# Patient Record
Sex: MALE | Race: WHITE | Employment: OTHER | URBAN - NONMETROPOLITAN AREA
[De-identification: names, ages, dates, MRNs, and addresses within clinical notes are randomized per-mention and may not be internally consistent; named-entity substitution may affect disease eponyms.]

---

## 2017-01-20 DIAGNOSIS — E11.9 TYPE 2 DIABETES MELLITUS WITHOUT COMPLICATION, WITHOUT LONG-TERM CURRENT USE OF INSULIN (HCC): Primary | ICD-10-CM

## 2017-02-02 ENCOUNTER — OFFICE VISIT (OUTPATIENT)
Dept: ONCOLOGY | Age: 62
End: 2017-02-02

## 2017-02-02 VITALS
SYSTOLIC BLOOD PRESSURE: 148 MMHG | WEIGHT: 235 LBS | HEIGHT: 71 IN | HEART RATE: 74 BPM | DIASTOLIC BLOOD PRESSURE: 68 MMHG | TEMPERATURE: 96.8 F | BODY MASS INDEX: 32.9 KG/M2

## 2017-02-02 DIAGNOSIS — D69.6 THROMBOCYTOPENIA (HCC): Primary | ICD-10-CM

## 2017-02-02 PROCEDURE — 99214 OFFICE O/P EST MOD 30 MIN: CPT | Performed by: INTERNAL MEDICINE

## 2017-02-06 ENCOUNTER — OFFICE VISIT (OUTPATIENT)
Dept: FAMILY MEDICINE CLINIC | Age: 62
End: 2017-02-06

## 2017-02-06 VITALS
BODY MASS INDEX: 32.9 KG/M2 | HEIGHT: 71 IN | SYSTOLIC BLOOD PRESSURE: 150 MMHG | DIASTOLIC BLOOD PRESSURE: 56 MMHG | HEART RATE: 60 BPM | WEIGHT: 235 LBS | RESPIRATION RATE: 16 BRPM

## 2017-02-06 DIAGNOSIS — I10 ESSENTIAL HYPERTENSION: ICD-10-CM

## 2017-02-06 DIAGNOSIS — Z13.9 SCREENING: Primary | ICD-10-CM

## 2017-02-06 PROCEDURE — 99214 OFFICE O/P EST MOD 30 MIN: CPT | Performed by: PHYSICIAN ASSISTANT

## 2017-02-06 RX ORDER — AMLODIPINE BESYLATE 10 MG/1
10 TABLET ORAL DAILY
Qty: 30 TABLET | Refills: 5 | Status: SHIPPED | OUTPATIENT
Start: 2017-02-06 | End: 2017-08-25 | Stop reason: SDUPTHER

## 2017-02-06 RX ORDER — CARVEDILOL 25 MG/1
25 TABLET ORAL 2 TIMES DAILY
Qty: 60 TABLET | Refills: 5 | Status: SHIPPED | OUTPATIENT
Start: 2017-02-06 | End: 2017-08-23 | Stop reason: SDUPTHER

## 2017-02-06 ASSESSMENT — ENCOUNTER SYMPTOMS
COUGH: 0
SHORTNESS OF BREATH: 0
BACK PAIN: 1

## 2017-02-12 ASSESSMENT — ENCOUNTER SYMPTOMS
CHEST TIGHTNESS: 0
NAUSEA: 0
DIARRHEA: 0
TROUBLE SWALLOWING: 0
VOICE CHANGE: 0
VOMITING: 0
CONSTIPATION: 0
SORE THROAT: 0
WHEEZING: 0

## 2017-03-06 ENCOUNTER — HOSPITAL ENCOUNTER (OUTPATIENT)
Age: 62
Setting detail: SPECIMEN
Discharge: HOME | End: 2017-03-06
Payer: COMMERCIAL

## 2017-03-06 LAB
HEPATITIS C ANTIBODY: NONREACTIVE
HIV AG/AB: NONREACTIVE

## 2017-03-07 ENCOUNTER — OFFICE VISIT (OUTPATIENT)
Dept: FAMILY MEDICINE CLINIC | Age: 62
End: 2017-03-07

## 2017-03-07 VITALS
SYSTOLIC BLOOD PRESSURE: 140 MMHG | HEART RATE: 80 BPM | RESPIRATION RATE: 18 BRPM | BODY MASS INDEX: 32.34 KG/M2 | WEIGHT: 231 LBS | DIASTOLIC BLOOD PRESSURE: 66 MMHG | HEIGHT: 71 IN

## 2017-03-07 DIAGNOSIS — I35.1 NONRHEUMATIC AORTIC VALVE INSUFFICIENCY: ICD-10-CM

## 2017-03-07 DIAGNOSIS — I10 ESSENTIAL HYPERTENSION: Primary | ICD-10-CM

## 2017-03-07 PROCEDURE — 99213 OFFICE O/P EST LOW 20 MIN: CPT | Performed by: PHYSICIAN ASSISTANT

## 2017-03-07 ASSESSMENT — ENCOUNTER SYMPTOMS
SHORTNESS OF BREATH: 0
SINUS PRESSURE: 0
COUGH: 0

## 2017-03-12 ASSESSMENT — ENCOUNTER SYMPTOMS
VOMITING: 0
CHEST TIGHTNESS: 0
DIARRHEA: 0
CONSTIPATION: 0
NAUSEA: 0

## 2017-05-11 DIAGNOSIS — I10 ESSENTIAL HYPERTENSION: Primary | ICD-10-CM

## 2017-05-11 DIAGNOSIS — E11.9 TYPE 2 DIABETES MELLITUS WITHOUT COMPLICATION, WITHOUT LONG-TERM CURRENT USE OF INSULIN (HCC): ICD-10-CM

## 2017-05-11 RX ORDER — LOSARTAN POTASSIUM AND HYDROCHLOROTHIAZIDE 25; 100 MG/1; MG/1
1 TABLET ORAL DAILY
Qty: 30 TABLET | Refills: 5 | Status: SHIPPED | OUTPATIENT
Start: 2017-05-11 | End: 2017-11-28 | Stop reason: SDUPTHER

## 2017-06-29 ENCOUNTER — HOSPITAL ENCOUNTER (OUTPATIENT)
Age: 62
Setting detail: SPECIMEN
Discharge: HOME | End: 2017-06-29
Payer: COMMERCIAL

## 2017-06-29 ENCOUNTER — OFFICE VISIT (OUTPATIENT)
Dept: FAMILY MEDICINE CLINIC | Age: 62
End: 2017-06-29
Payer: COMMERCIAL

## 2017-06-29 VITALS
RESPIRATION RATE: 16 BRPM | OXYGEN SATURATION: 97 % | HEIGHT: 71 IN | HEART RATE: 61 BPM | DIASTOLIC BLOOD PRESSURE: 64 MMHG | BODY MASS INDEX: 32.9 KG/M2 | SYSTOLIC BLOOD PRESSURE: 132 MMHG | WEIGHT: 235 LBS

## 2017-06-29 DIAGNOSIS — N52.9 ERECTILE DYSFUNCTION, UNSPECIFIED ERECTILE DYSFUNCTION TYPE: ICD-10-CM

## 2017-06-29 DIAGNOSIS — I10 ESSENTIAL HYPERTENSION: ICD-10-CM

## 2017-06-29 DIAGNOSIS — Q23.1 BICUSPID AORTIC VALVE: ICD-10-CM

## 2017-06-29 DIAGNOSIS — H61.22 CERUMINOSIS, LEFT: ICD-10-CM

## 2017-06-29 DIAGNOSIS — L98.9 SKIN LESION: ICD-10-CM

## 2017-06-29 DIAGNOSIS — B85.1 BODY LICE INFESTATION: ICD-10-CM

## 2017-06-29 DIAGNOSIS — R26.9 ABNORMAL GAIT: Primary | ICD-10-CM

## 2017-06-29 DIAGNOSIS — I35.1 NONRHEUMATIC AORTIC VALVE INSUFFICIENCY: ICD-10-CM

## 2017-06-29 LAB
SEX HORMONE BINDING GLOBULIN: 46 NMOL/L (ref 11–80)
TESTOSTERONE FREE-NONMALE: 58.2 PG/ML (ref 47–244)
TESTOSTERONE TOTAL: 359 NG/DL (ref 220–1000)
TESTOSTERONE, BIOAVAILABLE: 136.3 NG/DL (ref 130–680)

## 2017-06-29 PROCEDURE — 99215 OFFICE O/P EST HI 40 MIN: CPT | Performed by: PHYSICIAN ASSISTANT

## 2017-06-29 PROCEDURE — 69209 REMOVE IMPACTED EAR WAX UNI: CPT | Performed by: PHYSICIAN ASSISTANT

## 2017-06-29 RX ORDER — PERMETHRIN 50 MG/G
CREAM TOPICAL
Qty: 60 G | Refills: 1 | Status: SHIPPED | OUTPATIENT
Start: 2017-06-29 | End: 2017-07-10 | Stop reason: ALTCHOICE

## 2017-06-29 ASSESSMENT — PATIENT HEALTH QUESTIONNAIRE - PHQ9
2. FEELING DOWN, DEPRESSED OR HOPELESS: 0
SUM OF ALL RESPONSES TO PHQ9 QUESTIONS 1 & 2: 0
SUM OF ALL RESPONSES TO PHQ QUESTIONS 1-9: 0
1. LITTLE INTEREST OR PLEASURE IN DOING THINGS: 0

## 2017-07-05 ASSESSMENT — ENCOUNTER SYMPTOMS
TROUBLE SWALLOWING: 0
CONSTIPATION: 0
NAUSEA: 0
SHORTNESS OF BREATH: 0
CHEST TIGHTNESS: 0
VOMITING: 0
COUGH: 0
WHEEZING: 0
DIARRHEA: 0
SORE THROAT: 0

## 2017-07-10 ENCOUNTER — OFFICE VISIT (OUTPATIENT)
Dept: ONCOLOGY | Age: 62
End: 2017-07-10
Payer: COMMERCIAL

## 2017-07-10 ENCOUNTER — EVALUATION (OUTPATIENT)
Dept: PHYSICAL THERAPY | Age: 62
End: 2017-07-10
Payer: COMMERCIAL

## 2017-07-10 ENCOUNTER — PATIENT MESSAGE (OUTPATIENT)
Dept: FAMILY MEDICINE CLINIC | Age: 62
End: 2017-07-10

## 2017-07-10 VITALS
DIASTOLIC BLOOD PRESSURE: 70 MMHG | HEIGHT: 71 IN | WEIGHT: 234.8 LBS | HEART RATE: 68 BPM | SYSTOLIC BLOOD PRESSURE: 138 MMHG | BODY MASS INDEX: 32.87 KG/M2 | TEMPERATURE: 97.2 F

## 2017-07-10 DIAGNOSIS — R26.9 ABNORMALITY OF GAIT: Primary | ICD-10-CM

## 2017-07-10 DIAGNOSIS — D69.6 THROMBOCYTOPENIA (HCC): Primary | ICD-10-CM

## 2017-07-10 DIAGNOSIS — K12.0 APHTHOUS ULCER: Primary | ICD-10-CM

## 2017-07-10 PROCEDURE — 97161 PT EVAL LOW COMPLEX 20 MIN: CPT | Performed by: PHYSICAL THERAPIST

## 2017-07-10 PROCEDURE — 99214 OFFICE O/P EST MOD 30 MIN: CPT | Performed by: INTERNAL MEDICINE

## 2017-07-10 PROCEDURE — 97110 THERAPEUTIC EXERCISES: CPT | Performed by: PHYSICAL THERAPIST

## 2017-07-10 RX ORDER — VALACYCLOVIR HYDROCHLORIDE 1 G/1
1000 TABLET, FILM COATED ORAL 3 TIMES DAILY
Qty: 21 TABLET | Refills: 0 | Status: SHIPPED | OUTPATIENT
Start: 2017-07-10 | End: 2017-07-17

## 2017-07-21 ENCOUNTER — TREATMENT (OUTPATIENT)
Dept: PHYSICAL THERAPY | Age: 62
End: 2017-07-21
Payer: COMMERCIAL

## 2017-07-21 DIAGNOSIS — R26.9 ABNORMALITY OF GAIT: Primary | ICD-10-CM

## 2017-07-21 PROCEDURE — 97116 GAIT TRAINING THERAPY: CPT | Performed by: PHYSICAL THERAPIST

## 2017-08-15 DIAGNOSIS — M79.671 RIGHT FOOT PAIN: ICD-10-CM

## 2017-08-15 RX ORDER — MELOXICAM 7.5 MG/1
7.5 TABLET ORAL DAILY
Qty: 30 TABLET | Refills: 3 | Status: SHIPPED | OUTPATIENT
Start: 2017-08-15 | End: 2017-12-06 | Stop reason: SDUPTHER

## 2017-08-23 DIAGNOSIS — I10 ESSENTIAL HYPERTENSION: ICD-10-CM

## 2017-08-24 RX ORDER — CARVEDILOL 25 MG/1
25 TABLET ORAL 2 TIMES DAILY
Qty: 60 TABLET | Refills: 5 | Status: SHIPPED | OUTPATIENT
Start: 2017-08-24 | End: 2017-12-06 | Stop reason: SDUPTHER

## 2017-08-25 DIAGNOSIS — I10 ESSENTIAL HYPERTENSION: ICD-10-CM

## 2017-08-29 RX ORDER — AMLODIPINE BESYLATE 10 MG/1
10 TABLET ORAL DAILY
Qty: 30 TABLET | Refills: 5 | Status: SHIPPED | OUTPATIENT
Start: 2017-08-29 | End: 2017-12-06 | Stop reason: SDUPTHER

## 2017-09-05 ENCOUNTER — HOSPITAL ENCOUNTER (OUTPATIENT)
Dept: NON INVASIVE DIAGNOSTICS | Age: 62
Discharge: HOME | End: 2017-09-05
Payer: COMMERCIAL

## 2017-09-05 ENCOUNTER — TELEPHONE (OUTPATIENT)
Dept: CARDIOLOGY | Age: 62
End: 2017-09-05

## 2017-09-05 DIAGNOSIS — Q23.1 AORTIC REGURGITATION DUE TO BICUSPID AORTIC VALVE: ICD-10-CM

## 2017-09-05 DIAGNOSIS — Q23.1 BICUSPID AORTIC VALVE: ICD-10-CM

## 2017-09-05 DIAGNOSIS — I38 MURMUR, DIASTOLIC: ICD-10-CM

## 2017-09-05 DIAGNOSIS — I10 ESSENTIAL HYPERTENSION: ICD-10-CM

## 2017-09-05 PROCEDURE — 93306 TTE W/DOPPLER COMPLETE: CPT

## 2017-09-11 ENCOUNTER — OFFICE VISIT (OUTPATIENT)
Dept: DERMATOLOGY | Age: 62
End: 2017-09-11
Payer: COMMERCIAL

## 2017-09-11 VITALS
HEART RATE: 62 BPM | BODY MASS INDEX: 32.48 KG/M2 | DIASTOLIC BLOOD PRESSURE: 70 MMHG | HEIGHT: 71 IN | WEIGHT: 232 LBS | SYSTOLIC BLOOD PRESSURE: 138 MMHG | RESPIRATION RATE: 14 BRPM

## 2017-09-11 DIAGNOSIS — L57.0 ACTINIC KERATOSIS: Primary | ICD-10-CM

## 2017-09-11 PROCEDURE — 17000 DESTRUCT PREMALG LESION: CPT | Performed by: DERMATOLOGY

## 2017-09-11 PROCEDURE — 99202 OFFICE O/P NEW SF 15 MIN: CPT | Performed by: DERMATOLOGY

## 2017-09-11 RX ORDER — FLUOROURACIL 50 MG/G
CREAM TOPICAL
Qty: 40 G | Refills: 0 | Status: SHIPPED | OUTPATIENT
Start: 2017-09-11 | End: 2018-03-12 | Stop reason: ALTCHOICE

## 2017-09-12 ENCOUNTER — OFFICE VISIT (OUTPATIENT)
Dept: CARDIOLOGY | Age: 62
End: 2017-09-12
Payer: COMMERCIAL

## 2017-09-12 VITALS
HEART RATE: 61 BPM | SYSTOLIC BLOOD PRESSURE: 136 MMHG | DIASTOLIC BLOOD PRESSURE: 64 MMHG | HEIGHT: 70 IN | BODY MASS INDEX: 33.07 KG/M2 | WEIGHT: 231 LBS

## 2017-09-12 DIAGNOSIS — I38 MURMUR, DIASTOLIC: ICD-10-CM

## 2017-09-12 DIAGNOSIS — Q23.1 AORTIC REGURGITATION DUE TO BICUSPID AORTIC VALVE: ICD-10-CM

## 2017-09-12 DIAGNOSIS — I10 ESSENTIAL HYPERTENSION: Primary | ICD-10-CM

## 2017-09-12 DIAGNOSIS — Q23.1 BICUSPID AORTIC VALVE: ICD-10-CM

## 2017-09-12 PROCEDURE — 93000 ELECTROCARDIOGRAM COMPLETE: CPT | Performed by: INTERNAL MEDICINE

## 2017-09-12 PROCEDURE — 99213 OFFICE O/P EST LOW 20 MIN: CPT | Performed by: INTERNAL MEDICINE

## 2017-09-27 ENCOUNTER — OFFICE VISIT (OUTPATIENT)
Dept: FAMILY MEDICINE CLINIC | Age: 62
End: 2017-09-27
Payer: COMMERCIAL

## 2017-09-27 ENCOUNTER — HOSPITAL ENCOUNTER (OUTPATIENT)
Dept: GENERAL RADIOLOGY | Age: 62
Discharge: HOME | End: 2017-09-27
Payer: COMMERCIAL

## 2017-09-27 VITALS
SYSTOLIC BLOOD PRESSURE: 128 MMHG | DIASTOLIC BLOOD PRESSURE: 60 MMHG | RESPIRATION RATE: 16 BRPM | OXYGEN SATURATION: 98 % | HEART RATE: 64 BPM | HEIGHT: 71 IN | WEIGHT: 228 LBS | BODY MASS INDEX: 31.92 KG/M2

## 2017-09-27 DIAGNOSIS — M25.512 ACUTE PAIN OF LEFT SHOULDER: Primary | ICD-10-CM

## 2017-09-27 DIAGNOSIS — E11.9 TYPE 2 DIABETES MELLITUS WITHOUT COMPLICATION, WITHOUT LONG-TERM CURRENT USE OF INSULIN (HCC): ICD-10-CM

## 2017-09-27 DIAGNOSIS — E78.2 MIXED HYPERLIPIDEMIA: ICD-10-CM

## 2017-09-27 DIAGNOSIS — M25.512 ACUTE PAIN OF LEFT SHOULDER: ICD-10-CM

## 2017-09-27 DIAGNOSIS — Z23 NEED FOR VACCINATION: ICD-10-CM

## 2017-09-27 DIAGNOSIS — I10 ESSENTIAL HYPERTENSION: ICD-10-CM

## 2017-09-27 PROCEDURE — 73030 X-RAY EXAM OF SHOULDER: CPT

## 2017-09-27 PROCEDURE — 90471 IMMUNIZATION ADMIN: CPT | Performed by: PHYSICIAN ASSISTANT

## 2017-09-27 PROCEDURE — 99215 OFFICE O/P EST HI 40 MIN: CPT | Performed by: PHYSICIAN ASSISTANT

## 2017-09-27 PROCEDURE — 90670 PCV13 VACCINE IM: CPT | Performed by: PHYSICIAN ASSISTANT

## 2017-09-27 ASSESSMENT — ENCOUNTER SYMPTOMS
RESPIRATORY NEGATIVE: 1
GASTROINTESTINAL NEGATIVE: 1

## 2017-09-27 NOTE — PROGRESS NOTES
has no wheezes. He has no rales. Abdominal: Soft. Bowel sounds are normal. He exhibits no distension and no mass. There is no tenderness. There is no rebound and no guarding. Abdomen is obese. Musculoskeletal: Normal range of motion. He exhibits tenderness. He exhibits no edema. He had pain with palpation over the left AC joint. Slight pain in the bicipital groove. No pain with palpation over the deltoid. Pain noted in the left shoulder with internal and external rotation. Pain with empty pop can method at 90 and 30 degrees. Nl  strength and no pain with . Sensation nl upper extremities bilaterally. Lymphadenopathy:     He has no cervical adenopathy. Neurological: He is alert and oriented to person, place, and time. Skin: Skin is warm and dry. No rash noted. Psychiatric: He has a normal mood and affect. His behavior is normal. Judgment and thought content normal.   Nursing note and vitals reviewed. Wt Readings from Last 3 Encounters:   09/27/17 228 lb (103.4 kg)   09/12/17 231 lb (104.8 kg)   09/11/17 232 lb (105.2 kg)   Xr Shoulder Left (min 2 Views)    Result Date: 9/27/2017  EXAMINATION: 3 VIEWS OF THE LEFT SHOULDER 9/27/2017 12:46 pm COMPARISON: None. HISTORY: ORDERING SYSTEM PROVIDED HISTORY: Acute pain of left shoulder TECHNOLOGIST PROVIDED HISTORY: Reason for exam:->left shoulder pain 3 months Ordering Physician Provided Reason for Exam: No injury; states posterior left shoulder pain for about 3 months Acuity: Acute Type of Exam: Initial 58-year-old male with acute left posterior left shoulder pain for 3 months FINDINGS: Visualized left-sided ribs appear grossly intact. Mild degenerative changes of the left AC joint. Left glenohumeral joint grossly unremarkable. No acute fracture or gross dislocation. Atherosclerotic calcification of the thoracic aorta. 1. Mild degenerative changes of the left AC joint. 2. No acute fracture or gross dislocation.        Assessment: 1. Acute pain of left shoulder  XR SHOULDER LEFT (MIN 2 VIEWS)   2. Mixed hyperlipidemia  Comprehensive Metabolic Panel    Lipid Panel    Hemoglobin A1C    Microalbumin, Ur   3. Essential hypertension  Comprehensive Metabolic Panel    Lipid Panel    Hemoglobin A1C    Microalbumin, Ur   4. Type 2 diabetes mellitus without complication, without long-term current use of insulin (HCC)  Comprehensive Metabolic Panel    Lipid Panel    Hemoglobin A1C    Microalbumin, Ur   5. Need for vaccination  Pneumococcal conjugate vaccine 13-valent           Plan:      Wants 90 supply on all meds  He will be notified of all results  Further treatment based on results  Answered all of his questions  Lifestyle changes  Restart zyrtec for allergy symptoms  For his shoulder pain ice after baton practice then heat  nsaids prn pain Mobic  If not improving to see Orthopedics  Biofreeze to shoulder  Follow up with cardiology as scheduled  Due for fasting labs 12/17  Orders given to patient  For pedal edema push fluids avoid sodium elevate legs  Stop up at Podiatry and get his inserts replaced   Monitor foot pain    Total time spent face to face with patient in the office discussing medical issues, reviewing test reports and discussing treatment options was 40 minutes  and greater than 50 % of my time was spent counseling patient. Marlen Ramsey received counseling on the following healthy behaviors: nutrition, exercise and medication adherence    Patient given educational materials on Diabetes, Hyperlipidemia, Nutrition, Exercise and Hypertension    I have instructed Marlen Ramsey to complete a self tracking handout on Blood Sugars , Blood Pressures  and Weights and instructed them to bring it with them to his next appointment. Discussed use, benefit, and side effects of prescribed medications. Barriers to medication compliance addressed. All patient questions answered. Pt voiced understanding.

## 2017-09-27 NOTE — PROGRESS NOTES
Visit Information    Have you changed or started any medications since your last visit including any over-the-counter medicines, vitamins, or herbal medicines? no   Are you having any side effects from any of your medications? -  no  Have you stopped taking any of your medications? Is so, why? -  no    Have you seen any other physician or provider since your last visit? No  Have you had any other diagnostic tests since your last visit? No  Have you been seen in the emergency room and/or had an admission to a hospital since we last saw you? No  Have you had your routine dental cleaning in the past 6 months? no    Have you activated your [employer] account? If not, what are your barriers?  Yes     Patient Care Team:  PING Middleton as PCP - General    Medical History Review  Past Medical, Family, and Social History reviewed and does contribute to the patient presenting condition    Health Maintenance   Topic Date Due    Diabetic foot exam  12/31/2017 (Originally 5/6/1965)    DTaP/Tdap/Td vaccine (1 - Tdap) 12/31/2017 (Originally 5/6/1974)    Flu vaccine (1) 12/31/2017 (Originally 9/1/2017)    Pneumococcal med risk (1 of 1 - PPSV23) 12/31/2017 (Originally [birth year]1974)    Diabetic microalbuminuria test  10/14/2017    Lipid screen  01/18/2018    Diabetic retinal exam  06/22/2018    Diabetic hemoglobin A1C test  06/29/2018    Colon cancer screen colonoscopy  02/17/2026    Zostavax vaccine  Completed    Hepatitis C screen  Addressed    HIV screen  Addressed

## 2017-11-28 DIAGNOSIS — I10 ESSENTIAL HYPERTENSION: ICD-10-CM

## 2017-11-28 RX ORDER — LOSARTAN POTASSIUM AND HYDROCHLOROTHIAZIDE 25; 100 MG/1; MG/1
1 TABLET ORAL DAILY
Qty: 90 TABLET | Refills: 1 | Status: SHIPPED | OUTPATIENT
Start: 2017-11-28 | End: 2018-06-11 | Stop reason: SDUPTHER

## 2017-11-28 NOTE — TELEPHONE ENCOUNTER
From: Jenni Cade  Sent: 11/28/2017 11:11 AM EST  Subject: Medication Renewal Request    Jenni Rayo would like a refill of the following medications:  losartan-hydrochlorothiazide (HYZAAR) 100-25 MG per tablet PING Ribeiro]    Preferred pharmacy: [facility], [address] [place], [place] [phone] - F [phone]    Comment:  I had asked at my last Dr appointment if all my new renewals could be 3 month and the Dr said yes. ..  please verify before sending to [facility]

## 2017-12-04 ENCOUNTER — TELEPHONE (OUTPATIENT)
Dept: FAMILY MEDICINE CLINIC | Age: 62
End: 2017-12-04

## 2017-12-06 DIAGNOSIS — E11.9 TYPE 2 DIABETES MELLITUS WITHOUT COMPLICATION, WITHOUT LONG-TERM CURRENT USE OF INSULIN (HCC): ICD-10-CM

## 2017-12-06 DIAGNOSIS — M79.671 RIGHT FOOT PAIN: ICD-10-CM

## 2017-12-06 DIAGNOSIS — I10 ESSENTIAL HYPERTENSION: ICD-10-CM

## 2017-12-06 RX ORDER — MELOXICAM 7.5 MG/1
7.5 TABLET ORAL DAILY
Qty: 90 TABLET | Refills: 1 | Status: SHIPPED | OUTPATIENT
Start: 2017-12-06 | End: 2018-10-18 | Stop reason: SDUPTHER

## 2017-12-06 RX ORDER — CARVEDILOL 25 MG/1
25 TABLET ORAL 2 TIMES DAILY
Qty: 180 TABLET | Refills: 1 | Status: SHIPPED | OUTPATIENT
Start: 2017-12-06 | End: 2018-06-11 | Stop reason: SDUPTHER

## 2017-12-06 RX ORDER — AMLODIPINE BESYLATE 10 MG/1
10 TABLET ORAL DAILY
Qty: 90 TABLET | Refills: 1 | Status: SHIPPED | OUTPATIENT
Start: 2017-12-06 | End: 2018-06-11 | Stop reason: SDUPTHER

## 2017-12-06 NOTE — TELEPHONE ENCOUNTER
From: Shelby Cook  Sent: 12/5/2017 10:14 PM EST  Subject: Medication Renewal Request    Shelby Cook would like a refill of the following medications:  metFORMIN (GLUCOPHAGE) 500 MG tablet Nonah Spurling, PA]    Preferred pharmacy: [facility], [address] [place] [address] [phone] - F [phone]    Comment:  please refill my meds. ... when I had my last appointment I asked if my med could be all now 3 months and she said yes

## 2018-01-16 ENCOUNTER — OFFICE VISIT (OUTPATIENT)
Dept: PRIMARY CARE CLINIC | Age: 63
End: 2018-01-16
Payer: COMMERCIAL

## 2018-01-16 VITALS
DIASTOLIC BLOOD PRESSURE: 70 MMHG | TEMPERATURE: 97.5 F | BODY MASS INDEX: 30.96 KG/M2 | SYSTOLIC BLOOD PRESSURE: 118 MMHG | OXYGEN SATURATION: 98 % | HEART RATE: 68 BPM | WEIGHT: 222 LBS

## 2018-01-16 DIAGNOSIS — J11.1 INFLUENZA-LIKE ILLNESS: Primary | ICD-10-CM

## 2018-01-16 PROCEDURE — 99213 OFFICE O/P EST LOW 20 MIN: CPT | Performed by: NURSE PRACTITIONER

## 2018-01-16 PROCEDURE — G8484 FLU IMMUNIZE NO ADMIN: HCPCS | Performed by: NURSE PRACTITIONER

## 2018-01-16 PROCEDURE — G8417 CALC BMI ABV UP PARAM F/U: HCPCS | Performed by: NURSE PRACTITIONER

## 2018-01-16 PROCEDURE — 3017F COLORECTAL CA SCREEN DOC REV: CPT | Performed by: NURSE PRACTITIONER

## 2018-01-16 PROCEDURE — G8427 DOCREV CUR MEDS BY ELIG CLIN: HCPCS | Performed by: NURSE PRACTITIONER

## 2018-01-16 PROCEDURE — 1036F TOBACCO NON-USER: CPT | Performed by: NURSE PRACTITIONER

## 2018-01-16 RX ORDER — FLUTICASONE PROPIONATE 50 MCG
1 SPRAY, SUSPENSION (ML) NASAL DAILY
Qty: 1 BOTTLE | Refills: 3 | Status: SHIPPED | OUTPATIENT
Start: 2018-01-16 | End: 2021-03-25 | Stop reason: SDUPTHER

## 2018-01-16 ASSESSMENT — ENCOUNTER SYMPTOMS
RHINORRHEA: 0
COUGH: 1
SORE THROAT: 0
VOICE CHANGE: 1
SINUS PRESSURE: 1
WHEEZING: 1
SHORTNESS OF BREATH: 0

## 2018-01-16 NOTE — PROGRESS NOTES
[facility]             [address] [facility], [place], [address]                        Telephone [phone]             Fax [phone]     Mahendra Urena  1955  MRN:  [id number]   Date of visit:  1/16/2018    Subjective:    Mahendra Urena is a 58 y.o.  male who presents to [facility] today (1/16/2018) for evaluation of:    Chief Complaint   Patient presents with    Cough     started Thursday, lethargic, body aches, decreased appetite       Cough   This is a new problem. The current episode started in the past 7 days (X 6 days ago). The problem has been unchanged. The cough is productive of sputum. Associated symptoms include chills, ear congestion, myalgias, nasal congestion and wheezing. Pertinent negatives include no fever, headaches, postnasal drip, rash, rhinorrhea, sore throat or shortness of breath. Treatments tried: coricidin HBP, dayquil, nyquil. The treatment provided mild relief.        He has the following problem list:  Patient Active Problem List   Diagnosis    Aortic regurgitation due to bicuspid aortic valve    Hypertension    Dental abscess    Murmur, diastolic    HTN (hypertension)    Hallux valgus, acquired    Capsulitis of foot    Left foot pain    Left knee pain    Type 2 diabetes mellitus without complication ([facility]    Bicuspid aortic valve    Abnormality of gait        Current medications are:  Current Outpatient Prescriptions   Medication Sig Dispense Refill    fluticasone (FLONASE) 50 MCG/ACT nasal spray 1 spray by Nasal route daily 1 Bottle 3    metFORMIN (GLUCOPHAGE) 500 MG tablet Take 1 tablet by mouth 2 times daily (with meals) 180 tablet 1    amLODIPine (NORVASC) 10 MG tablet Take 1 tablet by mouth daily 90 tablet 1    carvedilol (COREG) 25 MG tablet Take 1 tablet by mouth 2 times daily 180 tablet 1    meloxicam (MOBIC) 7.5 MG tablet Take 1 tablet by mouth daily 90 tablet round, and reactive to light. Neck: Normal range of motion. Neck supple. Cardiovascular: Normal rate, regular rhythm and normal heart sounds. Pulmonary/Chest: Effort normal and breath sounds normal.   Lymphadenopathy:     He has no cervical adenopathy. Neurological: He is alert and oriented to person, place, and time. Skin: Skin is warm and dry. Psychiatric: He has a normal mood and affect. His behavior is normal. Thought content normal.   Nursing note and vitals reviewed. Assessment and Plan:    1. Influenza-like illness  fluticasone (FLONASE) 50 MCG/ACT nasal spray     he has a viral respiratory infection so I recommended that he use coricidin HBP to help with congestion and cough. I also recommended Flonase and an antihistamine for sinus symptoms. he was also encouraged to use tylenol or ibuprofen for fever. Increase water intake. Use cool mist humidifier at bedtime. Use nasal saline flush as needed. Good hand hygiene. he was instructed to return if there is no improvement or symptoms worsen.        Electronically signed by Shen Millard NP on 1/16/18 at 12:37 PM

## 2018-01-18 DIAGNOSIS — J30.2 OTHER SEASONAL ALLERGIC RHINITIS: ICD-10-CM

## 2018-01-18 RX ORDER — CETIRIZINE HYDROCHLORIDE 10 MG/1
TABLET ORAL
Qty: 30 TABLET | Refills: 5 | Status: SHIPPED | OUTPATIENT
Start: 2018-01-18 | End: 2019-02-04 | Stop reason: SDUPTHER

## 2018-01-18 NOTE — LETTER
Joe Berry, [address] [facility]  [place] [id number]  Dept: [phone]  Loc: [phone]          January 18, 2018     Jordan Barr   [address] Johnsonr Ngozi Melton: In addition to helping you feel better when you are sick, we are interested in preventing illness and injury in the first place. In the spirit of maintaining your good health, our system indicates that you are due for the following: Follow up visit. Please call us to make an appointment at your earliest convenience. I look forward to seeing you soon.     Sincerely,         PING Reardon

## 2018-02-05 ENCOUNTER — OFFICE VISIT (OUTPATIENT)
Dept: ONCOLOGY | Age: 63
End: 2018-02-05
Payer: COMMERCIAL

## 2018-02-05 VITALS
WEIGHT: 228 LBS | SYSTOLIC BLOOD PRESSURE: 137 MMHG | DIASTOLIC BLOOD PRESSURE: 74 MMHG | TEMPERATURE: 98.7 F | HEIGHT: 71 IN | BODY MASS INDEX: 31.92 KG/M2 | HEART RATE: 63 BPM

## 2018-02-05 DIAGNOSIS — D69.6 THROMBOCYTOPENIA (HCC): Primary | ICD-10-CM

## 2018-02-05 PROCEDURE — 1036F TOBACCO NON-USER: CPT | Performed by: INTERNAL MEDICINE

## 2018-02-05 PROCEDURE — 3017F COLORECTAL CA SCREEN DOC REV: CPT | Performed by: INTERNAL MEDICINE

## 2018-02-05 PROCEDURE — G8484 FLU IMMUNIZE NO ADMIN: HCPCS | Performed by: INTERNAL MEDICINE

## 2018-02-05 PROCEDURE — 99214 OFFICE O/P EST MOD 30 MIN: CPT | Performed by: INTERNAL MEDICINE

## 2018-02-05 PROCEDURE — G8427 DOCREV CUR MEDS BY ELIG CLIN: HCPCS | Performed by: INTERNAL MEDICINE

## 2018-02-05 PROCEDURE — G8417 CALC BMI ABV UP PARAM F/U: HCPCS | Performed by: INTERNAL MEDICINE

## 2018-02-05 NOTE — PROGRESS NOTES
each 0    vitamin D3 (CHOLECALCIFEROL) 400 UNITS TABS tablet Take 400 Units by mouth daily      Omeprazole (PRILOSEC PO) Take 1 capsule by mouth daily as needed        No current facility-administered medications for this visit. REVIEW OF SYSTEM:     Constitutional: No fever or chills. No night sweats, no weight loss   Eyes: No eye discharge, double vision, or eye pain   HEENT: negative for sore mouth, sore throat, hoarseness and voice change   Respiratory: negative for cough , sputum, dyspnea, wheezing, hemoptysis, chest pain   Cardiovascular: negative for chest pain, dyspnea, palpitations, orthopnea, PND   Gastrointestinal: negative for nausea, vomiting, diarrhea, constipation, abdominal pain, Dysphagia, hematemesis and hematochezia   Genitourinary: negative for frequency, dysuria, nocturia, urinary incontinence, and hematuria   Integument: negative for rash, skin lesions, bruises.    Hematologic/Lymphatic: negative for easy bruising, bleeding, lymphadenopathy, petechiae and swelling/edema   Endocrine: negative for heat or cold intolerance, tremor, weight changes, change in bowel habits and hair loss   Musculoskeletal: negative for myalgias, arthralgias, pain, joint swelling,and bone pain   Neurological: negative for headaches, dizziness, seizures, weakness, numbness   OBJECTIVE:         Vitals:    02/05/18 1043   BP: 137/74   Pulse: 63   Temp: 98.7 °F (37.1 °C)       PHYSICAL EXAM:   General appearance - well appearing, no in pain or distress   Mental status - alert and cooperative   Eyes - pupils equal and reactive, extraocular eye movements intact   Ears - bilateral TM's and external ear canals normal   Mouth - mucous membranes moist, pharynx normal without lesions   Neck - supple, no significant adenopathy   Lymphatics - no palpable lymphadenopathy, no hepatosplenomegaly   Chest - clear to auscultation, no wheezes, rales or rhonchi, symmetric air entry   Heart - normal rate, regular rhythm, normal S1, S2, no murmurs, rubs, clicks or gallops   Abdomen - soft, nontender, nondistended, no masses or organomegaly   Neurological - alert, oriented, normal speech, no focal findings or movement disorder noted   Musculoskeletal - no joint tenderness, deformity or swelling   Extremities - peripheral pulses normal, no pedal edema, no clubbing or cyanosis   Skin - normal coloration and turgor, no rashes, no suspicious skin lesions noted   LABORATORY DATA:     Lab Results   Component Value Date    WBC 6.2 06/29/2017    HGB 13.5 06/29/2017    HCT 39.5 (L) 06/29/2017    MCV 91.6 06/29/2017     (L) 06/29/2017    LYMPHOPCT 30 06/29/2017    RBC 4.32 (L) 06/29/2017    MCH 31.3 06/29/2017    MCHC 34.1 06/29/2017    RDW 13.7 06/29/2017    MONOPCT 12 06/29/2017    BASOPCT 1 06/29/2017    NEUTROABS 3.50 06/29/2017    LYMPHSABS 1.90 06/29/2017    MONOSABS 0.70 06/29/2017    EOSABS 0.10 06/29/2017    BASOSABS 0.00 06/29/2017         Chemistry        Component Value Date/Time     06/29/2017 1145    K 4.1 06/29/2017 1145    CL 96 (L) 06/29/2017 1145    CO2 31 06/29/2017 1145    BUN 18 06/29/2017 1145    CREATININE 0.69 (L) 06/29/2017 1145        Component Value Date/Time    CALCIUM 9.9 06/29/2017 1145    ALKPHOS 33 (L) 06/29/2017 1145    AST 18 06/29/2017 1145    ALT 23 06/29/2017 1145    BILITOT 0.71 06/29/2017 1145      1/22/2017  5:23 PM - [facility], [facility]   Component Value Ref Range & Units Status   IgG Plt Ab Direct Negative Negative Final   (NOTE)   Platelet Associated Antibodies, Direct Assay was performed on a   suboptimal submission. The maximum 48 hour stability was exceeded   at the time of test analysis. Please interpret the results with   caution. IgM Plt Ab Direct Positive Negative Final   (NOTE)      PATHOLOGY DATA:   None  IMAGING DATA:    Reviewed  ASSESSMENT:    Mrs. 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   He denied any symptoms of bleeding. He does not have systemic symptoms such as night sweats, unintentional weight loss or fever chills. His platelet IgM antibody was positive in the past.  At this point there is no indication to initiate treatment as he is asymptomatic and his platelet counts have been stable. His ultrasound of the abdomen showed mild splenomegaly. If his platelet counts drops further I would consider bone marrow biopsy in near future.      PLAN:   Labs in few days  Return to clinic in 12 months with CBC or earlier if needed      Terese Schwarz  Hematology/Oncology

## 2018-03-12 ENCOUNTER — HOSPITAL ENCOUNTER (OUTPATIENT)
Dept: LAB | Age: 63
Setting detail: SPECIMEN
Discharge: HOME | End: 2018-03-12
Payer: COMMERCIAL

## 2018-03-12 ENCOUNTER — OFFICE VISIT (OUTPATIENT)
Dept: DERMATOLOGY | Age: 63
End: 2018-03-12
Payer: COMMERCIAL

## 2018-03-12 VITALS
HEART RATE: 64 BPM | RESPIRATION RATE: 14 BRPM | HEIGHT: 71 IN | SYSTOLIC BLOOD PRESSURE: 124 MMHG | BODY MASS INDEX: 32.48 KG/M2 | DIASTOLIC BLOOD PRESSURE: 62 MMHG | WEIGHT: 232 LBS

## 2018-03-12 DIAGNOSIS — I10 ESSENTIAL HYPERTENSION: ICD-10-CM

## 2018-03-12 DIAGNOSIS — E78.2 MIXED HYPERLIPIDEMIA: ICD-10-CM

## 2018-03-12 DIAGNOSIS — E11.9 TYPE 2 DIABETES MELLITUS WITHOUT COMPLICATION, WITHOUT LONG-TERM CURRENT USE OF INSULIN (HCC): ICD-10-CM

## 2018-03-12 DIAGNOSIS — D69.6 THROMBOCYTOPENIA (HCC): ICD-10-CM

## 2018-03-12 DIAGNOSIS — L57.0 KERATOSIS, ACTINIC: Primary | ICD-10-CM

## 2018-03-12 LAB
ABSOLUTE EOS #: 0.1 K/UL (ref 0–0.4)
ABSOLUTE IMMATURE GRANULOCYTE: ABNORMAL K/UL (ref 0–0.3)
ABSOLUTE LYMPH #: 2 K/UL (ref 1–4.8)
ABSOLUTE MONO #: 0.6 K/UL (ref 0.1–1.2)
ALBUMIN SERPL-MCNC: 4.4 G/DL (ref 3.5–5.2)
ALBUMIN/GLOBULIN RATIO: 1.4 (ref 1–2.5)
ALP BLD-CCNC: 32 U/L (ref 40–129)
ALT SERPL-CCNC: 16 U/L (ref 5–41)
ANION GAP SERPL CALCULATED.3IONS-SCNC: 12 MMOL/L (ref 9–17)
AST SERPL-CCNC: 16 U/L
BASOPHILS # BLD: 1 % (ref 0–2)
BASOPHILS ABSOLUTE: 0 K/UL (ref 0–0.2)
BILIRUB SERPL-MCNC: 0.68 MG/DL (ref 0.3–1.2)
BUN BLDV-MCNC: 19 MG/DL (ref 8–23)
BUN/CREAT BLD: 29 (ref 9–20)
CALCIUM SERPL-MCNC: 9.4 MG/DL (ref 8.6–10.4)
CHLORIDE BLD-SCNC: 100 MMOL/L (ref 98–107)
CHOLESTEROL/HDL RATIO: 3.6
CHOLESTEROL: 143 MG/DL
CO2: 30 MMOL/L (ref 20–31)
CREAT SERPL-MCNC: 0.66 MG/DL (ref 0.7–1.2)
CREATININE URINE: 156.6 MG/DL (ref 39–259)
DIFFERENTIAL TYPE: ABNORMAL
EOSINOPHILS RELATIVE PERCENT: 2 % (ref 1–8)
ESTIMATED AVERAGE GLUCOSE: 143 MG/DL
GFR AFRICAN AMERICAN: >60 ML/MIN
GFR NON-AFRICAN AMERICAN: >60 ML/MIN
GFR SERPL CREATININE-BSD FRML MDRD: ABNORMAL ML/MIN/{1.73_M2}
GFR SERPL CREATININE-BSD FRML MDRD: ABNORMAL ML/MIN/{1.73_M2}
GLUCOSE BLD-MCNC: 144 MG/DL (ref 70–99)
HBA1C MFR BLD: 6.6 % (ref 4.8–5.9)
HCT VFR BLD CALC: 41.6 % (ref 41–53)
HDLC SERPL-MCNC: 40 MG/DL
HEMOGLOBIN: 14.4 G/DL (ref 13.5–17.5)
IMMATURE GRANULOCYTES: ABNORMAL %
LDL CHOLESTEROL: 75 MG/DL (ref 0–130)
LYMPHOCYTES # BLD: 32 % (ref 15–43)
MCH RBC QN AUTO: 31.7 PG (ref 26–34)
MCHC RBC AUTO-ENTMCNC: 34.7 G/DL (ref 31–37)
MCV RBC AUTO: 91.4 FL (ref 80–100)
MICROALBUMIN/CREAT 24H UR: 13 MG/L
MICROALBUMIN/CREAT UR-RTO: 8 MCG/MG CREAT
MONOCYTES # BLD: 10 % (ref 6–14)
NRBC AUTOMATED: ABNORMAL PER 100 WBC
PDW BLD-RTO: 13.8 % (ref 11–14.5)
PLATELET # BLD: 137 K/UL (ref 140–450)
PLATELET ESTIMATE: ABNORMAL
PMV BLD AUTO: 8.8 FL (ref 6–12)
POTASSIUM SERPL-SCNC: 4.2 MMOL/L (ref 3.7–5.3)
RBC # BLD: 4.55 M/UL (ref 4.5–5.9)
RBC # BLD: ABNORMAL 10*6/UL
SEG NEUTROPHILS: 55 % (ref 44–74)
SEGMENTED NEUTROPHILS ABSOLUTE COUNT: 3.5 K/UL (ref 1.8–7.7)
SODIUM BLD-SCNC: 142 MMOL/L (ref 135–144)
TOTAL PROTEIN: 7.6 G/DL (ref 6.4–8.3)
TRIGL SERPL-MCNC: 141 MG/DL
VLDLC SERPL CALC-MCNC: ABNORMAL MG/DL (ref 1–30)
WBC # BLD: 6.3 K/UL (ref 3.5–11)
WBC # BLD: ABNORMAL 10*3/UL

## 2018-03-12 PROCEDURE — 85025 COMPLETE CBC W/AUTO DIFF WBC: CPT

## 2018-03-12 PROCEDURE — G8484 FLU IMMUNIZE NO ADMIN: HCPCS | Performed by: DERMATOLOGY

## 2018-03-12 PROCEDURE — 82043 UR ALBUMIN QUANTITATIVE: CPT

## 2018-03-12 PROCEDURE — 99213 OFFICE O/P EST LOW 20 MIN: CPT | Performed by: DERMATOLOGY

## 2018-03-12 PROCEDURE — G8427 DOCREV CUR MEDS BY ELIG CLIN: HCPCS | Performed by: DERMATOLOGY

## 2018-03-12 PROCEDURE — 3017F COLORECTAL CA SCREEN DOC REV: CPT | Performed by: DERMATOLOGY

## 2018-03-12 PROCEDURE — 82570 ASSAY OF URINE CREATININE: CPT

## 2018-03-12 PROCEDURE — 36415 COLL VENOUS BLD VENIPUNCTURE: CPT

## 2018-03-12 PROCEDURE — G8417 CALC BMI ABV UP PARAM F/U: HCPCS | Performed by: DERMATOLOGY

## 2018-03-12 PROCEDURE — 80053 COMPREHEN METABOLIC PANEL: CPT

## 2018-03-12 PROCEDURE — 1036F TOBACCO NON-USER: CPT | Performed by: DERMATOLOGY

## 2018-03-12 PROCEDURE — 83036 HEMOGLOBIN GLYCOSYLATED A1C: CPT

## 2018-03-12 PROCEDURE — 80061 LIPID PANEL: CPT

## 2018-03-19 ENCOUNTER — TELEPHONE (OUTPATIENT)
Dept: PODIATRY | Age: 63
End: 2018-03-19
Payer: COMMERCIAL

## 2018-03-19 ENCOUNTER — OFFICE VISIT (OUTPATIENT)
Dept: FAMILY MEDICINE CLINIC | Age: 63
End: 2018-03-19
Payer: COMMERCIAL

## 2018-03-19 VITALS
HEART RATE: 60 BPM | WEIGHT: 229 LBS | HEIGHT: 71 IN | BODY MASS INDEX: 32.06 KG/M2 | DIASTOLIC BLOOD PRESSURE: 64 MMHG | SYSTOLIC BLOOD PRESSURE: 136 MMHG | RESPIRATION RATE: 16 BRPM

## 2018-03-19 DIAGNOSIS — I35.1 AORTIC VALVE INSUFFICIENCY, ETIOLOGY OF CARDIAC VALVE DISEASE UNSPECIFIED: ICD-10-CM

## 2018-03-19 DIAGNOSIS — M79.673 PAIN OF FOOT, UNSPECIFIED LATERALITY: Primary | ICD-10-CM

## 2018-03-19 DIAGNOSIS — E11.9 TYPE 2 DIABETES MELLITUS WITHOUT COMPLICATION, WITHOUT LONG-TERM CURRENT USE OF INSULIN (HCC): ICD-10-CM

## 2018-03-19 DIAGNOSIS — I10 ESSENTIAL HYPERTENSION: ICD-10-CM

## 2018-03-19 DIAGNOSIS — Z86.010 HISTORY OF COLON POLYPS: Primary | ICD-10-CM

## 2018-03-19 DIAGNOSIS — E78.2 MIXED HYPERLIPIDEMIA: ICD-10-CM

## 2018-03-19 DIAGNOSIS — Q23.1 BICUSPID AORTIC VALVE: ICD-10-CM

## 2018-03-19 DIAGNOSIS — N52.9 ERECTILE DYSFUNCTION, UNSPECIFIED ERECTILE DYSFUNCTION TYPE: ICD-10-CM

## 2018-03-19 DIAGNOSIS — M79.672 LEFT FOOT PAIN: ICD-10-CM

## 2018-03-19 PROCEDURE — 99214 OFFICE O/P EST MOD 30 MIN: CPT | Performed by: PHYSICIAN ASSISTANT

## 2018-03-19 PROCEDURE — 3017F COLORECTAL CA SCREEN DOC REV: CPT | Performed by: PHYSICIAN ASSISTANT

## 2018-03-19 PROCEDURE — G8427 DOCREV CUR MEDS BY ELIG CLIN: HCPCS | Performed by: PHYSICIAN ASSISTANT

## 2018-03-19 PROCEDURE — 3044F HG A1C LEVEL LT 7.0%: CPT | Performed by: PHYSICIAN ASSISTANT

## 2018-03-19 PROCEDURE — G8417 CALC BMI ABV UP PARAM F/U: HCPCS | Performed by: PHYSICIAN ASSISTANT

## 2018-03-19 PROCEDURE — 1036F TOBACCO NON-USER: CPT | Performed by: PHYSICIAN ASSISTANT

## 2018-03-19 PROCEDURE — G8484 FLU IMMUNIZE NO ADMIN: HCPCS | Performed by: PHYSICIAN ASSISTANT

## 2018-03-19 PROCEDURE — L3040 FT ARCH SUPRT PREMOLD LONGIT: HCPCS | Performed by: PODIATRIST

## 2018-03-19 ASSESSMENT — ENCOUNTER SYMPTOMS
RESPIRATORY NEGATIVE: 1
GASTROINTESTINAL NEGATIVE: 1

## 2018-03-19 NOTE — TELEPHONE ENCOUNTER
Patient dispensed 1 pair of men size 11-11.5 pre tejal orthotic. Brace agreement form signed by patient.

## 2018-03-19 NOTE — PROGRESS NOTES
use: No    Sexual activity: Not on file     Other Topics Concern    Not on file     Social History Narrative    No narrative on file     Family History   Problem Relation Age of Onset    High Blood Pressure Mother     Diabetes Mother      impaired fasting glucose    Other Mother      short term memory loss after MVA    Stroke Father     High Blood Pressure Father     Diabetes Father      impaired fasting glucose    Glaucoma Father     Heart Disease Father      MI massive and stroke at the same time    Cancer Father      bladder     Diabetes Maternal Grandmother     Other Paternal Grandmother      gallbladder disease    Stroke Paternal Grandfather     Other Paternal Grandfather      gallbladder    Glaucoma Other      siblings       Allergies   Allergen Reactions    Codeine      Severe Abdominal pain    Sulfa Antibiotics      Patient unsure of reaction       /64   Pulse 60   Resp 16   Ht 5' 11\" (1.803 m)   Wt 229 lb (103.9 kg)   BMI 31.94 kg/m²     Objective:   Physical Exam   Constitutional: He is oriented to person, place, and time. He appears well-developed and well-nourished. No distress. HENT:   Head: Normocephalic and atraumatic. Nose: Nose normal.   Mouth/Throat: Oropharynx is clear and moist. No oropharyngeal exudate. Eyes: Conjunctivae are normal. No scleral icterus. Neck: Normal range of motion. Neck supple. No thyromegaly present. Cardiovascular: Normal rate and regular rhythm. Exam reveals no gallop and no friction rub. No murmur heard. Very subtl soft 2/6 systolic murmur noted along the left lateral sternal border   Pulmonary/Chest: Effort normal and breath sounds normal. He has no wheezes. He has no rales. Abdominal: Soft. Bowel sounds are normal. He exhibits no distension and no mass. There is no tenderness. There is no rebound and no guarding. Musculoskeletal: He exhibits no edema. He has a normal gait.    Lymphadenopathy:     He has no cervical 03/12/2018 156.6  39.0 - 259.0 mg/dL Final    Microalb/Crt. Ratio 03/12/2018 8  <17 mcg/mg creat Final    Comment: Performed at [facility] [address] [id number] [phone] Visit on 03/12/2018   Component Date Value Ref Range Status    Glucose 03/12/2018 144* 70 - 99 mg/dL Final    BUN 03/12/2018 19  8 - 23 mg/dL Final    CREATININE 03/12/2018 0.66* 0.70 - 1.20 mg/dL Final    Bun/Cre Ratio 03/12/2018 29* 9 - 20 Final    Calcium 03/12/2018 9.4  8.6 - 10.4 mg/dL Final    Sodium 03/12/2018 142  135 - 144 mmol/L Final    Potassium 03/12/2018 4.2  3.7 - 5.3 mmol/L Final    Chloride 03/12/2018 100  98 - 107 mmol/L Final    CO2 03/12/2018 30  20 - 31 mmol/L Final    Anion Gap 03/12/2018 12  9 - 17 mmol/L Final    Alkaline Phosphatase 03/12/2018 32* 40 - 129 U/L Final    ALT 03/12/2018 16  5 - 41 U/L Final    AST 03/12/2018 16  <40 U/L Final    Total Bilirubin 03/12/2018 0.68  0.3 - 1.2 mg/dL Final    Total Protein 03/12/2018 7.6  6.4 - 8.3 g/dL Final    Alb 03/12/2018 4.4  3.5 - 5.2 g/dL Final    Albumin/Globulin Ratio 03/12/2018 1.4  1.0 - 2.5 Final    GFR Non- 03/12/2018 >60  >60 mL/min Final    GFR  03/12/2018 >60  >60 mL/min Final    GFR Comment 03/12/2018        Final    Comment: Average GFR for 61-76 years old:   80 mL/min/1.73sq m  Chronic Kidney Disease:   <60 mL/min/1.73sq m  Kidney failure:   <15 mL/min/1.73sq m              eGFR calculated using average adult body mass. Additional eGFR calculator   available at:        [email or web address]        Performed at [facility] [address] [facility] [address] [place] [phone] [facility] GFR Staging 03/12/2018 NOT REPORTED   Final    Cholesterol 03/12/2018 143  <200 mg/dL Final    Comment:    Cholesterol Guidelines:      <200  Desirable   200-240  Borderline      >240 Undesirable         HDL 03/12/2018 40* >40 mg/dL Final    Comment:    HDL Guidelines:    <40     Undesirable   40-59    Borderline    >59     Desirable         LDL Cholesterol 03/12/2018 75  0 - 130 mg/dL Final    Comment:    LDL Guidelines:     <100    Desirable   100-129   Near to/above Desirable   130-159   Borderline      >159   Undesirable     Direct (measured) LDL and calculated LDL are not interchangeable tests.  Chol/HDL Ratio 03/12/2018 3.6  <5 Final    Triglycerides 03/12/2018 141  <150 mg/dL Final    Comment:    Triglyceride Guidelines:     <150   Desirable   150-199  Borderline   200-499  High     >499   Very high   Based on AHA Guidelines for fasting triglyceride, October 2012. Performed at [facility] [address] [address] [phone]      VLDL 03/12/2018 NOT REPORTED  1 - 30 mg/dL Final    Hemoglobin A1C 03/12/2018 6.6* 4.8 - 5.9 % Final    Estimated Avg Glucose 03/12/2018 143  mg/dL Final    Comment: Performed at [facility] [address] [address] [phone]           Assessment:      1. History of colon polyps  Ambulatory referral to General Surgery   2. Erectile dysfunction, unspecified erectile dysfunction type  Radha Andrade MD, [facility] [place]   3. Type 2 diabetes mellitus without complication, without long-term current use of insulin (HCC)  Comprehensive Metabolic Panel    Lipid, Fasting    Hemoglobin A1C   4. Mixed hyperlipidemia  Comprehensive Metabolic Panel    Lipid, Fasting    Hemoglobin A1C   5. Essential hypertension     6. Aortic valve insufficiency, etiology of cardiac valve disease unspecified     7. Left foot pain     8.  Bicuspid aortic valve             Plan:      Reviewed recent labs  Answered all of his questions  Follow-up with urology due to ED issues  I did give him a sample of Viagra 100 mg 2 boxes advised how to take I recommended

## 2018-03-27 ASSESSMENT — ENCOUNTER SYMPTOMS
TROUBLE SWALLOWING: 0
COUGH: 0
CONSTIPATION: 0
VOMITING: 0
DIARRHEA: 0
CHEST TIGHTNESS: 0
SINUS PAIN: 0
SINUS PRESSURE: 0
WHEEZING: 0
NAUSEA: 0
SORE THROAT: 0
SHORTNESS OF BREATH: 0
RHINORRHEA: 0

## 2018-04-16 ENCOUNTER — NURSE ONLY (OUTPATIENT)
Dept: SURGERY | Age: 63
End: 2018-04-16

## 2018-04-16 VITALS
HEART RATE: 76 BPM | TEMPERATURE: 97.6 F | WEIGHT: 234 LBS | HEIGHT: 71 IN | DIASTOLIC BLOOD PRESSURE: 78 MMHG | BODY MASS INDEX: 32.76 KG/M2 | SYSTOLIC BLOOD PRESSURE: 136 MMHG

## 2018-04-16 DIAGNOSIS — Z12.11 COLON CANCER SCREENING: Primary | ICD-10-CM

## 2018-04-16 RX ORDER — POLYETHYLENE GLYCOL 3350, SODIUM CHLORIDE, SODIUM BICARBONATE, POTASSIUM CHLORIDE 420; 11.2; 5.72; 1.48 G/4L; G/4L; G/4L; G/4L
4000 POWDER, FOR SOLUTION ORAL ONCE
Qty: 4000 ML | Refills: 0 | Status: SHIPPED | OUTPATIENT
Start: 2018-04-16 | End: 2018-04-16

## 2018-04-24 ENCOUNTER — OFFICE VISIT (OUTPATIENT)
Dept: UROLOGY | Age: 63
End: 2018-04-24
Payer: COMMERCIAL

## 2018-04-24 VITALS
WEIGHT: 234.2 LBS | HEART RATE: 68 BPM | DIASTOLIC BLOOD PRESSURE: 66 MMHG | BODY MASS INDEX: 32.79 KG/M2 | SYSTOLIC BLOOD PRESSURE: 155 MMHG | OXYGEN SATURATION: 99 % | HEIGHT: 71 IN

## 2018-04-24 DIAGNOSIS — N52.9 ERECTILE DYSFUNCTION, UNSPECIFIED ERECTILE DYSFUNCTION TYPE: Primary | ICD-10-CM

## 2018-04-24 PROCEDURE — G8427 DOCREV CUR MEDS BY ELIG CLIN: HCPCS | Performed by: UROLOGY

## 2018-04-24 PROCEDURE — 3017F COLORECTAL CA SCREEN DOC REV: CPT | Performed by: UROLOGY

## 2018-04-24 PROCEDURE — 1036F TOBACCO NON-USER: CPT | Performed by: UROLOGY

## 2018-04-24 PROCEDURE — G8417 CALC BMI ABV UP PARAM F/U: HCPCS | Performed by: UROLOGY

## 2018-04-24 PROCEDURE — 99204 OFFICE O/P NEW MOD 45 MIN: CPT | Performed by: UROLOGY

## 2018-04-24 RX ORDER — SILDENAFIL CITRATE 20 MG/1
20 TABLET ORAL PRN
Qty: 60 TABLET | Refills: 3 | Status: SHIPPED | OUTPATIENT
Start: 2018-04-24 | End: 2020-11-09

## 2018-05-14 ENCOUNTER — ANESTHESIA EVENT (OUTPATIENT)
Dept: OPERATING ROOM | Age: 63
End: 2018-05-14
Payer: COMMERCIAL

## 2018-05-14 ENCOUNTER — ANESTHESIA (OUTPATIENT)
Dept: OPERATING ROOM | Age: 63
End: 2018-05-14
Payer: COMMERCIAL

## 2018-05-14 ENCOUNTER — HOSPITAL ENCOUNTER (OUTPATIENT)
Age: 63
Setting detail: OUTPATIENT SURGERY
Discharge: HOME | End: 2018-05-14
Attending: SURGERY | Admitting: SURGERY
Payer: COMMERCIAL

## 2018-05-14 VITALS
OXYGEN SATURATION: 96 % | BODY MASS INDEX: 32.11 KG/M2 | HEART RATE: 62 BPM | WEIGHT: 229.38 LBS | HEIGHT: 71 IN | TEMPERATURE: 97.6 F | RESPIRATION RATE: 18 BRPM | DIASTOLIC BLOOD PRESSURE: 67 MMHG | SYSTOLIC BLOOD PRESSURE: 124 MMHG

## 2018-05-14 VITALS — DIASTOLIC BLOOD PRESSURE: 52 MMHG | OXYGEN SATURATION: 96 % | SYSTOLIC BLOOD PRESSURE: 95 MMHG

## 2018-05-14 LAB — GLUCOSE BLD-MCNC: 144 MG/DL (ref 75–110)

## 2018-05-14 PROCEDURE — 45378 DIAGNOSTIC COLONOSCOPY: CPT | Performed by: SURGERY

## 2018-05-14 PROCEDURE — 82947 ASSAY GLUCOSE BLOOD QUANT: CPT

## 2018-05-14 PROCEDURE — 00812 ANES LWR INTST SCR COLSC: CPT | Performed by: NURSE ANESTHETIST, CERTIFIED REGISTERED

## 2018-05-14 PROCEDURE — 7100000011 HC PHASE II RECOVERY - ADDTL 15 MIN: Performed by: SURGERY

## 2018-05-14 PROCEDURE — 6360000002 HC RX W HCPCS: Performed by: NURSE ANESTHETIST, CERTIFIED REGISTERED

## 2018-05-14 PROCEDURE — 7100000010 HC PHASE II RECOVERY - FIRST 15 MIN: Performed by: SURGERY

## 2018-05-14 PROCEDURE — 3609027000 HC COLONOSCOPY: Performed by: SURGERY

## 2018-05-14 PROCEDURE — 2580000003 HC RX 258: Performed by: SURGERY

## 2018-05-14 PROCEDURE — 3700000000 HC ANESTHESIA ATTENDED CARE: Performed by: SURGERY

## 2018-05-14 RX ORDER — SODIUM CHLORIDE, SODIUM LACTATE, POTASSIUM CHLORIDE, CALCIUM CHLORIDE 600; 310; 30; 20 MG/100ML; MG/100ML; MG/100ML; MG/100ML
INJECTION, SOLUTION INTRAVENOUS CONTINUOUS
Status: DISCONTINUED | OUTPATIENT
Start: 2018-05-14 | End: 2018-05-14 | Stop reason: HOSPADM

## 2018-05-14 RX ORDER — PROPOFOL 10 MG/ML
INJECTION, EMULSION INTRAVENOUS PRN
Status: DISCONTINUED | OUTPATIENT
Start: 2018-05-14 | End: 2018-05-14 | Stop reason: SDUPTHER

## 2018-05-14 RX ADMIN — PROPOFOL 150 MG: 10 INJECTION, EMULSION INTRAVENOUS at 10:19

## 2018-05-14 RX ADMIN — SODIUM CHLORIDE, POTASSIUM CHLORIDE, SODIUM LACTATE AND CALCIUM CHLORIDE: 600; 310; 30; 20 INJECTION, SOLUTION INTRAVENOUS at 10:05

## 2018-05-14 ASSESSMENT — PAIN SCALES - GENERAL
PAINLEVEL_OUTOF10: 0

## 2018-05-14 ASSESSMENT — PAIN - FUNCTIONAL ASSESSMENT: PAIN_FUNCTIONAL_ASSESSMENT: 0-10

## 2018-05-22 ENCOUNTER — OFFICE VISIT (OUTPATIENT)
Dept: PRIMARY CARE CLINIC | Age: 63
End: 2018-05-22
Payer: COMMERCIAL

## 2018-05-22 VITALS
BODY MASS INDEX: 32.48 KG/M2 | DIASTOLIC BLOOD PRESSURE: 62 MMHG | HEART RATE: 78 BPM | WEIGHT: 232 LBS | SYSTOLIC BLOOD PRESSURE: 138 MMHG | HEIGHT: 71 IN | TEMPERATURE: 98.3 F

## 2018-05-22 DIAGNOSIS — H10.32 ACUTE BACTERIAL CONJUNCTIVITIS OF LEFT EYE: Primary | ICD-10-CM

## 2018-05-22 PROCEDURE — G8427 DOCREV CUR MEDS BY ELIG CLIN: HCPCS | Performed by: NURSE PRACTITIONER

## 2018-05-22 PROCEDURE — 1036F TOBACCO NON-USER: CPT | Performed by: NURSE PRACTITIONER

## 2018-05-22 PROCEDURE — 99213 OFFICE O/P EST LOW 20 MIN: CPT | Performed by: NURSE PRACTITIONER

## 2018-05-22 PROCEDURE — G8417 CALC BMI ABV UP PARAM F/U: HCPCS | Performed by: NURSE PRACTITIONER

## 2018-05-22 PROCEDURE — 3017F COLORECTAL CA SCREEN DOC REV: CPT | Performed by: NURSE PRACTITIONER

## 2018-05-22 RX ORDER — MOXIFLOXACIN 5 MG/ML
1 SOLUTION/ DROPS OPHTHALMIC 3 TIMES DAILY
Qty: 1 BOTTLE | Refills: 0 | Status: SHIPPED | OUTPATIENT
Start: 2018-05-22 | End: 2018-05-29

## 2018-05-22 ASSESSMENT — ENCOUNTER SYMPTOMS
COUGH: 0
SHORTNESS OF BREATH: 0
EYE ITCHING: 1
EYE REDNESS: 1
WHEEZING: 0
EYE DISCHARGE: 1
FOREIGN BODY SENSATION: 1
BLURRED VISION: 0

## 2018-06-11 ENCOUNTER — OFFICE VISIT (OUTPATIENT)
Dept: UROLOGY | Age: 63
End: 2018-06-11
Payer: COMMERCIAL

## 2018-06-11 VITALS
SYSTOLIC BLOOD PRESSURE: 134 MMHG | HEIGHT: 71 IN | WEIGHT: 233.6 LBS | HEART RATE: 66 BPM | DIASTOLIC BLOOD PRESSURE: 62 MMHG | BODY MASS INDEX: 32.7 KG/M2

## 2018-06-11 DIAGNOSIS — I10 ESSENTIAL HYPERTENSION: ICD-10-CM

## 2018-06-11 DIAGNOSIS — E11.9 TYPE 2 DIABETES MELLITUS WITHOUT COMPLICATION, WITHOUT LONG-TERM CURRENT USE OF INSULIN (HCC): ICD-10-CM

## 2018-06-11 DIAGNOSIS — N52.9 ERECTILE DYSFUNCTION, UNSPECIFIED ERECTILE DYSFUNCTION TYPE: Primary | ICD-10-CM

## 2018-06-11 PROCEDURE — 3017F COLORECTAL CA SCREEN DOC REV: CPT | Performed by: UROLOGY

## 2018-06-11 PROCEDURE — 1036F TOBACCO NON-USER: CPT | Performed by: UROLOGY

## 2018-06-11 PROCEDURE — 99212 OFFICE O/P EST SF 10 MIN: CPT | Performed by: UROLOGY

## 2018-06-11 PROCEDURE — G8417 CALC BMI ABV UP PARAM F/U: HCPCS | Performed by: UROLOGY

## 2018-06-11 PROCEDURE — G8427 DOCREV CUR MEDS BY ELIG CLIN: HCPCS | Performed by: UROLOGY

## 2018-06-11 RX ORDER — AMLODIPINE BESYLATE 10 MG/1
10 TABLET ORAL DAILY
Qty: 90 TABLET | Refills: 1 | Status: SHIPPED | OUTPATIENT
Start: 2018-06-11 | End: 2018-09-20 | Stop reason: SDUPTHER

## 2018-06-11 RX ORDER — CARVEDILOL 25 MG/1
25 TABLET ORAL 2 TIMES DAILY
Qty: 180 TABLET | Refills: 1 | Status: SHIPPED | OUTPATIENT
Start: 2018-06-11 | End: 2018-09-20 | Stop reason: SDUPTHER

## 2018-06-11 RX ORDER — LOSARTAN POTASSIUM AND HYDROCHLOROTHIAZIDE 25; 100 MG/1; MG/1
1 TABLET ORAL DAILY
Qty: 90 TABLET | Refills: 1 | Status: SHIPPED | OUTPATIENT
Start: 2018-06-11 | End: 2018-09-20 | Stop reason: SDUPTHER

## 2018-06-20 ENCOUNTER — OFFICE VISIT (OUTPATIENT)
Dept: FAMILY MEDICINE CLINIC | Age: 63
End: 2018-06-20
Payer: COMMERCIAL

## 2018-06-20 VITALS
RESPIRATION RATE: 16 BRPM | DIASTOLIC BLOOD PRESSURE: 68 MMHG | WEIGHT: 227 LBS | OXYGEN SATURATION: 97 % | SYSTOLIC BLOOD PRESSURE: 124 MMHG | HEIGHT: 70 IN | BODY MASS INDEX: 32.5 KG/M2 | HEART RATE: 75 BPM | TEMPERATURE: 97.3 F

## 2018-06-20 DIAGNOSIS — J01.41 ACUTE RECURRENT PANSINUSITIS: Primary | ICD-10-CM

## 2018-06-20 DIAGNOSIS — Q23.1 BICUSPID AORTIC VALVE: ICD-10-CM

## 2018-06-20 DIAGNOSIS — I83.93 VARICOSE VEINS OF BOTH LOWER EXTREMITIES: ICD-10-CM

## 2018-06-20 DIAGNOSIS — I10 ESSENTIAL HYPERTENSION: ICD-10-CM

## 2018-06-20 DIAGNOSIS — J30.1 CHRONIC SEASONAL ALLERGIC RHINITIS DUE TO POLLEN: ICD-10-CM

## 2018-06-20 DIAGNOSIS — I35.1 NONRHEUMATIC AORTIC VALVE INSUFFICIENCY: ICD-10-CM

## 2018-06-20 PROCEDURE — 99214 OFFICE O/P EST MOD 30 MIN: CPT | Performed by: PHYSICIAN ASSISTANT

## 2018-06-20 PROCEDURE — 1036F TOBACCO NON-USER: CPT | Performed by: PHYSICIAN ASSISTANT

## 2018-06-20 PROCEDURE — G8427 DOCREV CUR MEDS BY ELIG CLIN: HCPCS | Performed by: PHYSICIAN ASSISTANT

## 2018-06-20 PROCEDURE — 3017F COLORECTAL CA SCREEN DOC REV: CPT | Performed by: PHYSICIAN ASSISTANT

## 2018-06-20 PROCEDURE — G8417 CALC BMI ABV UP PARAM F/U: HCPCS | Performed by: PHYSICIAN ASSISTANT

## 2018-06-20 RX ORDER — AMOXICILLIN 875 MG/1
875 TABLET, COATED ORAL 2 TIMES DAILY
Qty: 28 TABLET | Refills: 0 | Status: SHIPPED | OUTPATIENT
Start: 2018-06-20 | End: 2018-09-20 | Stop reason: ALTCHOICE

## 2018-06-20 ASSESSMENT — ENCOUNTER SYMPTOMS
VOMITING: 0
RHINORRHEA: 0
TROUBLE SWALLOWING: 0
SINUS PAIN: 0
DIARRHEA: 0
SINUS PRESSURE: 0
SORE THROAT: 1
NAUSEA: 0
COUGH: 0

## 2018-06-20 ASSESSMENT — PATIENT HEALTH QUESTIONNAIRE - PHQ9
SUM OF ALL RESPONSES TO PHQ9 QUESTIONS 1 & 2: 0
2. FEELING DOWN, DEPRESSED OR HOPELESS: 0
SUM OF ALL RESPONSES TO PHQ QUESTIONS 1-9: 0
1. LITTLE INTEREST OR PLEASURE IN DOING THINGS: 0

## 2018-07-18 ENCOUNTER — TELEPHONE (OUTPATIENT)
Dept: SURGERY | Age: 63
End: 2018-07-18

## 2018-08-27 ENCOUNTER — TELEPHONE (OUTPATIENT)
Dept: CARDIOLOGY | Age: 63
End: 2018-08-27

## 2018-08-27 ENCOUNTER — HOSPITAL ENCOUNTER (OUTPATIENT)
Dept: NON INVASIVE DIAGNOSTICS | Age: 63
Discharge: HOME | End: 2018-08-27
Payer: COMMERCIAL

## 2018-08-27 DIAGNOSIS — I10 ESSENTIAL HYPERTENSION: ICD-10-CM

## 2018-08-27 LAB
LV EF: 60 %
LVEF MODALITY: NORMAL

## 2018-08-27 PROCEDURE — 93306 TTE W/DOPPLER COMPLETE: CPT

## 2018-09-11 ENCOUNTER — OFFICE VISIT (OUTPATIENT)
Dept: CARDIOLOGY | Age: 63
End: 2018-09-11
Payer: COMMERCIAL

## 2018-09-11 ENCOUNTER — HOSPITAL ENCOUNTER (OUTPATIENT)
Dept: LAB | Age: 63
Setting detail: SPECIMEN
Discharge: HOME | End: 2018-09-11
Payer: COMMERCIAL

## 2018-09-11 VITALS
WEIGHT: 232 LBS | HEART RATE: 57 BPM | DIASTOLIC BLOOD PRESSURE: 73 MMHG | BODY MASS INDEX: 32.48 KG/M2 | HEIGHT: 71 IN | SYSTOLIC BLOOD PRESSURE: 143 MMHG

## 2018-09-11 DIAGNOSIS — I10 ESSENTIAL HYPERTENSION: ICD-10-CM

## 2018-09-11 DIAGNOSIS — Q23.1 AORTIC REGURGITATION DUE TO BICUSPID AORTIC VALVE: Primary | ICD-10-CM

## 2018-09-11 DIAGNOSIS — I71.21 ASCENDING AORTIC ANEURYSM: ICD-10-CM

## 2018-09-11 DIAGNOSIS — M79.89 LEG SWELLING: ICD-10-CM

## 2018-09-11 DIAGNOSIS — Q23.1 AORTIC REGURGITATION DUE TO BICUSPID AORTIC VALVE: ICD-10-CM

## 2018-09-11 LAB
ANION GAP SERPL CALCULATED.3IONS-SCNC: 9 MMOL/L (ref 9–17)
BUN BLDV-MCNC: 18 MG/DL (ref 8–23)
BUN/CREAT BLD: 25 (ref 9–20)
CALCIUM SERPL-MCNC: 9.6 MG/DL (ref 8.6–10.4)
CHLORIDE BLD-SCNC: 100 MMOL/L (ref 98–107)
CO2: 30 MMOL/L (ref 20–31)
CREAT SERPL-MCNC: 0.71 MG/DL (ref 0.7–1.2)
GFR AFRICAN AMERICAN: >60 ML/MIN
GFR NON-AFRICAN AMERICAN: >60 ML/MIN
GFR SERPL CREATININE-BSD FRML MDRD: ABNORMAL ML/MIN/{1.73_M2}
GFR SERPL CREATININE-BSD FRML MDRD: ABNORMAL ML/MIN/{1.73_M2}
GLUCOSE BLD-MCNC: 130 MG/DL (ref 70–99)
POTASSIUM SERPL-SCNC: 4.8 MMOL/L (ref 3.7–5.3)
SODIUM BLD-SCNC: 139 MMOL/L (ref 135–144)

## 2018-09-11 PROCEDURE — 99213 OFFICE O/P EST LOW 20 MIN: CPT | Performed by: INTERNAL MEDICINE

## 2018-09-11 PROCEDURE — 80048 BASIC METABOLIC PNL TOTAL CA: CPT

## 2018-09-11 PROCEDURE — 36415 COLL VENOUS BLD VENIPUNCTURE: CPT

## 2018-09-11 PROCEDURE — 93000 ELECTROCARDIOGRAM COMPLETE: CPT | Performed by: INTERNAL MEDICINE

## 2018-09-11 RX ORDER — FUROSEMIDE 20 MG/1
20 TABLET ORAL DAILY PRN
Qty: 30 TABLET | Refills: 3 | Status: SHIPPED | OUTPATIENT
Start: 2018-09-11 | End: 2019-05-21 | Stop reason: SDUPTHER

## 2018-09-11 RX ORDER — ADHESIVE BANDAGE 3/4"
BANDAGE TOPICAL
Qty: 1 EACH | Refills: 0 | Status: SHIPPED | OUTPATIENT
Start: 2018-09-11 | End: 2021-03-25

## 2018-09-11 NOTE — PROGRESS NOTES
[address] [facility]  is here for follow up. No chest pain, no dyspnea, no PND, no syncope or pre-syncope, no orthopnea. He walks and ride bicycle for one hour without symptoms. He is also a hiker. Past Medical History:   Diagnosis Date    Anxiety     Aortic regurgitation     Bicuspid aortic valve     Diabetes mellitus (HCC)     GERD (gastroesophageal reflux disease)     Hypertension     Osteoarthritis of left knee        Past Surgical History:   Procedure Laterality Date    CYST REMOVAL      MOUTH SURGERY  2015    MN COLONOSCOPY FLX DX W/COLLJ SPEC WHEN PFRMD N/A 5/14/2018    normal colon, Dr. Wen Care EXTRACTION         Family History   Problem Relation Age of Onset    High Blood Pressure Mother     Diabetes Mother         impaired fasting glucose    Other Mother         short term memory loss after MVA    Stroke Father     High Blood Pressure Father     Diabetes Father         impaired fasting glucose    Glaucoma Father     Heart Disease Father         MI massive and stroke at the same time    Cancer Father         bladder     Diabetes Maternal Grandmother     Other Paternal Grandmother         gallbladder disease    Stroke Paternal Grandfather     Other Paternal Grandfather         gallbladder    Glaucoma Other         siblings       ROS: Otherwise 10 systems reviewed and negative. BP (!) 150/80   Ht 5' 11\" (1.803 m)   Wt 232 lb (105.2 kg)   BMI 32.36 kg/m²     Vitals as above. Alert and oriented x 3. No JVD, or carotid bruits. Lungs are clear to auscultation. Heart sounds are regular, 2/6 diastolic murmur  Abdomen is soft, no tenderness. Extremities 1+ peripheral edema.     EKG: SR, LVH criteria    Meds:    Current Outpatient Prescriptions:     amoxicillin (AMOXIL) 875 MG tablet, Take 1 tablet by mouth 2 times daily, Disp: 28 tablet, Rfl: 0    amLODIPine (NORVASC) 10 MG tablet, Take 1 tablet by mouth daily, 4.4 cm on TTE now and measured 4.6 cm on TTE 9/2017. Obtain CTA chest with contrast  -HTN- failry well controlled at this time. Continue current therapy.   -Obesity - encouraged diet, exercise, and discussed weight loss extensively. -Hyperlipidemia- continue statin.   -RTC 6 months

## 2018-09-19 ENCOUNTER — HOSPITAL ENCOUNTER (OUTPATIENT)
Dept: CT IMAGING | Age: 63
Discharge: HOME | End: 2018-09-21
Payer: COMMERCIAL

## 2018-09-19 DIAGNOSIS — I71.21 ASCENDING AORTIC ANEURYSM: ICD-10-CM

## 2018-09-19 PROCEDURE — 71275 CT ANGIOGRAPHY CHEST: CPT

## 2018-09-19 PROCEDURE — 6360000004 HC RX CONTRAST MEDICATION: Performed by: INTERNAL MEDICINE

## 2018-09-19 RX ADMIN — IOPAMIDOL 75 ML: 755 INJECTION, SOLUTION INTRAVENOUS at 14:56

## 2018-09-20 ENCOUNTER — OFFICE VISIT (OUTPATIENT)
Dept: FAMILY MEDICINE CLINIC | Age: 63
End: 2018-09-20
Payer: COMMERCIAL

## 2018-09-20 ENCOUNTER — HOSPITAL ENCOUNTER (OUTPATIENT)
Dept: GENERAL RADIOLOGY | Age: 63
Discharge: HOME | End: 2018-09-22
Payer: COMMERCIAL

## 2018-09-20 VITALS
HEIGHT: 70 IN | BODY MASS INDEX: 32.21 KG/M2 | HEART RATE: 70 BPM | WEIGHT: 225 LBS | OXYGEN SATURATION: 97 % | RESPIRATION RATE: 16 BRPM | DIASTOLIC BLOOD PRESSURE: 64 MMHG | SYSTOLIC BLOOD PRESSURE: 128 MMHG

## 2018-09-20 DIAGNOSIS — M79.671 CHRONIC FOOT PAIN, RIGHT: ICD-10-CM

## 2018-09-20 DIAGNOSIS — R20.2 NUMBNESS AND TINGLING IN LEFT ARM: ICD-10-CM

## 2018-09-20 DIAGNOSIS — H91.93 BILATERAL HEARING LOSS, UNSPECIFIED HEARING LOSS TYPE: ICD-10-CM

## 2018-09-20 DIAGNOSIS — G89.29 CHRONIC FOOT PAIN, RIGHT: ICD-10-CM

## 2018-09-20 DIAGNOSIS — R93.89 ABNORMAL COMPUTED TOMOGRAPHY ANGIOGRAPHY (CTA): Primary | ICD-10-CM

## 2018-09-20 DIAGNOSIS — E11.9 TYPE 2 DIABETES MELLITUS WITHOUT COMPLICATION, WITHOUT LONG-TERM CURRENT USE OF INSULIN (HCC): ICD-10-CM

## 2018-09-20 DIAGNOSIS — R20.0 NUMBNESS AND TINGLING IN LEFT ARM: ICD-10-CM

## 2018-09-20 DIAGNOSIS — H93.13 TINNITUS OF BOTH EARS: ICD-10-CM

## 2018-09-20 DIAGNOSIS — I10 ESSENTIAL HYPERTENSION: ICD-10-CM

## 2018-09-20 PROCEDURE — 2022F DILAT RTA XM EVC RTNOPTHY: CPT | Performed by: PHYSICIAN ASSISTANT

## 2018-09-20 PROCEDURE — G8427 DOCREV CUR MEDS BY ELIG CLIN: HCPCS | Performed by: PHYSICIAN ASSISTANT

## 2018-09-20 PROCEDURE — 3044F HG A1C LEVEL LT 7.0%: CPT | Performed by: PHYSICIAN ASSISTANT

## 2018-09-20 PROCEDURE — 73630 X-RAY EXAM OF FOOT: CPT

## 2018-09-20 PROCEDURE — 3017F COLORECTAL CA SCREEN DOC REV: CPT | Performed by: PHYSICIAN ASSISTANT

## 2018-09-20 PROCEDURE — 99214 OFFICE O/P EST MOD 30 MIN: CPT | Performed by: PHYSICIAN ASSISTANT

## 2018-09-20 PROCEDURE — G8417 CALC BMI ABV UP PARAM F/U: HCPCS | Performed by: PHYSICIAN ASSISTANT

## 2018-09-20 PROCEDURE — 1036F TOBACCO NON-USER: CPT | Performed by: PHYSICIAN ASSISTANT

## 2018-09-20 RX ORDER — AMLODIPINE BESYLATE 10 MG/1
10 TABLET ORAL DAILY
Qty: 90 TABLET | Refills: 1 | Status: SHIPPED | OUTPATIENT
Start: 2018-09-20 | End: 2019-04-01 | Stop reason: SDUPTHER

## 2018-09-20 RX ORDER — CARVEDILOL 25 MG/1
25 TABLET ORAL 2 TIMES DAILY
Qty: 180 TABLET | Refills: 1 | Status: SHIPPED | OUTPATIENT
Start: 2018-09-20 | End: 2019-04-01 | Stop reason: SDUPTHER

## 2018-09-20 RX ORDER — LOSARTAN POTASSIUM AND HYDROCHLOROTHIAZIDE 25; 100 MG/1; MG/1
1 TABLET ORAL DAILY
Qty: 90 TABLET | Refills: 1 | Status: SHIPPED | OUTPATIENT
Start: 2018-09-20 | End: 2019-01-02 | Stop reason: SDUPTHER

## 2018-09-20 ASSESSMENT — PATIENT HEALTH QUESTIONNAIRE - PHQ9
2. FEELING DOWN, DEPRESSED OR HOPELESS: 0
SUM OF ALL RESPONSES TO PHQ9 QUESTIONS 1 & 2: 0
1. LITTLE INTEREST OR PLEASURE IN DOING THINGS: 0
SUM OF ALL RESPONSES TO PHQ QUESTIONS 1-9: 0
SUM OF ALL RESPONSES TO PHQ QUESTIONS 1-9: 0

## 2018-09-20 ASSESSMENT — ENCOUNTER SYMPTOMS
TROUBLE SWALLOWING: 1
BACK PAIN: 1
RESPIRATORY NEGATIVE: 1
GASTROINTESTINAL NEGATIVE: 1

## 2018-09-20 NOTE — PROGRESS NOTES
Jesika Alston TONSILLECTOMY      WISDOM TOOTH EXTRACTION       Social History     Social History    Marital status: Single     Spouse name: N/A    Number of children: N/A    Years of education: N/A     Occupational History    Not on file. Social History Main Topics    Smoking status: Never Smoker    Smokeless tobacco: Never Used    Alcohol use Yes      Comment: very rare    Drug use: No    Sexual activity: Not on file     Other Topics Concern    Not on file     Social History Narrative    No narrative on file     Family History   Problem Relation Age of Onset    High Blood Pressure Mother     Diabetes Mother         impaired fasting glucose    Other Mother         short term memory loss after MVA    Stroke Father     High Blood Pressure Father     Diabetes Father         impaired fasting glucose    Glaucoma Father     Heart Disease Father         MI massive and stroke at the same time    Cancer Father         bladder     Diabetes Maternal Grandmother     Other Paternal Grandmother         gallbladder disease    Stroke Paternal Grandfather     Other Paternal Grandfather         gallbladder    Glaucoma Other         siblings       Allergies   Allergen Reactions    Codeine      Severe Abdominal pain    Sulfa Antibiotics      Patient unsure of reaction       /64   Pulse 70   Resp 16   Ht 5' 10\" (1.778 m)   Wt 225 lb (102.1 kg)   SpO2 97%   BMI 32.28 kg/m²     Objective:   Physical Exam   Constitutional: He is oriented to person, place, and time. He appears well-developed and well-nourished. No distress. HENT:   Head: Normocephalic and atraumatic. Nose: Nose normal.   Mouth/Throat: Oropharynx is clear and moist. No oropharyngeal exudate. Eyes: Conjunctivae are normal. No scleral icterus. Neck: Normal range of motion. Neck supple. No thyromegaly present. Thyroid is non tender nor palpable. No carotid bruits noted. Cardiovascular: Normal rate and regular rhythm.   Exam

## 2018-09-25 ENCOUNTER — TELEPHONE (OUTPATIENT)
Dept: CARDIOLOGY | Age: 63
End: 2018-09-25

## 2018-10-09 ENCOUNTER — HOSPITAL ENCOUNTER (OUTPATIENT)
Dept: ULTRASOUND IMAGING | Age: 63
Discharge: HOME | End: 2018-10-11
Payer: COMMERCIAL

## 2018-10-09 DIAGNOSIS — R93.89 ABNORMAL COMPUTED TOMOGRAPHY ANGIOGRAPHY (CTA): ICD-10-CM

## 2018-10-09 PROCEDURE — 76770 US EXAM ABDO BACK WALL COMP: CPT

## 2018-10-10 DIAGNOSIS — N28.89 RENAL MASS: Primary | ICD-10-CM

## 2018-10-11 ENCOUNTER — OFFICE VISIT (OUTPATIENT)
Dept: PRIMARY CARE CLINIC | Age: 63
End: 2018-10-11
Payer: COMMERCIAL

## 2018-10-11 VITALS
HEIGHT: 70 IN | BODY MASS INDEX: 32.93 KG/M2 | TEMPERATURE: 98.2 F | HEART RATE: 68 BPM | SYSTOLIC BLOOD PRESSURE: 138 MMHG | WEIGHT: 230 LBS | DIASTOLIC BLOOD PRESSURE: 70 MMHG

## 2018-10-11 DIAGNOSIS — K04.7 TOOTH ABSCESS: ICD-10-CM

## 2018-10-11 DIAGNOSIS — K08.89 TOOTH PAIN: Primary | ICD-10-CM

## 2018-10-11 PROCEDURE — G8417 CALC BMI ABV UP PARAM F/U: HCPCS | Performed by: NURSE PRACTITIONER

## 2018-10-11 PROCEDURE — G8484 FLU IMMUNIZE NO ADMIN: HCPCS | Performed by: NURSE PRACTITIONER

## 2018-10-11 PROCEDURE — G8427 DOCREV CUR MEDS BY ELIG CLIN: HCPCS | Performed by: NURSE PRACTITIONER

## 2018-10-11 PROCEDURE — 3017F COLORECTAL CA SCREEN DOC REV: CPT | Performed by: NURSE PRACTITIONER

## 2018-10-11 PROCEDURE — 99213 OFFICE O/P EST LOW 20 MIN: CPT | Performed by: NURSE PRACTITIONER

## 2018-10-11 PROCEDURE — 1036F TOBACCO NON-USER: CPT | Performed by: NURSE PRACTITIONER

## 2018-10-11 RX ORDER — AMOXICILLIN AND CLAVULANATE POTASSIUM 875; 125 MG/1; MG/1
1 TABLET, FILM COATED ORAL 2 TIMES DAILY
Qty: 20 TABLET | Refills: 0 | Status: SHIPPED | OUTPATIENT
Start: 2018-10-11 | End: 2018-10-21

## 2018-10-11 ASSESSMENT — PATIENT HEALTH QUESTIONNAIRE - PHQ9
1. LITTLE INTEREST OR PLEASURE IN DOING THINGS: 0
SUM OF ALL RESPONSES TO PHQ QUESTIONS 1-9: 0
SUM OF ALL RESPONSES TO PHQ QUESTIONS 1-9: 0
SUM OF ALL RESPONSES TO PHQ9 QUESTIONS 1 & 2: 0
2. FEELING DOWN, DEPRESSED OR HOPELESS: 0

## 2018-10-11 ASSESSMENT — ENCOUNTER SYMPTOMS: RESPIRATORY NEGATIVE: 1

## 2018-10-15 ENCOUNTER — OFFICE VISIT (OUTPATIENT)
Dept: ONCOLOGY | Age: 63
End: 2018-10-15
Payer: COMMERCIAL

## 2018-10-15 ENCOUNTER — OFFICE VISIT (OUTPATIENT)
Dept: UROLOGY | Age: 63
End: 2018-10-15
Payer: COMMERCIAL

## 2018-10-15 VITALS
TEMPERATURE: 98.5 F | WEIGHT: 230 LBS | HEART RATE: 72 BPM | BODY MASS INDEX: 32.93 KG/M2 | HEIGHT: 70 IN | SYSTOLIC BLOOD PRESSURE: 136 MMHG | DIASTOLIC BLOOD PRESSURE: 82 MMHG

## 2018-10-15 VITALS
BODY MASS INDEX: 32.93 KG/M2 | HEIGHT: 70 IN | SYSTOLIC BLOOD PRESSURE: 138 MMHG | WEIGHT: 230 LBS | DIASTOLIC BLOOD PRESSURE: 80 MMHG | HEART RATE: 88 BPM

## 2018-10-15 DIAGNOSIS — D69.6 THROMBOCYTOPENIA (HCC): Primary | ICD-10-CM

## 2018-10-15 DIAGNOSIS — N28.89 LEFT RENAL MASS: Primary | ICD-10-CM

## 2018-10-15 DIAGNOSIS — N28.89 LEFT RENAL MASS: ICD-10-CM

## 2018-10-15 DIAGNOSIS — N52.9 ERECTILE DYSFUNCTION, UNSPECIFIED ERECTILE DYSFUNCTION TYPE: ICD-10-CM

## 2018-10-15 PROCEDURE — G8427 DOCREV CUR MEDS BY ELIG CLIN: HCPCS | Performed by: INTERNAL MEDICINE

## 2018-10-15 PROCEDURE — 3017F COLORECTAL CA SCREEN DOC REV: CPT | Performed by: UROLOGY

## 2018-10-15 PROCEDURE — 1036F TOBACCO NON-USER: CPT | Performed by: INTERNAL MEDICINE

## 2018-10-15 PROCEDURE — G8484 FLU IMMUNIZE NO ADMIN: HCPCS | Performed by: INTERNAL MEDICINE

## 2018-10-15 PROCEDURE — G8417 CALC BMI ABV UP PARAM F/U: HCPCS | Performed by: UROLOGY

## 2018-10-15 PROCEDURE — 99215 OFFICE O/P EST HI 40 MIN: CPT | Performed by: UROLOGY

## 2018-10-15 PROCEDURE — 1036F TOBACCO NON-USER: CPT | Performed by: UROLOGY

## 2018-10-15 PROCEDURE — G8427 DOCREV CUR MEDS BY ELIG CLIN: HCPCS | Performed by: UROLOGY

## 2018-10-15 PROCEDURE — 99215 OFFICE O/P EST HI 40 MIN: CPT | Performed by: INTERNAL MEDICINE

## 2018-10-15 PROCEDURE — G8417 CALC BMI ABV UP PARAM F/U: HCPCS | Performed by: INTERNAL MEDICINE

## 2018-10-15 PROCEDURE — 3017F COLORECTAL CA SCREEN DOC REV: CPT | Performed by: INTERNAL MEDICINE

## 2018-10-15 PROCEDURE — G8484 FLU IMMUNIZE NO ADMIN: HCPCS | Performed by: UROLOGY

## 2018-10-15 NOTE — PROGRESS NOTES
Patient ID: Neeraj Pardo, 1955, [id number], 61 y.o. Diagnosis:   Immune related thrombocytopenia    HISTORY OF PRESENT ILLNESS:    Hematologic History: This is a 70-year-old male with a history of immune related thrombocytopenia was being followed by Dr. Adenike Fuchs. He was noted to have thrombocytopenia since 2016. He has a very mild thrombocytopenia ranging around 120-130 K. His ITP screen was positive for platelet associated antibody IgM. He denied any symptoms of bleeding. INTERVAL HISTORY  Patient is returning for follow-up visit. He recently had CT chest and then US kidney which showed renal mass suspicious for RCC. He is seeen by urologist and planning to have surgery soon. He denied any drenching night sweats, unintentional weight loss, fever chills. During this visit patient's allergy, social, medical, surgical history and medications were reviewed and updated.     Allergies   Allergen Reactions    Codeine      Severe Abdominal pain    Sulfa Antibiotics      Patient unsure of reaction       Current Outpatient Prescriptions   Medication Sig Dispense Refill    amoxicillin-clavulanate (AUGMENTIN) 875-125 MG per tablet Take 1 tablet by mouth 2 times daily for 10 days 20 tablet 0    carvedilol (COREG) 25 MG tablet Take 1 tablet by mouth 2 times daily 180 tablet 1    metFORMIN (GLUCOPHAGE) 500 MG tablet Take 1 tablet by mouth 2 times daily (with meals) 180 tablet 1    losartan-hydrochlorothiazide (HYZAAR) 100-25 MG per tablet Take 1 tablet by mouth daily 90 tablet 1    amLODIPine (NORVASC) 10 MG tablet Take 1 tablet by mouth daily 90 tablet 1    Blood Pressure Monitoring (BLOOD PRESSURE CUFF) MISC DX: I10 HTN 1 each 0    furosemide (LASIX) 20 MG tablet Take 1 tablet by mouth daily as needed (swelling) 30 tablet 3    sildenafil (REVATIO) 20 MG tablet Take 1 tablet by mouth as needed (ED) Take 1-5 tabs as needed PRN for ED 60 tablet 3    bisacodyl (DULCOLAX) 5 MG EC tablet Take 2 tablets at

## 2018-10-15 NOTE — PROGRESS NOTES
Refill    amoxicillin-clavulanate (AUGMENTIN) 875-125 MG per tablet Take 1 tablet by mouth 2 times daily for 10 days 20 tablet 0    carvedilol (COREG) 25 MG tablet Take 1 tablet by mouth 2 times daily 180 tablet 1    metFORMIN (GLUCOPHAGE) 500 MG tablet Take 1 tablet by mouth 2 times daily (with meals) 180 tablet 1    losartan-hydrochlorothiazide (HYZAAR) 100-25 MG per tablet Take 1 tablet by mouth daily 90 tablet 1    amLODIPine (NORVASC) 10 MG tablet Take 1 tablet by mouth daily 90 tablet 1    Blood Pressure Monitoring (BLOOD PRESSURE CUFF) MISC DX: I10 HTN 1 each 0    furosemide (LASIX) 20 MG tablet Take 1 tablet by mouth daily as needed (swelling) 30 tablet 3    sildenafil (REVATIO) 20 MG tablet Take 1 tablet by mouth as needed (ED) Take 1-5 tabs as needed PRN for ED 60 tablet 3    bisacodyl (DULCOLAX) 5 MG EC tablet Take 2 tablets at noon on bowel prep day 2 tablet 0    cetirizine (ZYRTEC) 10 MG tablet TAKE ONE TABLET BY MOUTH NIGHTLY AS NEEDED FOR ALLERGIES 30 tablet 5    fluticasone (FLONASE) 50 MCG/ACT nasal spray 1 spray by Nasal route daily 1 Bottle 3    meloxicam (MOBIC) 7.5 MG tablet Take 1 tablet by mouth daily 90 tablet 1    Elastic Bandages & Supports (JOBST OPAQUE KNEE 15-20MMHG XL) MISC #2 pair knee highs  Varicose veins 2 each 0    vitamin D3 (CHOLECALCIFEROL) 400 UNITS TABS tablet Take 400 Units by mouth daily      Omeprazole (PRILOSEC PO) Take 1 capsule by mouth daily as needed          Codeine and Sulfa antibiotics  History   Smoking Status    Never Smoker   Smokeless Tobacco    Never Used      (If patient a smoker, smoking cessation counseling offered)   History   Alcohol Use    Yes     Comment: very rare       REVIEW OF SYSTEMS:  Constitutional: negative  Eyes: negative  Respiratory: negative  Cardiovascular: negative  Gastrointestinal: negative  Genitourinary: see HPI  Musculoskeletal: negative  Skin: negative   Neurological: negative  Hematological/Lymphatic:

## 2018-10-18 ENCOUNTER — OFFICE VISIT (OUTPATIENT)
Dept: FAMILY MEDICINE CLINIC | Age: 63
End: 2018-10-18
Payer: COMMERCIAL

## 2018-10-18 VITALS
WEIGHT: 230 LBS | DIASTOLIC BLOOD PRESSURE: 60 MMHG | RESPIRATION RATE: 16 BRPM | SYSTOLIC BLOOD PRESSURE: 128 MMHG | BODY MASS INDEX: 32.93 KG/M2 | HEIGHT: 70 IN | OXYGEN SATURATION: 97 % | HEART RATE: 72 BPM

## 2018-10-18 DIAGNOSIS — Q23.1 BICUSPID AORTIC VALVE: ICD-10-CM

## 2018-10-18 DIAGNOSIS — M65.341 TRIGGER RING FINGER OF RIGHT HAND: Primary | ICD-10-CM

## 2018-10-18 DIAGNOSIS — E11.59 TYPE 2 DIABETES MELLITUS WITH OTHER CIRCULATORY COMPLICATION, WITHOUT LONG-TERM CURRENT USE OF INSULIN (HCC): ICD-10-CM

## 2018-10-18 DIAGNOSIS — M79.671 RIGHT FOOT PAIN: ICD-10-CM

## 2018-10-18 DIAGNOSIS — I10 ESSENTIAL HYPERTENSION: ICD-10-CM

## 2018-10-18 DIAGNOSIS — M19.90 GENERALIZED ARTHRITIS: ICD-10-CM

## 2018-10-18 DIAGNOSIS — N28.89 RENAL MASS: ICD-10-CM

## 2018-10-18 DIAGNOSIS — Z23 NEED FOR PNEUMOCOCCAL VACCINATION: ICD-10-CM

## 2018-10-18 DIAGNOSIS — Z23 NEED FOR VACCINATION: ICD-10-CM

## 2018-10-18 PROCEDURE — G8482 FLU IMMUNIZE ORDER/ADMIN: HCPCS | Performed by: PHYSICIAN ASSISTANT

## 2018-10-18 PROCEDURE — 3017F COLORECTAL CA SCREEN DOC REV: CPT | Performed by: PHYSICIAN ASSISTANT

## 2018-10-18 PROCEDURE — 90472 IMMUNIZATION ADMIN EACH ADD: CPT | Performed by: PHYSICIAN ASSISTANT

## 2018-10-18 PROCEDURE — 90662 IIV NO PRSV INCREASED AG IM: CPT | Performed by: PHYSICIAN ASSISTANT

## 2018-10-18 PROCEDURE — 90732 PPSV23 VACC 2 YRS+ SUBQ/IM: CPT | Performed by: PHYSICIAN ASSISTANT

## 2018-10-18 PROCEDURE — 1036F TOBACCO NON-USER: CPT | Performed by: PHYSICIAN ASSISTANT

## 2018-10-18 PROCEDURE — G8427 DOCREV CUR MEDS BY ELIG CLIN: HCPCS | Performed by: PHYSICIAN ASSISTANT

## 2018-10-18 PROCEDURE — 90471 IMMUNIZATION ADMIN: CPT | Performed by: PHYSICIAN ASSISTANT

## 2018-10-18 PROCEDURE — G8417 CALC BMI ABV UP PARAM F/U: HCPCS | Performed by: PHYSICIAN ASSISTANT

## 2018-10-18 PROCEDURE — 2022F DILAT RTA XM EVC RTNOPTHY: CPT | Performed by: PHYSICIAN ASSISTANT

## 2018-10-18 PROCEDURE — 99214 OFFICE O/P EST MOD 30 MIN: CPT | Performed by: PHYSICIAN ASSISTANT

## 2018-10-18 PROCEDURE — 3044F HG A1C LEVEL LT 7.0%: CPT | Performed by: PHYSICIAN ASSISTANT

## 2018-10-18 RX ORDER — MELOXICAM 7.5 MG/1
7.5 TABLET ORAL 2 TIMES DAILY WITH MEALS
Qty: 180 TABLET | Refills: 1 | Status: SHIPPED | OUTPATIENT
Start: 2018-10-18 | End: 2019-10-27 | Stop reason: SDUPTHER

## 2018-10-18 RX ORDER — MELOXICAM 7.5 MG/1
7.5 TABLET ORAL DAILY
Qty: 90 TABLET | Refills: 1 | Status: SHIPPED | OUTPATIENT
Start: 2018-10-18 | End: 2018-10-18 | Stop reason: DRUGHIGH

## 2018-10-18 ASSESSMENT — PATIENT HEALTH QUESTIONNAIRE - PHQ9
SUM OF ALL RESPONSES TO PHQ9 QUESTIONS 1 & 2: 0
2. FEELING DOWN, DEPRESSED OR HOPELESS: 0
SUM OF ALL RESPONSES TO PHQ QUESTIONS 1-9: 0
1. LITTLE INTEREST OR PLEASURE IN DOING THINGS: 0
SUM OF ALL RESPONSES TO PHQ QUESTIONS 1-9: 0

## 2018-10-18 NOTE — PROGRESS NOTES
Subjective:      Patient ID: Salvador Foote is a 61 y.o. male. Patient is seen following up on testing. He met with Dr. Dee Dee Diaz and planning to have robotic surgery at [facility] in the next month or so. Is on mobic bid and no knee pain and no foot problems. Has not had a scheduled appt with podiatry. Saw him years ago and he needs new orthotics. The right 4th finger has trigger finger and increasing pain. It will stick. Was having problems in the 3rd but the 4 th is worsening. Due for blood work. preop clearance as well. Numbness in left hand and arm is not better. If stretches it goes away. It feels asleep. Does not keep him awake. Review of Systems   Constitutional: Negative for appetite change, chills, fatigue and fever. HENT: Negative for congestion, sore throat and trouble swallowing. Eyes: Negative for visual disturbance. Respiratory: Negative for cough, chest tightness, shortness of breath and wheezing. Cardiovascular: Negative for chest pain and palpitations. Gastrointestinal: Negative for diarrhea, nausea and vomiting. Genitourinary: Negative for difficulty urinating, dysuria and frequency. Musculoskeletal: Positive for arthralgias. Negative for gait problem, myalgias, neck pain and neck stiffness. Skin: Negative for rash. Neurological: Negative for light-headedness, numbness and headaches. Psychiatric/Behavioral: Negative for sleep disturbance. The patient is not nervous/anxious. Objective:   Physical Exam   Constitutional: He is oriented to person, place, and time. He appears well-developed and well-nourished. No distress. HENT:   Head: Normocephalic and atraumatic. Nose: Nose normal.   Mouth/Throat: Oropharynx is clear and moist. No oropharyngeal exudate. Eyes: Conjunctivae are normal. No scleral icterus. Neck: Normal range of motion. Neck supple. No JVD present. No thyromegaly present. Thyroid nontender no palpable.   No carotid bruits noted. Cardiovascular: Normal rate and regular rhythm. Exam reveals no gallop and no friction rub. Murmur heard. Heart murmur is minimal 2/6 left lateral sternal border. Pulmonary/Chest: Effort normal and breath sounds normal. He has no wheezes. He has no rales. Abdominal: Soft. Bowel sounds are normal. He exhibits no distension and no mass. There is no tenderness. There is no rebound and no guarding. Abdomen is obese. Musculoskeletal: Normal range of motion. He exhibits no edema. No swelling of the hands. No erythema. Normal range of motion of the fingers today. Lymphadenopathy:     He has no cervical adenopathy. Neurological: He is alert and oriented to person, place, and time. Has a normal gait. Normal strength and sensation upper and lower extremities bilaterally. No tremors noted. Skin: Skin is warm and dry. No rash noted. Psychiatric: He has a normal mood and affect. His behavior is normal. Judgment and thought content normal.   He is tearful initially talking about her renal mass but other than that mood was appropriate. Nursing note and vitals reviewed. Xr Hand Right (min 3 Views)    Result Date: 10/23/2018  EXAMINATION: 3 XRAY VIEWS OF THE RIGHT HAND 10/23/2018 10:28 am COMPARISON: 07/27/2012 HISTORY: ORDERING SYSTEM PROVIDED HISTORY: Trigger ring finger of right hand TECHNOLOGIST PROVIDED HISTORY: Ordering Physician Provided Reason for Exam: Trigger finger of 3rd and 4th fingers Acuity: Chronic Type of Exam: Unknown FINDINGS: Carpal bones appear in appropriate alignment. No fracture is identified. No destructive osseous process is seen. No radiopaque foreign body is identified. No generalized periarticular osteopenia or erosive changes. Questionable small subchondral cyst within the head of the middle phalanx of the 2nd digit. No acute osseous abnormality or significant degenerative changes appreciated.      Us Renal Complete    Result Date:

## 2018-10-19 ENCOUNTER — HOSPITAL ENCOUNTER (OUTPATIENT)
Dept: LAB | Age: 63
Discharge: HOME | End: 2018-10-19
Payer: COMMERCIAL

## 2018-10-19 ENCOUNTER — OFFICE VISIT (OUTPATIENT)
Dept: PODIATRY | Age: 63
End: 2018-10-19
Payer: COMMERCIAL

## 2018-10-19 VITALS
BODY MASS INDEX: 32.84 KG/M2 | SYSTOLIC BLOOD PRESSURE: 126 MMHG | HEIGHT: 70 IN | HEART RATE: 80 BPM | DIASTOLIC BLOOD PRESSURE: 68 MMHG | WEIGHT: 229.4 LBS | RESPIRATION RATE: 20 BRPM

## 2018-10-19 DIAGNOSIS — R20.2 NUMBNESS AND TINGLING IN LEFT ARM: ICD-10-CM

## 2018-10-19 DIAGNOSIS — E11.9 TYPE 2 DIABETES MELLITUS WITHOUT COMPLICATION, WITHOUT LONG-TERM CURRENT USE OF INSULIN (HCC): ICD-10-CM

## 2018-10-19 DIAGNOSIS — M76.71 PERONEUS BREVIS TENDINITIS, RIGHT: Primary | ICD-10-CM

## 2018-10-19 DIAGNOSIS — E11.8 TYPE 2 DIABETES MELLITUS WITH COMPLICATION, UNSPECIFIED WHETHER LONG TERM INSULIN USE: ICD-10-CM

## 2018-10-19 DIAGNOSIS — E78.2 MIXED HYPERLIPIDEMIA: ICD-10-CM

## 2018-10-19 DIAGNOSIS — R20.0 NUMBNESS AND TINGLING IN LEFT ARM: ICD-10-CM

## 2018-10-19 DIAGNOSIS — E11.9 TYPE 2 DIABETES MELLITUS WITHOUT COMPLICATION, WITHOUT LONG-TERM CURRENT USE OF INSULIN (HCC): Primary | ICD-10-CM

## 2018-10-19 DIAGNOSIS — I10 ESSENTIAL HYPERTENSION: ICD-10-CM

## 2018-10-19 DIAGNOSIS — M19.90 GENERALIZED ARTHRITIS: ICD-10-CM

## 2018-10-19 DIAGNOSIS — L84 CORNS AND CALLOSITIES: ICD-10-CM

## 2018-10-19 DIAGNOSIS — N28.89 RENAL MASS: ICD-10-CM

## 2018-10-19 LAB
-: NORMAL
ALBUMIN SERPL-MCNC: 4.5 G/DL (ref 3.5–5.2)
ALBUMIN/GLOBULIN RATIO: 1.3 (ref 1–2.5)
ALP BLD-CCNC: 35 U/L (ref 40–129)
ALT SERPL-CCNC: 19 U/L (ref 5–41)
AMORPHOUS: NORMAL
ANION GAP SERPL CALCULATED.3IONS-SCNC: 13 MMOL/L (ref 9–17)
AST SERPL-CCNC: 15 U/L
BACTERIA: NORMAL
BILIRUB SERPL-MCNC: 0.89 MG/DL (ref 0.3–1.2)
BILIRUBIN URINE: NEGATIVE
BUN BLDV-MCNC: 19 MG/DL (ref 8–23)
BUN/CREAT BLD: 26 (ref 9–20)
CALCIUM SERPL-MCNC: 9.6 MG/DL (ref 8.6–10.4)
CASTS UA: NORMAL /LPF (ref 0–2)
CHLORIDE BLD-SCNC: 98 MMOL/L (ref 98–107)
CHOLESTEROL, FASTING: 137 MG/DL
CHOLESTEROL/HDL RATIO: 3
CO2: 25 MMOL/L (ref 20–31)
COLOR: ABNORMAL
COMMENT UA: ABNORMAL
CREAT SERPL-MCNC: 0.73 MG/DL (ref 0.7–1.2)
CREATININE URINE: 174.1 MG/DL (ref 39–259)
CRYSTALS, UA: NORMAL /HPF
EPITHELIAL CELLS UA: NORMAL /HPF (ref 0–5)
ESTIMATED AVERAGE GLUCOSE: 134 MG/DL
FOLATE: 10.3 NG/ML
GFR AFRICAN AMERICAN: >60 ML/MIN
GFR NON-AFRICAN AMERICAN: >60 ML/MIN
GFR SERPL CREATININE-BSD FRML MDRD: ABNORMAL ML/MIN/{1.73_M2}
GFR SERPL CREATININE-BSD FRML MDRD: ABNORMAL ML/MIN/{1.73_M2}
GLUCOSE BLD-MCNC: 146 MG/DL (ref 70–99)
GLUCOSE URINE: NEGATIVE
HBA1C MFR BLD: 6.3 % (ref 4.8–5.9)
HDLC SERPL-MCNC: 45 MG/DL
KETONES, URINE: NEGATIVE
LDL CHOLESTEROL: 77 MG/DL (ref 0–130)
LEUKOCYTE ESTERASE, URINE: NEGATIVE
MICROALBUMIN/CREAT 24H UR: 18 MG/L
MICROALBUMIN/CREAT UR-RTO: 10 MCG/MG CREAT
MUCUS: NORMAL
NITRITE, URINE: NEGATIVE
OTHER OBSERVATIONS UA: NORMAL
PH UA: 6.5 (ref 5–6)
POTASSIUM SERPL-SCNC: 4.1 MMOL/L (ref 3.7–5.3)
PROTEIN UA: NEGATIVE
RBC UA: NORMAL /HPF (ref 0–4)
RENAL EPITHELIAL, UA: NORMAL /HPF
SODIUM BLD-SCNC: 136 MMOL/L (ref 135–144)
SPECIFIC GRAVITY UA: 1.02 (ref 1.01–1.02)
TOTAL PROTEIN: 8.1 G/DL (ref 6.4–8.3)
TRICHOMONAS: NORMAL
TRIGLYCERIDE, FASTING: 75 MG/DL
TSH SERPL DL<=0.05 MIU/L-ACNC: 1.28 MIU/L (ref 0.3–5)
TURBIDITY: ABNORMAL
URINE HGB: ABNORMAL
UROBILINOGEN, URINE: NORMAL
VITAMIN B-12: 636 PG/ML (ref 232–1245)
VLDLC SERPL CALC-MCNC: NORMAL MG/DL (ref 1–30)
WBC UA: NORMAL /HPF (ref 0–4)
YEAST: NORMAL

## 2018-10-19 PROCEDURE — 11056 PARNG/CUTG B9 HYPRKR LES 2-4: CPT | Performed by: PODIATRIST

## 2018-10-19 PROCEDURE — 82043 UR ALBUMIN QUANTITATIVE: CPT

## 2018-10-19 PROCEDURE — 84443 ASSAY THYROID STIM HORMONE: CPT

## 2018-10-19 PROCEDURE — 2022F DILAT RTA XM EVC RTNOPTHY: CPT | Performed by: PODIATRIST

## 2018-10-19 PROCEDURE — 80053 COMPREHEN METABOLIC PANEL: CPT

## 2018-10-19 PROCEDURE — G8427 DOCREV CUR MEDS BY ELIG CLIN: HCPCS | Performed by: PODIATRIST

## 2018-10-19 PROCEDURE — L3040 FT ARCH SUPRT PREMOLD LONGIT: HCPCS | Performed by: PODIATRIST

## 2018-10-19 PROCEDURE — 3017F COLORECTAL CA SCREEN DOC REV: CPT | Performed by: PODIATRIST

## 2018-10-19 PROCEDURE — 82607 VITAMIN B-12: CPT

## 2018-10-19 PROCEDURE — 82746 ASSAY OF FOLIC ACID SERUM: CPT

## 2018-10-19 PROCEDURE — 36415 COLL VENOUS BLD VENIPUNCTURE: CPT

## 2018-10-19 PROCEDURE — G8417 CALC BMI ABV UP PARAM F/U: HCPCS | Performed by: PODIATRIST

## 2018-10-19 PROCEDURE — 1036F TOBACCO NON-USER: CPT | Performed by: PODIATRIST

## 2018-10-19 PROCEDURE — G8482 FLU IMMUNIZE ORDER/ADMIN: HCPCS | Performed by: PODIATRIST

## 2018-10-19 PROCEDURE — 80061 LIPID PANEL: CPT

## 2018-10-19 PROCEDURE — 82570 ASSAY OF URINE CREATININE: CPT

## 2018-10-19 PROCEDURE — 3044F HG A1C LEVEL LT 7.0%: CPT | Performed by: PODIATRIST

## 2018-10-19 PROCEDURE — 87086 URINE CULTURE/COLONY COUNT: CPT

## 2018-10-19 PROCEDURE — 99213 OFFICE O/P EST LOW 20 MIN: CPT | Performed by: PODIATRIST

## 2018-10-19 PROCEDURE — 81001 URINALYSIS AUTO W/SCOPE: CPT

## 2018-10-19 PROCEDURE — 83036 HEMOGLOBIN GLYCOSYLATED A1C: CPT

## 2018-10-19 NOTE — PROGRESS NOTES
Subjective:  Marcella Dewey is a 61 y.o. male who presents to the office today complaining of R foot pain. Symptoms improved once he took mobic bid instead of qd. No new injury. Patient relates pain is Present. Pain is rated 2 out of 10 and is described as intermittent. Pt very active on his feet. pt also has callous area on feet that have gotten worse. No tx tried there. ocassionally uses lotion. Currently denies F/C/N/V. Allergies   Allergen Reactions    Codeine      Severe Abdominal pain    Sulfa Antibiotics      Patient unsure of reaction       Past Medical History:   Diagnosis Date    Anxiety     Aortic regurgitation     Bicuspid aortic valve     Diabetes mellitus (HCC)     GERD (gastroesophageal reflux disease)     Hypertension     Osteoarthritis of left knee        Prior to Admission medications    Medication Sig Start Date End Date Taking?  Authorizing Provider   meloxicam (MOBIC) 7.5 MG tablet Take 1 tablet by mouth 2 times daily (with meals) 10/18/18  Yes PING Huang   amoxicillin-clavulanate (AUGMENTIN) 875-125 MG per tablet Take 1 tablet by mouth 2 times daily for 10 days 10/11/18 10/21/18 Yes Rufino Stovall APRN - CNP   carvedilol (COREG) 25 MG tablet Take 1 tablet by mouth 2 times daily 9/20/18  Yes PING Huang   metFORMIN (GLUCOPHAGE) 500 MG tablet Take 1 tablet by mouth 2 times daily (with meals) 9/20/18  Yes PING Huang   losartan-hydrochlorothiazide [facility]) 100-25 MG per tablet Take 1 tablet by mouth daily 9/20/18  Yes [facility] [address] [place] [address] PING Alanis   amLODIPine (NORVASC) 10 MG tablet Take 1 tablet by mouth daily 9/20/18  Yes Maxwell Huangma   Blood Pressure Monitoring (BLOOD PRESSURE CUFF) MISC DX: I10 HTN 9/11/18  Yes Oneil Osler, MD   furosemide (LASIX) 20 MG tablet Take 1 tablet by mouth daily as needed (swelling) 9/11/18  Yes Oneil Osler, MD   sildenafil (REVATIO) 20 MG tablet Take 1 tablet by mouth as needed (ED) Take 1-5 tabs as needed PRN for ED 4/24/18  Yes Amairani Barnes MD   bisacodyl (DULCOLAX) 5 MG EC tablet Take 2 tablets at noon on bowel prep day 4/16/18  Yes Kody Love MD   cetirizine (ZYRTEC) 10 MG tablet TAKE ONE TABLET BY MOUTH NIGHTLY AS NEEDED FOR ALLERGIES 1/18/18  Yes Ami Regan [place] PA   fluticasone Lowella Ream) 50 MCG/ACT nasal spray 1 spray by Nasal route daily 1/16/18  Yes XU Marshall - CNP   Elastic Bandages & Supports ([place] 15-20MMHG XL) MISC #2 pair knee highs  Varicose veins 10/17/16  Yes PING Crespo   vitamin D3 (CHOLECALCIFEROL) 400 UNITS TABS tablet Take 400 Units by mouth daily   Yes Historical Provider, MD   Omeprazole (PRILOSEC PO) Take 1 capsule by mouth daily as needed    Yes Historical Provider, MD       Past Surgical History:   Procedure Laterality Date    CYST REMOVAL      MOUTH SURGERY  2015    ID COLONOSCOPY FLX DX W/COLLJ SPEC WHEN PFRMD N/A 5/14/2018    normal colon, Dr. Gregory Cramer EXTRACTION         Family History   Problem Relation Age of Onset    High Blood Pressure Mother     Diabetes Mother         impaired fasting glucose    Other Mother         short term memory loss after MVA    Stroke Father     High Blood Pressure Father     Diabetes Father         impaired fasting glucose    Glaucoma Father     Heart Disease Father         MI massive and stroke at the same time    Cancer Father         bladder     Diabetes Maternal Grandmother     Other Paternal Grandmother         gallbladder disease    Stroke Paternal Grandfather     Other Paternal Grandfather         gallbladder    Glaucoma Other         siblings       Social History   Substance Use Topics    Smoking status: Never Smoker    Smokeless tobacco: Never Used    Alcohol use Yes      Comment: very rare       Review of Systems: All 12 systems reviewed and pertinent positives noted above.     Lower Extremity Physical Examination:     Vitals: evaluated. Current condition and treatment options discussed in detail. Orders Placed This Encounter   Procedures    WA FT ARCH SUPRT PREMOLD LONGIT     1 pair of men size 11-11.5 pre tejal orthotic dispensed to patient, brace agreement form signed by patient. $47.00 per pair.  TRIM BENIGN HYPERKERATOTIC SKIN LESION,2-4   continue pre fabricated insoles. Pt was advised on appropriate use and how they can help their condition. Pt should use these in shoe gear 100% of the time WB. Further offloading applied to insoles today. Continue po Rx mobic bid  Option of custom insoels discussed with pt  Paring of 2 benign hyperkeratotic lesions (as listed above) took place with #10 blade or tissue nippers. Patient advised OTC methods for treatment of the callous  Patient will follow up in 10 weeks. Sooner if pain returns  Contact office with any questions/problems/concerns.

## 2018-10-21 LAB
CULTURE: NO GROWTH
Lab: NORMAL
SPECIMEN DESCRIPTION: NORMAL
STATUS: NORMAL

## 2018-10-22 DIAGNOSIS — M65.341 TRIGGER RING FINGER OF RIGHT HAND: Primary | ICD-10-CM

## 2018-10-23 ENCOUNTER — OFFICE VISIT (OUTPATIENT)
Dept: ORTHOPEDIC SURGERY | Age: 63
End: 2018-10-23
Payer: COMMERCIAL

## 2018-10-23 ENCOUNTER — HOSPITAL ENCOUNTER (OUTPATIENT)
Dept: GENERAL RADIOLOGY | Age: 63
Discharge: HOME | End: 2018-10-25
Payer: COMMERCIAL

## 2018-10-23 VITALS
BODY MASS INDEX: 32.93 KG/M2 | SYSTOLIC BLOOD PRESSURE: 142 MMHG | WEIGHT: 230 LBS | HEIGHT: 70 IN | HEART RATE: 65 BPM | DIASTOLIC BLOOD PRESSURE: 66 MMHG

## 2018-10-23 DIAGNOSIS — M65.341 TRIGGER RING FINGER OF RIGHT HAND: Primary | ICD-10-CM

## 2018-10-23 DIAGNOSIS — M65.341 TRIGGER RING FINGER OF RIGHT HAND: ICD-10-CM

## 2018-10-23 PROCEDURE — 99212 OFFICE O/P EST SF 10 MIN: CPT | Performed by: PHYSICIAN ASSISTANT

## 2018-10-23 PROCEDURE — 73130 X-RAY EXAM OF HAND: CPT

## 2018-10-23 PROCEDURE — G8427 DOCREV CUR MEDS BY ELIG CLIN: HCPCS | Performed by: PHYSICIAN ASSISTANT

## 2018-10-23 PROCEDURE — G8417 CALC BMI ABV UP PARAM F/U: HCPCS | Performed by: PHYSICIAN ASSISTANT

## 2018-10-23 PROCEDURE — 1036F TOBACCO NON-USER: CPT | Performed by: PHYSICIAN ASSISTANT

## 2018-10-23 PROCEDURE — G8482 FLU IMMUNIZE ORDER/ADMIN: HCPCS | Performed by: PHYSICIAN ASSISTANT

## 2018-10-23 PROCEDURE — 3017F COLORECTAL CA SCREEN DOC REV: CPT | Performed by: PHYSICIAN ASSISTANT

## 2018-10-24 ASSESSMENT — ENCOUNTER SYMPTOMS
SORE THROAT: 0
NAUSEA: 0
TROUBLE SWALLOWING: 0
WHEEZING: 0
DIARRHEA: 0
VOMITING: 0
SHORTNESS OF BREATH: 0
CHEST TIGHTNESS: 0
COUGH: 0

## 2018-10-30 ENCOUNTER — OFFICE VISIT (OUTPATIENT)
Dept: CARDIOLOGY | Age: 63
End: 2018-10-30
Payer: COMMERCIAL

## 2018-10-30 VITALS
SYSTOLIC BLOOD PRESSURE: 139 MMHG | HEIGHT: 71 IN | DIASTOLIC BLOOD PRESSURE: 80 MMHG | WEIGHT: 229 LBS | BODY MASS INDEX: 32.06 KG/M2 | HEART RATE: 60 BPM

## 2018-10-30 DIAGNOSIS — Q23.1 AORTIC REGURGITATION DUE TO BICUSPID AORTIC VALVE: ICD-10-CM

## 2018-10-30 DIAGNOSIS — I38 MURMUR, DIASTOLIC: ICD-10-CM

## 2018-10-30 DIAGNOSIS — Q23.1 BICUSPID AORTIC VALVE: ICD-10-CM

## 2018-10-30 DIAGNOSIS — E11.9 TYPE 2 DIABETES MELLITUS WITHOUT COMPLICATION, WITHOUT LONG-TERM CURRENT USE OF INSULIN (HCC): ICD-10-CM

## 2018-10-30 DIAGNOSIS — I10 ESSENTIAL HYPERTENSION: Primary | ICD-10-CM

## 2018-10-30 PROCEDURE — 99213 OFFICE O/P EST LOW 20 MIN: CPT | Performed by: INTERNAL MEDICINE

## 2018-10-30 NOTE — PROGRESS NOTES
by mouth 2 times daily, Disp: 180 tablet, Rfl: 1    metFORMIN (GLUCOPHAGE) 500 MG tablet, Take 1 tablet by mouth 2 times daily (with meals), Disp: 180 tablet, Rfl: 1    losartan-hydrochlorothiazide (HYZAAR) 100-25 MG per tablet, Take 1 tablet by mouth daily, Disp: 90 tablet, Rfl: 1    amLODIPine (NORVASC) 10 MG tablet, Take 1 tablet by mouth daily, Disp: 90 tablet, Rfl: 1    Blood Pressure Monitoring (BLOOD PRESSURE CUFF) MISC, DX: I10 HTN, Disp: 1 each, Rfl: 0    furosemide (LASIX) 20 MG tablet, Take 1 tablet by mouth daily as needed (swelling), Disp: 30 tablet, Rfl: 3    sildenafil (REVATIO) 20 MG tablet, Take 1 tablet by mouth as needed (ED) Take 1-5 tabs as needed PRN for ED, Disp: 60 tablet, Rfl: 3    bisacodyl (DULCOLAX) 5 MG EC tablet, Take 2 tablets at noon on bowel prep day, Disp: 2 tablet, Rfl: 0    cetirizine (ZYRTEC) 10 MG tablet, TAKE ONE TABLET BY MOUTH NIGHTLY AS NEEDED FOR ALLERGIES, Disp: 30 tablet, Rfl: 5    fluticasone (FLONASE) 50 MCG/ACT nasal spray, 1 spray by Nasal route daily, Disp: 1 Bottle, Rfl: 3    Elastic Bandages & Supports (JOBST OPAQUE KNEE 15-20MMHG XL) MISC, #2 pair knee highs Varicose veins, Disp: 2 each, Rfl: 0    vitamin D3 (CHOLECALCIFEROL) 400 UNITS TABS tablet, Take 400 Units by mouth daily, Disp: , Rfl:     Omeprazole (PRILOSEC PO), Take 1 capsule by mouth daily as needed , Disp: , Rfl:     TTE 8/27/18  Left ventricle is normal in size Global left ventricular systolic function is normal   Estimated ejection fraction is 60 % . Mild left ventricular hypertrophy. Grade II (moderate) left ventricular diastolic dysfunction. Left atrium is mildly dilated. Bicuspid aortic valve. Moderate eccentric jet of aortic insufficiency. Normal tricuspid valve leaflets. Trivial tricuspid regurgitation. Estimated right ventricular systolic pressure is 33 mmHg. No pulmonary hypertension. Aortic root is mildly dilated at 4.4 cm.     CTA 9/19/2018  *Aneurysmal dilatation of the

## 2018-11-08 ENCOUNTER — TELEPHONE (OUTPATIENT)
Dept: UROLOGY | Age: 63
End: 2018-11-08

## 2018-11-14 ENCOUNTER — OFFICE VISIT (OUTPATIENT)
Dept: UROLOGY | Age: 63
End: 2018-11-14
Payer: COMMERCIAL

## 2018-11-14 VITALS
SYSTOLIC BLOOD PRESSURE: 138 MMHG | BODY MASS INDEX: 32 KG/M2 | HEIGHT: 71 IN | HEART RATE: 69 BPM | DIASTOLIC BLOOD PRESSURE: 62 MMHG | WEIGHT: 228.6 LBS

## 2018-11-14 DIAGNOSIS — N28.89 LEFT RENAL MASS: Primary | ICD-10-CM

## 2018-11-14 DIAGNOSIS — N52.9 ERECTILE DYSFUNCTION, UNSPECIFIED ERECTILE DYSFUNCTION TYPE: ICD-10-CM

## 2018-11-14 DIAGNOSIS — N28.89 RENAL MASS, LEFT: Primary | ICD-10-CM

## 2018-11-14 PROCEDURE — 1036F TOBACCO NON-USER: CPT | Performed by: UROLOGY

## 2018-11-14 PROCEDURE — 99215 OFFICE O/P EST HI 40 MIN: CPT | Performed by: UROLOGY

## 2018-11-14 PROCEDURE — 3017F COLORECTAL CA SCREEN DOC REV: CPT | Performed by: UROLOGY

## 2018-11-14 PROCEDURE — G8427 DOCREV CUR MEDS BY ELIG CLIN: HCPCS | Performed by: UROLOGY

## 2018-11-14 PROCEDURE — G8417 CALC BMI ABV UP PARAM F/U: HCPCS | Performed by: UROLOGY

## 2018-11-14 PROCEDURE — G8482 FLU IMMUNIZE ORDER/ADMIN: HCPCS | Performed by: UROLOGY

## 2018-11-14 NOTE — LETTER
[facility]      11/14/18    Patient: March Begin  YOB: 1955    Dear PING Campos,    I had the pleasure of seeing one of your patients, Eileen Parks in the office today. Below are the relevant portions of my assessment and plan of care. IMPRESSION:     1. Left renal mass    2. Erectile dysfunction, unspecified erectile dysfunction type        PLAN:      Discussed all risks and benefits or surgery and all questions were answered  Robotic left partial nephrectomy, possible open, possible radical Nov 30th  Return in about 2 weeks (around 11/30/2018). The patient is agreeable and happy with our plan. I will surely keep you updated on March Begin in the future. Should you have any questions, please do not hesitate to call me, [phone].     Sincerely,     Evelin Diaz MD

## 2018-11-15 DIAGNOSIS — N28.89 RENAL MASS: Primary | ICD-10-CM

## 2018-11-16 ENCOUNTER — HOSPITAL ENCOUNTER (OUTPATIENT)
Dept: PREADMISSION TESTING | Age: 63
Discharge: HOME | End: 2018-11-20
Payer: COMMERCIAL

## 2018-11-16 VITALS
OXYGEN SATURATION: 98 % | DIASTOLIC BLOOD PRESSURE: 81 MMHG | WEIGHT: 233 LBS | BODY MASS INDEX: 32.62 KG/M2 | HEIGHT: 71 IN | TEMPERATURE: 98 F | RESPIRATION RATE: 18 BRPM | SYSTOLIC BLOOD PRESSURE: 160 MMHG | HEART RATE: 66 BPM

## 2018-11-16 DIAGNOSIS — N28.89 RENAL MASS: ICD-10-CM

## 2018-11-16 DIAGNOSIS — N28.89 RENAL MASS, LEFT: ICD-10-CM

## 2018-11-16 LAB
ABO/RH: NORMAL
ABSOLUTE EOS #: 0.09 K/UL (ref 0–0.44)
ABSOLUTE IMMATURE GRANULOCYTE: <0.03 K/UL (ref 0–0.3)
ABSOLUTE LYMPH #: 1.83 K/UL (ref 1.1–3.7)
ABSOLUTE MONO #: 0.74 K/UL (ref 0.1–1.2)
ALBUMIN SERPL-MCNC: 4.5 G/DL (ref 3.5–5.2)
ALBUMIN/GLOBULIN RATIO: 1.4 (ref 1–2.5)
ALP BLD-CCNC: 33 U/L (ref 40–129)
ALT SERPL-CCNC: 22 U/L (ref 5–41)
ANION GAP SERPL CALCULATED.3IONS-SCNC: 10 MMOL/L (ref 9–17)
ANTIBODY SCREEN: NEGATIVE
ARM BAND NUMBER: NORMAL
AST SERPL-CCNC: 19 U/L
BASOPHILS # BLD: 1 % (ref 0–2)
BASOPHILS ABSOLUTE: 0.04 K/UL (ref 0–0.2)
BILIRUB SERPL-MCNC: 0.9 MG/DL (ref 0.3–1.2)
BUN BLDV-MCNC: 20 MG/DL (ref 8–23)
BUN/CREAT BLD: ABNORMAL (ref 9–20)
CALCIUM SERPL-MCNC: 9.5 MG/DL (ref 8.6–10.4)
CHLORIDE BLD-SCNC: 98 MMOL/L (ref 98–107)
CO2: 28 MMOL/L (ref 20–31)
CREAT SERPL-MCNC: 0.65 MG/DL (ref 0.7–1.2)
DIFFERENTIAL TYPE: ABNORMAL
EOSINOPHILS RELATIVE PERCENT: 2 % (ref 1–4)
EXPIRATION DATE: NORMAL
GFR AFRICAN AMERICAN: >60 ML/MIN
GFR NON-AFRICAN AMERICAN: >60 ML/MIN
GFR SERPL CREATININE-BSD FRML MDRD: ABNORMAL ML/MIN/{1.73_M2}
GFR SERPL CREATININE-BSD FRML MDRD: ABNORMAL ML/MIN/{1.73_M2}
GLUCOSE BLD-MCNC: 131 MG/DL (ref 70–99)
HCT VFR BLD CALC: 39.9 % (ref 40.7–50.3)
HEMOGLOBIN: 13.7 G/DL (ref 13–17)
IMMATURE GRANULOCYTES: 0 %
INR BLD: 1.1
LYMPHOCYTES # BLD: 31 % (ref 24–43)
MCH RBC QN AUTO: 31.6 PG (ref 25.2–33.5)
MCHC RBC AUTO-ENTMCNC: 34.3 G/DL (ref 28.4–34.8)
MCV RBC AUTO: 91.9 FL (ref 82.6–102.9)
MONOCYTES # BLD: 12 % (ref 3–12)
NRBC AUTOMATED: 0 PER 100 WBC
PDW BLD-RTO: 12.6 % (ref 11.8–14.4)
PLATELET # BLD: ABNORMAL K/UL (ref 138–453)
PLATELET ESTIMATE: ABNORMAL
PLATELET, FLUORESCENCE: 129 K/UL (ref 138–453)
PLATELET, IMMATURE FRACTION: 3.9 % (ref 1.1–10.3)
PMV BLD AUTO: ABNORMAL FL (ref 8.1–13.5)
POTASSIUM SERPL-SCNC: 4.4 MMOL/L (ref 3.7–5.3)
PROTHROMBIN TIME: 11.5 SEC (ref 9–12)
RBC # BLD: 4.34 M/UL (ref 4.21–5.77)
RBC # BLD: ABNORMAL 10*6/UL
SEG NEUTROPHILS: 55 % (ref 36–65)
SEGMENTED NEUTROPHILS ABSOLUTE COUNT: 3.27 K/UL (ref 1.5–8.1)
SODIUM BLD-SCNC: 136 MMOL/L (ref 135–144)
TOTAL PROTEIN: 7.7 G/DL (ref 6.4–8.3)
WBC # BLD: 6 K/UL (ref 3.5–11.3)
WBC # BLD: ABNORMAL 10*3/UL

## 2018-11-16 PROCEDURE — 86901 BLOOD TYPING SEROLOGIC RH(D): CPT

## 2018-11-16 PROCEDURE — 80053 COMPREHEN METABOLIC PANEL: CPT

## 2018-11-16 PROCEDURE — 85055 RETICULATED PLATELET ASSAY: CPT

## 2018-11-16 PROCEDURE — 86900 BLOOD TYPING SEROLOGIC ABO: CPT

## 2018-11-16 PROCEDURE — 86850 RBC ANTIBODY SCREEN: CPT

## 2018-11-16 PROCEDURE — 36415 COLL VENOUS BLD VENIPUNCTURE: CPT

## 2018-11-16 PROCEDURE — 85025 COMPLETE CBC W/AUTO DIFF WBC: CPT

## 2018-11-16 PROCEDURE — 85610 PROTHROMBIN TIME: CPT

## 2018-11-16 RX ORDER — SODIUM CHLORIDE, SODIUM LACTATE, POTASSIUM CHLORIDE, CALCIUM CHLORIDE 600; 310; 30; 20 MG/100ML; MG/100ML; MG/100ML; MG/100ML
1000 INJECTION, SOLUTION INTRAVENOUS CONTINUOUS
Status: CANCELLED | OUTPATIENT
Start: 2018-11-16

## 2018-11-16 NOTE — H&P
History and Physical    Pt Name: Natalia Brenner  MRN: [id number]  YOB: 1955  Date of evaluation: 11/16/2018    SUBJECTIVE:     History of Chief Complaint:    Patient complains of a left renal mass found incidentally on a CTA of his chest.  He denies urinary symptoms. He is scheduled for a left partial nephrectomy. Past Medical History    has a past medical history of Anxiety; Aortic regurgitation; Bicuspid aortic valve; Diabetes mellitus ([address] GERD (gastroesophageal reflux disease); Hypertension; Left renal mass; Osteoarthritis of left knee; and Wears glasses. Past Surgical History   has a past surgical history that includes cyst removal; [place] tooth extraction; Mouth surgery (2015); pr colonoscopy flx dx w/collj spec when pfrmd (N/A, 5/14/2018); and Tonsillectomy and adenoidectomy (1960).   Medications    Current Outpatient Prescriptions:     meloxicam (MOBIC) 7.5 MG tablet, Take 1 tablet by mouth 2 times daily (with meals), Disp: 180 tablet, Rfl: 1    carvedilol (COREG) 25 MG tablet, Take 1 tablet by mouth 2 times daily, Disp: 180 tablet, Rfl: 1    metFORMIN (GLUCOPHAGE) 500 MG tablet, Take 1 tablet by mouth 2 times daily (with meals), Disp: 180 tablet, Rfl: 1    losartan-hydrochlorothiazide (HYZAAR) 100-25 MG per tablet, Take 1 tablet by mouth daily, Disp: 90 tablet, Rfl: 1    amLODIPine (NORVASC) 10 MG tablet, Take 1 tablet by mouth daily (Patient taking differently: Take 10 mg by mouth every morning ), Disp: 90 tablet, Rfl: 1    furosemide (LASIX) 20 MG tablet, Take 1 tablet by mouth daily as needed (swelling), Disp: 30 tablet, Rfl: 3    sildenafil (REVATIO) 20 MG tablet, Take 1 tablet by mouth as needed (ED) Take 1-5 tabs as needed PRN for ED, Disp: 60 tablet, Rfl: 3    cetirizine (ZYRTEC) 10 MG tablet, TAKE ONE TABLET BY MOUTH NIGHTLY AS NEEDED FOR ALLERGIES, Disp: 30 tablet, Rfl: 5    fluticasone (FLONASE) 50 MCG/ACT nasal spray, 1 spray by Nasal route daily (Patient taking differently: 1 spray by Nasal route daily as needed ), Disp: 1 Bottle, Rfl: 3    Elastic Bandages & Supports (JOBST OPAQUE KNEE 15-20MMHG XL) MISC, #2 pair knee highs Varicose veins, Disp: 2 each, Rfl: 0    vitamin D3 (CHOLECALCIFEROL) 400 UNITS TABS tablet, Take 400 Units by mouth every other day , Disp: , Rfl:     Omeprazole (PRILOSEC PO), Take 1 capsule by mouth daily as needed , Disp: , Rfl:     Blood Pressure Monitoring (BLOOD PRESSURE CUFF) MISC, DX: I10 HTN, Disp: 1 each, Rfl: 0  Allergies  is allergic to codeine and sulfa antibiotics. Family History  family history includes Cancer in his father; Diabetes in his father, maternal grandmother, mother, and paternal grandfather; Glaucoma in his brother, father, and mother; Heart Attack (age of onset: 66) in his father; High Blood Pressure in his father and mother; No Known Problems in his brother; Other in his mother, paternal grandfather, and paternal grandmother; Stroke in his paternal grandfather; Stroke (age of onset: 66) in his father. Social History   reports that he has never smoked. He has never used smokeless tobacco.   reports that he drinks alcohol. reports that he does not use drugs. Marital Status: single  Children: none  Occupation: , color guard instructor at [address]:     VITALS:  height is 5' 11\" (1.803 m) and weight is 233 lb (105.7 kg). His oral temperature is 98 °F (36.7 °C). His blood pressure is 160/81 (abnormal) and his pulse is 66. His respiration is 18 and oxygen saturation is 98%. CONSTITUTIONAL: Alert & oriented x 3, no acute distress. SKIN:  Warm and dry, no rash or erythema. HEAD:  Normocephalic, atraumatic. EYES: PERRLA. EOMs intact. Wears glasses. EARS:  Hearing grossly normal.    NOSE:  Nares patent. Septum midline. No rhinorrhea   MOUTH/THROAT:  Unremarkable   NECK: Supple with no lymphadenopathy. LUNGS: Clear to auscultation throughout. No wheezes, rales or rhonchi.     CARDIOVASCULAR:

## 2018-11-29 ENCOUNTER — ANESTHESIA EVENT (OUTPATIENT)
Dept: OPERATING ROOM | Age: 63
DRG: 442 | End: 2018-11-29
Payer: COMMERCIAL

## 2018-11-30 ENCOUNTER — ANESTHESIA (OUTPATIENT)
Dept: OPERATING ROOM | Age: 63
DRG: 442 | End: 2018-11-30
Payer: COMMERCIAL

## 2018-11-30 ENCOUNTER — HOSPITAL ENCOUNTER (INPATIENT)
Age: 63
LOS: 1 days | Discharge: HOME | DRG: 442 | End: 2018-12-01
Attending: UROLOGY | Admitting: UROLOGY
Payer: COMMERCIAL

## 2018-11-30 VITALS — OXYGEN SATURATION: 98 % | TEMPERATURE: 97.6 F | SYSTOLIC BLOOD PRESSURE: 103 MMHG | DIASTOLIC BLOOD PRESSURE: 57 MMHG

## 2018-11-30 DIAGNOSIS — N28.89 RENAL MASS: Primary | ICD-10-CM

## 2018-11-30 LAB
ANION GAP SERPL CALCULATED.3IONS-SCNC: 11 MMOL/L (ref 9–17)
BUN BLDV-MCNC: 18 MG/DL (ref 8–23)
BUN/CREAT BLD: ABNORMAL (ref 9–20)
CALCIUM SERPL-MCNC: 8.8 MG/DL (ref 8.6–10.4)
CHLORIDE BLD-SCNC: 102 MMOL/L (ref 98–107)
CO2: 24 MMOL/L (ref 20–31)
CREAT SERPL-MCNC: 0.83 MG/DL (ref 0.7–1.2)
GFR AFRICAN AMERICAN: >60 ML/MIN
GFR NON-AFRICAN AMERICAN: >60 ML/MIN
GFR SERPL CREATININE-BSD FRML MDRD: ABNORMAL ML/MIN/{1.73_M2}
GFR SERPL CREATININE-BSD FRML MDRD: ABNORMAL ML/MIN/{1.73_M2}
GLUCOSE BLD-MCNC: 140 MG/DL (ref 74–100)
GLUCOSE BLD-MCNC: 165 MG/DL (ref 75–110)
GLUCOSE BLD-MCNC: 172 MG/DL (ref 70–99)
GLUCOSE BLD-MCNC: 223 MG/DL (ref 75–110)
HCT VFR BLD CALC: 37.8 % (ref 40.7–50.3)
HEMOGLOBIN: 12.7 G/DL (ref 13–17)
MCH RBC QN AUTO: 31.1 PG (ref 25.2–33.5)
MCHC RBC AUTO-ENTMCNC: 33.6 G/DL (ref 28.4–34.8)
MCV RBC AUTO: 92.4 FL (ref 82.6–102.9)
NRBC AUTOMATED: 0 PER 100 WBC
PDW BLD-RTO: 12.8 % (ref 11.8–14.4)
PLATELET # BLD: 146 K/UL (ref 138–453)
PMV BLD AUTO: 10.9 FL (ref 8.1–13.5)
POC POTASSIUM: 4.3 MMOL/L (ref 3.5–4.5)
POTASSIUM SERPL-SCNC: 4.1 MMOL/L (ref 3.7–5.3)
RBC # BLD: 4.09 M/UL (ref 4.21–5.77)
SODIUM BLD-SCNC: 137 MMOL/L (ref 135–144)
WBC # BLD: 8.8 K/UL (ref 3.5–11.3)

## 2018-11-30 PROCEDURE — 6360000002 HC RX W HCPCS: Performed by: NURSE ANESTHETIST, CERTIFIED REGISTERED

## 2018-11-30 PROCEDURE — 3600000019 HC SURGERY ROBOT ADDTL 15MIN: Performed by: UROLOGY

## 2018-11-30 PROCEDURE — 7100000001 HC PACU RECOVERY - ADDTL 15 MIN: Performed by: UROLOGY

## 2018-11-30 PROCEDURE — 2580000003 HC RX 258: Performed by: NURSE ANESTHETIST, CERTIFIED REGISTERED

## 2018-11-30 PROCEDURE — 1200000000 HC SEMI PRIVATE

## 2018-11-30 PROCEDURE — 2500000003 HC RX 250 WO HCPCS: Performed by: NURSE ANESTHETIST, CERTIFIED REGISTERED

## 2018-11-30 PROCEDURE — 82947 ASSAY GLUCOSE BLOOD QUANT: CPT

## 2018-11-30 PROCEDURE — 0TB14ZZ EXCISION OF LEFT KIDNEY, PERCUTANEOUS ENDOSCOPIC APPROACH: ICD-10-PCS | Performed by: UROLOGY

## 2018-11-30 PROCEDURE — 6360000002 HC RX W HCPCS

## 2018-11-30 PROCEDURE — 6360000002 HC RX W HCPCS: Performed by: ANESTHESIOLOGY

## 2018-11-30 PROCEDURE — 6360000002 HC RX W HCPCS: Performed by: UROLOGY

## 2018-11-30 PROCEDURE — 3700000001 HC ADD 15 MINUTES (ANESTHESIA): Performed by: UROLOGY

## 2018-11-30 PROCEDURE — 80048 BASIC METABOLIC PNL TOTAL CA: CPT

## 2018-11-30 PROCEDURE — 88307 TISSUE EXAM BY PATHOLOGIST: CPT

## 2018-11-30 PROCEDURE — 84132 ASSAY OF SERUM POTASSIUM: CPT

## 2018-11-30 PROCEDURE — C1773 RET DEV, INSERTABLE: HCPCS | Performed by: UROLOGY

## 2018-11-30 PROCEDURE — 2580000003 HC RX 258: Performed by: UROLOGY

## 2018-11-30 PROCEDURE — 6360000002 HC RX W HCPCS: Performed by: STUDENT IN AN ORGANIZED HEALTH CARE EDUCATION/TRAINING PROGRAM

## 2018-11-30 PROCEDURE — 6370000000 HC RX 637 (ALT 250 FOR IP): Performed by: UROLOGY

## 2018-11-30 PROCEDURE — 85027 COMPLETE CBC AUTOMATED: CPT

## 2018-11-30 PROCEDURE — 3600000009 HC SURGERY ROBOT BASE: Performed by: UROLOGY

## 2018-11-30 PROCEDURE — 7100000000 HC PACU RECOVERY - FIRST 15 MIN: Performed by: UROLOGY

## 2018-11-30 PROCEDURE — 2780000010 HC IMPLANT OTHER: Performed by: UROLOGY

## 2018-11-30 PROCEDURE — 3700000000 HC ANESTHESIA ATTENDED CARE: Performed by: UROLOGY

## 2018-11-30 PROCEDURE — S2900 ROBOTIC SURGICAL SYSTEM: HCPCS | Performed by: UROLOGY

## 2018-11-30 PROCEDURE — 87086 URINE CULTURE/COLONY COUNT: CPT

## 2018-11-30 PROCEDURE — 2709999900 HC NON-CHARGEABLE SUPPLY: Performed by: UROLOGY

## 2018-11-30 RX ORDER — FUROSEMIDE 20 MG/1
20 TABLET ORAL DAILY PRN
Status: DISCONTINUED | OUTPATIENT
Start: 2018-11-30 | End: 2018-12-01 | Stop reason: HOSPADM

## 2018-11-30 RX ORDER — FLUTICASONE PROPIONATE 50 MCG
1 SPRAY, SUSPENSION (ML) NASAL DAILY PRN
Status: DISCONTINUED | OUTPATIENT
Start: 2018-11-30 | End: 2018-12-01 | Stop reason: HOSPADM

## 2018-11-30 RX ORDER — OXYCODONE HYDROCHLORIDE AND ACETAMINOPHEN 5; 325 MG/1; MG/1
2 TABLET ORAL EVERY 4 HOURS PRN
Status: DISCONTINUED | OUTPATIENT
Start: 2018-11-30 | End: 2018-12-01 | Stop reason: HOSPADM

## 2018-11-30 RX ORDER — ONDANSETRON 2 MG/ML
4 INJECTION INTRAMUSCULAR; INTRAVENOUS EVERY 6 HOURS PRN
Status: DISCONTINUED | OUTPATIENT
Start: 2018-11-30 | End: 2018-12-01 | Stop reason: HOSPADM

## 2018-11-30 RX ORDER — MORPHINE SULFATE 4 MG/ML
4 INJECTION, SOLUTION INTRAMUSCULAR; INTRAVENOUS
Status: DISCONTINUED | OUTPATIENT
Start: 2018-11-30 | End: 2018-12-01 | Stop reason: HOSPADM

## 2018-11-30 RX ORDER — MAGNESIUM HYDROXIDE 1200 MG/15ML
LIQUID ORAL PRN
Status: DISCONTINUED | OUTPATIENT
Start: 2018-11-30 | End: 2018-11-30 | Stop reason: HOSPADM

## 2018-11-30 RX ORDER — DEXAMETHASONE SODIUM PHOSPHATE 10 MG/ML
INJECTION INTRAMUSCULAR; INTRAVENOUS PRN
Status: DISCONTINUED | OUTPATIENT
Start: 2018-11-30 | End: 2018-11-30 | Stop reason: SDUPTHER

## 2018-11-30 RX ORDER — SODIUM CHLORIDE, SODIUM LACTATE, POTASSIUM CHLORIDE, CALCIUM CHLORIDE 600; 310; 30; 20 MG/100ML; MG/100ML; MG/100ML; MG/100ML
INJECTION, SOLUTION INTRAVENOUS CONTINUOUS PRN
Status: DISCONTINUED | OUTPATIENT
Start: 2018-11-30 | End: 2018-11-30 | Stop reason: SDUPTHER

## 2018-11-30 RX ORDER — OXYCODONE HYDROCHLORIDE AND ACETAMINOPHEN 5; 325 MG/1; MG/1
1-2 TABLET ORAL EVERY 4 HOURS PRN
Qty: 40 TABLET | Refills: 0 | Status: SHIPPED | OUTPATIENT
Start: 2018-11-30 | End: 2018-12-07

## 2018-11-30 RX ORDER — AMLODIPINE BESYLATE 10 MG/1
10 TABLET ORAL EVERY MORNING
Status: DISCONTINUED | OUTPATIENT
Start: 2018-12-01 | End: 2018-12-01 | Stop reason: HOSPADM

## 2018-11-30 RX ORDER — SODIUM CHLORIDE, SODIUM LACTATE, POTASSIUM CHLORIDE, CALCIUM CHLORIDE 600; 310; 30; 20 MG/100ML; MG/100ML; MG/100ML; MG/100ML
INJECTION, SOLUTION INTRAVENOUS CONTINUOUS
Status: DISCONTINUED | OUTPATIENT
Start: 2018-11-30 | End: 2018-11-30

## 2018-11-30 RX ORDER — SODIUM CHLORIDE 0.9 % (FLUSH) 0.9 %
10 SYRINGE (ML) INJECTION EVERY 12 HOURS SCHEDULED
Status: DISCONTINUED | OUTPATIENT
Start: 2018-11-30 | End: 2018-12-01 | Stop reason: HOSPADM

## 2018-11-30 RX ORDER — HEPARIN SODIUM 5000 [USP'U]/ML
INJECTION, SOLUTION INTRAVENOUS; SUBCUTANEOUS
Status: DISPENSED
Start: 2018-11-30 | End: 2018-11-30

## 2018-11-30 RX ORDER — MORPHINE SULFATE 2 MG/ML
2 INJECTION, SOLUTION INTRAMUSCULAR; INTRAVENOUS
Status: DISCONTINUED | OUTPATIENT
Start: 2018-11-30 | End: 2018-12-01 | Stop reason: HOSPADM

## 2018-11-30 RX ORDER — LOSARTAN POTASSIUM AND HYDROCHLOROTHIAZIDE 25; 100 MG/1; MG/1
1 TABLET ORAL DAILY
Status: DISCONTINUED | OUTPATIENT
Start: 2018-11-30 | End: 2018-11-30 | Stop reason: CLARIF

## 2018-11-30 RX ORDER — SODIUM CHLORIDE 0.9 % (FLUSH) 0.9 %
10 SYRINGE (ML) INJECTION PRN
Status: DISCONTINUED | OUTPATIENT
Start: 2018-11-30 | End: 2018-12-01 | Stop reason: HOSPADM

## 2018-11-30 RX ORDER — FENTANYL CITRATE 50 UG/ML
INJECTION, SOLUTION INTRAMUSCULAR; INTRAVENOUS PRN
Status: DISCONTINUED | OUTPATIENT
Start: 2018-11-30 | End: 2018-11-30 | Stop reason: SDUPTHER

## 2018-11-30 RX ORDER — CETIRIZINE HYDROCHLORIDE 10 MG/1
10 TABLET ORAL DAILY
Status: DISCONTINUED | OUTPATIENT
Start: 2018-11-30 | End: 2018-12-01 | Stop reason: HOSPADM

## 2018-11-30 RX ORDER — GLYCOPYRROLATE 1 MG/5 ML
SYRINGE (ML) INTRAVENOUS PRN
Status: DISCONTINUED | OUTPATIENT
Start: 2018-11-30 | End: 2018-11-30 | Stop reason: SDUPTHER

## 2018-11-30 RX ORDER — LIDOCAINE HYDROCHLORIDE 10 MG/ML
INJECTION, SOLUTION INFILTRATION; PERINEURAL PRN
Status: DISCONTINUED | OUTPATIENT
Start: 2018-11-30 | End: 2018-11-30 | Stop reason: SDUPTHER

## 2018-11-30 RX ORDER — MORPHINE SULFATE 4 MG/ML
INJECTION, SOLUTION INTRAMUSCULAR; INTRAVENOUS PRN
Status: DISCONTINUED | OUTPATIENT
Start: 2018-11-30 | End: 2018-11-30 | Stop reason: SDUPTHER

## 2018-11-30 RX ORDER — HYDROCHLOROTHIAZIDE 25 MG/1
25 TABLET ORAL DAILY
Status: DISCONTINUED | OUTPATIENT
Start: 2018-11-30 | End: 2018-12-01 | Stop reason: HOSPADM

## 2018-11-30 RX ORDER — CARVEDILOL 25 MG/1
25 TABLET ORAL 2 TIMES DAILY
Status: DISCONTINUED | OUTPATIENT
Start: 2018-11-30 | End: 2018-12-01 | Stop reason: HOSPADM

## 2018-11-30 RX ORDER — MIDAZOLAM HYDROCHLORIDE 1 MG/ML
2 INJECTION INTRAMUSCULAR; INTRAVENOUS ONCE
Status: DISCONTINUED | OUTPATIENT
Start: 2018-11-30 | End: 2018-12-01 | Stop reason: HOSPADM

## 2018-11-30 RX ORDER — HEPARIN SODIUM 5000 [USP'U]/ML
5000 INJECTION, SOLUTION INTRAVENOUS; SUBCUTANEOUS ONCE
Status: COMPLETED | OUTPATIENT
Start: 2018-11-30 | End: 2018-11-30

## 2018-11-30 RX ORDER — SODIUM CHLORIDE, SODIUM LACTATE, POTASSIUM CHLORIDE, CALCIUM CHLORIDE 600; 310; 30; 20 MG/100ML; MG/100ML; MG/100ML; MG/100ML
1000 INJECTION, SOLUTION INTRAVENOUS CONTINUOUS
Status: DISCONTINUED | OUTPATIENT
Start: 2018-11-30 | End: 2018-11-30

## 2018-11-30 RX ORDER — PROPOFOL 10 MG/ML
INJECTION, EMULSION INTRAVENOUS PRN
Status: DISCONTINUED | OUTPATIENT
Start: 2018-11-30 | End: 2018-11-30 | Stop reason: SDUPTHER

## 2018-11-30 RX ORDER — DOCUSATE SODIUM 100 MG/1
100 CAPSULE, LIQUID FILLED ORAL 2 TIMES DAILY
Status: DISCONTINUED | OUTPATIENT
Start: 2018-11-30 | End: 2018-12-01 | Stop reason: HOSPADM

## 2018-11-30 RX ORDER — SODIUM CHLORIDE 9 MG/ML
INJECTION, SOLUTION INTRAVENOUS CONTINUOUS
Status: DISCONTINUED | OUTPATIENT
Start: 2018-11-30 | End: 2018-12-01

## 2018-11-30 RX ORDER — OMEPRAZOLE 20 MG/1
20 CAPSULE, DELAYED RELEASE ORAL DAILY
Status: DISCONTINUED | OUTPATIENT
Start: 2018-12-01 | End: 2018-12-01 | Stop reason: HOSPADM

## 2018-11-30 RX ORDER — MIDAZOLAM HYDROCHLORIDE 1 MG/ML
2 INJECTION INTRAMUSCULAR; INTRAVENOUS ONCE
Status: COMPLETED | OUTPATIENT
Start: 2018-11-30 | End: 2018-11-30

## 2018-11-30 RX ORDER — ACETAMINOPHEN 325 MG/1
650 TABLET ORAL EVERY 4 HOURS PRN
Status: DISCONTINUED | OUTPATIENT
Start: 2018-11-30 | End: 2018-12-01 | Stop reason: HOSPADM

## 2018-11-30 RX ORDER — LOSARTAN POTASSIUM 50 MG/1
100 TABLET ORAL DAILY
Status: DISCONTINUED | OUTPATIENT
Start: 2018-11-30 | End: 2018-12-01 | Stop reason: HOSPADM

## 2018-11-30 RX ORDER — ROCURONIUM BROMIDE 10 MG/ML
INJECTION, SOLUTION INTRAVENOUS PRN
Status: DISCONTINUED | OUTPATIENT
Start: 2018-11-30 | End: 2018-11-30 | Stop reason: SDUPTHER

## 2018-11-30 RX ORDER — POLYETHYLENE GLYCOL 3350 17 G/17G
17 POWDER, FOR SOLUTION ORAL DAILY
Qty: 510 G | Refills: 0 | Status: SHIPPED | OUTPATIENT
Start: 2018-11-30 | End: 2018-12-17

## 2018-11-30 RX ORDER — MANNITOL 250 MG/ML
INJECTION, SOLUTION INTRAVENOUS PRN
Status: DISCONTINUED | OUTPATIENT
Start: 2018-11-30 | End: 2018-11-30 | Stop reason: SDUPTHER

## 2018-11-30 RX ORDER — OXYCODONE HYDROCHLORIDE AND ACETAMINOPHEN 5; 325 MG/1; MG/1
1 TABLET ORAL EVERY 4 HOURS PRN
Status: DISCONTINUED | OUTPATIENT
Start: 2018-11-30 | End: 2018-12-01 | Stop reason: HOSPADM

## 2018-11-30 RX ADMIN — FENTANYL CITRATE 50 MCG: 50 INJECTION INTRAMUSCULAR; INTRAVENOUS at 14:18

## 2018-11-30 RX ADMIN — MORPHINE SULFATE 2 MG: 2 INJECTION, SOLUTION INTRAMUSCULAR; INTRAVENOUS at 22:04

## 2018-11-30 RX ADMIN — HYDROMORPHONE HYDROCHLORIDE 0.25 MG: 1 INJECTION, SOLUTION INTRAMUSCULAR; INTRAVENOUS; SUBCUTANEOUS at 17:25

## 2018-11-30 RX ADMIN — HYDROMORPHONE HYDROCHLORIDE 0.5 MG: 1 INJECTION, SOLUTION INTRAMUSCULAR; INTRAVENOUS; SUBCUTANEOUS at 17:08

## 2018-11-30 RX ADMIN — MIDAZOLAM HYDROCHLORIDE 2 MG: 1 INJECTION, SOLUTION INTRAMUSCULAR; INTRAVENOUS at 11:56

## 2018-11-30 RX ADMIN — MANNITOL 12.5 G: 12.5 INJECTION, SOLUTION INTRAVENOUS at 14:59

## 2018-11-30 RX ADMIN — Medication 10 ML: at 21:26

## 2018-11-30 RX ADMIN — DEXTROSE MONOHYDRATE 2 G: 50 INJECTION, SOLUTION INTRAVENOUS at 23:58

## 2018-11-30 RX ADMIN — LIDOCAINE HYDROCHLORIDE 50 MG: 10 INJECTION, SOLUTION INFILTRATION; PERINEURAL at 12:19

## 2018-11-30 RX ADMIN — MORPHINE SULFATE 4 MG: 4 INJECTION INTRAVENOUS at 15:59

## 2018-11-30 RX ADMIN — DEXAMETHASONE SODIUM PHOSPHATE 10 MG: 10 INJECTION INTRAMUSCULAR; INTRAVENOUS at 12:45

## 2018-11-30 RX ADMIN — Medication 2 G: at 15:55

## 2018-11-30 RX ADMIN — Medication 0.2 MG: at 13:15

## 2018-11-30 RX ADMIN — HYDROMORPHONE HYDROCHLORIDE 0.25 MG: 1 INJECTION, SOLUTION INTRAMUSCULAR; INTRAVENOUS; SUBCUTANEOUS at 17:15

## 2018-11-30 RX ADMIN — PROPOFOL 200 MG: 10 INJECTION, EMULSION INTRAVENOUS at 12:19

## 2018-11-30 RX ADMIN — DOCUSATE SODIUM 100 MG: 100 CAPSULE, LIQUID FILLED ORAL at 21:25

## 2018-11-30 RX ADMIN — MORPHINE SULFATE 2 MG: 4 INJECTION INTRAVENOUS at 16:01

## 2018-11-30 RX ADMIN — MORPHINE SULFATE 2 MG: 4 INJECTION INTRAVENOUS at 14:20

## 2018-11-30 RX ADMIN — SODIUM CHLORIDE: 9 INJECTION, SOLUTION INTRAVENOUS at 21:25

## 2018-11-30 RX ADMIN — HYDROMORPHONE HYDROCHLORIDE 0.25 MG: 1 INJECTION, SOLUTION INTRAMUSCULAR; INTRAVENOUS; SUBCUTANEOUS at 17:20

## 2018-11-30 RX ADMIN — HYDROMORPHONE HYDROCHLORIDE 0.25 MG: 1 INJECTION, SOLUTION INTRAMUSCULAR; INTRAVENOUS; SUBCUTANEOUS at 17:10

## 2018-11-30 RX ADMIN — FENTANYL CITRATE 50 MCG: 50 INJECTION INTRAMUSCULAR; INTRAVENOUS at 14:07

## 2018-11-30 RX ADMIN — FENTANYL CITRATE 100 MCG: 50 INJECTION INTRAMUSCULAR; INTRAVENOUS at 13:09

## 2018-11-30 RX ADMIN — OXYCODONE HYDROCHLORIDE AND ACETAMINOPHEN 2 TABLET: 5; 325 TABLET ORAL at 23:10

## 2018-11-30 RX ADMIN — ROCURONIUM BROMIDE 10 MG: 10 INJECTION INTRAVENOUS at 14:05

## 2018-11-30 RX ADMIN — NEOSTIGMINE METHYLSULFATE 4 MG: 1 INJECTION, SOLUTION INTRAMUSCULAR; INTRAVENOUS; SUBCUTANEOUS at 15:49

## 2018-11-30 RX ADMIN — SODIUM CHLORIDE, POTASSIUM CHLORIDE, SODIUM LACTATE AND CALCIUM CHLORIDE: 600; 310; 30; 20 INJECTION, SOLUTION INTRAVENOUS at 12:31

## 2018-11-30 RX ADMIN — CARVEDILOL 25 MG: 25 TABLET, FILM COATED ORAL at 21:25

## 2018-11-30 RX ADMIN — ROCURONIUM BROMIDE 10 MG: 10 INJECTION INTRAVENOUS at 14:41

## 2018-11-30 RX ADMIN — ROCURONIUM BROMIDE 50 MG: 10 INJECTION INTRAVENOUS at 12:19

## 2018-11-30 RX ADMIN — ROCURONIUM BROMIDE 10 MG: 10 INJECTION INTRAVENOUS at 13:02

## 2018-11-30 RX ADMIN — FENTANYL CITRATE 100 MCG: 50 INJECTION INTRAMUSCULAR; INTRAVENOUS at 12:19

## 2018-11-30 RX ADMIN — HEPARIN SODIUM 5000 UNITS: 5000 INJECTION, SOLUTION INTRAVENOUS; SUBCUTANEOUS at 10:49

## 2018-11-30 RX ADMIN — FENTANYL CITRATE 50 MCG: 50 INJECTION INTRAMUSCULAR; INTRAVENOUS at 13:04

## 2018-11-30 RX ADMIN — SODIUM CHLORIDE, POTASSIUM CHLORIDE, SODIUM LACTATE AND CALCIUM CHLORIDE: 600; 310; 30; 20 INJECTION, SOLUTION INTRAVENOUS at 11:49

## 2018-11-30 RX ADMIN — HYDROMORPHONE HYDROCHLORIDE 0.5 MG: 1 INJECTION, SOLUTION INTRAMUSCULAR; INTRAVENOUS; SUBCUTANEOUS at 16:50

## 2018-11-30 RX ADMIN — ROCURONIUM BROMIDE 10 MG: 10 INJECTION INTRAVENOUS at 15:22

## 2018-11-30 RX ADMIN — Medication 0.8 MG: at 15:49

## 2018-11-30 RX ADMIN — METFORMIN HYDROCHLORIDE 500 MG: 500 TABLET ORAL at 21:25

## 2018-11-30 RX ADMIN — Medication 2 G: at 12:58

## 2018-11-30 RX ADMIN — MORPHINE SULFATE 2 MG: 4 INJECTION INTRAVENOUS at 16:03

## 2018-11-30 ASSESSMENT — PULMONARY FUNCTION TESTS
PIF_VALUE: 24
PIF_VALUE: 16
PIF_VALUE: 28
PIF_VALUE: 31
PIF_VALUE: 29
PIF_VALUE: 1
PIF_VALUE: 19
PIF_VALUE: 29
PIF_VALUE: 23
PIF_VALUE: 17
PIF_VALUE: 19
PIF_VALUE: 28
PIF_VALUE: 28
PIF_VALUE: 29
PIF_VALUE: 28
PIF_VALUE: 28
PIF_VALUE: 17
PIF_VALUE: 29
PIF_VALUE: 21
PIF_VALUE: 0
PIF_VALUE: 30
PIF_VALUE: 27
PIF_VALUE: 27
PIF_VALUE: 28
PIF_VALUE: 31
PIF_VALUE: 15
PIF_VALUE: 30
PIF_VALUE: 40
PIF_VALUE: 27
PIF_VALUE: 18
PIF_VALUE: 20
PIF_VALUE: 17
PIF_VALUE: 28
PIF_VALUE: 26
PIF_VALUE: 29
PIF_VALUE: 29
PIF_VALUE: 28
PIF_VALUE: 2
PIF_VALUE: 12
PIF_VALUE: 28
PIF_VALUE: 18
PIF_VALUE: 19
PIF_VALUE: 30
PIF_VALUE: 30
PIF_VALUE: 27
PIF_VALUE: 28
PIF_VALUE: 29
PIF_VALUE: 15
PIF_VALUE: 18
PIF_VALUE: 27
PIF_VALUE: 29
PIF_VALUE: 19
PIF_VALUE: 27
PIF_VALUE: 19
PIF_VALUE: 28
PIF_VALUE: 27
PIF_VALUE: 17
PIF_VALUE: 18
PIF_VALUE: 20
PIF_VALUE: 27
PIF_VALUE: 29
PIF_VALUE: 27
PIF_VALUE: 28
PIF_VALUE: 29
PIF_VALUE: 24
PIF_VALUE: 27
PIF_VALUE: 1
PIF_VALUE: 18
PIF_VALUE: 19
PIF_VALUE: 17
PIF_VALUE: 28
PIF_VALUE: 30
PIF_VALUE: 27
PIF_VALUE: 28
PIF_VALUE: 15
PIF_VALUE: 0
PIF_VALUE: 30
PIF_VALUE: 28
PIF_VALUE: 28
PIF_VALUE: 21
PIF_VALUE: 27
PIF_VALUE: 2
PIF_VALUE: 27
PIF_VALUE: 19
PIF_VALUE: 27
PIF_VALUE: 30
PIF_VALUE: 28
PIF_VALUE: 27
PIF_VALUE: 28
PIF_VALUE: 28
PIF_VALUE: 1
PIF_VALUE: 31
PIF_VALUE: 21
PIF_VALUE: 28
PIF_VALUE: 27
PIF_VALUE: 18
PIF_VALUE: 28
PIF_VALUE: 29
PIF_VALUE: 28
PIF_VALUE: 31
PIF_VALUE: 4
PIF_VALUE: 17
PIF_VALUE: 19
PIF_VALUE: 28
PIF_VALUE: 27
PIF_VALUE: 3
PIF_VALUE: 27
PIF_VALUE: 19
PIF_VALUE: 27
PIF_VALUE: 27
PIF_VALUE: 30
PIF_VALUE: 19
PIF_VALUE: 18
PIF_VALUE: 29
PIF_VALUE: 28
PIF_VALUE: 27
PIF_VALUE: 21
PIF_VALUE: 20
PIF_VALUE: 28
PIF_VALUE: 20
PIF_VALUE: 27
PIF_VALUE: 2
PIF_VALUE: 29
PIF_VALUE: 23
PIF_VALUE: 31
PIF_VALUE: 17
PIF_VALUE: 21
PIF_VALUE: 19
PIF_VALUE: 19
PIF_VALUE: 17
PIF_VALUE: 30
PIF_VALUE: 28
PIF_VALUE: 30
PIF_VALUE: 27
PIF_VALUE: 27
PIF_VALUE: 29
PIF_VALUE: 19
PIF_VALUE: 1
PIF_VALUE: 28
PIF_VALUE: 0
PIF_VALUE: 28
PIF_VALUE: 27
PIF_VALUE: 28
PIF_VALUE: 17
PIF_VALUE: 26
PIF_VALUE: 29
PIF_VALUE: 28
PIF_VALUE: 30
PIF_VALUE: 27
PIF_VALUE: 17
PIF_VALUE: 29
PIF_VALUE: 17
PIF_VALUE: 28
PIF_VALUE: 29
PIF_VALUE: 31
PIF_VALUE: 27
PIF_VALUE: 28
PIF_VALUE: 19
PIF_VALUE: 22
PIF_VALUE: 1
PIF_VALUE: 28
PIF_VALUE: 27
PIF_VALUE: 30
PIF_VALUE: 27
PIF_VALUE: 29
PIF_VALUE: 28
PIF_VALUE: 30
PIF_VALUE: 17
PIF_VALUE: 27
PIF_VALUE: 17
PIF_VALUE: 28
PIF_VALUE: 28
PIF_VALUE: 31
PIF_VALUE: 28
PIF_VALUE: 30
PIF_VALUE: 27
PIF_VALUE: 28
PIF_VALUE: 21
PIF_VALUE: 17
PIF_VALUE: 19
PIF_VALUE: 27
PIF_VALUE: 27
PIF_VALUE: 28
PIF_VALUE: 27
PIF_VALUE: 27
PIF_VALUE: 29
PIF_VALUE: 30
PIF_VALUE: 27
PIF_VALUE: 21
PIF_VALUE: 27
PIF_VALUE: 17
PIF_VALUE: 29
PIF_VALUE: 23
PIF_VALUE: 27
PIF_VALUE: 24
PIF_VALUE: 17
PIF_VALUE: 29
PIF_VALUE: 29
PIF_VALUE: 27
PIF_VALUE: 28
PIF_VALUE: 17
PIF_VALUE: 28
PIF_VALUE: 17
PIF_VALUE: 27
PIF_VALUE: 19
PIF_VALUE: 28
PIF_VALUE: 28
PIF_VALUE: 30
PIF_VALUE: 27
PIF_VALUE: 27
PIF_VALUE: 19
PIF_VALUE: 28
PIF_VALUE: 30
PIF_VALUE: 18
PIF_VALUE: 27
PIF_VALUE: 28
PIF_VALUE: 30
PIF_VALUE: 18
PIF_VALUE: 28
PIF_VALUE: 28
PIF_VALUE: 31
PIF_VALUE: 28
PIF_VALUE: 28
PIF_VALUE: 1
PIF_VALUE: 19
PIF_VALUE: 21
PIF_VALUE: 17
PIF_VALUE: 29
PIF_VALUE: 27
PIF_VALUE: 28
PIF_VALUE: 28
PIF_VALUE: 31
PIF_VALUE: 30
PIF_VALUE: 19

## 2018-11-30 ASSESSMENT — PAIN SCALES - GENERAL
PAINLEVEL_OUTOF10: 4
PAINLEVEL_OUTOF10: 4
PAINLEVEL_OUTOF10: 0
PAINLEVEL_OUTOF10: 6
PAINLEVEL_OUTOF10: 0
PAINLEVEL_OUTOF10: 8
PAINLEVEL_OUTOF10: 4
PAINLEVEL_OUTOF10: 7
PAINLEVEL_OUTOF10: 4
PAINLEVEL_OUTOF10: 8
PAINLEVEL_OUTOF10: 8
PAINLEVEL_OUTOF10: 4

## 2018-11-30 ASSESSMENT — PAIN - FUNCTIONAL ASSESSMENT: PAIN_FUNCTIONAL_ASSESSMENT: 0-10

## 2018-11-30 NOTE — ANESTHESIA POSTPROCEDURE EVALUATION
Department of Anesthesiology  Postprocedure Note    Patient: Robb Myaorga  MRN: [id number]  [place]: 1955  Date of evaluation: 11/30/2018  Time:  5:06 PM     Procedure Summary     Date:  11/30/18 Room / Location:  [facility] OR  / [facility] OR    Anesthesia Start:  3465 Anesthesia Stop:  9894    Procedure:  XI ROBOTIC PARTIAL NEPHRECTOMY, INTRA-OP LAP ULTRASOUND (Left ) Diagnosis:  (LEFT RENAL MASS)    Surgeon:  Dilan Johnson MD Responsible Provider:  Marbella Robins MD    Anesthesia Type:  general ASA Status:  3          Anesthesia Type: general    Hernandez Phase I: Hernandez Score: 8    Hernandez Phase II:      Last vitals: Reviewed and per EMR flowsheets.        Anesthesia Post Evaluation    Patient location during evaluation: PACU  Patient participation: complete - patient participated  Level of consciousness: awake and alert  Pain score: 2  Airway patency: patent  Nausea & Vomiting: no nausea and no vomiting  Complications: no  Cardiovascular status: hemodynamically stable  Respiratory status: acceptable  Hydration status: euvolemic

## 2018-11-30 NOTE — ANESTHESIA PRE PROCEDURE
Smokeless tobacco: Never Used    Alcohol use Yes      Comment: very rare                                Counseling given: Not Answered      Vital Signs (Current):   Vitals:    11/30/18 1030 11/30/18 1053   BP:  (!) 164/78   Pulse:  65   Resp:  16   Temp:  97.9 °F (36.6 °C)   TempSrc:  Temporal   SpO2:  100%   Weight: 229 lb 4.5 oz (104 kg)    Height: 5' 11\" (1.803 m)                                               BP Readings from Last 3 Encounters:   11/30/18 (!) 164/78   11/16/18 (!) 160/81   11/14/18 138/62       NPO Status: Time of last liquid consumption: 2200                        Time of last solid consumption: 2200                        Date of last liquid consumption: 11/29/18                        Date of last solid food consumption: 11/29/18    BMI:   Wt Readings from Last 3 Encounters:   11/30/18 229 lb 4.5 oz (104 kg)   11/16/18 233 lb (105.7 kg)   11/14/18 228 lb 9.6 oz (103.7 kg)     Body mass index is 31.98 kg/m².     CBC:   Lab Results   Component Value Date    WBC 6.0 11/16/2018    RBC 4.34 11/16/2018    HGB 13.7 11/16/2018    HCT 39.9 11/16/2018    MCV 91.9 11/16/2018    RDW 12.6 11/16/2018    PLT See Reflexed IPF Result 11/16/2018       CMP:   Lab Results   Component Value Date     11/16/2018    K 4.4 11/16/2018    CL 98 11/16/2018    CO2 28 11/16/2018    BUN 20 11/16/2018    CREATININE 0.65 11/16/2018    GFRAA >60 11/16/2018    LABGLOM >60 11/16/2018    GLUCOSE 131 11/16/2018    PROT 7.7 11/16/2018    CALCIUM 9.5 11/16/2018    BILITOT 0.90 11/16/2018    ALKPHOS 33 11/16/2018    AST 19 11/16/2018    ALT 22 11/16/2018       POC Tests:   Recent Labs      11/30/18   1107   POCGLU  140*   POCK  4.3       Coags:   Lab Results   Component Value Date    PROTIME 11.5 11/16/2018    INR 1.1 11/16/2018       HCG (If Applicable): No results found for: PREGTESTUR, PREGSERUM, HCG, HCGQUANT     ABGs: No results found for: PHART, PO2ART, OXP0OZX, JYF0OVP, BEART, K5HTACRP     Type & Screen (If

## 2018-12-01 VITALS
DIASTOLIC BLOOD PRESSURE: 62 MMHG | HEART RATE: 77 BPM | OXYGEN SATURATION: 94 % | TEMPERATURE: 99.1 F | BODY MASS INDEX: 32.1 KG/M2 | RESPIRATION RATE: 18 BRPM | SYSTOLIC BLOOD PRESSURE: 138 MMHG | HEIGHT: 71 IN | WEIGHT: 229.28 LBS

## 2018-12-01 LAB
ANION GAP SERPL CALCULATED.3IONS-SCNC: 12 MMOL/L (ref 9–17)
BUN BLDV-MCNC: 23 MG/DL (ref 8–23)
BUN/CREAT BLD: ABNORMAL (ref 9–20)
CALCIUM SERPL-MCNC: 8.3 MG/DL (ref 8.6–10.4)
CHLORIDE BLD-SCNC: 104 MMOL/L (ref 98–107)
CO2: 24 MMOL/L (ref 20–31)
CREAT SERPL-MCNC: 0.9 MG/DL (ref 0.7–1.2)
CREATININE FLUID: 0.9 MG/DL
CREATININE FLUID: 0.9 MG/DL
CULTURE: NO GROWTH
GFR AFRICAN AMERICAN: >60 ML/MIN
GFR NON-AFRICAN AMERICAN: >60 ML/MIN
GFR SERPL CREATININE-BSD FRML MDRD: ABNORMAL ML/MIN/{1.73_M2}
GFR SERPL CREATININE-BSD FRML MDRD: ABNORMAL ML/MIN/{1.73_M2}
GLUCOSE BLD-MCNC: 171 MG/DL (ref 70–99)
HCT VFR BLD CALC: 34.7 % (ref 40.7–50.3)
HEMOGLOBIN: 11.5 G/DL (ref 13–17)
Lab: NORMAL
MCH RBC QN AUTO: 31.5 PG (ref 25.2–33.5)
MCHC RBC AUTO-ENTMCNC: 33.1 G/DL (ref 28.4–34.8)
MCV RBC AUTO: 95.1 FL (ref 82.6–102.9)
NRBC AUTOMATED: 0 PER 100 WBC
PDW BLD-RTO: 13.1 % (ref 11.8–14.4)
PLATELET # BLD: 164 K/UL (ref 138–453)
PMV BLD AUTO: 11.7 FL (ref 8.1–13.5)
POTASSIUM SERPL-SCNC: 4.5 MMOL/L (ref 3.7–5.3)
RBC # BLD: 3.65 M/UL (ref 4.21–5.77)
SODIUM BLD-SCNC: 140 MMOL/L (ref 135–144)
SPECIMEN DESCRIPTION: NORMAL
SPECIMEN TYPE: NORMAL
SPECIMEN TYPE: NORMAL
STATUS: NORMAL
WBC # BLD: 12.5 K/UL (ref 3.5–11.3)

## 2018-12-01 PROCEDURE — 82570 ASSAY OF URINE CREATININE: CPT

## 2018-12-01 PROCEDURE — 51798 US URINE CAPACITY MEASURE: CPT

## 2018-12-01 PROCEDURE — 80048 BASIC METABOLIC PNL TOTAL CA: CPT

## 2018-12-01 PROCEDURE — 6360000002 HC RX W HCPCS: Performed by: UROLOGY

## 2018-12-01 PROCEDURE — 2580000003 HC RX 258: Performed by: UROLOGY

## 2018-12-01 PROCEDURE — 36415 COLL VENOUS BLD VENIPUNCTURE: CPT

## 2018-12-01 PROCEDURE — 85027 COMPLETE CBC AUTOMATED: CPT

## 2018-12-01 PROCEDURE — 6370000000 HC RX 637 (ALT 250 FOR IP): Performed by: UROLOGY

## 2018-12-01 RX ADMIN — CARVEDILOL 25 MG: 25 TABLET, FILM COATED ORAL at 08:38

## 2018-12-01 RX ADMIN — AMLODIPINE BESYLATE 10 MG: 10 TABLET ORAL at 08:38

## 2018-12-01 RX ADMIN — DEXTROSE MONOHYDRATE 2 G: 50 INJECTION, SOLUTION INTRAVENOUS at 08:40

## 2018-12-01 RX ADMIN — CETIRIZINE HYDROCHLORIDE 10 MG: 10 TABLET ORAL at 08:38

## 2018-12-01 RX ADMIN — METFORMIN HYDROCHLORIDE 500 MG: 500 TABLET ORAL at 17:36

## 2018-12-01 RX ADMIN — HYDROCHLOROTHIAZIDE 25 MG: 25 TABLET ORAL at 08:38

## 2018-12-01 RX ADMIN — METFORMIN HYDROCHLORIDE 500 MG: 500 TABLET ORAL at 08:38

## 2018-12-01 RX ADMIN — SODIUM CHLORIDE: 9 INJECTION, SOLUTION INTRAVENOUS at 03:33

## 2018-12-01 RX ADMIN — OXYCODONE HYDROCHLORIDE AND ACETAMINOPHEN 2 TABLET: 5; 325 TABLET ORAL at 08:32

## 2018-12-01 RX ADMIN — LOSARTAN POTASSIUM 100 MG: 50 TABLET, FILM COATED ORAL at 08:38

## 2018-12-01 RX ADMIN — DOCUSATE SODIUM 100 MG: 100 CAPSULE, LIQUID FILLED ORAL at 08:38

## 2018-12-01 RX ADMIN — OXYCODONE HYDROCHLORIDE AND ACETAMINOPHEN 2 TABLET: 5; 325 TABLET ORAL at 03:33

## 2018-12-01 RX ADMIN — OXYCODONE HYDROCHLORIDE AND ACETAMINOPHEN 1 TABLET: 5; 325 TABLET ORAL at 18:06

## 2018-12-01 RX ADMIN — OMEPRAZOLE 20 MG: 20 CAPSULE, DELAYED RELEASE ORAL at 08:38

## 2018-12-01 RX ADMIN — OXYCODONE HYDROCHLORIDE AND ACETAMINOPHEN 2 TABLET: 5; 325 TABLET ORAL at 13:00

## 2018-12-01 ASSESSMENT — PAIN SCALES - GENERAL
PAINLEVEL_OUTOF10: 5
PAINLEVEL_OUTOF10: 1
PAINLEVEL_OUTOF10: 4
PAINLEVEL_OUTOF10: 8
PAINLEVEL_OUTOF10: 9

## 2018-12-03 ENCOUNTER — TELEPHONE (OUTPATIENT)
Dept: FAMILY MEDICINE CLINIC | Age: 63
End: 2018-12-03

## 2018-12-03 DIAGNOSIS — N28.89 RENAL MASS: Primary | ICD-10-CM

## 2018-12-08 LAB — SURGICAL PATHOLOGY REPORT: NORMAL

## 2018-12-11 ENCOUNTER — HOSPITAL ENCOUNTER (OUTPATIENT)
Dept: LAB | Age: 63
Discharge: HOME | End: 2018-12-11
Payer: COMMERCIAL

## 2018-12-11 DIAGNOSIS — N28.89 RENAL MASS: ICD-10-CM

## 2018-12-11 LAB
ANION GAP SERPL CALCULATED.3IONS-SCNC: 10 MMOL/L (ref 9–17)
BUN BLDV-MCNC: 22 MG/DL (ref 8–23)
BUN/CREAT BLD: 29 (ref 9–20)
CALCIUM SERPL-MCNC: 9.6 MG/DL (ref 8.6–10.4)
CHLORIDE BLD-SCNC: 101 MMOL/L (ref 98–107)
CO2: 27 MMOL/L (ref 20–31)
CREAT SERPL-MCNC: 0.76 MG/DL (ref 0.7–1.2)
GFR AFRICAN AMERICAN: >60 ML/MIN
GFR NON-AFRICAN AMERICAN: >60 ML/MIN
GFR SERPL CREATININE-BSD FRML MDRD: ABNORMAL ML/MIN/{1.73_M2}
GFR SERPL CREATININE-BSD FRML MDRD: ABNORMAL ML/MIN/{1.73_M2}
GLUCOSE BLD-MCNC: 189 MG/DL (ref 70–99)
POTASSIUM SERPL-SCNC: 4.2 MMOL/L (ref 3.7–5.3)
SODIUM BLD-SCNC: 138 MMOL/L (ref 135–144)

## 2018-12-11 PROCEDURE — 36415 COLL VENOUS BLD VENIPUNCTURE: CPT

## 2018-12-11 PROCEDURE — 80048 BASIC METABOLIC PNL TOTAL CA: CPT

## 2018-12-12 ENCOUNTER — OFFICE VISIT (OUTPATIENT)
Dept: UROLOGY | Age: 63
End: 2018-12-12

## 2018-12-12 VITALS
BODY MASS INDEX: 31.39 KG/M2 | DIASTOLIC BLOOD PRESSURE: 62 MMHG | HEART RATE: 81 BPM | WEIGHT: 224.2 LBS | OXYGEN SATURATION: 99 % | HEIGHT: 71 IN | SYSTOLIC BLOOD PRESSURE: 148 MMHG

## 2018-12-12 DIAGNOSIS — C64.2 RENAL CELL ADENOCARCINOMA, LEFT (HCC): Primary | ICD-10-CM

## 2018-12-12 PROCEDURE — 99024 POSTOP FOLLOW-UP VISIT: CPT | Performed by: UROLOGY

## 2018-12-17 ENCOUNTER — OFFICE VISIT (OUTPATIENT)
Dept: ONCOLOGY | Age: 63
End: 2018-12-17
Payer: COMMERCIAL

## 2018-12-17 VITALS
DIASTOLIC BLOOD PRESSURE: 68 MMHG | BODY MASS INDEX: 31.64 KG/M2 | WEIGHT: 226 LBS | SYSTOLIC BLOOD PRESSURE: 126 MMHG | HEIGHT: 71 IN | TEMPERATURE: 98.4 F | RESPIRATION RATE: 18 BRPM | HEART RATE: 80 BPM

## 2018-12-17 DIAGNOSIS — C64.9 RENAL CELL CARCINOMA, UNSPECIFIED LATERALITY (HCC): Primary | ICD-10-CM

## 2018-12-17 PROCEDURE — 1036F TOBACCO NON-USER: CPT | Performed by: INTERNAL MEDICINE

## 2018-12-17 PROCEDURE — G8482 FLU IMMUNIZE ORDER/ADMIN: HCPCS | Performed by: INTERNAL MEDICINE

## 2018-12-17 PROCEDURE — 1111F DSCHRG MED/CURRENT MED MERGE: CPT | Performed by: INTERNAL MEDICINE

## 2018-12-17 PROCEDURE — G8427 DOCREV CUR MEDS BY ELIG CLIN: HCPCS | Performed by: INTERNAL MEDICINE

## 2018-12-17 PROCEDURE — G8417 CALC BMI ABV UP PARAM F/U: HCPCS | Performed by: INTERNAL MEDICINE

## 2018-12-17 PROCEDURE — 3017F COLORECTAL CA SCREEN DOC REV: CPT | Performed by: INTERNAL MEDICINE

## 2018-12-17 PROCEDURE — 99214 OFFICE O/P EST MOD 30 MIN: CPT | Performed by: INTERNAL MEDICINE

## 2018-12-17 NOTE — PROGRESS NOTES
Patient ID: Hui Bustamante, 1955, [id number], 61 y.o. Diagnosis:   Immune related thrombocytopenia  RCC, stage I, T1aNx s/p partial nephrectomy 11/30/18  HISTORY OF PRESENT ILLNESS:    Hematologic History: This is a 79-year-old male with a history of immune related thrombocytopenia was being followed by Dr. Rosas Carrasco. He was noted to have thrombocytopenia since 2016. He has a very mild thrombocytopenia ranging around 120-130 K. His ITP screen was positive for platelet associated antibody IgM. He denied any symptoms of bleeding. INTERVAL HISTORY  Patient is returning for follow-up visit. He had partial nephrectomy and is here to discuss the results of surgical pathology. He is recovering well. He has a follow up apptt with urology in March and is scxheduled to have Ct scans at that time. He denied any drenching night sweats, unintentional weight loss, fever chills. During this visit patient's allergy, social, medical, surgical history and medications were reviewed and updated.     Allergies   Allergen Reactions    Codeine      Severe Abdominal pain    Sulfa Antibiotics      Patient unsure of reaction     Current Outpatient Prescriptions   Medication Sig Dispense Refill    meloxicam (MOBIC) 7.5 MG tablet Take 1 tablet by mouth 2 times daily (with meals) 180 tablet 1    carvedilol (COREG) 25 MG tablet Take 1 tablet by mouth 2 times daily 180 tablet 1    metFORMIN (GLUCOPHAGE) 500 MG tablet Take 1 tablet by mouth 2 times daily (with meals) 180 tablet 1    losartan-hydrochlorothiazide (HYZAAR) 100-25 MG per tablet Take 1 tablet by mouth daily 90 tablet 1    amLODIPine (NORVASC) 10 MG tablet Take 1 tablet by mouth daily (Patient taking differently: Take 10 mg by mouth every morning ) 90 tablet 1    Blood Pressure Monitoring (BLOOD PRESSURE CUFF) MISC DX: I10 HTN 1 each 0    cetirizine (ZYRTEC) 10 MG tablet TAKE ONE TABLET BY MOUTH NIGHTLY AS NEEDED FOR ALLERGIES 30 tablet 5    Elastic Bandages & Supports (JOBST OPAQUE KNEE 15-20MMHG XL) MISC #2 pair knee highs  Varicose veins 2 each 0    vitamin D3 (CHOLECALCIFEROL) 400 UNITS TABS tablet Take 400 Units by mouth every other day       Omeprazole (PRILOSEC PO) Take 1 capsule by mouth daily as needed       furosemide (LASIX) 20 MG tablet Take 1 tablet by mouth daily as needed (swelling) 30 tablet 3    sildenafil (REVATIO) 20 MG tablet Take 1 tablet by mouth as needed (ED) Take 1-5 tabs as needed PRN for ED 60 tablet 3    fluticasone (FLONASE) 50 MCG/ACT nasal spray 1 spray by Nasal route daily (Patient taking differently: 1 spray by Nasal route daily as needed ) 1 Bottle 3     No current facility-administered medications for this visit. REVIEW OF SYSTEM:     Constitutional: No fever or chills. No night sweats, no weight loss   Eyes: No eye discharge, double vision, or eye pain   HEENT: negative for sore mouth, sore throat, hoarseness and voice change   Respiratory: negative for cough , sputum, dyspnea, wheezing, hemoptysis, chest pain   Cardiovascular: negative for chest pain, dyspnea, palpitations, orthopnea, PND   Gastrointestinal: negative for nausea, vomiting, diarrhea, constipation, abdominal pain, Dysphagia, hematemesis and hematochezia   Genitourinary: negative for frequency, dysuria, nocturia, urinary incontinence, and hematuria   Integument: negative for rash, skin lesions, bruises.    Hematologic/Lymphatic: negative for easy bruising, bleeding, lymphadenopathy, petechiae and swelling/edema   Endocrine: negative for heat or cold intolerance, tremor, weight changes, change in bowel habits and hair loss   Musculoskeletal: negative for myalgias, arthralgias, pain, joint swelling,and bone pain   Neurological: negative for headaches, dizziness, seizures, weakness, numbness   OBJECTIVE:         Vitals:    12/17/18 1404   BP: 126/68   Pulse: 80   Resp: 18   Temp: 98.4 °F (36.9 °C)       PHYSICAL EXAM:   General appearance - well appearing, no in pain

## 2018-12-20 ENCOUNTER — OFFICE VISIT (OUTPATIENT)
Dept: FAMILY MEDICINE CLINIC | Age: 63
End: 2018-12-20
Payer: COMMERCIAL

## 2018-12-20 VITALS
BODY MASS INDEX: 32.64 KG/M2 | SYSTOLIC BLOOD PRESSURE: 126 MMHG | WEIGHT: 228 LBS | DIASTOLIC BLOOD PRESSURE: 62 MMHG | HEART RATE: 67 BPM | HEIGHT: 70 IN | OXYGEN SATURATION: 96 %

## 2018-12-20 DIAGNOSIS — C64.9 RENAL CELL CARCINOMA, UNSPECIFIED LATERALITY (HCC): ICD-10-CM

## 2018-12-20 DIAGNOSIS — E11.9 TYPE 2 DIABETES MELLITUS WITHOUT COMPLICATION, WITHOUT LONG-TERM CURRENT USE OF INSULIN (HCC): Primary | ICD-10-CM

## 2018-12-20 DIAGNOSIS — I35.1 NONRHEUMATIC AORTIC VALVE INSUFFICIENCY: ICD-10-CM

## 2018-12-20 DIAGNOSIS — I10 ESSENTIAL HYPERTENSION: ICD-10-CM

## 2018-12-20 PROCEDURE — 99214 OFFICE O/P EST MOD 30 MIN: CPT | Performed by: PHYSICIAN ASSISTANT

## 2018-12-20 PROCEDURE — 2022F DILAT RTA XM EVC RTNOPTHY: CPT | Performed by: PHYSICIAN ASSISTANT

## 2018-12-20 PROCEDURE — G8482 FLU IMMUNIZE ORDER/ADMIN: HCPCS | Performed by: PHYSICIAN ASSISTANT

## 2018-12-20 PROCEDURE — 1036F TOBACCO NON-USER: CPT | Performed by: PHYSICIAN ASSISTANT

## 2018-12-20 PROCEDURE — G8417 CALC BMI ABV UP PARAM F/U: HCPCS | Performed by: PHYSICIAN ASSISTANT

## 2018-12-20 PROCEDURE — G8427 DOCREV CUR MEDS BY ELIG CLIN: HCPCS | Performed by: PHYSICIAN ASSISTANT

## 2018-12-20 PROCEDURE — 3017F COLORECTAL CA SCREEN DOC REV: CPT | Performed by: PHYSICIAN ASSISTANT

## 2018-12-20 PROCEDURE — 1111F DSCHRG MED/CURRENT MED MERGE: CPT | Performed by: PHYSICIAN ASSISTANT

## 2018-12-20 PROCEDURE — 3044F HG A1C LEVEL LT 7.0%: CPT | Performed by: PHYSICIAN ASSISTANT

## 2018-12-20 ASSESSMENT — PATIENT HEALTH QUESTIONNAIRE - PHQ9
2. FEELING DOWN, DEPRESSED OR HOPELESS: 0
SUM OF ALL RESPONSES TO PHQ9 QUESTIONS 1 & 2: 0
SUM OF ALL RESPONSES TO PHQ QUESTIONS 1-9: 0
1. LITTLE INTEREST OR PLEASURE IN DOING THINGS: 0
SUM OF ALL RESPONSES TO PHQ QUESTIONS 1-9: 0

## 2019-01-02 DIAGNOSIS — I10 ESSENTIAL HYPERTENSION: ICD-10-CM

## 2019-01-02 RX ORDER — LOSARTAN POTASSIUM AND HYDROCHLOROTHIAZIDE 25; 100 MG/1; MG/1
1 TABLET ORAL DAILY
Qty: 90 TABLET | Refills: 1 | Status: SHIPPED | OUTPATIENT
Start: 2019-01-02 | End: 2019-06-10 | Stop reason: SDUPTHER

## 2019-01-03 ASSESSMENT — ENCOUNTER SYMPTOMS
CHEST TIGHTNESS: 0
SHORTNESS OF BREATH: 0
NAUSEA: 0
WHEEZING: 0
COUGH: 0
DIARRHEA: 0
VOMITING: 0
TROUBLE SWALLOWING: 0
SORE THROAT: 0

## 2019-01-10 ENCOUNTER — OFFICE VISIT (OUTPATIENT)
Dept: OTOLARYNGOLOGY | Age: 64
End: 2019-01-10
Payer: COMMERCIAL

## 2019-01-10 VITALS
HEIGHT: 70 IN | BODY MASS INDEX: 32.64 KG/M2 | HEART RATE: 72 BPM | WEIGHT: 228 LBS | DIASTOLIC BLOOD PRESSURE: 78 MMHG | RESPIRATION RATE: 14 BRPM | SYSTOLIC BLOOD PRESSURE: 118 MMHG

## 2019-01-10 DIAGNOSIS — H93.13 TINNITUS OF BOTH EARS: ICD-10-CM

## 2019-01-10 DIAGNOSIS — H90.3 SENSORINEURAL HEARING LOSS (SNHL) OF BOTH EARS: Primary | ICD-10-CM

## 2019-01-10 PROCEDURE — G8417 CALC BMI ABV UP PARAM F/U: HCPCS | Performed by: OTOLARYNGOLOGY

## 2019-01-10 PROCEDURE — 1036F TOBACCO NON-USER: CPT | Performed by: OTOLARYNGOLOGY

## 2019-01-10 PROCEDURE — 99203 OFFICE O/P NEW LOW 30 MIN: CPT | Performed by: OTOLARYNGOLOGY

## 2019-01-10 PROCEDURE — G8427 DOCREV CUR MEDS BY ELIG CLIN: HCPCS | Performed by: OTOLARYNGOLOGY

## 2019-01-10 PROCEDURE — 3017F COLORECTAL CA SCREEN DOC REV: CPT | Performed by: OTOLARYNGOLOGY

## 2019-01-10 PROCEDURE — G8482 FLU IMMUNIZE ORDER/ADMIN: HCPCS | Performed by: OTOLARYNGOLOGY

## 2019-02-04 DIAGNOSIS — J30.2 OTHER SEASONAL ALLERGIC RHINITIS: ICD-10-CM

## 2019-02-05 RX ORDER — CETIRIZINE HYDROCHLORIDE 10 MG/1
TABLET ORAL
Qty: 30 TABLET | Refills: 6 | Status: SHIPPED | OUTPATIENT
Start: 2019-02-05 | End: 2020-01-21 | Stop reason: SDUPTHER

## 2019-02-11 ENCOUNTER — OFFICE VISIT (OUTPATIENT)
Dept: ONCOLOGY | Age: 64
End: 2019-02-11
Payer: COMMERCIAL

## 2019-02-11 VITALS
HEIGHT: 70 IN | TEMPERATURE: 98 F | DIASTOLIC BLOOD PRESSURE: 80 MMHG | HEART RATE: 84 BPM | BODY MASS INDEX: 33.36 KG/M2 | WEIGHT: 233 LBS | SYSTOLIC BLOOD PRESSURE: 138 MMHG

## 2019-02-11 DIAGNOSIS — C64.9 RENAL CELL CARCINOMA, UNSPECIFIED LATERALITY (HCC): Primary | ICD-10-CM

## 2019-02-11 DIAGNOSIS — D69.6 THROMBOCYTOPENIA (HCC): ICD-10-CM

## 2019-02-11 PROCEDURE — 3017F COLORECTAL CA SCREEN DOC REV: CPT | Performed by: INTERNAL MEDICINE

## 2019-02-11 PROCEDURE — 1036F TOBACCO NON-USER: CPT | Performed by: INTERNAL MEDICINE

## 2019-02-11 PROCEDURE — G8482 FLU IMMUNIZE ORDER/ADMIN: HCPCS | Performed by: INTERNAL MEDICINE

## 2019-02-11 PROCEDURE — 99214 OFFICE O/P EST MOD 30 MIN: CPT | Performed by: INTERNAL MEDICINE

## 2019-02-11 PROCEDURE — G8427 DOCREV CUR MEDS BY ELIG CLIN: HCPCS | Performed by: INTERNAL MEDICINE

## 2019-02-11 PROCEDURE — G8417 CALC BMI ABV UP PARAM F/U: HCPCS | Performed by: INTERNAL MEDICINE

## 2019-03-11 ENCOUNTER — OFFICE VISIT (OUTPATIENT)
Dept: DERMATOLOGY | Age: 64
End: 2019-03-11
Payer: COMMERCIAL

## 2019-03-11 ENCOUNTER — HOSPITAL ENCOUNTER (OUTPATIENT)
Age: 64
Setting detail: SPECIMEN
Discharge: HOME | End: 2019-03-11
Payer: COMMERCIAL

## 2019-03-11 VITALS
HEART RATE: 64 BPM | SYSTOLIC BLOOD PRESSURE: 122 MMHG | DIASTOLIC BLOOD PRESSURE: 66 MMHG | BODY MASS INDEX: 32.37 KG/M2 | HEIGHT: 71 IN | WEIGHT: 231.2 LBS

## 2019-03-11 DIAGNOSIS — L57.8 ACTINIC SKIN DAMAGE: Primary | ICD-10-CM

## 2019-03-11 DIAGNOSIS — D48.5 NEOPLASM OF UNCERTAIN BEHAVIOR OF SKIN: ICD-10-CM

## 2019-03-11 DIAGNOSIS — L82.1 SEBORRHEIC KERATOSES: ICD-10-CM

## 2019-03-11 PROCEDURE — 99214 OFFICE O/P EST MOD 30 MIN: CPT | Performed by: DERMATOLOGY

## 2019-03-11 PROCEDURE — G8482 FLU IMMUNIZE ORDER/ADMIN: HCPCS | Performed by: DERMATOLOGY

## 2019-03-11 PROCEDURE — G8417 CALC BMI ABV UP PARAM F/U: HCPCS | Performed by: DERMATOLOGY

## 2019-03-11 PROCEDURE — 11102 TANGNTL BX SKIN SINGLE LES: CPT | Performed by: DERMATOLOGY

## 2019-03-11 PROCEDURE — 1036F TOBACCO NON-USER: CPT | Performed by: DERMATOLOGY

## 2019-03-11 PROCEDURE — 3017F COLORECTAL CA SCREEN DOC REV: CPT | Performed by: DERMATOLOGY

## 2019-03-11 PROCEDURE — G8427 DOCREV CUR MEDS BY ELIG CLIN: HCPCS | Performed by: DERMATOLOGY

## 2019-03-12 ENCOUNTER — HOSPITAL ENCOUNTER (OUTPATIENT)
Dept: LAB | Age: 64
Discharge: HOME | End: 2019-03-12
Payer: COMMERCIAL

## 2019-03-12 DIAGNOSIS — C64.2 RENAL CELL ADENOCARCINOMA, LEFT (HCC): ICD-10-CM

## 2019-03-12 LAB
ANION GAP SERPL CALCULATED.3IONS-SCNC: 12 MMOL/L (ref 9–17)
BUN BLDV-MCNC: 22 MG/DL (ref 8–23)
BUN/CREAT BLD: 28 (ref 9–20)
CALCIUM SERPL-MCNC: 9.6 MG/DL (ref 8.6–10.4)
CHLORIDE BLD-SCNC: 101 MMOL/L (ref 98–107)
CO2: 30 MMOL/L (ref 20–31)
CREAT SERPL-MCNC: 0.78 MG/DL (ref 0.7–1.2)
GFR AFRICAN AMERICAN: >60 ML/MIN
GFR NON-AFRICAN AMERICAN: >60 ML/MIN
GFR SERPL CREATININE-BSD FRML MDRD: ABNORMAL ML/MIN/{1.73_M2}
GFR SERPL CREATININE-BSD FRML MDRD: ABNORMAL ML/MIN/{1.73_M2}
GLUCOSE BLD-MCNC: 148 MG/DL (ref 70–99)
POTASSIUM SERPL-SCNC: 4.4 MMOL/L (ref 3.7–5.3)
SODIUM BLD-SCNC: 143 MMOL/L (ref 135–144)

## 2019-03-12 PROCEDURE — 80048 BASIC METABOLIC PNL TOTAL CA: CPT

## 2019-03-12 PROCEDURE — 36415 COLL VENOUS BLD VENIPUNCTURE: CPT

## 2019-03-13 ENCOUNTER — HOSPITAL ENCOUNTER (OUTPATIENT)
Dept: CT IMAGING | Age: 64
Discharge: HOME | End: 2019-03-15
Payer: COMMERCIAL

## 2019-03-13 DIAGNOSIS — C64.2 RENAL CELL ADENOCARCINOMA, LEFT (HCC): ICD-10-CM

## 2019-03-13 LAB — DERMATOLOGY PATHOLOGY REPORT: NORMAL

## 2019-03-13 PROCEDURE — 2709999900 CT ABDOMEN PELVIS W IV CONTRAST

## 2019-03-13 PROCEDURE — 6360000004 HC RX CONTRAST MEDICATION: Performed by: UROLOGY

## 2019-03-13 RX ADMIN — IOPAMIDOL 100 ML: 755 INJECTION, SOLUTION INTRAVENOUS at 10:20

## 2019-03-20 ENCOUNTER — OFFICE VISIT (OUTPATIENT)
Dept: UROLOGY | Age: 64
End: 2019-03-20
Payer: COMMERCIAL

## 2019-03-20 VITALS
OXYGEN SATURATION: 97 % | HEART RATE: 65 BPM | SYSTOLIC BLOOD PRESSURE: 138 MMHG | DIASTOLIC BLOOD PRESSURE: 68 MMHG | HEIGHT: 71 IN | WEIGHT: 235 LBS | BODY MASS INDEX: 32.9 KG/M2

## 2019-03-20 DIAGNOSIS — R91.1 PULMONARY NODULE: ICD-10-CM

## 2019-03-20 DIAGNOSIS — C64.2 RENAL CELL CARCINOMA OF LEFT KIDNEY (HCC): ICD-10-CM

## 2019-03-20 DIAGNOSIS — N52.9 ERECTILE DYSFUNCTION, UNSPECIFIED ERECTILE DYSFUNCTION TYPE: ICD-10-CM

## 2019-03-20 DIAGNOSIS — N28.89 RENAL MASS: Primary | ICD-10-CM

## 2019-03-20 PROCEDURE — G8427 DOCREV CUR MEDS BY ELIG CLIN: HCPCS | Performed by: UROLOGY

## 2019-03-20 PROCEDURE — G8417 CALC BMI ABV UP PARAM F/U: HCPCS | Performed by: UROLOGY

## 2019-03-20 PROCEDURE — 99214 OFFICE O/P EST MOD 30 MIN: CPT | Performed by: UROLOGY

## 2019-03-20 PROCEDURE — 1036F TOBACCO NON-USER: CPT | Performed by: UROLOGY

## 2019-03-20 PROCEDURE — 3017F COLORECTAL CA SCREEN DOC REV: CPT | Performed by: UROLOGY

## 2019-03-20 PROCEDURE — G8482 FLU IMMUNIZE ORDER/ADMIN: HCPCS | Performed by: UROLOGY

## 2019-03-27 ENCOUNTER — HOSPITAL ENCOUNTER (OUTPATIENT)
Dept: CT IMAGING | Age: 64
Discharge: HOME | End: 2019-03-29
Payer: COMMERCIAL

## 2019-03-27 DIAGNOSIS — C64.2 RENAL CELL CARCINOMA OF LEFT KIDNEY (HCC): ICD-10-CM

## 2019-03-27 DIAGNOSIS — R91.1 PULMONARY NODULE: ICD-10-CM

## 2019-03-27 PROCEDURE — 6360000004 HC RX CONTRAST MEDICATION: Performed by: UROLOGY

## 2019-03-27 PROCEDURE — 2709999900 CT CHEST W CONTRAST

## 2019-03-27 RX ADMIN — IOPAMIDOL 75 ML: 755 INJECTION, SOLUTION INTRAVENOUS at 08:28

## 2019-04-01 ENCOUNTER — OFFICE VISIT (OUTPATIENT)
Dept: FAMILY MEDICINE CLINIC | Age: 64
End: 2019-04-01
Payer: COMMERCIAL

## 2019-04-01 ENCOUNTER — HOSPITAL ENCOUNTER (OUTPATIENT)
Dept: LAB | Age: 64
Discharge: HOME | End: 2019-04-01
Payer: COMMERCIAL

## 2019-04-01 VITALS
HEIGHT: 70 IN | HEART RATE: 62 BPM | BODY MASS INDEX: 33.5 KG/M2 | DIASTOLIC BLOOD PRESSURE: 78 MMHG | RESPIRATION RATE: 14 BRPM | SYSTOLIC BLOOD PRESSURE: 134 MMHG | WEIGHT: 234 LBS

## 2019-04-01 DIAGNOSIS — E11.9 TYPE 2 DIABETES MELLITUS WITHOUT COMPLICATION, WITHOUT LONG-TERM CURRENT USE OF INSULIN (HCC): ICD-10-CM

## 2019-04-01 DIAGNOSIS — I35.1 NONRHEUMATIC AORTIC VALVE INSUFFICIENCY: ICD-10-CM

## 2019-04-01 DIAGNOSIS — I10 ESSENTIAL HYPERTENSION: ICD-10-CM

## 2019-04-01 DIAGNOSIS — C64.2 MALIGNANT NEOPLASM OF LEFT KIDNEY (HCC): ICD-10-CM

## 2019-04-01 DIAGNOSIS — Q23.1 BICUSPID AORTIC VALVE: ICD-10-CM

## 2019-04-01 DIAGNOSIS — Z13.9 SCREENING FOR CONDITION: Primary | ICD-10-CM

## 2019-04-01 DIAGNOSIS — R91.1 PULMONARY NODULE, RIGHT: ICD-10-CM

## 2019-04-01 LAB
ALBUMIN SERPL-MCNC: 4.6 G/DL (ref 3.5–5.2)
ALBUMIN/GLOBULIN RATIO: 1.5 (ref 1–2.5)
ALP BLD-CCNC: 37 U/L (ref 40–129)
ALT SERPL-CCNC: 18 U/L (ref 5–41)
ANION GAP SERPL CALCULATED.3IONS-SCNC: 10 MMOL/L (ref 9–17)
AST SERPL-CCNC: 14 U/L
BILIRUB SERPL-MCNC: 0.54 MG/DL (ref 0.3–1.2)
BUN BLDV-MCNC: 20 MG/DL (ref 8–23)
BUN/CREAT BLD: 28 (ref 9–20)
CALCIUM SERPL-MCNC: 9.5 MG/DL (ref 8.6–10.4)
CHLORIDE BLD-SCNC: 102 MMOL/L (ref 98–107)
CO2: 30 MMOL/L (ref 20–31)
CREAT SERPL-MCNC: 0.72 MG/DL (ref 0.7–1.2)
CREATININE URINE: 168.1 MG/DL (ref 39–259)
ESTIMATED AVERAGE GLUCOSE: 146 MG/DL
GFR AFRICAN AMERICAN: >60 ML/MIN
GFR NON-AFRICAN AMERICAN: >60 ML/MIN
GFR SERPL CREATININE-BSD FRML MDRD: ABNORMAL ML/MIN/{1.73_M2}
GFR SERPL CREATININE-BSD FRML MDRD: ABNORMAL ML/MIN/{1.73_M2}
GLUCOSE BLD-MCNC: 174 MG/DL (ref 70–99)
HBA1C MFR BLD: 6.7 % (ref 4.8–5.9)
MICROALBUMIN/CREAT 24H UR: 26 MG/L
MICROALBUMIN/CREAT UR-RTO: 15 MCG/MG CREAT
POTASSIUM SERPL-SCNC: 4.3 MMOL/L (ref 3.7–5.3)
SODIUM BLD-SCNC: 142 MMOL/L (ref 135–144)
TOTAL PROTEIN: 7.7 G/DL (ref 6.4–8.3)

## 2019-04-01 PROCEDURE — 3044F HG A1C LEVEL LT 7.0%: CPT | Performed by: PHYSICIAN ASSISTANT

## 2019-04-01 PROCEDURE — 80053 COMPREHEN METABOLIC PANEL: CPT

## 2019-04-01 PROCEDURE — 99214 OFFICE O/P EST MOD 30 MIN: CPT | Performed by: PHYSICIAN ASSISTANT

## 2019-04-01 PROCEDURE — 83036 HEMOGLOBIN GLYCOSYLATED A1C: CPT

## 2019-04-01 PROCEDURE — 82570 ASSAY OF URINE CREATININE: CPT

## 2019-04-01 PROCEDURE — 2022F DILAT RTA XM EVC RTNOPTHY: CPT | Performed by: PHYSICIAN ASSISTANT

## 2019-04-01 PROCEDURE — 36415 COLL VENOUS BLD VENIPUNCTURE: CPT

## 2019-04-01 PROCEDURE — G8427 DOCREV CUR MEDS BY ELIG CLIN: HCPCS | Performed by: PHYSICIAN ASSISTANT

## 2019-04-01 PROCEDURE — 82043 UR ALBUMIN QUANTITATIVE: CPT

## 2019-04-01 PROCEDURE — 1036F TOBACCO NON-USER: CPT | Performed by: PHYSICIAN ASSISTANT

## 2019-04-01 PROCEDURE — G8417 CALC BMI ABV UP PARAM F/U: HCPCS | Performed by: PHYSICIAN ASSISTANT

## 2019-04-01 PROCEDURE — 3017F COLORECTAL CA SCREEN DOC REV: CPT | Performed by: PHYSICIAN ASSISTANT

## 2019-04-01 RX ORDER — AMLODIPINE BESYLATE 10 MG/1
10 TABLET ORAL DAILY
Qty: 90 TABLET | Refills: 2 | Status: SHIPPED | OUTPATIENT
Start: 2019-04-01 | End: 2020-01-14 | Stop reason: SDUPTHER

## 2019-04-01 RX ORDER — CARVEDILOL 25 MG/1
25 TABLET ORAL 2 TIMES DAILY
Qty: 180 TABLET | Refills: 2 | Status: SHIPPED | OUTPATIENT
Start: 2019-04-01 | End: 2020-01-21 | Stop reason: SDUPTHER

## 2019-04-01 ASSESSMENT — PATIENT HEALTH QUESTIONNAIRE - PHQ9
1. LITTLE INTEREST OR PLEASURE IN DOING THINGS: 0
SUM OF ALL RESPONSES TO PHQ QUESTIONS 1-9: 0
SUM OF ALL RESPONSES TO PHQ9 QUESTIONS 1 & 2: 0
SUM OF ALL RESPONSES TO PHQ QUESTIONS 1-9: 0
2. FEELING DOWN, DEPRESSED OR HOPELESS: 0

## 2019-04-01 ASSESSMENT — ENCOUNTER SYMPTOMS
DIARRHEA: 0
CHEST TIGHTNESS: 0
CONSTIPATION: 0
WHEEZING: 0
SHORTNESS OF BREATH: 0
TROUBLE SWALLOWING: 0
COUGH: 0
VOMITING: 0
NAUSEA: 0

## 2019-04-01 NOTE — PROGRESS NOTES
Dr. Joselyn hutchison Left 11/30/2018    XI ROBOTIC PARTIAL NEPHRECTOMY, INTRA-OP LAP ULTRASOUND performed by Candie Isidro MD at [address] [place] [address] Drive EXTRACTION       Social History     Socioeconomic History    Marital status: Single     Spouse name: Not on file    Number of children: 0    Years of education: Not on file    Highest education level: Not on file   Occupational History    Not on file   Social Needs    Financial resource strain: Not on file    Food insecurity:     Worry: Not on file     Inability: Not on file    Transportation needs:     Medical: Not on file     Non-medical: Not on file   Tobacco Use    Smoking status: Never Smoker    Smokeless tobacco: Never Used   Substance and Sexual Activity    Alcohol use: Yes     Comment: very rare    Drug use: No    Sexual activity: Yes     Partners: Male   Lifestyle    Physical activity:     Days per week: Not on file     Minutes per session: Not on file    Stress: Not on file   Relationships    Social connections:     Talks on phone: Not on file     Gets together: Not on file     Attends Buddhism service: Not on file     Active member of club or organization: Not on file     Attends meetings of clubs or organizations: Not on file     Relationship status: Not on file    Intimate partner violence:     Fear of current or ex partner: Not on file     Emotionally abused: Not on file     Physically abused: Not on file     Forced sexual activity: Not on file   Other Topics Concern    Not on file   Social History Narrative    Not on file     Family History   Problem Relation Age of Onset    High Blood Pressure Mother     Diabetes Mother         impaired fasting glucose    Other Mother         short term memory loss after MVA    Glaucoma Mother     Stroke Father 66    High Blood Pressure Father     Diabetes Father         impaired fasting glucose    Glaucoma Father    Vincent Cancer Father         bladder     Heart Attack Father 66    Glaucoma Brother     Diabetes Maternal Grandmother     Other Paternal Grandmother         gallbladder disease    Stroke Paternal Grandfather     Other Paternal Grandfather         gallbladder disease    Diabetes Paternal Grandfather     No Known Problems Brother        Allergies   Allergen Reactions    Codeine      Severe Abdominal pain    Sulfa Antibiotics      Patient unsure of reaction       /78   Pulse 62   Resp 14   Ht 5' 10\" (1.778 m)   Wt 234 lb (106.1 kg)   BMI 33.58 kg/m²     Objective:   Physical Exam   Constitutional: He is oriented to person, place, and time. He appears well-developed and well-nourished. No distress. HENT:   Head: Normocephalic and atraumatic. Nose: Nose normal.   Mouth/Throat: Oropharynx is clear and moist. No oropharyngeal exudate. Postnasal drip noted. Eyes: Conjunctivae are normal. No scleral icterus. Neck: Normal range of motion. Neck supple. Cardiovascular: Normal rate, regular rhythm and normal heart sounds. Exam reveals no gallop and no friction rub. No murmur heard. Pulmonary/Chest: Effort normal and breath sounds normal. He has no wheezes. He has no rales. Abdominal: Soft. Bowel sounds are normal. He exhibits no distension and no mass. There is no tenderness. There is no rebound and no guarding. No hernia. Musculoskeletal: He exhibits no edema. Lymphadenopathy:     He has no cervical adenopathy. Neurological: He is alert and oriented to person, place, and time. Skin: Skin is warm and dry. No rash noted. No erythema. Psychiatric: He has a normal mood and affect. Nursing note and vitals reviewed. Ct Chest W Contrast    Result Date: 3/27/2019  EXAMINATION: CT OF THE CHEST WITH CONTRAST 3/27/2019 8:08 am TECHNIQUE: CT of the chest was performed with the administration of intravenous contrast. Multiplanar reformatted images are provided for review.  Dose modulation, iterative reconstruction, and/or weight based adjustment of the mA/kV was utilized to reduce the radiation dose to as low as reasonably achievable. COMPARISON: CT abdomen and pelvis 03/13/2018. Chest CT 09/19/2018. HISTORY: ORDERING SYSTEM PROVIDED HISTORY: Renal cell carcinoma of left kidney [facility]) TECHNOLOGIST PROVIDED HISTORY: pulm nodule, hx of Renal RCC Ordering Physician Provided Reason for Exam: Right pulmonary nodule seen on CT abdomen/pelvis, history of renal cell carcinoma of left kidney. No current chest complaints. Acuity: Unknown Type of Exam: Initial FINDINGS: Mediastinum: The heart and great vessels are normal in size. No pericardial effusion. No enlarged or suspicious-appearing lymph nodes are identified. Lungs/pleura: 3 mm noncalcified nodule in the right lower lobe on image 71 is unchanged compared to the prior chest CT 7 months ago. Minimal linear opacities in the lung bases are noted compatible with subsegmental atelectasis. The lungs are otherwise clear. No effusion. The airway is patent. Upper Abdomen: Cholelithiasis. Findings suggestive of mild hepatic steatosis. The visualized adrenal glands appear within normal limits. Subcentimeter cyst in the superior right kidney. Soft Tissues/Bones: Multilevel mild disc space narrowing and degenerative endplate changes are noted without suspicious lytic or blastic lesion identified. Stable 3 mm nodule in the right lower lobe compared to prior chest imaging 7 months ago. No other finding to suggest metastatic disease in the chest. While this favors a benign process, attention to on routine oncologic follow-up is recommended. Ct Abdomen Pelvis W Iv Contrast Additional Contrast? None    Result Date: 3/13/2019  EXAMINATION: CT OF THE ABDOMEN AND PELVIS WITH CONTRAST 3/13/2019 10:06 am TECHNIQUE: CT of the abdomen and pelvis was performed with the administration of intravenous contrast. Multiplanar reformatted images are provided for review. Dose modulation, iterative reconstruction, and/or weight based adjustment of the mA/kV was utilized to reduce the radiation dose to as low as reasonably achievable. COMPARISON: None. HISTORY: ORDERING SYSTEM PROVIDED HISTORY: Renal cell adenocarcinoma, left [facility]) TECHNOLOGIST PROVIDED HISTORY: renal cancer Ordering Physician Provided Reason for Exam: left renal cell carcinoma FINDINGS: Lower Chest: 3 mm solid noncalcified pulmonary nodule right lower lobe series 2, image 1. No pericardial or pleural effusions. Organs: No enhancing liver lesion. Cholelithiasis. Portal vein, common bile duct, spleen, pancreas and adrenal glands all appear unremarkable. Patient status post partial nephrectomy of the left kidney. No suspicious enhancement is identified within the surgical bed to suggest tumor recurrence. Subcentimeter low attenuating lesions are seen within both kidneys, too small for accurate characterization. Small cysts right kidney. No hydronephrosis. Abdominal aorta appears normal in caliber. GI/Bowel: Stomach is grossly unremarkable. Small bowel appears nondilated. No acute colonic abnormality. Appendix is normal. Pelvis: Urinary bladder and prostate gland all appear unremarkable. Peritoneum/Retroperitoneum: No free air or free fluid. No lymphadenopathy. Renal veins and IVC are patent. Bones/Soft Tissues: Abdominal wall demonstrates no acute findings. Osseous structures demonstrate degenerative change. No aggressive bony lesions. *Partial fragment of the left kidney without suspicious enhancement within the surgical bed to suggest tumor recurrence. *3 mm solid noncalcified pulmonary nodule right lower lobe. Given the history, metastatic disease cannot be excluded and complete evaluation with CT chest IV contrast is recommended. *Cholelithiasis. Assessment:       Diagnosis Orders   1. Screening for condition  Vitamin D 25 Hydroxy   2.  Type 2 diabetes mellitus without complication, without long-term current use of insulin (HCC)  metFORMIN (GLUCOPHAGE) 500 MG tablet    Comprehensive Metabolic Panel    Microalbumin, Ur    Hemoglobin A1C   3. Essential hypertension  carvedilol (COREG) 25 MG tablet    amLODIPine (NORVASC) 10 MG tablet    Lipid Panel    TSH With Reflex Ft4   4. Bicuspid aortic valve     5. Nonrheumatic aortic valve insufficiency     6. Malignant neoplasm of left kidney (HCC)     7. Pulmonary nodule, right             Plan:      Follow up with Dr. Petra Trejo sooner due to CT chest to be safe and reassurance  Will notify pt of lab results  Work on lifestyle changes  Reviewed CT scans with patient  Answered his questions  Follow up with specialty as scheduled sooner if problems  Follow up with me six months sooner if problems  Hold shingrix for 1-2 years just 2016 still has immunity  Will recommend HIV testing this year    Tricia Canela received counseling on the following healthy behaviors: nutrition, exercise and medication adherence    Patient given educational materials on Diabetes, Hyperlipidemia, Nutrition, Exercise and Hypertension    I have instructed Tricia Canela to complete a self tracking handout on Blood Sugars , Blood Pressures  and Weights and instructed them to bring it with them to his next appointment. Discussed use, benefit, and side effects of prescribed medications. Barriers to medication compliance addressed. All patient questions answered. Pt voiced understanding.            PING Copeland

## 2019-04-05 ENCOUNTER — OFFICE VISIT (OUTPATIENT)
Dept: UROLOGY | Age: 64
End: 2019-04-05
Payer: COMMERCIAL

## 2019-04-05 VITALS
HEIGHT: 70 IN | BODY MASS INDEX: 32.96 KG/M2 | DIASTOLIC BLOOD PRESSURE: 64 MMHG | HEART RATE: 59 BPM | WEIGHT: 230.2 LBS | SYSTOLIC BLOOD PRESSURE: 132 MMHG

## 2019-04-05 DIAGNOSIS — N28.89 RENAL MASS: Primary | ICD-10-CM

## 2019-04-05 DIAGNOSIS — N52.9 ERECTILE DYSFUNCTION, UNSPECIFIED ERECTILE DYSFUNCTION TYPE: ICD-10-CM

## 2019-04-05 DIAGNOSIS — C64.2 RENAL CELL CARCINOMA OF LEFT KIDNEY (HCC): ICD-10-CM

## 2019-04-05 DIAGNOSIS — R91.1 PULMONARY NODULE: ICD-10-CM

## 2019-04-05 PROCEDURE — 99213 OFFICE O/P EST LOW 20 MIN: CPT | Performed by: UROLOGY

## 2019-04-05 PROCEDURE — G8427 DOCREV CUR MEDS BY ELIG CLIN: HCPCS | Performed by: UROLOGY

## 2019-04-05 PROCEDURE — 1036F TOBACCO NON-USER: CPT | Performed by: UROLOGY

## 2019-04-05 PROCEDURE — G8417 CALC BMI ABV UP PARAM F/U: HCPCS | Performed by: UROLOGY

## 2019-04-05 PROCEDURE — 3017F COLORECTAL CA SCREEN DOC REV: CPT | Performed by: UROLOGY

## 2019-04-05 NOTE — PROGRESS NOTES
Alexa Sandoval MD   Urology Clinic Progress Note      Patient:  Aurelia Fonseca  YOB: 1955  Date: 4/5/2019    HISTORY OF PRESENT ILLNESS:   The patient is a 61 y.o. male who presents today for follow-up for the following problem(s): left renal mass  Overall the problem(s) : show no change. Associated Symptoms: No dysuria, gross hematuria. Pain Severity:      Summary of old records: Dr Wendi Guzman patient, left renal mass noted on Chest CT for [facility]    11/30/2018, left partial nephrectomy  -- Diagnosis --   KIDNEY, PARTIAL NEPHRECTOMY (LEFT):       - RENAL CELL CARCINOMA, CLEAR CELL TYPE WITH PROMINENT CYSTIC        CHANGE, GRADE 2.       - PARENCHYMAL AND SOFT TISSUE SURGICAL MARGINS NEGATIVE FOR         NEOPLASM. Additional History:     Doing well, here to review CT  No issues  Cr 0.78  Feels well, no issues. .I independently reviewed and verified the images and reports from:    CT chest 3/2019  Stable 3 mm nodule in the right lower lobe compared to prior chest imaging 7   months ago.  No other finding to suggest metastatic disease in the chest.   While this favors a benign process, attention to on routine oncologic   follow-up is recommended. Result Date: 3/13/2019  EXAMINATION: CT OF THE ABDOMEN AND PELVIS WITH CONTRAST   *Partial fragment of the left kidney without suspicious enhancement within the surgical bed to suggest tumor recurrence. *3 mm solid noncalcified pulmonary nodule right lower lobe. Given the history, metastatic disease cannot be excluded and complete evaluation with CT chest IV contrast is recommended. *Cholelithiasis. 12/2018  CREATININE 0.76        Last several PSA's:  Lab Results   Component Value Date    PSA 0.77 01/26/2016       Last total testosterone:  Lab Results   Component Value Date    TESTOSTERONE 359 06/29/2017       Urinalysis today:  No results found for this visit on 04/05/19.     Last BUN and creatinine:  Lab Results   Component Value Date    BUN 20 04/01/2019     Lab Results   Component Value Date    CREATININE 0.72 04/01/2019       Imaging Reviewed during this Office Visit:   (results were independently reviewed by physician and radiology report verified)    PAST MEDICAL, FAMILY AND SOCIAL HISTORY UPDATE:  Past Medical History:   Diagnosis Date    Anxiety     Aortic regurgitation     Bicuspid aortic valve     Diabetes mellitus ([address] since March 2018    on Rx.     GERD (gastroesophageal reflux disease)     Hypertension     Left renal mass 10/2018    Osteoarthritis of left knee     Wears glasses      Past Surgical History:   Procedure Laterality Date    CYST REMOVAL      tail bone    MOUTH SURGERY  2015    PARTIAL NEPHRECTOMY Left 11/30/2018    ROBOTIC PARTIAL NEPHRECTOMY,     GA COLONOSCOPY FLX DX W/COLLJ SPEC WHEN PFRMD N/A 5/14/2018    normal colon, Dr. Deleon Arms Left 11/30/2018    XI ROBOTIC PARTIAL NEPHRECTOMY, INTRA-OP LAP ULTRASOUND performed by Marcos Hutson MD at [address] [place] [address] EXTRACTION       Family History   Problem Relation Age of Onset    High Blood Pressure Mother     Diabetes Mother         impaired fasting glucose    Other Mother         short term memory loss after MVA    Glaucoma Mother     Stroke Father 66    High Blood Pressure Father     Diabetes Father         impaired fasting glucose    Glaucoma Father     Cancer Father         bladder     Heart Attack Father 66    Glaucoma Brother     Diabetes Maternal Grandmother     Other Paternal Grandmother         gallbladder disease    Stroke Paternal Grandfather     Other Paternal Grandfather         gallbladder disease    Diabetes Paternal Grandfather     No Known Problems Brother      Outpatient Medications Marked as Taking for the 4/5/19 encounter (Office Visit) with Marcos Hutson MD   Medication Sig Dispense Refill    metFORMIN (GLUCOPHAGE) 500 MG tablet Take 1 tablet alert and oriented to person, place and time. NAD, A/o  Non labored respiration  Normal peripheral pulses  Skin- warm and dry  Psych- normal mood and affect      Assessment and Plan      1. Renal mass    2. Erectile dysfunction, unspecified erectile dysfunction type    3. Renal cell carcinoma of left kidney (HCC)    4. Pulmonary nodule           Plan:       Follow up 6 months with CMP with Ct chest/abdomen/pelvis. Return in about 6 months (around 10/5/2019).            Barry Villarreal M.D  [facility]

## 2019-04-08 ENCOUNTER — OFFICE VISIT (OUTPATIENT)
Dept: ONCOLOGY | Age: 64
End: 2019-04-08
Payer: COMMERCIAL

## 2019-04-08 VITALS
OXYGEN SATURATION: 95 % | HEIGHT: 70 IN | WEIGHT: 231 LBS | TEMPERATURE: 97.4 F | HEART RATE: 67 BPM | BODY MASS INDEX: 33.07 KG/M2 | DIASTOLIC BLOOD PRESSURE: 64 MMHG | SYSTOLIC BLOOD PRESSURE: 116 MMHG

## 2019-04-08 DIAGNOSIS — C64.9 RENAL CELL CARCINOMA, UNSPECIFIED LATERALITY (HCC): Primary | ICD-10-CM

## 2019-04-08 DIAGNOSIS — R91.1 LUNG NODULE: ICD-10-CM

## 2019-04-08 PROCEDURE — 1036F TOBACCO NON-USER: CPT | Performed by: INTERNAL MEDICINE

## 2019-04-08 PROCEDURE — 99214 OFFICE O/P EST MOD 30 MIN: CPT | Performed by: INTERNAL MEDICINE

## 2019-04-08 PROCEDURE — G8417 CALC BMI ABV UP PARAM F/U: HCPCS | Performed by: INTERNAL MEDICINE

## 2019-04-08 PROCEDURE — G8427 DOCREV CUR MEDS BY ELIG CLIN: HCPCS | Performed by: INTERNAL MEDICINE

## 2019-04-08 PROCEDURE — 3017F COLORECTAL CA SCREEN DOC REV: CPT | Performed by: INTERNAL MEDICINE

## 2019-04-08 NOTE — PROGRESS NOTES
Patient ID: Carly Grace, 1955, [id number], 61 y.o. Diagnosis:   Immune related thrombocytopenia  Left RCC, stage I, T1aNx s/p partial nephrectomy 11/30/18  HISTORY OF PRESENT ILLNESS:    Hematologic History: This is a 69-year-old male with a history of immune related thrombocytopenia was being followed by Dr. Lionel Smart. He was noted to have thrombocytopenia since 2016. He has a very mild thrombocytopenia ranging around 120-130 K. His ITP screen was positive for platelet associated antibody IgM. He denied any symptoms of bleeding. INTERVAL HISTORY  Patient is returning for follow-up visit. He recently had a CT scan of the chest abdomen pelvis with urologist.  That showed no evidence of recurrence. His CT scan of the chest showed small 3 mm lung nodule in the right lower lobe which is stable for past 6 months. He denied any chest pain, shortness of breath. He denied any unintentional weight loss drenching night sweats or fever chills. During this visit patient's allergy, social, medical, surgical history and medications were reviewed and updated.     Allergies   Allergen Reactions    Codeine      Severe Abdominal pain    Sulfa Antibiotics      Patient unsure of reaction     Current Outpatient Medications   Medication Sig Dispense Refill    metFORMIN (GLUCOPHAGE) 500 MG tablet Take 1 tablet by mouth 2 times daily (with meals) 180 tablet 2    carvedilol (COREG) 25 MG tablet Take 1 tablet by mouth 2 times daily 180 tablet 2    amLODIPine (NORVASC) 10 MG tablet Take 1 tablet by mouth daily 90 tablet 2    cetirizine (ZYRTEC) 10 MG tablet TAKE ONE TABLET BY MOUTH ONCE NIGHTLY AS NEEDED FOR ALLERGIES 30 tablet 6    losartan-hydrochlorothiazide (HYZAAR) 100-25 MG per tablet Take 1 tablet by mouth daily 90 tablet 1    meloxicam (MOBIC) 7.5 MG tablet Take 1 tablet by mouth 2 times daily (with meals) 180 tablet 1    Blood Pressure Monitoring (BLOOD PRESSURE CUFF) MISC DX: I10 HTN 1 each 0    furosemide (LASIX) 20 MG tablet Take 1 tablet by mouth daily as needed (swelling) 30 tablet 3    sildenafil (REVATIO) 20 MG tablet Take 1 tablet by mouth as needed (ED) Take 1-5 tabs as needed PRN for ED 60 tablet 3    fluticasone (FLONASE) 50 MCG/ACT nasal spray 1 spray by Nasal route daily (Patient taking differently: 1 spray by Nasal route daily as needed ) 1 Bottle 3    Elastic Bandages & Supports (JOBST OPAQUE KNEE 15-20MMHG XL) MISC #2 pair knee highs  Varicose veins 2 each 0    vitamin D3 (CHOLECALCIFEROL) 400 UNITS TABS tablet Take 400 Units by mouth every other day       Omeprazole (PRILOSEC PO) Take 1 capsule by mouth daily as needed        No current facility-administered medications for this visit. REVIEW OF SYSTEM:     Constitutional: No fever or chills. No night sweats, no weight loss   Eyes: No eye discharge, double vision, or eye pain   HEENT: negative for sore mouth, sore throat, hoarseness and voice change   Respiratory: negative for cough , sputum, dyspnea, wheezing, hemoptysis, chest pain   Cardiovascular: negative for chest pain, dyspnea, palpitations, orthopnea, PND   Gastrointestinal: negative for nausea, vomiting, diarrhea, constipation, abdominal pain, Dysphagia, hematemesis and hematochezia   Genitourinary: negative for frequency, dysuria, nocturia, urinary incontinence, and hematuria   Integument: negative for rash, skin lesions, bruises. Hematologic/Lymphatic: negative for easy bruising, bleeding, lymphadenopathy, petechiae and swelling/edema   Endocrine: negative for heat or cold intolerance, tremor, weight changes, change in bowel habits and hair loss   Musculoskeletal: negative for myalgias, arthralgias, pain, joint swelling,and bone pain   Neurological: negative for headaches, dizziness, seizures, weakness, numbness   OBJECTIVE:         There were no vitals filed for this visit.     PHYSICAL EXAM:   General appearance - well appearing, no in pain or distress   Mental status - alert and cooperative   Eyes - pupils equal and reactive, extraocular eye movements intact   Ears - bilateral TM's and external ear canals normal   Mouth - mucous membranes moist, pharynx normal without lesions   Neck - supple, no significant adenopathy   Lymphatics - no palpable lymphadenopathy, no hepatosplenomegaly   Chest - clear to auscultation, no wheezes, rales or rhonchi, symmetric air entry   Heart - normal rate, regular rhythm, normal S1, S2, no murmurs, rubs, clicks or gallops   Abdomen - soft, nontender, nondistended, no masses or organomegaly   Neurological - alert, oriented, normal speech, no focal findings or movement disorder noted   Musculoskeletal - no joint tenderness, deformity or swelling   Extremities - peripheral pulses normal, no pedal edema, no clubbing or cyanosis   Skin - normal coloration and turgor, no rashes, no suspicious skin lesions noted   LABORATORY DATA:     Lab Results   Component Value Date    WBC 12.5 (H) 12/01/2018    HGB 11.5 (L) 12/01/2018    HCT 34.7 (L) 12/01/2018    MCV 95.1 12/01/2018     12/01/2018    LYMPHOPCT 31 11/16/2018    RBC 3.65 (L) 12/01/2018    MCH 31.5 12/01/2018    MCHC 33.1 12/01/2018    RDW 13.1 12/01/2018    MONOPCT 12 11/16/2018    BASOPCT 1 11/16/2018    NEUTROABS 3.27 11/16/2018    LYMPHSABS 1.83 11/16/2018    MONOSABS 0.74 11/16/2018    EOSABS 0.09 11/16/2018    BASOSABS 0.04 11/16/2018         Chemistry        Component Value Date/Time     04/01/2019 0915    K 4.3 04/01/2019 0915     04/01/2019 0915    CO2 30 04/01/2019 0915    BUN 20 04/01/2019 0915    CREATININE 0.72 04/01/2019 0915        Component Value Date/Time    CALCIUM 9.5 04/01/2019 0915    ALKPHOS 37 (L) 04/01/2019 0915    AST 14 04/01/2019 0915    ALT 18 04/01/2019 0915    BILITOT 0.54 04/01/2019 0915      1/22/2017  5:23 PM - Vas[place], Lab Incoming [facility] Results   Component Value Ref Range & Units Status   IgG Plt Ab Direct Negative Negative Final   (NOTE)

## 2019-05-21 DIAGNOSIS — M79.89 LEG SWELLING: ICD-10-CM

## 2019-05-22 RX ORDER — FUROSEMIDE 20 MG/1
TABLET ORAL
Qty: 90 TABLET | Refills: 3 | Status: SHIPPED | OUTPATIENT
Start: 2019-05-22 | End: 2019-06-17

## 2019-06-03 ENCOUNTER — HOSPITAL ENCOUNTER (OUTPATIENT)
Dept: LAB | Age: 64
Discharge: HOME | End: 2019-06-03
Payer: COMMERCIAL

## 2019-06-03 ENCOUNTER — OFFICE VISIT (OUTPATIENT)
Dept: PRIMARY CARE CLINIC | Age: 64
End: 2019-06-03
Payer: COMMERCIAL

## 2019-06-03 VITALS
DIASTOLIC BLOOD PRESSURE: 72 MMHG | HEART RATE: 66 BPM | RESPIRATION RATE: 16 BRPM | TEMPERATURE: 98 F | WEIGHT: 237 LBS | SYSTOLIC BLOOD PRESSURE: 138 MMHG | OXYGEN SATURATION: 98 % | BODY MASS INDEX: 33.18 KG/M2 | HEIGHT: 71 IN

## 2019-06-03 DIAGNOSIS — R55 NEAR SYNCOPE: ICD-10-CM

## 2019-06-03 DIAGNOSIS — H61.22 CERUMEN DEBRIS ON TYMPANIC MEMBRANE, LEFT: ICD-10-CM

## 2019-06-03 DIAGNOSIS — R55 NEAR SYNCOPE: Primary | ICD-10-CM

## 2019-06-03 LAB
ABSOLUTE EOS #: 0.14 K/UL (ref 0–0.4)
ABSOLUTE IMMATURE GRANULOCYTE: ABNORMAL K/UL (ref 0–0.3)
ABSOLUTE LYMPH #: 1.79 K/UL (ref 1–4.8)
ABSOLUTE MONO #: 0.62 K/UL (ref 0.1–1.2)
ANION GAP SERPL CALCULATED.3IONS-SCNC: 10 MMOL/L (ref 9–17)
BASOPHILS # BLD: 0 % (ref 0–2)
BASOPHILS ABSOLUTE: 0 K/UL (ref 0–0.2)
BUN BLDV-MCNC: 21 MG/DL (ref 8–23)
BUN/CREAT BLD: 27 (ref 9–20)
CALCIUM SERPL-MCNC: 9.7 MG/DL (ref 8.6–10.4)
CHLORIDE BLD-SCNC: 104 MMOL/L (ref 98–107)
CO2: 28 MMOL/L (ref 20–31)
CREAT SERPL-MCNC: 0.78 MG/DL (ref 0.7–1.2)
DIFFERENTIAL TYPE: ABNORMAL
EOSINOPHILS RELATIVE PERCENT: 2 % (ref 1–8)
GFR AFRICAN AMERICAN: >60 ML/MIN
GFR NON-AFRICAN AMERICAN: >60 ML/MIN
GFR SERPL CREATININE-BSD FRML MDRD: ABNORMAL ML/MIN/{1.73_M2}
GFR SERPL CREATININE-BSD FRML MDRD: ABNORMAL ML/MIN/{1.73_M2}
GLUCOSE BLD-MCNC: 116 MG/DL (ref 70–99)
HCT VFR BLD CALC: 37.4 % (ref 41–53)
HEMOGLOBIN: 13 G/DL (ref 13.5–17.5)
IMMATURE GRANULOCYTES: ABNORMAL %
LYMPHOCYTES # BLD: 26 % (ref 15–43)
MCH RBC QN AUTO: 32.3 PG (ref 26–34)
MCHC RBC AUTO-ENTMCNC: 34.7 G/DL (ref 31–37)
MCV RBC AUTO: 92.9 FL (ref 80–100)
MONOCYTES # BLD: 9 % (ref 6–14)
MORPHOLOGY: ABNORMAL
MORPHOLOGY: ABNORMAL
NRBC AUTOMATED: ABNORMAL PER 100 WBC
PDW BLD-RTO: 13.4 % (ref 11–14.5)
PLATELET # BLD: 140 K/UL (ref 140–450)
PLATELET ESTIMATE: ABNORMAL
PMV BLD AUTO: 8.6 FL (ref 6–12)
POTASSIUM SERPL-SCNC: 4.1 MMOL/L (ref 3.7–5.3)
RBC # BLD: 4.03 M/UL (ref 4.5–5.9)
RBC # BLD: ABNORMAL 10*6/UL
SEG NEUTROPHILS: 63 % (ref 44–74)
SEGMENTED NEUTROPHILS ABSOLUTE COUNT: 4.35 K/UL (ref 1.8–7.7)
SODIUM BLD-SCNC: 142 MMOL/L (ref 135–144)
TSH SERPL DL<=0.05 MIU/L-ACNC: 1.88 MIU/L (ref 0.3–5)
WBC # BLD: 6.9 K/UL (ref 3.5–11)
WBC # BLD: ABNORMAL 10*3/UL

## 2019-06-03 PROCEDURE — G8427 DOCREV CUR MEDS BY ELIG CLIN: HCPCS | Performed by: PHYSICIAN ASSISTANT

## 2019-06-03 PROCEDURE — 84443 ASSAY THYROID STIM HORMONE: CPT

## 2019-06-03 PROCEDURE — 3017F COLORECTAL CA SCREEN DOC REV: CPT | Performed by: PHYSICIAN ASSISTANT

## 2019-06-03 PROCEDURE — 99214 OFFICE O/P EST MOD 30 MIN: CPT | Performed by: PHYSICIAN ASSISTANT

## 2019-06-03 PROCEDURE — 36415 COLL VENOUS BLD VENIPUNCTURE: CPT

## 2019-06-03 PROCEDURE — G8417 CALC BMI ABV UP PARAM F/U: HCPCS | Performed by: PHYSICIAN ASSISTANT

## 2019-06-03 PROCEDURE — 80048 BASIC METABOLIC PNL TOTAL CA: CPT

## 2019-06-03 PROCEDURE — 1036F TOBACCO NON-USER: CPT | Performed by: PHYSICIAN ASSISTANT

## 2019-06-03 PROCEDURE — 85025 COMPLETE CBC W/AUTO DIFF WBC: CPT

## 2019-06-03 ASSESSMENT — ENCOUNTER SYMPTOMS
SHORTNESS OF BREATH: 0
RESPIRATORY NEGATIVE: 1
CHEST TIGHTNESS: 0
COUGH: 0

## 2019-06-03 NOTE — PROGRESS NOTES
[facility]    Subjective:      Patient ID: Flash Willingham is a 59 y.o. y.o. male. Patient is seen due to lightheadedness for one month. They are episodic. Notices while standing. Walking to the car feels it. Starts with shoulders to head. Not dizzy just lightheaded. 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 Vision blurred he closed eyes and grabbed hold of a tree. Thought he was going to pass out. No room spinning. No episode for 4 days now. Has not had anything over the weekend. Has noted he has some weight gain not sure why is more active lately. No recent illness. These episodes last seconds. Do not awaken him. Never occur while sitting. Past Medical History:   Diagnosis Date    Anxiety     Aortic regurgitation     Bicuspid aortic valve     Diabetes mellitus ([address] since March 2018    on Rx.     GERD (gastroesophageal reflux disease)     Hypertension     Left renal mass 10/2018    Osteoarthritis of left knee     Wears glasses        Past Surgical History:   Procedure Laterality Date    CYST REMOVAL      tail bone    MOUTH SURGERY  2015    PARTIAL NEPHRECTOMY Left 11/30/2018    ROBOTIC PARTIAL NEPHRECTOMY,     NE COLONOSCOPY FLX DX W/COLLJ SPEC WHEN PFRMD N/A 5/14/2018    normal colon, Dr. Allyson Birmingham Left 11/30/2018    XI ROBOTIC PARTIAL NEPHRECTOMY, INTRA-OP LAP ULTRASOUND performed by Grant Torres MD at [address] [place] [address] EXTRACTION         Family History   Problem Relation Age of Onset    High Blood Pressure Mother     Diabetes Mother         impaired fasting glucose    Other Mother         short term memory loss after MVA    Glaucoma Mother     Stroke Father 66    High Blood Pressure Father     Diabetes Father         impaired fasting glucose    Glaucoma Father     Cancer Father         bladder     Heart Attack Father 66    Glaucoma Brother     Diabetes Maternal Grandmother     Other Paternal Grandmother         gallbladder disease    Stroke Paternal Grandfather     Other Paternal Grandfather         gallbladder disease    Diabetes Paternal Grandfather     No Known Problems Brother        Allergies   Allergen Reactions    Codeine      Severe Abdominal pain    Sulfa Antibiotics      Patient unsure of reaction       Current Outpatient Medications   Medication Sig Dispense Refill    furosemide (LASIX) 20 MG tablet TAKE ONE TABLET BY MOUTH DAILY AS NEEDED (SWELLING) 90 tablet 3    metFORMIN (GLUCOPHAGE) 500 MG tablet Take 1 tablet by mouth 2 times daily (with meals) 180 tablet 2    carvedilol (COREG) 25 MG tablet Take 1 tablet by mouth 2 times daily 180 tablet 2    amLODIPine (NORVASC) 10 MG tablet Take 1 tablet by mouth daily 90 tablet 2    cetirizine (ZYRTEC) 10 MG tablet TAKE ONE TABLET BY MOUTH ONCE NIGHTLY AS NEEDED FOR ALLERGIES 30 tablet 6    losartan-hydrochlorothiazide (HYZAAR) 100-25 MG per tablet Take 1 tablet by mouth daily 90 tablet 1    meloxicam (MOBIC) 7.5 MG tablet Take 1 tablet by mouth 2 times daily (with meals) 180 tablet 1    sildenafil (REVATIO) 20 MG tablet Take 1 tablet by mouth as needed (ED) Take 1-5 tabs as needed PRN for ED 60 tablet 3    fluticasone (FLONASE) 50 MCG/ACT nasal spray 1 spray by Nasal route daily (Patient taking differently: 1 spray by Nasal route daily as needed ) 1 Bottle 3    vitamin D3 (CHOLECALCIFEROL) 400 UNITS TABS tablet Take 400 Units by mouth every other day       Omeprazole (PRILOSEC PO) Take 1 capsule by mouth daily as needed       Blood Pressure Monitoring (BLOOD PRESSURE CUFF) MISC DX: I10 HTN 1 each 0    Elastic Bandages & Supports (JOBST OPAQUE KNEE 15-20MMHG XL) MISC #2 pair knee highs  Varicose veins 2 each 0     No current facility-administered medications for this visit. Review of Systems   Constitutional: Negative. HENT: Negative. Eyes: Positive for visual disturbance. Respiratory: Negative. Negative for cough, chest tightness and shortness of breath. Cardiovascular: Negative. Negative for palpitations. Sees cardiology 6/17/19. Genitourinary: The water pill is making him urinate more often but not as much. Neurological: Positive for light-headedness. Negative for dizziness and headaches. Objective:      /72   Pulse 66   Temp 98 °F (36.7 °C)   Resp 16   Ht 5' 11\" (1.803 m)   Wt 237 lb (107.5 kg)   SpO2 98%   BMI 33.05 kg/m²     Physical Exam  Pulse Readings from Last 3 Encounters:   06/03/19 66   04/08/19 67   04/05/19 59     BP Readings from Last 3 Encounters:   06/03/19 138/72   04/08/19 116/64   04/05/19 132/64           Assessment & Plan:     There are no diagnoses linked to this encounter.       PING Osborn  6/3/2019 1:07 PM    (Pleasenote that portions of this note were completed with a voice recognition program.Efforts were made to edit the dictations but occasionally words are mis-transcribed.)

## 2019-06-05 ENCOUNTER — HOSPITAL ENCOUNTER (OUTPATIENT)
Dept: INTERVENTIONAL RADIOLOGY/VASCULAR | Age: 64
Discharge: HOME | End: 2019-06-07
Payer: COMMERCIAL

## 2019-06-05 DIAGNOSIS — R55 NEAR SYNCOPE: ICD-10-CM

## 2019-06-05 PROCEDURE — 93880 EXTRACRANIAL BILAT STUDY: CPT

## 2019-06-10 DIAGNOSIS — I10 ESSENTIAL HYPERTENSION: ICD-10-CM

## 2019-06-11 RX ORDER — LOSARTAN POTASSIUM AND HYDROCHLOROTHIAZIDE 25; 100 MG/1; MG/1
TABLET ORAL
Qty: 90 TABLET | Refills: 2 | Status: SHIPPED | OUTPATIENT
Start: 2019-06-11 | End: 2020-03-22 | Stop reason: SDUPTHER

## 2019-06-17 ENCOUNTER — OFFICE VISIT (OUTPATIENT)
Dept: CARDIOLOGY | Age: 64
End: 2019-06-17
Payer: COMMERCIAL

## 2019-06-17 VITALS
HEIGHT: 70 IN | HEART RATE: 60 BPM | WEIGHT: 229 LBS | DIASTOLIC BLOOD PRESSURE: 70 MMHG | SYSTOLIC BLOOD PRESSURE: 148 MMHG | BODY MASS INDEX: 32.78 KG/M2

## 2019-06-17 DIAGNOSIS — I77.810 AORTIC ROOT DILATION (HCC): ICD-10-CM

## 2019-06-17 DIAGNOSIS — I10 ESSENTIAL HYPERTENSION: Primary | ICD-10-CM

## 2019-06-17 DIAGNOSIS — Q23.1 BICUSPID AORTIC VALVE: ICD-10-CM

## 2019-06-17 DIAGNOSIS — M79.89 LEG SWELLING: ICD-10-CM

## 2019-06-17 PROCEDURE — G8427 DOCREV CUR MEDS BY ELIG CLIN: HCPCS | Performed by: INTERNAL MEDICINE

## 2019-06-17 PROCEDURE — 1036F TOBACCO NON-USER: CPT | Performed by: INTERNAL MEDICINE

## 2019-06-17 PROCEDURE — G8417 CALC BMI ABV UP PARAM F/U: HCPCS | Performed by: INTERNAL MEDICINE

## 2019-06-17 PROCEDURE — 3017F COLORECTAL CA SCREEN DOC REV: CPT | Performed by: INTERNAL MEDICINE

## 2019-06-17 PROCEDURE — 99214 OFFICE O/P EST MOD 30 MIN: CPT | Performed by: INTERNAL MEDICINE

## 2019-06-17 PROCEDURE — 93000 ELECTROCARDIOGRAM COMPLETE: CPT | Performed by: INTERNAL MEDICINE

## 2019-06-17 RX ORDER — FUROSEMIDE 20 MG/1
20 TABLET ORAL DAILY
Qty: 90 TABLET | Refills: 3 | Status: SHIPPED | OUTPATIENT
Start: 2019-06-17 | End: 2019-06-17

## 2019-06-17 RX ORDER — FUROSEMIDE 20 MG/1
20 TABLET ORAL DAILY
Qty: 90 TABLET | Refills: 2 | Status: SHIPPED | OUTPATIENT
Start: 2019-06-17 | End: 2019-10-04 | Stop reason: ALTCHOICE

## 2019-06-17 NOTE — PROGRESS NOTES
[facility]  is here for follow up. No chest pain, no dyspnea, no PND, no syncope or pre-syncope, no orthopnea. Feels more tired than usual.    Also some lightheadedness. Possibly was taking extra BP pill. Lightheadedness now resolved. Past Medical History:   Diagnosis Date    Anxiety     Aortic regurgitation     Bicuspid aortic valve     Diabetes mellitus ([address] since March 2018    on Rx.  GERD (gastroesophageal reflux disease)     Hypertension     Left renal mass 10/2018    Osteoarthritis of left knee     Wears glasses        Past Surgical History:   Procedure Laterality Date    CYST REMOVAL      tail bone    MOUTH SURGERY  2015    PARTIAL NEPHRECTOMY Left 11/30/2018    ROBOTIC PARTIAL NEPHRECTOMY,     MO COLONOSCOPY FLX DX W/COLLJ SPEC WHEN PFRMD N/A 5/14/2018    normal colon, Dr. Kemal Peralta Left 11/30/2018    XI ROBOTIC PARTIAL NEPHRECTOMY, INTRA-OP LAP ULTRASOUND performed by Georgi Wilkes MD at [address] [place] [address] Drive EXTRACTION         Family History   Problem Relation Age of Onset    High Blood Pressure Mother     Diabetes Mother         impaired fasting glucose    Other Mother         short term memory loss after MVA    Glaucoma Mother     Stroke Father 66    High Blood Pressure Father     Diabetes Father         impaired fasting glucose    Glaucoma Father     Cancer Father         bladder     Heart Attack Father 66    Glaucoma Brother     Diabetes Maternal Grandmother     Other Paternal Grandmother         gallbladder disease    Stroke Paternal Grandfather     Other Paternal Grandfather         gallbladder disease    Diabetes Paternal Grandfather     No Known Problems Brother        ROS: Otherwise 10 systems reviewed and negative. BP (!) 148/70   Pulse 60   Ht 5' 10\" (1.778 m)   Wt 229 lb (103.9 kg)   BMI 32.86 kg/m²     Vitals as above.    Alert and oriented x 3. No JVD, or carotid bruits. Lungs are clear to auscultation. Heart sounds are regular, 2/6 diastolic murmur  Abdomen is soft, no tenderness. Extremities 1+ peripheral edema. EKG:   Sinus  Rhythm   Voltage criteria for LVH     Meds:    Current Outpatient Medications:     losartan-hydrochlorothiazide (HYZAAR) 100-25 MG per tablet, TAKE ONE TABLET BY MOUTH DAILY, Disp: 90 tablet, Rfl: 2    furosemide (LASIX) 20 MG tablet, TAKE ONE TABLET BY MOUTH DAILY AS NEEDED (SWELLING), Disp: 90 tablet, Rfl: 3    metFORMIN (GLUCOPHAGE) 500 MG tablet, Take 1 tablet by mouth 2 times daily (with meals), Disp: 180 tablet, Rfl: 2    carvedilol (COREG) 25 MG tablet, Take 1 tablet by mouth 2 times daily, Disp: 180 tablet, Rfl: 2    amLODIPine (NORVASC) 10 MG tablet, Take 1 tablet by mouth daily, Disp: 90 tablet, Rfl: 2    cetirizine (ZYRTEC) 10 MG tablet, TAKE ONE TABLET BY MOUTH ONCE NIGHTLY AS NEEDED FOR ALLERGIES, Disp: 30 tablet, Rfl: 6    meloxicam (MOBIC) 7.5 MG tablet, Take 1 tablet by mouth 2 times daily (with meals), Disp: 180 tablet, Rfl: 1    Blood Pressure Monitoring (BLOOD PRESSURE CUFF) MISC, DX: I10 HTN, Disp: 1 each, Rfl: 0    sildenafil (REVATIO) 20 MG tablet, Take 1 tablet by mouth as needed (ED) Take 1-5 tabs as needed PRN for ED, Disp: 60 tablet, Rfl: 3    fluticasone (FLONASE) 50 MCG/ACT nasal spray, 1 spray by Nasal route daily (Patient taking differently: 1 spray by Nasal route daily as needed ), Disp: 1 Bottle, Rfl: 3    Elastic Bandages & Supports (JOBST OPAQUE KNEE 15-20MMHG XL) MISC, #2 pair knee highs Varicose veins, Disp: 2 each, Rfl: 0    vitamin D3 (CHOLECALCIFEROL) 400 UNITS TABS tablet, Take 400 Units by mouth every other day , Disp: , Rfl:     Omeprazole (PRILOSEC PO), Take 1 capsule by mouth daily as needed , Disp: , Rfl:     TTE 8/27/18  Left ventricle is normal in size Global left ventricular systolic function is normal   Estimated ejection fraction is 60 % .   Mild left ventricular hypertrophy. Grade II (moderate) left ventricular diastolic dysfunction. Left atrium is mildly dilated. Bicuspid aortic valve. Moderate eccentric jet of aortic insufficiency. Normal tricuspid valve leaflets. Trivial tricuspid regurgitation. Estimated right ventricular systolic pressure is 33 mmHg. No pulmonary hypertension. Aortic root is mildly dilated at 4.4 cm. CTA 9/19/2018  *Aneurysmal dilatation of the aortic root measuring 4.7 cm.  Normal caliber   remaining thoracic aorta.  No dissection or rupture. *Questionable enhancing mass involving the left kidney measuring 2.5 x 2.4   cm.  Renal cell carcinoma cannot be excluded and complete evaluation with CT   or MRI utilizing renal mass protocol is recommended. *Cholelithiasis. *Cardiomegaly. Assessment and Plan:    -Lightheadedness due to taking extra BP pill- resolved  -Fatigue and some Dyspnea on exertion- concern if due to worsening AI. Plan for updated 2d Echo now, if AI worsened will need LIZA/LHC. -Moderate aortic regurgitation with bicuspid valve. See above.   -Lower ext swelling- improved on lasix daily. -Ascending aortic aneurysm 4.7 cm CTA chest 9/2018. Will update CTA chest. Then possibly refer to CT surgery if expanding. -HTN- failry well controlled at this time. Continue current therapy.   -Obesity - encouraged diet, exercise, and discussed weight loss extensively. -Hyperlipidemia- continue statin. -RTC 2-3 months. Thank you for allowing me to participate in the care of this patient, please do not hesitate to call if you have any questions. Marco Swenson DO, [address] [facility]  [facility]. [facility]  [phone]

## 2019-07-14 ENCOUNTER — OFFICE VISIT (OUTPATIENT)
Dept: PRIMARY CARE CLINIC | Age: 64
End: 2019-07-14
Payer: COMMERCIAL

## 2019-07-14 VITALS
WEIGHT: 233.2 LBS | BODY MASS INDEX: 33.46 KG/M2 | HEART RATE: 74 BPM | OXYGEN SATURATION: 98 % | TEMPERATURE: 98 F | DIASTOLIC BLOOD PRESSURE: 74 MMHG | SYSTOLIC BLOOD PRESSURE: 122 MMHG

## 2019-07-14 DIAGNOSIS — H10.31 ACUTE BACTERIAL CONJUNCTIVITIS OF RIGHT EYE: Primary | ICD-10-CM

## 2019-07-14 PROCEDURE — 99213 OFFICE O/P EST LOW 20 MIN: CPT | Performed by: PHYSICIAN ASSISTANT

## 2019-07-14 PROCEDURE — 1036F TOBACCO NON-USER: CPT | Performed by: PHYSICIAN ASSISTANT

## 2019-07-14 PROCEDURE — G8417 CALC BMI ABV UP PARAM F/U: HCPCS | Performed by: PHYSICIAN ASSISTANT

## 2019-07-14 PROCEDURE — 3017F COLORECTAL CA SCREEN DOC REV: CPT | Performed by: PHYSICIAN ASSISTANT

## 2019-07-14 PROCEDURE — G8427 DOCREV CUR MEDS BY ELIG CLIN: HCPCS | Performed by: PHYSICIAN ASSISTANT

## 2019-07-14 RX ORDER — MOXIFLOXACIN 5 MG/ML
1 SOLUTION/ DROPS OPHTHALMIC 3 TIMES DAILY
Qty: 3 ML | Refills: 0 | Status: SHIPPED | OUTPATIENT
Start: 2019-07-14 | End: 2021-05-13 | Stop reason: SDUPTHER

## 2019-07-14 ASSESSMENT — ENCOUNTER SYMPTOMS
EYE DISCHARGE: 1
EYE REDNESS: 1
PHOTOPHOBIA: 0
RESPIRATORY NEGATIVE: 1
EYE ITCHING: 1
EYE PAIN: 0

## 2019-07-15 ASSESSMENT — ENCOUNTER SYMPTOMS
SORE THROAT: 0
TROUBLE SWALLOWING: 0
DIARRHEA: 0
NAUSEA: 0
VOMITING: 0

## 2019-08-19 ENCOUNTER — HOSPITAL ENCOUNTER (OUTPATIENT)
Dept: NON INVASIVE DIAGNOSTICS | Age: 64
Discharge: HOME | End: 2019-08-19
Payer: COMMERCIAL

## 2019-08-19 DIAGNOSIS — I77.810 AORTIC ROOT DILATION (HCC): ICD-10-CM

## 2019-08-19 DIAGNOSIS — Q23.1 BICUSPID AORTIC VALVE: ICD-10-CM

## 2019-08-19 DIAGNOSIS — I10 ESSENTIAL HYPERTENSION: ICD-10-CM

## 2019-08-19 LAB
LV EF: 55 %
LVEF MODALITY: NORMAL

## 2019-08-19 PROCEDURE — 93306 TTE W/DOPPLER COMPLETE: CPT

## 2019-08-26 ENCOUNTER — OFFICE VISIT (OUTPATIENT)
Dept: CARDIOLOGY | Age: 64
End: 2019-08-26
Payer: COMMERCIAL

## 2019-08-26 VITALS
DIASTOLIC BLOOD PRESSURE: 70 MMHG | OXYGEN SATURATION: 98 % | SYSTOLIC BLOOD PRESSURE: 140 MMHG | HEART RATE: 82 BPM | BODY MASS INDEX: 32.81 KG/M2 | HEIGHT: 70 IN | WEIGHT: 229.2 LBS

## 2019-08-26 DIAGNOSIS — I71.21 ASCENDING AORTIC ANEURYSM: ICD-10-CM

## 2019-08-26 DIAGNOSIS — R00.2 PALPITATIONS: ICD-10-CM

## 2019-08-26 DIAGNOSIS — I35.1 MODERATE AORTIC REGURGITATION: ICD-10-CM

## 2019-08-26 DIAGNOSIS — I10 ESSENTIAL HYPERTENSION: Primary | ICD-10-CM

## 2019-08-26 DIAGNOSIS — E66.09 OBESITY DUE TO EXCESS CALORIES WITH SERIOUS COMORBIDITY IN PEDIATRIC PATIENT, UNSPECIFIED BMI: ICD-10-CM

## 2019-08-26 DIAGNOSIS — I77.810 DILATED AORTIC ROOT (HCC): ICD-10-CM

## 2019-08-26 DIAGNOSIS — E78.2 MIXED HYPERLIPIDEMIA: ICD-10-CM

## 2019-08-26 DIAGNOSIS — R06.02 SHORTNESS OF BREATH: ICD-10-CM

## 2019-08-26 PROCEDURE — 3017F COLORECTAL CA SCREEN DOC REV: CPT | Performed by: INTERNAL MEDICINE

## 2019-08-26 PROCEDURE — 99214 OFFICE O/P EST MOD 30 MIN: CPT | Performed by: INTERNAL MEDICINE

## 2019-08-26 PROCEDURE — G8427 DOCREV CUR MEDS BY ELIG CLIN: HCPCS | Performed by: INTERNAL MEDICINE

## 2019-08-26 PROCEDURE — 1036F TOBACCO NON-USER: CPT | Performed by: INTERNAL MEDICINE

## 2019-08-26 PROCEDURE — 93000 ELECTROCARDIOGRAM COMPLETE: CPT | Performed by: INTERNAL MEDICINE

## 2019-08-26 PROCEDURE — G8417 CALC BMI ABV UP PARAM F/U: HCPCS | Performed by: INTERNAL MEDICINE

## 2019-08-26 NOTE — PROGRESS NOTES
*Cholelithiasis. *Cardiomegaly. Echo 8/19  Normal left ventricular diameter. Mild left ventricular hypertrophy. Left ventricular systolic function is normal.  Left ventricular ejection fraction 55 %. Left atrium is mildly dilated. Bicuspid aortic valve (fusion of Right and Left Coronary Cusps). Moderate aortic insufficiency, with highly eccentric jet, that maybe be  underestimating the degree of AI. Aortic root is mildly dilated at 4.5 m. Assessment and Plan:    -Lightheadedness due to taking extra BP pill- resolved  -Fatigue and some Dyspnea on exertion- Resolved. Wants to lose weight. Mod AI same.  -Moderate aortic regurgitation with bicuspid valve. See above.   -Lower ext swelling- improved on lasix daily. -Ascending aortic aneurysm 4.7 cm CTA chest 9/2018. Awaiting repeat. Then possibly refer to CT surgery if expanding. Renal CA- Seeing Urology- had renal CA removed. -HTN- failry well controlled at this time. Continue current therapy.   -Obesity - encouraged diet, exercise, and discussed weight loss extensively. -Hyperlipidemia- continue statin. Dyspnea from deconditioning      Thank you for allowing me to participate in the care of this patient, please do not hesitate to call if you have any questions. Keith Toribio, [id number] [facility]  [facility] [place]  [phone]

## 2019-09-30 ENCOUNTER — OFFICE VISIT (OUTPATIENT)
Dept: FAMILY MEDICINE CLINIC | Age: 64
End: 2019-09-30
Payer: COMMERCIAL

## 2019-09-30 ENCOUNTER — TELEPHONE (OUTPATIENT)
Dept: CARDIOLOGY | Age: 64
End: 2019-09-30

## 2019-09-30 VITALS
HEART RATE: 71 BPM | WEIGHT: 231.1 LBS | HEIGHT: 70 IN | OXYGEN SATURATION: 98 % | SYSTOLIC BLOOD PRESSURE: 128 MMHG | DIASTOLIC BLOOD PRESSURE: 70 MMHG | BODY MASS INDEX: 33.09 KG/M2

## 2019-09-30 DIAGNOSIS — R20.0 NUMBNESS AND TINGLING IN LEFT ARM: ICD-10-CM

## 2019-09-30 DIAGNOSIS — R42 LIGHTHEADEDNESS: ICD-10-CM

## 2019-09-30 DIAGNOSIS — Q23.1 BICUSPID AORTIC VALVE: ICD-10-CM

## 2019-09-30 DIAGNOSIS — R53.83 FATIGUE, UNSPECIFIED TYPE: ICD-10-CM

## 2019-09-30 DIAGNOSIS — Z13.220 SCREENING FOR LIPID DISORDERS: ICD-10-CM

## 2019-09-30 DIAGNOSIS — I10 ESSENTIAL HYPERTENSION: ICD-10-CM

## 2019-09-30 DIAGNOSIS — Z23 NEED FOR VACCINATION: ICD-10-CM

## 2019-09-30 DIAGNOSIS — R20.2 NUMBNESS AND TINGLING IN LEFT ARM: ICD-10-CM

## 2019-09-30 DIAGNOSIS — C64.9 CARCINOMA OF KIDNEY, UNSPECIFIED LATERALITY (HCC): ICD-10-CM

## 2019-09-30 DIAGNOSIS — E11.9 TYPE 2 DIABETES MELLITUS WITHOUT COMPLICATION, WITHOUT LONG-TERM CURRENT USE OF INSULIN (HCC): Primary | ICD-10-CM

## 2019-09-30 PROCEDURE — G8427 DOCREV CUR MEDS BY ELIG CLIN: HCPCS | Performed by: PHYSICIAN ASSISTANT

## 2019-09-30 PROCEDURE — 1036F TOBACCO NON-USER: CPT | Performed by: PHYSICIAN ASSISTANT

## 2019-09-30 PROCEDURE — 3044F HG A1C LEVEL LT 7.0%: CPT | Performed by: PHYSICIAN ASSISTANT

## 2019-09-30 PROCEDURE — 2022F DILAT RTA XM EVC RTNOPTHY: CPT | Performed by: PHYSICIAN ASSISTANT

## 2019-09-30 PROCEDURE — 99214 OFFICE O/P EST MOD 30 MIN: CPT | Performed by: PHYSICIAN ASSISTANT

## 2019-09-30 PROCEDURE — 3017F COLORECTAL CA SCREEN DOC REV: CPT | Performed by: PHYSICIAN ASSISTANT

## 2019-09-30 PROCEDURE — G8417 CALC BMI ABV UP PARAM F/U: HCPCS | Performed by: PHYSICIAN ASSISTANT

## 2019-09-30 ASSESSMENT — ENCOUNTER SYMPTOMS: TROUBLE SWALLOWING: 1

## 2019-10-02 ENCOUNTER — HOSPITAL ENCOUNTER (OUTPATIENT)
Dept: NEUROLOGY | Age: 64
Discharge: HOME | End: 2019-10-02
Payer: COMMERCIAL

## 2019-10-02 DIAGNOSIS — R20.2 NUMBNESS AND TINGLING IN LEFT ARM: ICD-10-CM

## 2019-10-02 DIAGNOSIS — R20.0 NUMBNESS AND TINGLING IN LEFT ARM: ICD-10-CM

## 2019-10-02 PROCEDURE — 95910 NRV CNDJ TEST 7-8 STUDIES: CPT

## 2019-10-02 PROCEDURE — 95886 MUSC TEST DONE W/N TEST COMP: CPT

## 2019-10-03 ENCOUNTER — OFFICE VISIT (OUTPATIENT)
Dept: PODIATRY | Age: 64
End: 2019-10-03
Payer: COMMERCIAL

## 2019-10-03 VITALS
BODY MASS INDEX: 33.07 KG/M2 | WEIGHT: 231 LBS | SYSTOLIC BLOOD PRESSURE: 128 MMHG | HEIGHT: 70 IN | HEART RATE: 80 BPM | DIASTOLIC BLOOD PRESSURE: 74 MMHG

## 2019-10-03 DIAGNOSIS — M79.671 RIGHT FOOT PAIN: ICD-10-CM

## 2019-10-03 DIAGNOSIS — M21.171 VARUS FOOT DEFORMITY, ACQUIRED, RIGHT: ICD-10-CM

## 2019-10-03 DIAGNOSIS — M76.71 PERONEUS BREVIS TENDINITIS, RIGHT: Primary | ICD-10-CM

## 2019-10-03 PROCEDURE — 99213 OFFICE O/P EST LOW 20 MIN: CPT | Performed by: PODIATRIST

## 2019-10-03 PROCEDURE — 3017F COLORECTAL CA SCREEN DOC REV: CPT | Performed by: PODIATRIST

## 2019-10-03 PROCEDURE — 1036F TOBACCO NON-USER: CPT | Performed by: PODIATRIST

## 2019-10-03 PROCEDURE — G8417 CALC BMI ABV UP PARAM F/U: HCPCS | Performed by: PODIATRIST

## 2019-10-03 PROCEDURE — G8484 FLU IMMUNIZE NO ADMIN: HCPCS | Performed by: PODIATRIST

## 2019-10-03 PROCEDURE — L3010 FOOT LONGITUDINAL ARCH SUPPO: HCPCS | Performed by: PODIATRIST

## 2019-10-03 PROCEDURE — G8428 CUR MEDS NOT DOCUMENT: HCPCS | Performed by: PODIATRIST

## 2019-10-03 RX ORDER — METHYLPREDNISOLONE 4 MG/1
TABLET ORAL
Qty: 1 KIT | Refills: 0 | Status: SHIPPED | OUTPATIENT
Start: 2019-10-03 | End: 2019-11-04 | Stop reason: ALTCHOICE

## 2019-10-04 DIAGNOSIS — G56.03 BILATERAL CARPAL TUNNEL SYNDROME: Primary | ICD-10-CM

## 2019-10-11 ENCOUNTER — HOSPITAL ENCOUNTER (OUTPATIENT)
Age: 64
Discharge: HOME | End: 2019-10-11
Payer: COMMERCIAL

## 2019-10-11 ENCOUNTER — HOSPITAL ENCOUNTER (OUTPATIENT)
Dept: CT IMAGING | Age: 64
Discharge: HOME | End: 2019-10-13
Payer: COMMERCIAL

## 2019-10-11 DIAGNOSIS — I77.810 AORTIC ROOT DILATION (HCC): ICD-10-CM

## 2019-10-11 DIAGNOSIS — I10 ESSENTIAL HYPERTENSION: ICD-10-CM

## 2019-10-11 DIAGNOSIS — Q23.1 BICUSPID AORTIC VALVE: ICD-10-CM

## 2019-10-11 DIAGNOSIS — C64.2 RENAL CELL CARCINOMA OF LEFT KIDNEY (HCC): ICD-10-CM

## 2019-10-11 DIAGNOSIS — I10 HYPERTENSION, UNSPECIFIED TYPE: Primary | ICD-10-CM

## 2019-10-11 LAB
CREAT SERPL-MCNC: 0.69 MG/DL (ref 0.7–1.2)
GFR AFRICAN AMERICAN: >60 ML/MIN
GFR NON-AFRICAN AMERICAN: >60 ML/MIN
GFR SERPL CREATININE-BSD FRML MDRD: ABNORMAL ML/MIN/{1.73_M2}
GFR SERPL CREATININE-BSD FRML MDRD: ABNORMAL ML/MIN/{1.73_M2}

## 2019-10-11 PROCEDURE — 71275 CT ANGIOGRAPHY CHEST: CPT

## 2019-10-11 PROCEDURE — 36415 COLL VENOUS BLD VENIPUNCTURE: CPT

## 2019-10-11 PROCEDURE — 82565 ASSAY OF CREATININE: CPT

## 2019-10-11 PROCEDURE — 6360000004 HC RX CONTRAST MEDICATION: Performed by: PHYSICIAN ASSISTANT

## 2019-10-11 PROCEDURE — 74177 CT ABD & PELVIS W/CONTRAST: CPT

## 2019-10-11 RX ADMIN — IOVERSOL 100 ML: 741 INJECTION INTRA-ARTERIAL; INTRAVENOUS at 14:08

## 2019-10-11 RX ADMIN — IOHEXOL 50 ML: 240 INJECTION, SOLUTION INTRATHECAL; INTRAVASCULAR; INTRAVENOUS; ORAL at 14:07

## 2019-10-14 ENCOUNTER — OFFICE VISIT (OUTPATIENT)
Dept: NEUROLOGY | Age: 64
End: 2019-10-14
Payer: COMMERCIAL

## 2019-10-14 VITALS
RESPIRATION RATE: 20 BRPM | HEART RATE: 64 BPM | DIASTOLIC BLOOD PRESSURE: 66 MMHG | HEIGHT: 70 IN | SYSTOLIC BLOOD PRESSURE: 118 MMHG | WEIGHT: 234.8 LBS | BODY MASS INDEX: 33.61 KG/M2

## 2019-10-14 DIAGNOSIS — Q23.1 AORTIC REGURGITATION DUE TO BICUSPID AORTIC VALVE: ICD-10-CM

## 2019-10-14 DIAGNOSIS — E11.9 TYPE 2 DIABETES MELLITUS WITHOUT COMPLICATION, WITHOUT LONG-TERM CURRENT USE OF INSULIN (HCC): ICD-10-CM

## 2019-10-14 DIAGNOSIS — R26.9 ABNORMALITY OF GAIT: ICD-10-CM

## 2019-10-14 DIAGNOSIS — G56.02 CARPAL TUNNEL SYNDROME OF LEFT WRIST: ICD-10-CM

## 2019-10-14 DIAGNOSIS — I10 ESSENTIAL HYPERTENSION: ICD-10-CM

## 2019-10-14 DIAGNOSIS — R73.09 ELEVATED HEMOGLOBIN A1C: ICD-10-CM

## 2019-10-14 DIAGNOSIS — I10 HYPERTENSION, UNSPECIFIED TYPE: ICD-10-CM

## 2019-10-14 DIAGNOSIS — E11.69 TYPE 2 DIABETES MELLITUS WITH OTHER SPECIFIED COMPLICATION, WITHOUT LONG-TERM CURRENT USE OF INSULIN (HCC): ICD-10-CM

## 2019-10-14 DIAGNOSIS — N28.89 RENAL MASS: ICD-10-CM

## 2019-10-14 DIAGNOSIS — I38 MURMUR, DIASTOLIC: ICD-10-CM

## 2019-10-14 DIAGNOSIS — G56.22 ULNAR NEUROPATHY OF LEFT UPPER EXTREMITY: Primary | ICD-10-CM

## 2019-10-14 PROCEDURE — G8417 CALC BMI ABV UP PARAM F/U: HCPCS | Performed by: PSYCHIATRY & NEUROLOGY

## 2019-10-14 PROCEDURE — G8427 DOCREV CUR MEDS BY ELIG CLIN: HCPCS | Performed by: PSYCHIATRY & NEUROLOGY

## 2019-10-14 PROCEDURE — G8484 FLU IMMUNIZE NO ADMIN: HCPCS | Performed by: PSYCHIATRY & NEUROLOGY

## 2019-10-14 PROCEDURE — 2022F DILAT RTA XM EVC RTNOPTHY: CPT | Performed by: PSYCHIATRY & NEUROLOGY

## 2019-10-14 PROCEDURE — L3908 WHO COCK-UP NONMOLDE PRE OTS: HCPCS | Performed by: PSYCHIATRY & NEUROLOGY

## 2019-10-14 PROCEDURE — 99244 OFF/OP CNSLTJ NEW/EST MOD 40: CPT | Performed by: PSYCHIATRY & NEUROLOGY

## 2019-10-14 ASSESSMENT — ENCOUNTER SYMPTOMS
VISUAL CHANGE: 0
EYE REDNESS: 0
COUGH: 0
BACK PAIN: 0
WHEEZING: 0
SORE THROAT: 0
ABDOMINAL DISTENTION: 0
PHOTOPHOBIA: 0
DIARRHEA: 0
VOMITING: 0
BLOOD IN STOOL: 0
CHOKING: 0
EYE DISCHARGE: 0
EYE ITCHING: 0
APNEA: 0
BOWEL INCONTINENCE: 0
EYE PAIN: 0
FACIAL SWELLING: 0
NAUSEA: 0
CHEST TIGHTNESS: 0
TROUBLE SWALLOWING: 0
ABDOMINAL PAIN: 0
SINUS PRESSURE: 0
CONSTIPATION: 0
COLOR CHANGE: 0
VOICE CHANGE: 0
SHORTNESS OF BREATH: 0

## 2019-10-15 DIAGNOSIS — R91.1 PULMONARY NODULE, RIGHT: Primary | ICD-10-CM

## 2019-10-21 ENCOUNTER — HOSPITAL ENCOUNTER (OUTPATIENT)
Dept: LAB | Age: 64
Discharge: HOME | End: 2019-10-21
Payer: COMMERCIAL

## 2019-10-21 ENCOUNTER — OFFICE VISIT (OUTPATIENT)
Dept: ONCOLOGY | Age: 64
End: 2019-10-21
Payer: COMMERCIAL

## 2019-10-21 VITALS
OXYGEN SATURATION: 97 % | RESPIRATION RATE: 16 BRPM | DIASTOLIC BLOOD PRESSURE: 64 MMHG | WEIGHT: 232.4 LBS | HEART RATE: 73 BPM | SYSTOLIC BLOOD PRESSURE: 120 MMHG | HEIGHT: 70 IN | BODY MASS INDEX: 33.27 KG/M2

## 2019-10-21 DIAGNOSIS — E11.59 TYPE 2 DIABETES MELLITUS WITH OTHER CIRCULATORY COMPLICATION, WITHOUT LONG-TERM CURRENT USE OF INSULIN (HCC): Primary | ICD-10-CM

## 2019-10-21 DIAGNOSIS — R91.1 LUNG NODULE: Primary | ICD-10-CM

## 2019-10-21 DIAGNOSIS — C64.9 RENAL CELL CARCINOMA, UNSPECIFIED LATERALITY (HCC): ICD-10-CM

## 2019-10-21 DIAGNOSIS — I10 ESSENTIAL HYPERTENSION: ICD-10-CM

## 2019-10-21 DIAGNOSIS — E11.9 TYPE 2 DIABETES MELLITUS WITHOUT COMPLICATION, WITHOUT LONG-TERM CURRENT USE OF INSULIN (HCC): ICD-10-CM

## 2019-10-21 DIAGNOSIS — D69.6 THROMBOCYTOPENIA (HCC): ICD-10-CM

## 2019-10-21 DIAGNOSIS — C64.2 RENAL CELL CARCINOMA OF LEFT KIDNEY (HCC): ICD-10-CM

## 2019-10-21 DIAGNOSIS — Z13.9 SCREENING FOR CONDITION: ICD-10-CM

## 2019-10-21 DIAGNOSIS — R91.1 LUNG NODULE: ICD-10-CM

## 2019-10-21 DIAGNOSIS — R53.83 FATIGUE, UNSPECIFIED TYPE: ICD-10-CM

## 2019-10-21 LAB
ABSOLUTE EOS #: 0.1 K/UL (ref 0–0.4)
ABSOLUTE IMMATURE GRANULOCYTE: NORMAL K/UL (ref 0–0.3)
ABSOLUTE LYMPH #: 1.5 K/UL (ref 1–4.8)
ABSOLUTE MONO #: 0.6 K/UL (ref 0.1–1.2)
ALBUMIN SERPL-MCNC: 4.8 G/DL (ref 3.5–5.2)
ALBUMIN/GLOBULIN RATIO: 1.5 (ref 1–2.5)
ALP BLD-CCNC: 37 U/L (ref 40–129)
ALT SERPL-CCNC: 19 U/L (ref 5–41)
ANION GAP SERPL CALCULATED.3IONS-SCNC: 10 MMOL/L (ref 9–17)
AST SERPL-CCNC: 17 U/L
BASOPHILS # BLD: 1 % (ref 0–2)
BASOPHILS ABSOLUTE: 0 K/UL (ref 0–0.2)
BILIRUB SERPL-MCNC: 0.62 MG/DL (ref 0.3–1.2)
BUN BLDV-MCNC: 24 MG/DL (ref 8–23)
BUN/CREAT BLD: 32 (ref 9–20)
CALCIUM SERPL-MCNC: 9.9 MG/DL (ref 8.6–10.4)
CHLORIDE BLD-SCNC: 101 MMOL/L (ref 98–107)
CHOLESTEROL/HDL RATIO: 3.9
CHOLESTEROL: 151 MG/DL
CO2: 29 MMOL/L (ref 20–31)
CREAT SERPL-MCNC: 0.74 MG/DL (ref 0.7–1.2)
CREATININE URINE: 37.7 MG/DL (ref 39–259)
DIFFERENTIAL TYPE: NORMAL
EOSINOPHILS RELATIVE PERCENT: 2 % (ref 1–8)
ESTIMATED AVERAGE GLUCOSE: 148 MG/DL
GFR AFRICAN AMERICAN: >60 ML/MIN
GFR NON-AFRICAN AMERICAN: >60 ML/MIN
GFR SERPL CREATININE-BSD FRML MDRD: ABNORMAL ML/MIN/{1.73_M2}
GLUCOSE BLD-MCNC: 175 MG/DL (ref 70–99)
HBA1C MFR BLD: 6.8 % (ref 4.8–5.9)
HCT VFR BLD CALC: 42.2 % (ref 41–53)
HDLC SERPL-MCNC: 39 MG/DL
HEMOGLOBIN: 14.5 G/DL (ref 13.5–17.5)
IMMATURE GRANULOCYTES: NORMAL %
LDL CHOLESTEROL: 80 MG/DL (ref 0–130)
LYMPHOCYTES # BLD: 27 % (ref 15–43)
MCH RBC QN AUTO: 32.2 PG (ref 26–34)
MCHC RBC AUTO-ENTMCNC: 34.4 G/DL (ref 31–37)
MCV RBC AUTO: 93.5 FL (ref 80–100)
MICROALBUMIN/CREAT 24H UR: <12 MG/L
MICROALBUMIN/CREAT UR-RTO: ABNORMAL MCG/MG CREAT
MONOCYTES # BLD: 10 % (ref 6–14)
NRBC AUTOMATED: NORMAL PER 100 WBC
PDW BLD-RTO: 13.6 % (ref 11–14.5)
PLATELET # BLD: 141 K/UL (ref 140–450)
PLATELET ESTIMATE: NORMAL
PMV BLD AUTO: 8.8 FL (ref 6–12)
POTASSIUM SERPL-SCNC: 4.4 MMOL/L (ref 3.7–5.3)
RBC # BLD: 4.52 M/UL (ref 4.5–5.9)
RBC # BLD: NORMAL 10*6/UL
SEG NEUTROPHILS: 60 % (ref 44–74)
SEGMENTED NEUTROPHILS ABSOLUTE COUNT: 3.3 K/UL (ref 1.8–7.7)
SODIUM BLD-SCNC: 140 MMOL/L (ref 135–144)
TOTAL PROTEIN: 8.1 G/DL (ref 6.4–8.3)
TRIGL SERPL-MCNC: 159 MG/DL
TSH SERPL DL<=0.05 MIU/L-ACNC: 3.4 MIU/L (ref 0.3–5)
VITAMIN D 25-HYDROXY: 49.1 NG/ML (ref 30–100)
VLDLC SERPL CALC-MCNC: ABNORMAL MG/DL (ref 1–30)
WBC # BLD: 5.5 K/UL (ref 3.5–11)
WBC # BLD: NORMAL 10*3/UL

## 2019-10-21 PROCEDURE — 82043 UR ALBUMIN QUANTITATIVE: CPT

## 2019-10-21 PROCEDURE — 83036 HEMOGLOBIN GLYCOSYLATED A1C: CPT

## 2019-10-21 PROCEDURE — 80053 COMPREHEN METABOLIC PANEL: CPT

## 2019-10-21 PROCEDURE — 99214 OFFICE O/P EST MOD 30 MIN: CPT | Performed by: INTERNAL MEDICINE

## 2019-10-21 PROCEDURE — 80061 LIPID PANEL: CPT

## 2019-10-21 PROCEDURE — 82306 VITAMIN D 25 HYDROXY: CPT

## 2019-10-21 PROCEDURE — 3017F COLORECTAL CA SCREEN DOC REV: CPT | Performed by: INTERNAL MEDICINE

## 2019-10-21 PROCEDURE — 85025 COMPLETE CBC W/AUTO DIFF WBC: CPT

## 2019-10-21 PROCEDURE — 84403 ASSAY OF TOTAL TESTOSTERONE: CPT

## 2019-10-21 PROCEDURE — 82570 ASSAY OF URINE CREATININE: CPT

## 2019-10-21 PROCEDURE — G8427 DOCREV CUR MEDS BY ELIG CLIN: HCPCS | Performed by: INTERNAL MEDICINE

## 2019-10-21 PROCEDURE — 84443 ASSAY THYROID STIM HORMONE: CPT

## 2019-10-21 PROCEDURE — 1036F TOBACCO NON-USER: CPT | Performed by: INTERNAL MEDICINE

## 2019-10-21 PROCEDURE — G8484 FLU IMMUNIZE NO ADMIN: HCPCS | Performed by: INTERNAL MEDICINE

## 2019-10-21 PROCEDURE — 36415 COLL VENOUS BLD VENIPUNCTURE: CPT

## 2019-10-21 PROCEDURE — G8417 CALC BMI ABV UP PARAM F/U: HCPCS | Performed by: INTERNAL MEDICINE

## 2019-10-21 PROCEDURE — 84270 ASSAY OF SEX HORMONE GLOBUL: CPT

## 2019-10-21 RX ORDER — PIOGLITAZONEHYDROCHLORIDE 15 MG/1
15 TABLET ORAL DAILY
Qty: 30 TABLET | Refills: 5 | Status: SHIPPED | OUTPATIENT
Start: 2019-10-21 | End: 2020-05-10 | Stop reason: SDUPTHER

## 2019-10-22 DIAGNOSIS — E11.59 TYPE 2 DIABETES MELLITUS WITH OTHER CIRCULATORY COMPLICATION, WITHOUT LONG-TERM CURRENT USE OF INSULIN (HCC): Primary | ICD-10-CM

## 2019-10-22 LAB
SEX HORMONE BINDING GLOBULIN: 39 NMOL/L (ref 11–80)
TESTOSTERONE FREE-NONMALE: 81.5 PG/ML (ref 47–244)
TESTOSTERONE TOTAL: 439 NG/DL (ref 220–1000)
TESTOSTERONE, BIOAVAILABLE: 191.1 NG/DL (ref 130–680)

## 2019-10-23 ENCOUNTER — OFFICE VISIT (OUTPATIENT)
Dept: UROLOGY | Age: 64
End: 2019-10-23
Payer: COMMERCIAL

## 2019-10-23 VITALS
HEART RATE: 72 BPM | HEIGHT: 70 IN | DIASTOLIC BLOOD PRESSURE: 80 MMHG | SYSTOLIC BLOOD PRESSURE: 132 MMHG | BODY MASS INDEX: 33.64 KG/M2 | WEIGHT: 235 LBS

## 2019-10-23 DIAGNOSIS — N28.89 RENAL MASS: Primary | ICD-10-CM

## 2019-10-23 DIAGNOSIS — N52.9 ERECTILE DYSFUNCTION, UNSPECIFIED ERECTILE DYSFUNCTION TYPE: ICD-10-CM

## 2019-10-23 DIAGNOSIS — R91.1 PULMONARY NODULE: ICD-10-CM

## 2019-10-23 DIAGNOSIS — C64.2 RENAL CELL CARCINOMA OF LEFT KIDNEY (HCC): ICD-10-CM

## 2019-10-23 PROCEDURE — G8417 CALC BMI ABV UP PARAM F/U: HCPCS | Performed by: UROLOGY

## 2019-10-23 PROCEDURE — G8484 FLU IMMUNIZE NO ADMIN: HCPCS | Performed by: UROLOGY

## 2019-10-23 PROCEDURE — 1036F TOBACCO NON-USER: CPT | Performed by: UROLOGY

## 2019-10-23 PROCEDURE — 99213 OFFICE O/P EST LOW 20 MIN: CPT | Performed by: UROLOGY

## 2019-10-23 PROCEDURE — G8427 DOCREV CUR MEDS BY ELIG CLIN: HCPCS | Performed by: UROLOGY

## 2019-10-23 PROCEDURE — 3017F COLORECTAL CA SCREEN DOC REV: CPT | Performed by: UROLOGY

## 2019-10-27 DIAGNOSIS — M79.671 RIGHT FOOT PAIN: ICD-10-CM

## 2019-10-28 RX ORDER — MELOXICAM 7.5 MG/1
TABLET ORAL
Qty: 180 TABLET | Refills: 0 | Status: SHIPPED | OUTPATIENT
Start: 2019-10-28 | End: 2020-05-17 | Stop reason: SDUPTHER

## 2019-11-04 ENCOUNTER — OFFICE VISIT (OUTPATIENT)
Dept: FAMILY MEDICINE CLINIC | Age: 64
End: 2019-11-04
Payer: COMMERCIAL

## 2019-11-04 VITALS
HEIGHT: 70 IN | RESPIRATION RATE: 16 BRPM | WEIGHT: 234 LBS | OXYGEN SATURATION: 97 % | DIASTOLIC BLOOD PRESSURE: 72 MMHG | SYSTOLIC BLOOD PRESSURE: 128 MMHG | HEART RATE: 68 BPM | BODY MASS INDEX: 33.5 KG/M2

## 2019-11-04 DIAGNOSIS — N52.9 ERECTILE DYSFUNCTION, UNSPECIFIED ERECTILE DYSFUNCTION TYPE: Primary | ICD-10-CM

## 2019-11-04 DIAGNOSIS — Q23.1 AORTIC INSUFFICIENCY DUE TO BICUSPID AORTIC VALVE: ICD-10-CM

## 2019-11-04 DIAGNOSIS — Z23 NEED FOR VACCINATION: ICD-10-CM

## 2019-11-04 DIAGNOSIS — G56.02 CARPAL TUNNEL SYNDROME OF LEFT WRIST: ICD-10-CM

## 2019-11-04 DIAGNOSIS — R91.1 PULMONARY NODULE: ICD-10-CM

## 2019-11-04 DIAGNOSIS — C64.9 CARCINOMA OF KIDNEY, UNSPECIFIED LATERALITY (HCC): ICD-10-CM

## 2019-11-04 DIAGNOSIS — E11.59 TYPE 2 DIABETES MELLITUS WITH OTHER CIRCULATORY COMPLICATION, WITHOUT LONG-TERM CURRENT USE OF INSULIN (HCC): ICD-10-CM

## 2019-11-04 DIAGNOSIS — I10 ESSENTIAL HYPERTENSION: ICD-10-CM

## 2019-11-04 PROCEDURE — 1036F TOBACCO NON-USER: CPT | Performed by: PHYSICIAN ASSISTANT

## 2019-11-04 PROCEDURE — 3017F COLORECTAL CA SCREEN DOC REV: CPT | Performed by: PHYSICIAN ASSISTANT

## 2019-11-04 PROCEDURE — G8427 DOCREV CUR MEDS BY ELIG CLIN: HCPCS | Performed by: PHYSICIAN ASSISTANT

## 2019-11-04 PROCEDURE — 3044F HG A1C LEVEL LT 7.0%: CPT | Performed by: PHYSICIAN ASSISTANT

## 2019-11-04 PROCEDURE — G8482 FLU IMMUNIZE ORDER/ADMIN: HCPCS | Performed by: PHYSICIAN ASSISTANT

## 2019-11-04 PROCEDURE — 2022F DILAT RTA XM EVC RTNOPTHY: CPT | Performed by: PHYSICIAN ASSISTANT

## 2019-11-04 PROCEDURE — 90471 IMMUNIZATION ADMIN: CPT | Performed by: PHYSICIAN ASSISTANT

## 2019-11-04 PROCEDURE — 90686 IIV4 VACC NO PRSV 0.5 ML IM: CPT | Performed by: PHYSICIAN ASSISTANT

## 2019-11-04 PROCEDURE — 99214 OFFICE O/P EST MOD 30 MIN: CPT | Performed by: PHYSICIAN ASSISTANT

## 2019-11-04 PROCEDURE — G8417 CALC BMI ABV UP PARAM F/U: HCPCS | Performed by: PHYSICIAN ASSISTANT

## 2019-11-04 RX ORDER — SILDENAFIL CITRATE 20 MG/1
20 TABLET ORAL DAILY PRN
Qty: 25 TABLET | Refills: 5 | Status: SHIPPED | OUTPATIENT
Start: 2019-11-04 | End: 2020-11-09

## 2019-11-04 ASSESSMENT — ENCOUNTER SYMPTOMS
DIARRHEA: 0
VOMITING: 0
NAUSEA: 0

## 2019-11-05 ENCOUNTER — OFFICE VISIT (OUTPATIENT)
Dept: PODIATRY | Age: 64
End: 2019-11-05

## 2019-11-05 VITALS
WEIGHT: 233.8 LBS | SYSTOLIC BLOOD PRESSURE: 122 MMHG | RESPIRATION RATE: 20 BRPM | HEART RATE: 64 BPM | BODY MASS INDEX: 33.47 KG/M2 | HEIGHT: 70 IN | DIASTOLIC BLOOD PRESSURE: 68 MMHG

## 2019-11-05 DIAGNOSIS — M21.171 VARUS FOOT DEFORMITY, ACQUIRED, RIGHT: ICD-10-CM

## 2019-11-05 DIAGNOSIS — M76.71 PERONEUS BREVIS TENDINITIS, RIGHT: Primary | ICD-10-CM

## 2019-11-05 PROCEDURE — 99999 PR OFFICE/OUTPT VISIT,PROCEDURE ONLY: CPT | Performed by: PODIATRIST

## 2019-12-04 ENCOUNTER — PATIENT MESSAGE (OUTPATIENT)
Dept: FAMILY MEDICINE CLINIC | Age: 64
End: 2019-12-04

## 2020-01-14 RX ORDER — AMLODIPINE BESYLATE 10 MG/1
10 TABLET ORAL DAILY
Qty: 90 TABLET | Refills: 2 | Status: SHIPPED | OUTPATIENT
Start: 2020-01-14 | End: 2020-10-19 | Stop reason: SDUPTHER

## 2020-01-22 RX ORDER — CETIRIZINE HYDROCHLORIDE 10 MG/1
10 TABLET ORAL DAILY
Qty: 30 TABLET | Refills: 6 | Status: SHIPPED | OUTPATIENT
Start: 2020-01-22 | End: 2020-08-28 | Stop reason: SDUPTHER

## 2020-01-22 RX ORDER — CARVEDILOL 25 MG/1
25 TABLET ORAL 2 TIMES DAILY
Qty: 180 TABLET | Refills: 2 | Status: SHIPPED | OUTPATIENT
Start: 2020-01-22 | End: 2020-10-29 | Stop reason: SDUPTHER

## 2020-01-31 ENCOUNTER — HOSPITAL ENCOUNTER (OUTPATIENT)
Dept: LAB | Age: 65
Discharge: HOME | End: 2020-01-31
Payer: COMMERCIAL

## 2020-01-31 LAB
ALBUMIN SERPL-MCNC: 4.2 G/DL (ref 3.5–5.2)
ALBUMIN/GLOBULIN RATIO: 1.3 (ref 1–2.5)
ALP BLD-CCNC: 30 U/L (ref 40–129)
ALT SERPL-CCNC: 18 U/L (ref 5–41)
ANION GAP SERPL CALCULATED.3IONS-SCNC: 15 MMOL/L (ref 9–17)
AST SERPL-CCNC: 22 U/L
BILIRUB SERPL-MCNC: 0.71 MG/DL (ref 0.3–1.2)
BUN BLDV-MCNC: 26 MG/DL (ref 8–23)
BUN/CREAT BLD: 34 (ref 9–20)
CALCIUM SERPL-MCNC: 9.4 MG/DL (ref 8.6–10.4)
CHLORIDE BLD-SCNC: 98 MMOL/L (ref 98–107)
CO2: 24 MMOL/L (ref 20–31)
CREAT SERPL-MCNC: 0.76 MG/DL (ref 0.7–1.2)
ESTIMATED AVERAGE GLUCOSE: 154 MG/DL
GFR AFRICAN AMERICAN: >60 ML/MIN
GFR NON-AFRICAN AMERICAN: >60 ML/MIN
GFR SERPL CREATININE-BSD FRML MDRD: ABNORMAL ML/MIN/{1.73_M2}
GFR SERPL CREATININE-BSD FRML MDRD: ABNORMAL ML/MIN/{1.73_M2}
GLUCOSE BLD-MCNC: 151 MG/DL (ref 70–99)
HBA1C MFR BLD: 7 % (ref 4.8–5.9)
POTASSIUM SERPL-SCNC: 4 MMOL/L (ref 3.7–5.3)
SODIUM BLD-SCNC: 137 MMOL/L (ref 135–144)
TOTAL PROTEIN: 7.4 G/DL (ref 6.4–8.3)

## 2020-01-31 PROCEDURE — 83036 HEMOGLOBIN GLYCOSYLATED A1C: CPT

## 2020-01-31 PROCEDURE — 36415 COLL VENOUS BLD VENIPUNCTURE: CPT

## 2020-01-31 PROCEDURE — 80053 COMPREHEN METABOLIC PANEL: CPT

## 2020-02-05 ENCOUNTER — HOSPITAL ENCOUNTER (OUTPATIENT)
Dept: LAB | Age: 65
Discharge: HOME | End: 2020-02-05
Payer: COMMERCIAL

## 2020-02-05 ENCOUNTER — OFFICE VISIT (OUTPATIENT)
Dept: FAMILY MEDICINE CLINIC | Age: 65
End: 2020-02-05
Payer: COMMERCIAL

## 2020-02-05 VITALS
HEIGHT: 70 IN | WEIGHT: 238 LBS | DIASTOLIC BLOOD PRESSURE: 60 MMHG | SYSTOLIC BLOOD PRESSURE: 122 MMHG | HEART RATE: 64 BPM | OXYGEN SATURATION: 98 % | BODY MASS INDEX: 34.07 KG/M2 | RESPIRATION RATE: 16 BRPM

## 2020-02-05 LAB
C-REACTIVE PROTEIN: 0.9 MG/L (ref 0–5)
FOLATE: 12.1 NG/ML
RHEUMATOID FACTOR: <10 IU/ML
VITAMIN B-12: 710 PG/ML (ref 232–1245)

## 2020-02-05 PROCEDURE — G8482 FLU IMMUNIZE ORDER/ADMIN: HCPCS | Performed by: PHYSICIAN ASSISTANT

## 2020-02-05 PROCEDURE — 36415 COLL VENOUS BLD VENIPUNCTURE: CPT

## 2020-02-05 PROCEDURE — 2022F DILAT RTA XM EVC RTNOPTHY: CPT | Performed by: PHYSICIAN ASSISTANT

## 2020-02-05 PROCEDURE — G8427 DOCREV CUR MEDS BY ELIG CLIN: HCPCS | Performed by: PHYSICIAN ASSISTANT

## 2020-02-05 PROCEDURE — 82607 VITAMIN B-12: CPT

## 2020-02-05 PROCEDURE — G8417 CALC BMI ABV UP PARAM F/U: HCPCS | Performed by: PHYSICIAN ASSISTANT

## 2020-02-05 PROCEDURE — 86431 RHEUMATOID FACTOR QUANT: CPT

## 2020-02-05 PROCEDURE — 69210 REMOVE IMPACTED EAR WAX UNI: CPT | Performed by: PHYSICIAN ASSISTANT

## 2020-02-05 PROCEDURE — 82746 ASSAY OF FOLIC ACID SERUM: CPT

## 2020-02-05 PROCEDURE — 1036F TOBACCO NON-USER: CPT | Performed by: PHYSICIAN ASSISTANT

## 2020-02-05 PROCEDURE — 99214 OFFICE O/P EST MOD 30 MIN: CPT | Performed by: PHYSICIAN ASSISTANT

## 2020-02-05 PROCEDURE — 3051F HG A1C>EQUAL 7.0%<8.0%: CPT | Performed by: PHYSICIAN ASSISTANT

## 2020-02-05 PROCEDURE — 86140 C-REACTIVE PROTEIN: CPT

## 2020-02-05 PROCEDURE — 69209 REMOVE IMPACTED EAR WAX UNI: CPT | Performed by: PHYSICIAN ASSISTANT

## 2020-02-05 PROCEDURE — 86038 ANTINUCLEAR ANTIBODIES: CPT

## 2020-02-05 PROCEDURE — 3017F COLORECTAL CA SCREEN DOC REV: CPT | Performed by: PHYSICIAN ASSISTANT

## 2020-02-05 ASSESSMENT — PATIENT HEALTH QUESTIONNAIRE - PHQ9
SUM OF ALL RESPONSES TO PHQ QUESTIONS 1-9: 0
SUM OF ALL RESPONSES TO PHQ QUESTIONS 1-9: 0
SUM OF ALL RESPONSES TO PHQ9 QUESTIONS 1 & 2: 0
1. LITTLE INTEREST OR PLEASURE IN DOING THINGS: 0
2. FEELING DOWN, DEPRESSED OR HOPELESS: 0

## 2020-02-05 ASSESSMENT — ENCOUNTER SYMPTOMS
VOMITING: 0
RESPIRATORY NEGATIVE: 1
NAUSEA: 0
DIARRHEA: 0

## 2020-02-05 NOTE — PROGRESS NOTES
[facility]    Subjective:      Patient ID: Lenora Prieto is a 59 y.o. y.o. male. Patient is seen for follow up on diabetes and other health issues. He notes he is sleeping more lately wondering if this is nl. This has been the past few months. He is going to bed later and then will sleep till 9-10 am.  Admits is not a morning person. Since taking mobic once a day his left knee does not bother at all. If sits for a long period is more stiff to get up the past 6-12 months. Once up and walking is ok. Even sitting 2-3 minutes is stiff. No stretching. When active is fine. In mornings getting out of bed has kink pain in left posterior SI area upper gluteal area. Thinking about seeing chiropractor. Has an older mattress. Does not sleep on back anymore in general after injured his back years ago. Sleeps in side. Past Medical History:   Diagnosis Date    Anxiety     Aortic regurgitation     Bicuspid aortic valve     Diabetes mellitus ([address] since March 2018    on Rx.     GERD (gastroesophageal reflux disease)     Hypertension     Left renal mass 10/2018    Osteoarthritis of left knee     Wears glasses        Past Surgical History:   Procedure Laterality Date    CYST REMOVAL      tail bone    MOUTH SURGERY  2015    PARTIAL NEPHRECTOMY Left 11/30/2018    ROBOTIC PARTIAL NEPHRECTOMY,     OR COLONOSCOPY FLX DX W/COLLJ SPEC WHEN PFRMD N/A 5/14/2018    normal colon, Dr. Felicia Layne Left 11/30/2018    XI ROBOTIC PARTIAL NEPHRECTOMY, INTRA-OP LAP ULTRASOUND performed by Rex Temple MD at [address] [place] [address] Drive EXTRACTION         Family History   Problem Relation Age of Onset    High Blood Pressure Mother     Diabetes Mother         impaired fasting glucose    Other Mother         short term memory loss after MVA    Glaucoma Mother     Stroke Father 66    High Blood Pressure Father     Diabetes Father needed        No current facility-administered medications for this visit. Review of Systems   Constitutional: Negative for appetite change, chills, fatigue and fever. Weight is up was bad at [facility]. Pop consumption down. Drinks a lot of water. HENT: Positive for congestion and postnasal drip. Wants ears checked today has wax. Wonders if needs irrigated. Hearing is ok has ringing. Respiratory: Negative. Cardiovascular: Negative for chest pain and palpitations. Gastrointestinal: Negative for diarrhea, nausea and vomiting. Genitourinary: Negative. Musculoskeletal: Positive for arthralgias. Negative for myalgias. Does not notice stiffness in am getting out of bed. It is worse after sitting. No stretching is done. Skin: Negative for rash. Neurological: Negative for dizziness, light-headedness, numbness and headaches. Energy is better since taking allergy medication at night and no dizzy spells either. Since saw neurology had emg has not had any numbness in the left arm. He recommended vitamins wants to discuss. Psychiatric/Behavioral: Negative for sleep disturbance. The patient is nervous/anxious. Is a worry wort he admits always had anxiety it is worse the past year. Little things really bother him and increases anxiety. Was treated in past and did not tolerate medications. Worrying about things he should not be. He is over thinking things too much. Admits not exercising and anxiety is worse in winter. Objective:      /60   Pulse 64   Resp 16   Ht 5' 10\" (1.778 m)   Wt 238 lb (108 kg)   SpO2 98%   BMI 34.15 kg/m²     Physical Exam  Vitals signs and nursing note reviewed. Constitutional:       General: He is not in acute distress. Appearance: Normal appearance. He is well-developed. He is not ill-appearing. HENT:      Head: Normocephalic and atraumatic.       Right Ear: External ear normal. There is impacted cerumen. Left Ear: External ear normal. There is impacted cerumen. Nose: Nose normal.      Mouth/Throat:      Mouth: Mucous membranes are moist.      Pharynx: No oropharyngeal exudate or posterior oropharyngeal erythema. Eyes:      General: No scleral icterus. Conjunctiva/sclera: Conjunctivae normal.   Neck:      Musculoskeletal: Normal range of motion and neck supple. No neck rigidity or muscular tenderness. Thyroid: No thyroid mass, thyromegaly or thyroid tenderness. Cardiovascular:      Rate and Rhythm: Normal rate and regular rhythm. Heart sounds: Murmur present. No friction rub. No gallop. Comments: 2/6 systolic murmur noted 2ICS and the LLSB to apex. Pulmonary:      Effort: Pulmonary effort is normal.      Breath sounds: Normal breath sounds. No stridor. No wheezing, rhonchi or rales. Abdominal:      General: Bowel sounds are normal. There is no distension. Palpations: Abdomen is soft. There is no mass. Tenderness: There is no abdominal tenderness. There is no guarding or rebound. Hernia: No hernia is present. Musculoskeletal:         General: No swelling. Lymphadenopathy:      Cervical: No cervical adenopathy. Skin:     General: Skin is warm and dry. Findings: No rash. Neurological:      General: No focal deficit present. Mental Status: He is alert and oriented to person, place, and time. Sensory: No sensory deficit. Gait: Gait normal.   Psychiatric:         Mood and Affect: Mood normal.         Behavior: Behavior normal.         Thought Content: Thought content normal.         Judgment: Judgment normal.       Hospital Outpatient Visit on 02/05/2020   Component Date Value Ref Range Status    Rheumatoid Factor 02/05/2020 <10  <14 IU/mL Final    CRP 02/05/2020 0.9  0.0 - 5.0 mg/L Final    ENE 02/05/2020 NEGATIVE  NEGATIVE Final    Comment: This test was run on the [employer] ENE test system.   The system provides ten test diabetes mellitus with other circulatory complication, without long-term current use of insulin (HCC)  stable    3. Generalized anxiety disorder  Increased    - Ese Bello, Greg, [facility] [place]    4. Arthralgia, unspecified joint  increased  - ENE Screen With Reflex; Future  - C-Reactive Protein; Future  - Rheumatoid Factor; Future    5. Stiffness due to immobility  Increased    - ENE Screen With Reflex; Future  - C-Reactive Protein; Future  - Rheumatoid Factor; Future    6. Cerumen debris on tympanic membrane of both ears    - 37389 - [place] REMOVE IMPACTED EAR WAX    7. Abnormal laboratory test    - Vitamin B12 & Folate; Future    8. Fatigue, unspecified type  increased  - Vitamin B12 & Folate; Future    9. Carcinoma of kidney, unspecified laterality ([address]  stable    10. Dilated aortic root (HCC)  stable    11. Thrombocytopenia ([address]  stable    Hold off on shingrix consider getting next year   tdap was 5-6 years ago in our ER  Does not want to see PT  Go to patient accounts discuss options with medicare etc  Diabetic foot next visit since he is wearing support hose today  Follow up one month sooner if problems  Answered his questions  Follow up with specialty as scheduled  Reviewed recent labs  Work on lifestyle changes  Discussed vitamins  Make sure to drink plenty of water  He will be notified of new labs  Stretching discussed  Heat ice prn  biofreeze  The ears were irrigated and cleared. He tolerated this very well. Coping mechanisms      Luz Sullivan received counseling on the following healthy behaviors: nutrition, exercise and medication adherence    Patient given educational materials on Diabetes, Hyperlipidemia, Nutrition, Exercise and Hypertension    I have instructed Luz Sullivan to complete a self tracking handout on Blood Sugars , Blood Pressures  and Weights and instructed them to bring it with them to his next appointment.      Discussed use, benefit, and side effects of

## 2020-02-06 ENCOUNTER — PATIENT MESSAGE (OUTPATIENT)
Dept: FAMILY MEDICINE CLINIC | Age: 65
End: 2020-02-06

## 2020-02-06 LAB — ANTI-NUCLEAR ANTIBODY (ANA): NEGATIVE

## 2020-02-12 PROBLEM — C64.9 CARCINOMA OF KIDNEY (HCC): Status: ACTIVE | Noted: 2020-02-12

## 2020-02-12 PROBLEM — D69.6 THROMBOCYTOPENIA (HCC): Status: ACTIVE | Noted: 2020-02-12

## 2020-02-12 PROBLEM — I77.810 DILATED AORTIC ROOT (HCC): Status: ACTIVE | Noted: 2020-02-12

## 2020-03-02 ENCOUNTER — OFFICE VISIT (OUTPATIENT)
Dept: CARDIOLOGY | Age: 65
End: 2020-03-02
Payer: COMMERCIAL

## 2020-03-02 VITALS
BODY MASS INDEX: 34.07 KG/M2 | WEIGHT: 238 LBS | SYSTOLIC BLOOD PRESSURE: 144 MMHG | DIASTOLIC BLOOD PRESSURE: 70 MMHG | HEIGHT: 70 IN | HEART RATE: 66 BPM

## 2020-03-02 PROCEDURE — 1036F TOBACCO NON-USER: CPT | Performed by: INTERNAL MEDICINE

## 2020-03-02 PROCEDURE — G8427 DOCREV CUR MEDS BY ELIG CLIN: HCPCS | Performed by: INTERNAL MEDICINE

## 2020-03-02 PROCEDURE — 3017F COLORECTAL CA SCREEN DOC REV: CPT | Performed by: INTERNAL MEDICINE

## 2020-03-02 PROCEDURE — 99215 OFFICE O/P EST HI 40 MIN: CPT

## 2020-03-02 PROCEDURE — 93005 ELECTROCARDIOGRAM TRACING: CPT | Performed by: INTERNAL MEDICINE

## 2020-03-02 PROCEDURE — 93010 ELECTROCARDIOGRAM REPORT: CPT | Performed by: INTERNAL MEDICINE

## 2020-03-02 PROCEDURE — G8417 CALC BMI ABV UP PARAM F/U: HCPCS | Performed by: INTERNAL MEDICINE

## 2020-03-02 PROCEDURE — 99214 OFFICE O/P EST MOD 30 MIN: CPT | Performed by: INTERNAL MEDICINE

## 2020-03-02 PROCEDURE — G8482 FLU IMMUNIZE ORDER/ADMIN: HCPCS | Performed by: INTERNAL MEDICINE

## 2020-03-02 NOTE — PROGRESS NOTES
[facility]      CC: Follow up for thoracic aortic aneurysm, htn, hlp mod AI    HPI:  Patient is doing well from a cardiac standpoint. Good functional capacity with no significant change in functional capacity. No chest pain, no dyspnea, no PND, no syncope or pre-syncope, no orthopnea. No symptoms of CHF or angina/chest pain. Past Medical History:   Diagnosis Date    Anxiety     Aortic regurgitation     Bicuspid aortic valve     Diabetes mellitus ([address] since March 2018    on Rx.  GERD (gastroesophageal reflux disease)     Hypertension     Left renal mass 10/2018    Osteoarthritis of left knee     Wears glasses        Past Surgical History:   Procedure Laterality Date    CYST REMOVAL      tail bone    MOUTH SURGERY  2015    PARTIAL NEPHRECTOMY Left 11/30/2018    ROBOTIC PARTIAL NEPHRECTOMY,     [place] COLONOSCOPY FLX DX W/COLLJ SPEC WHEN PFRMD N/A 5/14/2018    normal colon, Dr. Cheyanne Kowalski Left 11/30/2018    XI ROBOTIC PARTIAL NEPHRECTOMY, INTRA-OP LAP ULTRASOUND performed by Jann Blanca MD at [address] [place] [address] Drive EXTRACTION         Family History   Problem Relation Age of Onset    High Blood Pressure Mother     Diabetes Mother         impaired fasting glucose    Other Mother         short term memory loss after MVA    Glaucoma Mother     Stroke Father 66    High Blood Pressure Father     Diabetes Father         impaired fasting glucose    Glaucoma Father     Cancer Father         bladder     Heart Attack Father 66    Glaucoma Brother     Diabetes Maternal Grandmother     Other Paternal Grandmother         gallbladder disease    Stroke Paternal Grandfather     Other Paternal Grandfather         gallbladder disease    Diabetes Paternal Grandfather     No Known Problems Brother        REVIEW OF SYSTEMS:    · Constitutional: there has been no unanticipated weight loss.  There's been No change in swelling- improved on lasix daily. 5. Ascending aortic aneurysm- stable at 4.7 cm from CTA chest 2018 and 2019 CTA. Stable repeat. Refer to CT surgery if expanding. Follow with annual CTA. 6. Renal CA- Seeing Urology- had renal CA removed. 7. HTN- failry well controlled at this time. Continue current therapy. 8. Obesity - encouraged diet, exercise, and discussed weight loss extensively. 9. Hyperlipidemia- continue statin. 10. Dyspnea from deconditioning      Thank you for allowing me to participate in the care of this patient, please do not hesitate to call if you have any questions. Queenie Myles, [id number] [facility]  [facility] [employer]  [phone]

## 2020-03-06 ENCOUNTER — OFFICE VISIT (OUTPATIENT)
Dept: FAMILY MEDICINE CLINIC | Age: 65
End: 2020-03-06
Payer: COMMERCIAL

## 2020-03-06 VITALS
WEIGHT: 238 LBS | BODY MASS INDEX: 34.07 KG/M2 | SYSTOLIC BLOOD PRESSURE: 132 MMHG | DIASTOLIC BLOOD PRESSURE: 70 MMHG | RESPIRATION RATE: 14 BRPM | OXYGEN SATURATION: 98 % | HEIGHT: 70 IN | HEART RATE: 71 BPM

## 2020-03-06 PROCEDURE — G8417 CALC BMI ABV UP PARAM F/U: HCPCS | Performed by: PHYSICIAN ASSISTANT

## 2020-03-06 PROCEDURE — 2022F DILAT RTA XM EVC RTNOPTHY: CPT | Performed by: PHYSICIAN ASSISTANT

## 2020-03-06 PROCEDURE — 3017F COLORECTAL CA SCREEN DOC REV: CPT | Performed by: PHYSICIAN ASSISTANT

## 2020-03-06 PROCEDURE — 3051F HG A1C>EQUAL 7.0%<8.0%: CPT | Performed by: PHYSICIAN ASSISTANT

## 2020-03-06 PROCEDURE — 99212 OFFICE O/P EST SF 10 MIN: CPT | Performed by: PHYSICIAN ASSISTANT

## 2020-03-06 PROCEDURE — G8482 FLU IMMUNIZE ORDER/ADMIN: HCPCS | Performed by: PHYSICIAN ASSISTANT

## 2020-03-06 PROCEDURE — 99214 OFFICE O/P EST MOD 30 MIN: CPT | Performed by: PHYSICIAN ASSISTANT

## 2020-03-06 PROCEDURE — G8427 DOCREV CUR MEDS BY ELIG CLIN: HCPCS | Performed by: PHYSICIAN ASSISTANT

## 2020-03-06 PROCEDURE — 1036F TOBACCO NON-USER: CPT | Performed by: PHYSICIAN ASSISTANT

## 2020-03-06 ASSESSMENT — ENCOUNTER SYMPTOMS
NAUSEA: 0
VOMITING: 0
DIARRHEA: 0

## 2020-03-06 NOTE — PROGRESS NOTES
Problems Brother        Allergies   Allergen Reactions    Codeine      Severe Abdominal pain    Sulfa Antibiotics      Patient unsure of reaction       Current Outpatient Medications   Medication Sig Dispense Refill    cetirizine (ZYRTEC) 10 MG tablet Take 1 tablet by mouth daily 30 tablet 6    carvedilol (COREG) 25 MG tablet Take 1 tablet by mouth 2 times daily 180 tablet 2    metFORMIN (GLUCOPHAGE) 500 MG tablet Take 1 tablet by mouth 2 times daily (with meals) 180 tablet 2    amLODIPine (NORVASC) 10 MG tablet Take 1 tablet by mouth daily 90 tablet 2    sildenafil (REVATIO) 20 MG tablet Take 1 tablet by mouth daily as needed (ed) 25 tablet 5    meloxicam (MOBIC) 7.5 MG tablet TAKE ONE TABLET BY MOUTH TWICE A DAY WITH MEALS 180 tablet 0    pioglitazone (ACTOS) 15 MG tablet Take 1 tablet by mouth daily 30 tablet 5    losartan-hydrochlorothiazide (HYZAAR) 100-25 MG per tablet TAKE ONE TABLET BY MOUTH DAILY 90 tablet 2    Blood Pressure Monitoring (BLOOD PRESSURE CUFF) MISC DX: I10 HTN 1 each 0    sildenafil (REVATIO) 20 MG tablet Take 1 tablet by mouth as needed (ED) Take 1-5 tabs as needed PRN for ED 60 tablet 3    fluticasone (FLONASE) 50 MCG/ACT nasal spray 1 spray by Nasal route daily (Patient taking differently: 1 spray by Nasal route daily as needed ) 1 Bottle 3    Elastic Bandages & Supports (JOBST OPAQUE KNEE 15-20MMHG XL) MISC #2 pair knee highs  Varicose veins 2 each 0    vitamin D3 (CHOLECALCIFEROL) 400 UNITS TABS tablet Take 400 Units by mouth every other day       Omeprazole (PRILOSEC PO) Take 1 capsule by mouth daily as needed        No current facility-administered medications for this visit. Review of Systems   Constitutional: Negative for appetite change, chills, fatigue and fever. Cardiovascular: Negative for chest pain and palpitations. Gastrointestinal: Negative for diarrhea, nausea and vomiting. Musculoskeletal: Positive for arthralgias and gait problem.  Negative for Assessment & Plan:     1. Type 2 diabetes mellitus without complication, without long-term current use of insulin ([facility])    -  DIABETES FOOT EXAM    2. Essential hypertension      3. Generalized anxiety disorder      4. Dilated aortic root ([facility]      5. Carcinoma of kidney, unspecified laterality ([facility]      6. Fatigue, unspecified type      7. Aortic insufficiency due to bicuspid aortic valve      8. Other seasonal allergic rhinitis    Good nutrition hydration  Answered his questions  Follow-up with specialty as scheduled  Follow-up with me in 3 months sooner if problems  Coping mechanisms  Follow-up with podiatry let me know if he still struggling with his foot pain and if not improving  Work on lifestyle changes    Markos Villalobos received counseling on the following healthy behaviors: nutrition, exercise and medication adherence    Patient given educational materials on Diabetes, Hyperlipidemia, Nutrition, Exercise and Hypertension    I have instructed Markos Villalobos to complete a self tracking handout on Blood Sugars , Blood Pressures  and Weights and instructed them to bring it with them to his next appointment. Discussed use, benefit, and side effects of prescribed medications. Barriers to medication compliance addressed. All patient questions answered. Pt voiced understanding.          PING Vines  3/15/2020 11:15 AM    (Pleasenote that portions of this note were completed with a voice recognition program.Efforts were made to edit the dictations but occasionally words are mis-transcribed.)

## 2020-03-09 ENCOUNTER — OFFICE VISIT (OUTPATIENT)
Dept: DERMATOLOGY | Age: 65
End: 2020-03-09
Payer: COMMERCIAL

## 2020-03-09 VITALS
WEIGHT: 236 LBS | OXYGEN SATURATION: 98 % | HEART RATE: 65 BPM | DIASTOLIC BLOOD PRESSURE: 66 MMHG | BODY MASS INDEX: 33.79 KG/M2 | SYSTOLIC BLOOD PRESSURE: 120 MMHG | HEIGHT: 70 IN

## 2020-03-09 PROCEDURE — 17000 DESTRUCT PREMALG LESION: CPT | Performed by: DERMATOLOGY

## 2020-03-09 PROCEDURE — G8427 DOCREV CUR MEDS BY ELIG CLIN: HCPCS | Performed by: DERMATOLOGY

## 2020-03-09 PROCEDURE — 17003 DESTRUCT PREMALG LES 2-14: CPT | Performed by: DERMATOLOGY

## 2020-03-09 PROCEDURE — 99214 OFFICE O/P EST MOD 30 MIN: CPT

## 2020-03-09 PROCEDURE — 1036F TOBACCO NON-USER: CPT | Performed by: DERMATOLOGY

## 2020-03-09 PROCEDURE — 99214 OFFICE O/P EST MOD 30 MIN: CPT | Performed by: DERMATOLOGY

## 2020-03-09 PROCEDURE — 3017F COLORECTAL CA SCREEN DOC REV: CPT | Performed by: DERMATOLOGY

## 2020-03-09 PROCEDURE — G8417 CALC BMI ABV UP PARAM F/U: HCPCS | Performed by: DERMATOLOGY

## 2020-03-09 PROCEDURE — G8482 FLU IMMUNIZE ORDER/ADMIN: HCPCS | Performed by: DERMATOLOGY

## 2020-03-09 NOTE — PROGRESS NOTES
taking differently: 1 spray by Nasal route daily as needed ) 1 Bottle 3    Elastic Bandages & Supports (JOBST OPAQUE KNEE 15-20MMHG XL) MISC #2 pair knee highs  Varicose veins 2 each 0    vitamin D3 (CHOLECALCIFEROL) 400 UNITS TABS tablet Take 400 Units by mouth every other day       Omeprazole (PRILOSEC PO) Take 1 capsule by mouth daily as needed        No current facility-administered medications for this visit. ALLERGIES:   Allergies   Allergen Reactions    Codeine      Severe Abdominal pain    Sulfa Antibiotics      Patient unsure of reaction       SOCIAL HISTORY:  Social History     Tobacco Use    Smoking status: Never Smoker    Smokeless tobacco: Never Used   Substance Use Topics    Alcohol use: Yes     Comment: very rare       REVIEW OF SYSTEMS:  Review of Systems   Constitutional: Negative. Skin:Denies any new changing, growing or bleeding lesions or rashes except as described in the HPI     PHYSICAL EXAM:   /66 (Site: Left Upper Arm, Position: Sitting, Cuff Size: Large Adult)   Pulse 65   Ht 5' 10\" (1.778 m)   Wt 236 lb (107 kg)   SpO2 98%   BMI 33.86 kg/m²     General Exam:  General Appearance: No acute distress, Well nourished     Neuro: Alert and oriented to person, place and time  Psych: Normal affect   Lymph Node: Not performed    Cutaneous Exam: Performed as documented in clinic note below. Total skin excluding undergarment areas, which includes the head/face, neck, both arms, chest, back, abdomen, both legs, digits and/or nails, was examined. Pertinent Physical Exam Findings:  Physical Exam  Skin:     Comments: AK x 4 on scalp, x 1 on rigth temple, x 3 on nose and x 1 on right cheek         Medical Necessity of Exam Performed:   Distribution of patient concerns    Additional Diagnostic Testing performed during exam: Not performed ,  Not performed    ASSESSMENT:   Diagnosis Orders   1. Keratosis, actinic     2.  Actinic skin damage         Plan of Action is as Follows:  Assessment 1. Keratosis, actinic  Cryotherapy: After verbal consent was obtained including discussion of the risks (lesion persistence, lesion recurrence and hypo/hyperpigmentation) and benefits (resolution of the lesion) 9 total Actinic Keratosis as per PE were treated once with liquid nitrogen to achieve a 2-3 mm freeze border. 2. Actinic skin damage  Discussed sunscreen and sun protection - recommend SPF 30 or greater sunscreen applied every 2-3 hours, sun protective clothing and avoidance of peak sun. Photo surveillance performed: No    Follow-up: 1 year    This note was created with the assistance of aspeech-recognition program.  Although the intention is to generate a document that actually reflects thecontent of the visit, no guarantees can be provided that every mistake has been identified and corrected by editing.     Electronically signed by Lucrecia Cortes MD on 3/9/20 at 11:24 AM EDT

## 2020-03-12 ENCOUNTER — OFFICE VISIT (OUTPATIENT)
Dept: BEHAVIORAL/MENTAL HEALTH | Age: 65
End: 2020-03-12
Payer: COMMERCIAL

## 2020-03-12 PROCEDURE — 90791 PSYCH DIAGNOSTIC EVALUATION: CPT | Performed by: COUNSELOR

## 2020-03-12 NOTE — PROGRESS NOTES
with severe psychomotor agitation; he was frequently distracted by his own thoughts and highly tangential in his speech. Patient acknowledged that this is typical for him, independent of his mood and stress levels. Patient indicated that he has thought repeatedly over his life that he may have ADHD, but that he was never tested while in school.       O:  MSE:    Appearance    alert, cooperative  Appetite variable  Sleep disturbance Yes  Loss of pleasure intermittent  Speech    hyperverbal, pressured and interrupting  Mood    Anxious  Affect    anxiety  Thought Content    intrusive thoughts, cognitive distortions and all or nothing thinking  Insight    Good  Judgment    Generally intact, but with noted impulsivity  Suicide Assessment    no suicidal ideation      History:    Medications:   Current Outpatient Medications   Medication Sig Dispense Refill    cetirizine (ZYRTEC) 10 MG tablet Take 1 tablet by mouth daily 30 tablet 6    carvedilol (COREG) 25 MG tablet Take 1 tablet by mouth 2 times daily 180 tablet 2    metFORMIN (GLUCOPHAGE) 500 MG tablet Take 1 tablet by mouth 2 times daily (with meals) 180 tablet 2    amLODIPine (NORVASC) 10 MG tablet Take 1 tablet by mouth daily 90 tablet 2    sildenafil (REVATIO) 20 MG tablet Take 1 tablet by mouth daily as needed (ed) 25 tablet 5    meloxicam (MOBIC) 7.5 MG tablet TAKE ONE TABLET BY MOUTH TWICE A DAY WITH MEALS 180 tablet 0    pioglitazone (ACTOS) 15 MG tablet Take 1 tablet by mouth daily 30 tablet 5    losartan-hydrochlorothiazide (HYZAAR) 100-25 MG per tablet TAKE ONE TABLET BY MOUTH DAILY 90 tablet 2    Blood Pressure Monitoring (BLOOD PRESSURE CUFF) MISC DX: I10 HTN 1 each 0    sildenafil (REVATIO) 20 MG tablet Take 1 tablet by mouth as needed (ED) Take 1-5 tabs as needed PRN for ED 60 tablet 3    fluticasone (FLONASE) 50 MCG/ACT nasal spray 1 spray by Nasal route daily (Patient taking differently: 1 spray by Nasal route daily as needed ) 1 Bottle 3

## 2020-03-24 RX ORDER — LOSARTAN POTASSIUM AND HYDROCHLOROTHIAZIDE 25; 100 MG/1; MG/1
1 TABLET ORAL DAILY
Qty: 90 TABLET | Refills: 0 | Status: SHIPPED | OUTPATIENT
Start: 2020-03-24 | End: 2020-06-29 | Stop reason: SDUPTHER

## 2020-04-17 ENCOUNTER — HOSPITAL ENCOUNTER (OUTPATIENT)
Dept: LAB | Age: 65
Discharge: HOME | End: 2020-04-17
Payer: COMMERCIAL

## 2020-04-17 LAB
CREAT SERPL-MCNC: 0.79 MG/DL (ref 0.7–1.2)
GFR AFRICAN AMERICAN: >60 ML/MIN
GFR NON-AFRICAN AMERICAN: >60 ML/MIN
GFR SERPL CREATININE-BSD FRML MDRD: NORMAL ML/MIN/{1.73_M2}
GFR SERPL CREATININE-BSD FRML MDRD: NORMAL ML/MIN/{1.73_M2}

## 2020-04-17 PROCEDURE — 36415 COLL VENOUS BLD VENIPUNCTURE: CPT

## 2020-04-17 PROCEDURE — 82565 ASSAY OF CREATININE: CPT

## 2020-04-23 ENCOUNTER — HOSPITAL ENCOUNTER (OUTPATIENT)
Dept: CT IMAGING | Age: 65
Discharge: HOME | End: 2020-04-25
Payer: COMMERCIAL

## 2020-04-23 PROCEDURE — 6360000004 HC RX CONTRAST MEDICATION: Performed by: INTERNAL MEDICINE

## 2020-04-23 PROCEDURE — 2709999900 CT CHEST W CONTRAST

## 2020-04-23 RX ADMIN — IOPAMIDOL 100 ML: 755 INJECTION, SOLUTION INTRAVENOUS at 10:11

## 2020-04-24 ENCOUNTER — PATIENT MESSAGE (OUTPATIENT)
Dept: FAMILY MEDICINE CLINIC | Age: 65
End: 2020-04-24

## 2020-04-24 RX ORDER — FUROSEMIDE 20 MG/1
20 TABLET ORAL DAILY
Qty: 90 TABLET | Refills: 3 | Status: SHIPPED | OUTPATIENT
Start: 2020-04-24 | End: 2021-04-19 | Stop reason: SDUPTHER

## 2020-04-27 ENCOUNTER — OFFICE VISIT (OUTPATIENT)
Dept: ONCOLOGY | Age: 65
End: 2020-04-27
Payer: COMMERCIAL

## 2020-04-27 VITALS
HEIGHT: 70 IN | SYSTOLIC BLOOD PRESSURE: 138 MMHG | WEIGHT: 242 LBS | TEMPERATURE: 97.9 F | OXYGEN SATURATION: 97 % | DIASTOLIC BLOOD PRESSURE: 80 MMHG | HEART RATE: 71 BPM | BODY MASS INDEX: 34.65 KG/M2

## 2020-04-27 PROCEDURE — G8417 CALC BMI ABV UP PARAM F/U: HCPCS | Performed by: INTERNAL MEDICINE

## 2020-04-27 PROCEDURE — 99214 OFFICE O/P EST MOD 30 MIN: CPT | Performed by: INTERNAL MEDICINE

## 2020-04-27 PROCEDURE — G8427 DOCREV CUR MEDS BY ELIG CLIN: HCPCS | Performed by: INTERNAL MEDICINE

## 2020-04-27 PROCEDURE — 1036F TOBACCO NON-USER: CPT | Performed by: INTERNAL MEDICINE

## 2020-04-27 PROCEDURE — 3017F COLORECTAL CA SCREEN DOC REV: CPT | Performed by: INTERNAL MEDICINE

## 2020-04-27 NOTE — PROGRESS NOTES
(ACTOS) 15 MG tablet Take 1 tablet by mouth daily 30 tablet 5    Blood Pressure Monitoring (BLOOD PRESSURE CUFF) MISC DX: I10 HTN 1 each 0    sildenafil (REVATIO) 20 MG tablet Take 1 tablet by mouth as needed (ED) Take 1-5 tabs as needed PRN for ED 60 tablet 3    fluticasone (FLONASE) 50 MCG/ACT nasal spray 1 spray by Nasal route daily (Patient taking differently: 1 spray by Nasal route daily as needed ) 1 Bottle 3    Elastic Bandages & Supports (JOBST OPAQUE KNEE 15-20MMHG XL) MISC #2 pair knee highs  Varicose veins 2 each 0    vitamin D3 (CHOLECALCIFEROL) 400 UNITS TABS tablet Take 400 Units by mouth every other day       Omeprazole (PRILOSEC PO) Take 1 capsule by mouth daily as needed        No current facility-administered medications for this visit. REVIEW OF SYSTEM:     Constitutional: No fever or chills. No night sweats, no weight loss   Eyes: No eye discharge, double vision, or eye pain   HEENT: negative for sore mouth, sore throat, hoarseness and voice change   Respiratory: negative for cough , sputum, dyspnea, wheezing, hemoptysis, chest pain   Cardiovascular: negative for chest pain, dyspnea, palpitations, orthopnea, PND   Gastrointestinal: negative for nausea, vomiting, diarrhea, constipation, abdominal pain, Dysphagia, hematemesis and hematochezia   Genitourinary: negative for frequency, dysuria, nocturia, urinary incontinence, and hematuria   Integument: negative for rash, skin lesions, bruises.    Hematologic/Lymphatic: negative for easy bruising, bleeding, lymphadenopathy, petechiae and swelling/edema   Endocrine: negative for heat or cold intolerance, tremor, weight changes, change in bowel habits and hair loss   Musculoskeletal: negative for myalgias, arthralgias, pain, joint swelling,and bone pain   Neurological: negative for headaches, dizziness, seizures, weakness, numbness   OBJECTIVE:         Vitals:    04/27/20 1035   BP: 138/80   Pulse: 71   Temp: 97.9 °F (36.6 °C)   SpO2: 97% PHYSICAL EXAM:   General appearance - well appearing, no in pain or distress   Mental status - alert and cooperative   Eyes - pupils equal and reactive, extraocular eye movements intact   Ears - bilateral TM's and external ear canals normal   Mouth - mucous membranes moist, pharynx normal without lesions   Neck - supple, no significant adenopathy   Lymphatics - no palpable lymphadenopathy, no hepatosplenomegaly   Chest - clear to auscultation, no wheezes, rales or rhonchi, symmetric air entry   Heart - normal rate, regular rhythm, normal S1, S2, no murmurs, rubs, clicks or gallops   Abdomen - soft, nontender, nondistended, no masses or organomegaly   Neurological - alert, oriented, normal speech, no focal findings or movement disorder noted   Musculoskeletal - no joint tenderness, deformity or swelling   Extremities - peripheral pulses normal, no pedal edema, no clubbing or cyanosis   Skin - normal coloration and turgor, no rashes, no suspicious skin lesions noted   LABORATORY DATA:     Lab Results   Component Value Date    WBC 5.5 10/21/2019    HGB 14.5 10/21/2019    HCT 42.2 10/21/2019    MCV 93.5 10/21/2019     10/21/2019    LYMPHOPCT 27 10/21/2019    RBC 4.52 10/21/2019    MCH 32.2 10/21/2019    MCHC 34.4 10/21/2019    RDW 13.6 10/21/2019    MONOPCT 10 10/21/2019    BASOPCT 1 10/21/2019    NEUTROABS 3.30 10/21/2019    LYMPHSABS 1.50 10/21/2019    MONOSABS 0.60 10/21/2019    EOSABS 0.10 10/21/2019    BASOSABS 0.00 10/21/2019         Chemistry        Component Value Date/Time     01/31/2020 0939    K 4.0 01/31/2020 0939    CL 98 01/31/2020 0939    CO2 24 01/31/2020 0939    BUN 26 (H) 01/31/2020 0939    CREATININE 0.79 04/17/2020 1121        Component Value Date/Time    CALCIUM 9.4 01/31/2020 0939    ALKPHOS 30 (L) 01/31/2020 0939    AST 22 01/31/2020 0939    ALT 18 01/31/2020 0939    BILITOT 0.71 01/31/2020 0939      1/22/2017  5:23 PM - Vasu, Lab Incoming Jessi

## 2020-05-06 ENCOUNTER — OFFICE VISIT (OUTPATIENT)
Dept: UROLOGY | Age: 65
End: 2020-05-06
Payer: COMMERCIAL

## 2020-05-06 VITALS
BODY MASS INDEX: 34.73 KG/M2 | WEIGHT: 242.6 LBS | DIASTOLIC BLOOD PRESSURE: 60 MMHG | HEIGHT: 70 IN | HEART RATE: 69 BPM | SYSTOLIC BLOOD PRESSURE: 126 MMHG

## 2020-05-06 PROCEDURE — G8417 CALC BMI ABV UP PARAM F/U: HCPCS | Performed by: UROLOGY

## 2020-05-06 PROCEDURE — 99214 OFFICE O/P EST MOD 30 MIN: CPT | Performed by: UROLOGY

## 2020-05-06 PROCEDURE — 3017F COLORECTAL CA SCREEN DOC REV: CPT | Performed by: UROLOGY

## 2020-05-06 PROCEDURE — 4040F PNEUMOC VAC/ADMIN/RCVD: CPT | Performed by: UROLOGY

## 2020-05-06 PROCEDURE — 1036F TOBACCO NON-USER: CPT | Performed by: UROLOGY

## 2020-05-06 PROCEDURE — 99214 OFFICE O/P EST MOD 30 MIN: CPT

## 2020-05-06 PROCEDURE — G8427 DOCREV CUR MEDS BY ELIG CLIN: HCPCS | Performed by: UROLOGY

## 2020-05-06 PROCEDURE — 1123F ACP DISCUSS/DSCN MKR DOCD: CPT | Performed by: UROLOGY

## 2020-05-06 NOTE — PROGRESS NOTES
Wilian Sanz MD   Urology Clinic Progress Note      Patient:  Wisam Titus  YOB: 1955  Date: 5/6/2020    HISTORY OF PRESENT ILLNESS:   The patient is a 72 y.o. male who presents today for follow-up for the following problem(s): left renal mass  Overall the problem(s) : show no change. Associated Symptoms: No dysuria, gross hematuria. Pain Severity:      Summary of old records: Dr Rodrick Membreno patient, left renal mass noted on Chest CT for [facility]    11/30/2018, left partial nephrectomy  -- Diagnosis --   KIDNEY, PARTIAL NEPHRECTOMY (LEFT):       - RENAL CELL CARCINOMA, CLEAR CELL TYPE WITH PROMINENT CYSTIC        CHANGE, GRADE 2.       - PARENCHYMAL AND SOFT TISSUE SURGICAL MARGINS NEGATIVE FOR         NEOPLASM. Additional History:     Doing well, here to review CT but did not get it abdominal due to insurance denial, but did get chest CT which is noted below normal  CT reviewed and kidneys were captured in the images, there were no recurrences, renal cysts, no masses  No issues  Cr normal  Feels well, no issues. I independently reviewed and verified the images and reports from:    Ct Chest W Contrast    Result Date: 4/23/2020  EXAMINATION: CT OF THE CHEST WITH CONTRAST 4/23/2020 10:11 am TECHNIQUE: CT of the chest was performed with the administration of intravenous contrast. Multiplanar reformatted images are provided for review. Dose modulation, iterative reconstruction, and/or weight based adjustment of the mA/kV was utilized to reduce the radiation dose to as low as reasonably achievable. COMPARISON: 10/11/2019, 03/27/2019, 09/19/2018 HISTORY: ORDERING SYSTEM PROVIDED HISTORY: Lung nodule TECHNOLOGIST PROVIDED HISTORY: folllow lung nodule Reason for Exam: Hx kidney CA. F/u lung nodule. Acuity: Chronic Type of Exam: Subsequent/Follow-up FINDINGS: Mediastinum: The heart and great vessels are stable in appearance. The ascending aorta measures up to 4.5 cm, tapering at the arch.   No 0.77 01/26/2016       Last total testosterone:  Lab Results   Component Value Date    TESTOSTERONE 439 10/21/2019       Urinalysis today:  No results found for this visit on 05/06/20. Last BUN and creatinine:  Lab Results   Component Value Date    BUN 26 (H) 01/31/2020     Lab Results   Component Value Date    CREATININE 0.79 04/17/2020       Imaging Reviewed during this Office Visit:   (results were independently reviewed by physician and radiology report verified)    PAST MEDICAL, FAMILY AND SOCIAL HISTORY UPDATE:  Past Medical History:   Diagnosis Date    Anxiety     Aortic regurgitation     Bicuspid aortic valve     Diabetes mellitus ([address] since March 2018    on Rx.     GERD (gastroesophageal reflux disease)     Hypertension     Left renal mass 10/2018    Osteoarthritis of left knee     Wears glasses      Past Surgical History:   Procedure Laterality Date    CYST REMOVAL      tail bone    MOUTH SURGERY  2015    PARTIAL NEPHRECTOMY Left 11/30/2018    ROBOTIC PARTIAL NEPHRECTOMY,     MS COLONOSCOPY FLX DX W/COLLJ SPEC WHEN PFRMD N/A 5/14/2018    normal colon, Dr. Long Plum Left 11/30/2018    XI ROBOTIC PARTIAL NEPHRECTOMY, INTRA-OP LAP ULTRASOUND performed by Elaine Cruz MD at [address] [place] [address] Drive EXTRACTION       Family History   Problem Relation Age of Onset    High Blood Pressure Mother     Diabetes Mother         impaired fasting glucose    Other Mother         short term memory loss after MVA    Glaucoma Mother     Stroke Father 66    High Blood Pressure Father     Diabetes Father         impaired fasting glucose    Glaucoma Father     Cancer Father         bladder     Heart Attack Father 66    Glaucoma Brother     Diabetes Maternal Grandmother     Other Paternal Grandmother         gallbladder disease    Stroke Paternal Grandfather     Other Paternal Grandfather         gallbladder disease    Diabetes negative  Cardiovascular: negative  Gastrointestinal: negative  Musculoskeletal: negative  Genitourinary: negative except for what is in HPI  Skin: negative   Neurological: negative  Hematological/Lymphatic: negative  Psychological: negative    Physical Exam:      Vitals:    05/06/20 0854   BP: 126/60   Pulse: 69     Patient is a 72 y.o. male in no acute distress and alert and oriented to person, place and time. NAD, A/o      Assessment and Plan      1. Renal mass    2. Renal cell carcinoma of left kidney (HCC)    3. Erectile dysfunction, unspecified erectile dysfunction type    4.  Pulmonary nodule           Plan:       RCC: CT as noted above, no evidence of recurrence  Renal US 6 months surveillance (VV)      ED: continue sildenafil           Emir Nielson M.D  [facility]

## 2020-05-12 RX ORDER — PIOGLITAZONEHYDROCHLORIDE 15 MG/1
15 TABLET ORAL DAILY
Qty: 30 TABLET | Refills: 5 | Status: SHIPPED | OUTPATIENT
Start: 2020-05-12 | End: 2021-05-21 | Stop reason: SDUPTHER

## 2020-05-18 RX ORDER — MELOXICAM 7.5 MG/1
7.5 TABLET ORAL 2 TIMES DAILY WITH MEALS
Qty: 180 TABLET | Refills: 1 | Status: SHIPPED | OUTPATIENT
Start: 2020-05-18 | End: 2021-05-21 | Stop reason: SDUPTHER

## 2020-06-08 ENCOUNTER — OFFICE VISIT (OUTPATIENT)
Dept: BEHAVIORAL/MENTAL HEALTH | Age: 65
End: 2020-06-08
Payer: COMMERCIAL

## 2020-06-08 PROCEDURE — 99354 PR PROLONGED SVC OUTPATIENT SETTING 1ST HOUR: CPT | Performed by: COUNSELOR

## 2020-06-08 PROCEDURE — 90837 PSYTX W PT 60 MINUTES: CPT | Performed by: COUNSELOR

## 2020-06-12 ENCOUNTER — OFFICE VISIT (OUTPATIENT)
Dept: FAMILY MEDICINE CLINIC | Age: 65
End: 2020-06-12
Payer: MEDICAID

## 2020-06-12 VITALS
BODY MASS INDEX: 34.65 KG/M2 | DIASTOLIC BLOOD PRESSURE: 62 MMHG | HEART RATE: 61 BPM | WEIGHT: 242 LBS | RESPIRATION RATE: 14 BRPM | SYSTOLIC BLOOD PRESSURE: 124 MMHG | OXYGEN SATURATION: 98 % | HEIGHT: 70 IN

## 2020-06-12 PROCEDURE — G8417 CALC BMI ABV UP PARAM F/U: HCPCS | Performed by: PHYSICIAN ASSISTANT

## 2020-06-12 PROCEDURE — 99211 OFF/OP EST MAY X REQ PHY/QHP: CPT | Performed by: PHYSICIAN ASSISTANT

## 2020-06-12 PROCEDURE — 2022F DILAT RTA XM EVC RTNOPTHY: CPT | Performed by: PHYSICIAN ASSISTANT

## 2020-06-12 PROCEDURE — G8427 DOCREV CUR MEDS BY ELIG CLIN: HCPCS | Performed by: PHYSICIAN ASSISTANT

## 2020-06-12 PROCEDURE — 99214 OFFICE O/P EST MOD 30 MIN: CPT | Performed by: PHYSICIAN ASSISTANT

## 2020-06-12 PROCEDURE — 99212 OFFICE O/P EST SF 10 MIN: CPT

## 2020-06-12 PROCEDURE — 3051F HG A1C>EQUAL 7.0%<8.0%: CPT | Performed by: PHYSICIAN ASSISTANT

## 2020-06-12 PROCEDURE — 4040F PNEUMOC VAC/ADMIN/RCVD: CPT | Performed by: PHYSICIAN ASSISTANT

## 2020-06-12 PROCEDURE — 3017F COLORECTAL CA SCREEN DOC REV: CPT | Performed by: PHYSICIAN ASSISTANT

## 2020-06-12 PROCEDURE — 1036F TOBACCO NON-USER: CPT | Performed by: PHYSICIAN ASSISTANT

## 2020-06-12 PROCEDURE — 1123F ACP DISCUSS/DSCN MKR DOCD: CPT | Performed by: PHYSICIAN ASSISTANT

## 2020-06-12 ASSESSMENT — ENCOUNTER SYMPTOMS
RESPIRATORY NEGATIVE: 1
SHORTNESS OF BREATH: 0
COUGH: 0
WHEEZING: 0
VOMITING: 0
NAUSEA: 0
DIARRHEA: 0

## 2020-06-12 NOTE — PROGRESS NOTES
Pulmonary:      Effort: Pulmonary effort is normal.      Breath sounds: Normal breath sounds. No wheezing, rhonchi or rales. Abdominal:      General: Bowel sounds are normal. There is no distension. Palpations: Abdomen is soft. There is no mass. Tenderness: There is no abdominal tenderness. There is no guarding or rebound. Hernia: No hernia is present. Musculoskeletal:      Right lower leg: No edema. Left lower leg: No edema. Lymphadenopathy:      Cervical: No cervical adenopathy. Skin:     General: Skin is warm and dry. Findings: No bruising, erythema or rash. Neurological:      General: No focal deficit present. Mental Status: He is alert and oriented to person, place, and time. Gait: Gait normal.   Psychiatric:         Mood and Affect: Mood normal.         Behavior: Behavior normal.         Thought Content: Thought content normal.         Judgment: Judgment normal.           Assessment & Plan:     1. Chronic foot pain, right    - [address] Alanis MD, Orthopedic Surgery, [place]    2. Essential hypertension      3. Right foot pain      4. Type 2 diabetes mellitus with other circulatory complication, without long-term current use of insulin ([facility] [address]      5. Carcinoma of kidney, unspecified laterality ([facility]      6. Generalized anxiety disorder      7. Fatigue, unspecified type      8.  Seasonal allergic rhinitis due to pollen    Recommended using Flonase over-the-counter allergy medications  If symptoms do not improve we will treat for sinusitis but he really is not sick and I hate to give him an antibiotic if he does not need one  Good nutrition hydration  Answered his questions  Follow-up with specialty as scheduled  Coping mechanisms follow-up with counseling  Fasting labs due 7/2020 ordered  Based on chronic foot pain new referral placed for further input  Heat for his foot Home exercises continue working on those  Follow-up with me in 5 months sooner if problems    Kelvin Bautista received counseling on the following healthy behaviors: nutrition, exercise and medication adherence    Patient given educational materials on Diabetes, Hyperlipidemia, Nutrition, Exercise and Hypertension    I have instructed Kelvin Bautista to complete a self tracking handout on Blood Sugars , Blood Pressures  and Weights and instructed them to bring it with them to his next appointment. Discussed use, benefit, and side effects of prescribed medications. Barriers to medication compliance addressed. All patient questions answered. Pt voiced understanding.      PING Lopez  6/21/2020 10:07 AM EDT    (Pleasenote that portions of this note were completed with a voice recognition program.Efforts were made to edit the dictations but occasionally words are mis-transcribed.)

## 2020-06-25 ENCOUNTER — OFFICE VISIT (OUTPATIENT)
Dept: ORTHOPEDIC SURGERY | Age: 65
End: 2020-06-25
Payer: MEDICAID

## 2020-06-25 ENCOUNTER — HOSPITAL ENCOUNTER (OUTPATIENT)
Dept: GENERAL RADIOLOGY | Age: 65
Discharge: HOME | End: 2020-06-27
Payer: MEDICAID

## 2020-06-25 VITALS
HEIGHT: 70 IN | DIASTOLIC BLOOD PRESSURE: 64 MMHG | SYSTOLIC BLOOD PRESSURE: 132 MMHG | HEART RATE: 54 BPM | WEIGHT: 242.06 LBS | BODY MASS INDEX: 34.65 KG/M2

## 2020-06-25 PROCEDURE — 99203 OFFICE O/P NEW LOW 30 MIN: CPT | Performed by: ORTHOPAEDIC SURGERY

## 2020-06-25 PROCEDURE — 1123F ACP DISCUSS/DSCN MKR DOCD: CPT | Performed by: ORTHOPAEDIC SURGERY

## 2020-06-25 PROCEDURE — 3017F COLORECTAL CA SCREEN DOC REV: CPT | Performed by: ORTHOPAEDIC SURGERY

## 2020-06-25 PROCEDURE — 99214 OFFICE O/P EST MOD 30 MIN: CPT

## 2020-06-25 PROCEDURE — G8417 CALC BMI ABV UP PARAM F/U: HCPCS | Performed by: ORTHOPAEDIC SURGERY

## 2020-06-25 PROCEDURE — 73610 X-RAY EXAM OF ANKLE: CPT

## 2020-06-25 PROCEDURE — 4040F PNEUMOC VAC/ADMIN/RCVD: CPT | Performed by: ORTHOPAEDIC SURGERY

## 2020-06-25 PROCEDURE — 73630 X-RAY EXAM OF FOOT: CPT

## 2020-06-25 PROCEDURE — 1036F TOBACCO NON-USER: CPT | Performed by: ORTHOPAEDIC SURGERY

## 2020-06-25 PROCEDURE — G8427 DOCREV CUR MEDS BY ELIG CLIN: HCPCS | Performed by: ORTHOPAEDIC SURGERY

## 2020-06-25 NOTE — PROGRESS NOTES
equinus, contracture peroneus longus      LLE:  Alignment:  Heel neutral subtle cavus  Vascular: Toes warm and well perfused, compartments soft/compressible. No significant swelling of foot. Skin: Intact without rash/lesions/AV malformations. Strength: Able to fire/perform the following with appropriate strength:    [x]  Tib Ant:     [x]  Gastroc-Soleus:         [x]  Inversion:    [x]  Eversion:         [x]  FHL:     [x]  EHL:      Motion:  Normal for the following joints:    [x]  Ankle:     [x]  Subtalar:        [x]  1st MTP:      []  1st TMT:            Tenderness to Palpation:    Tenderness to palpation: None  -Gastroc equinus, contracture peroneus longus with callus at the plantar medial aspect of the first metatarsal head      RADIOLOGY:   X-rays reviewed, both images and report as below. Limited studies. There are degenerative changes diffusely throughout the midfoot, most pronounced at the 4 5 cuboid joint. Xr Ankle Right (min 3 Views)    Result Date: 6/25/2020  Moderate degenerative changes about the ankle and foot. Xr Foot Right (min 3 Views)    Result Date: 6/25/2020  Moderate degenerative changes about the ankle and foot. ASSESSMENT AND PLAN:  Joey Judd was seen today for Foot Pain (right foot pain)  The primary encounter diagnosis was Arthritis of midfoot. Diagnoses of Cavus deformity of foot, acquired, Gastrocnemius equinus, unspecified laterality, and Contracture of muscles of both lower extremities were also pertinent to this visit. Body mass index is 34.73 kg/m². He has lateral column midfoot arthritis, most pronounced at the 4 5 cuboid joint, with a possible loose body. Notably, he has a history of aortic regurgitation, aortic aneurysm, kidney cancer status post mass resection, and non-insulin-dependent diabetes.     I had a long discussion today with the patient about the likely diagnosis and its natural history, physical exam and imaging findings, as well as treatment

## 2020-06-30 RX ORDER — LOSARTAN POTASSIUM AND HYDROCHLOROTHIAZIDE 25; 100 MG/1; MG/1
1 TABLET ORAL DAILY
Qty: 90 TABLET | Refills: 1 | Status: SHIPPED | OUTPATIENT
Start: 2020-06-30 | End: 2021-01-03 | Stop reason: SDUPTHER

## 2020-06-30 NOTE — TELEPHONE ENCOUNTER
Bonnie Remy called requesting a refill of the below medication which has been pended for you:     Requested Prescriptions     Pending Prescriptions Disp Refills    losartan-hydrochlorothiazide (HYZAAR) 100-25 MG per tablet 90 tablet 0     Sig: Take 1 tablet by mouth daily       Last Appointment Date: 6/12/2020  Next Appointment Date: 11/13/2020    Allergies   Allergen Reactions    Codeine      Severe Abdominal pain    Sulfa Antibiotics      Patient unsure of reaction

## 2020-07-06 ENCOUNTER — HOSPITAL ENCOUNTER (OUTPATIENT)
Dept: CT IMAGING | Age: 65
Discharge: HOME | End: 2020-07-08
Payer: COMMERCIAL

## 2020-07-06 PROCEDURE — 73700 CT LOWER EXTREMITY W/O DYE: CPT

## 2020-07-07 ENCOUNTER — OFFICE VISIT (OUTPATIENT)
Dept: BEHAVIORAL/MENTAL HEALTH | Age: 65
End: 2020-07-07
Payer: COMMERCIAL

## 2020-07-07 PROCEDURE — 90837 PSYTX W PT 60 MINUTES: CPT | Performed by: COUNSELOR

## 2020-07-07 NOTE — PROGRESS NOTES
Behavioral Health Consultation/Psychotherapy Note  Eugenia Rosas. Nacho Armstrong Psy.D. Visit Date:  7/7/2020    Patient:  Aidee Ugalde  YOB: 1955  Chief Complaint:  Follow-up    Duration of session:  68 minutes      S:     Patient presented alone for appointment and engaged readily. Patient returned previously provided behavioral questionnaires and discussed preliminary review. Patient processed his completion strategy and discussed his difficulty in answering prompts. Patient discussed symptom areas that were underreported on the questionnaires, and reviewed psychoeducational content regarding executive functioning. Patient discussed how this applies to his past experiences and current self-esteem. Patient discussed his response to the recent racial discourses and processed his confusion regarding being told that he was privileged. Patient processed his concern that others would think he was racist when he did not want to be. Patient discussed his upcoming camping plans and reunions with friends. Patient again displayed a highly voluble level of speech, but was more receptive to redirection to wind down appointment. O:    Appearance    Patient presents as alert, oriented, and cooperative  Appetite normal  Sleep disturbance No  Loss of pleasure No  Speech    Hyperverbal, rapid and interrupting, but clear and understandable  Mood    Euthymic  Affect    normal affect  Thought Process    tangential  Insight    Good  Judgment    Notable impulsivity, but generally intact  Memory    recent and remote memory intact  Suicide Assessment    no suicidal ideation      A:    1. Anxiety disorder, unspecified type    2. Executive function deficit      Patient continues to demonstrate markedly reduced anxiety symptoms and is reporting a general sense of relaxation and happiness.  Patient's returned behavioral questionnaires do not reflect clinically significant levels of executive dysfunction, but I also believe that

## 2020-07-07 NOTE — PATIENT INSTRUCTIONS
1) Try out the information I gave you today on grounding techniques and the worry log. See what works for you. 2) Keep track of your symptoms and how you're feeling. Bring the sheets with you next time for us to review.

## 2020-07-09 ENCOUNTER — TELEPHONE (OUTPATIENT)
Dept: ORTHOPEDIC SURGERY | Age: 65
End: 2020-07-09

## 2020-07-28 ENCOUNTER — HOSPITAL ENCOUNTER (OUTPATIENT)
Dept: PHYSICAL THERAPY | Age: 65
Setting detail: THERAPIES SERIES
Discharge: HOME | End: 2020-07-28
Payer: MEDICAID

## 2020-07-28 PROCEDURE — 97110 THERAPEUTIC EXERCISES: CPT | Performed by: PHYSICAL THERAPIST

## 2020-07-28 PROCEDURE — 97162 PT EVAL MOD COMPLEX 30 MIN: CPT | Performed by: PHYSICAL THERAPIST

## 2020-07-28 ASSESSMENT — PAIN DESCRIPTION - ONSET: ONSET: ON-GOING

## 2020-07-28 ASSESSMENT — PAIN DESCRIPTION - ORIENTATION: ORIENTATION: LOWER;MID

## 2020-07-28 ASSESSMENT — PAIN DESCRIPTION - PROGRESSION: CLINICAL_PROGRESSION: GRADUALLY WORSENING

## 2020-07-28 ASSESSMENT — PAIN DESCRIPTION - PAIN TYPE: TYPE: CHRONIC PAIN

## 2020-07-28 ASSESSMENT — PAIN SCALES - GENERAL: PAINLEVEL_OUTOF10: 6

## 2020-07-28 ASSESSMENT — PAIN DESCRIPTION - DESCRIPTORS: DESCRIPTORS: ACHING;DULL

## 2020-07-28 ASSESSMENT — PAIN - FUNCTIONAL ASSESSMENT: PAIN_FUNCTIONAL_ASSESSMENT: PREVENTS OR INTERFERES SOME ACTIVE ACTIVITIES AND ADLS

## 2020-07-28 ASSESSMENT — PAIN DESCRIPTION - LOCATION: LOCATION: FOOT

## 2020-07-28 ASSESSMENT — PAIN DESCRIPTION - FREQUENCY: FREQUENCY: CONTINUOUS

## 2020-07-28 NOTE — PROGRESS NOTES
Physical Therapy  Initial Assessment  Date: 2020  Patient Name: Jose Whiting  MRN: [id number]  : 1955     Treatment Diagnosis: R foot pain    Restrictions       Subjective   General  Chart Reviewed: Yes  Patient assessed for rehabilitation services?: Yes  Response To Previous Treatment: Not applicable  Family / Caregiver Present: No  Referring Practitioner: Monico Abreu MD  Referral Date : 20  Diagnosis: Arthritis of midfoot  Follows Commands: Within Functional Limits  PT Visit Information  Onset Date: 20  PT Insurance Information: Sarah  Subjective  Subjective: \"My right foot hurts. It's on the outside surface. It's been x-rayed and CT scanned and supposedly it's just arthritis, but I can just be sitting and it hurts. Everything that requires me to be standing or walking makes it hurt. If I sit down and relax it feels somewhat better, but sometimes even then it aches. \"  Pain Screening  Patient Currently in Pain: Yes  Pain Assessment  Pain Assessment: 0-10  Pain Level: 6  Patient's Stated Pain Goal: No pain  Pain Type: Chronic pain  Pain Location: Foot  Pain Orientation: Lower;Mid  Pain Radiating Towards: local to foot, dorsum and lateral surface of midfoot  Pain Descriptors: Aching;Dull  Pain Frequency: Continuous  Pain Onset: On-going  Clinical Progression: Gradually worsening  Functional Pain Assessment: Prevents or interferes some active activities and ADLs  Non-Pharmaceutical Pain Intervention(s): Rest;Repositioned  Vital Signs  Patient Currently in Pain: Yes    Orientation  Orientation  Overall Orientation Status: Within Normal Limits    Social/Functional History  Social/Functional History  Lives With: Family  Type of Home: House  Home Layout: Two level; Able to Live on Main level with bedroom/bathroom  Home Access: Stairs to enter without rails  Entrance Stairs - Number of Steps: 3  Bathroom Shower/Tub: Tub/Shower unit  Bathroom Toilet: Standard  Bathroom Accessibility: Accessible  Receives Help From: Family  ADL Assistance: Independent  Homemaking Assistance: Independent  Homemaking Responsibilities: Yes  Meal Prep Responsibility: Primary  Laundry Responsibility: Primary  Cleaning Responsibility: Primary  Shopping Responsibility: Primary  Ambulation Assistance: Independent  Transfer Assistance: Independent  Active : Yes  Mode of Transportation: SUV  Occupation: Part time employment  Type of occupation: -on feet most of the day  Leisure & Hobbies: camping, hiking, riding bikes, traveling, photography    Objective     Observation/Palpation  Posture: Fair  Observation: forefoot supination bilaterally, rearfoot supination, gastroc equinus/tone noted bilaterally (worse in R)    PROM RLE (degrees)  RLE PROM: WNL  AROM RLE (degrees)  RLE AROM: Exceptions  R Ankle Dorsiflexion 0-20: 3 degrees with knee extended, 12 degrees with knee flexed  R Ankle Plantar Flexion 0-45: WNL  R Ankle Forefoot Inversion 0-40: 35 degrees  R Ankle Forefoot Eversion 0-20: 10 degrees  PROM LLE (degrees)  LLE PROM: WFL  AROM LLE (degrees)  LLE AROM : WFL    Strength RLE  Strength RLE: Exception  R Ankle Dorsiflexion: 4/5  R Ankle Plantar flexion: 5/5  R Ankle Inversion: 4+/5  R Ankle Eversion: 4-/5  Strength LLE  Strength LLE: WNL     Additional Measures  Special Tests: - Squeeze, - Anterior/Posterior Drawer of ankle     Ambulation  Ambulation?: Yes  Ambulation 1  Surface: level tile  Device: No Device  Assistance: Independent  Quality of Gait: bilat pes cavus, bilat rearfoot supination, bilat forefoot supintion, has mild genu valgum bilaterally  Distance: 150'  Balance  Posture: Good  Sitting - Static: Good  Sitting - Dynamic: Good  Standing - Static: Good  Standing - Dynamic: Fair;+     Assessment   Conditions Requiring Skilled Therapeutic Intervention  Body structures, Functions, Activity limitations: Decreased functional mobility ; Decreased ADL status; Decreased ROM; Decreased strength;Decreased posture; Increased pain  Treatment Diagnosis: R foot pain  Prognosis: Good  Decision Making: Medium Complexity  REQUIRES PT FOLLOW UP: Yes  Activity Tolerance  Activity Tolerance: Patient Tolerated treatment well     Plan   Plan  Times per week: 2  Plan weeks: 6  Current Treatment Recommendations: Strengthening, ROM, Balance Training, Functional Mobility Training, Gait Training, Manual Therapy - Soft Tissue Mobilization, Neuromuscular Re-education, Home Exercise Program, Integrated Dry Needling, Modalities    Goals  Short term goals  Time Frame for Short term goals: 3 weeks  Short term goal 1: Initiate HEP  Short term goal 2: Decrease R foot pain to <5/10 for improved ease with ADL and ambulation  Short term goal 3: Increase R foot AROM to WNL for improved ease with ADL and ambulation  Long term goals  Time Frame for Long term goals : 6 weeks  Long term goal 1: Indep with HEP  Long term goal 2: Decrease R foot pain to 0/10 for improved ease with ADL and ambulation  Long term goal 3:  Increase R foot strength to WNL for improved ease with ADL and ambulation  Long term goal 4: Pt to stand/ambulate for >45 min without pain or antagia for return to previous level of function  Long term goal 5: LEFS score <15% disabled for return to previous level of function     Therapy Time   Individual Concurrent Group Co-treatment   Time In 1003         Time Out 1055         Minutes 52         Timed Code Treatment Minutes: 25 Minutes     Yana Brice PT, DPT

## 2020-07-28 NOTE — PLAN OF CARE
term goal 5: LEFS score <15% disabled for return to previous level of function    Frequency/Duration: 7/28/2020 - 9/8/2020  # Days per week: [] 1 day # Weeks: [] 1 week [] 5 weeks     [x] 2 days? [] 2 weeks [x] 6 weeks     [] 3 days   [] 3 weeks [] 7 weeks     [] 4 days   [] 4 weeks [] 8 weeks    Rehab Potential: [] Excellent [x] Good [] Fair  [] Poor     Electronically signed by:  Maria E Steel, PT, DPT    If you have any questions or concerns, please don't hesitate to call.   Thank you for your referral.      Physician Signature:________________________________Date:__________________  By signing above, therapists plan is approved by physician

## 2020-07-28 NOTE — FLOWSHEET NOTE
Physical Therapy Daily Treatment Note    Date:  2020    Patient Name:  Indira Hercules    :  1955  MRN: [id number]  Restrictions/Precautions:     Medical/Treatment Diagnosis Information:   · Diagnosis: Arthritis of midfoot  · Treatment Diagnosis: R foot pain  Insurance/Certification information:  PT Insurance Information: Krista Harrington  Physician Information:  Referring Practitioner: To Montiel MD  Plan of care signed (Y/N):  N  Visit# / total visits:    Pain level: 7-8/10     Time In: 10:03   Time Out: 10:55    Progress Note: [x]  Yes  []  No  Next due by: Visit #12 or by 2020      Subjective:   See eval    Objective: see eval  Observation:   Test measurements:      Exercises:   Exercise/Equipment Resistance/Repetitions Other comments        Airdyne     Counter Ex  On Foam  HR/TR, Marches, Mini Squat   Gastroc Step Stretch 3 x 30\"    Slant Board     Foam balance          Ankle 4 way 15x each    Ankle ABCs A-Z 1x    Ankle Circles 15x each    Active Arches 2\" x 15    Toe Yoga 15x each    Belt Stretch Gastroc 3 way 3 x 30\"    Toe Curls, Ext 15x each    Towel Swipes/Skateboard 1'    Towel ankle swivels (twists) 15x each                   [x] Provided verbal/tactile cueing for activities related to strengthening, flexibility, endurance, ROM. (82814)  [] Provided verbal/tactile cueing for activities related to improving balance, coordination, kinesthetic sense, posture, motor skill, proprioception. (23252)    Therapeutic Activities:     [] Therapeutic activities, direct (one-on-one) patient contact (use of dynamic activities to improve functional performance). (39672)    Gait:   [] Provided training and instruction to the patient for ambulation re-education. (72726)    Self-Care/ADL's  [] Self-care/home management training and compensatory training, meal preparation, safety procedures, and instructions in use of assistive technology devices/adaptive equipment, direct one-on-one contact.  (96740)    Home Exercise Program:     [x] Reviewed/Progressed HEP activities related to strengthening, flexibility, endurance, ROM. (27176)  [] Reviewed/Progressed HEP activities related to improving balance, coordination, kinesthetic sense, posture, motor skill, proprioception.  (76679)    Manual Treatments:    [] Provided manual therapy to mobilize soft tissue/joints for the purpose of modulating pain, promoting relaxation,  increasing ROM, reducing/eliminating soft tissue swelling/inflammation/restriction, improving soft tissue extensibility. (08394)    Service Based Modalities:      Timed Code Treatment Minutes:   25' there-ex/HEP    Total Treatment Minutes:  46'     Treatment/Activity Tolerance:  [x] Patient tolerated treatment well [] Patient limited by fatique  [] Patient limited by pain  [] Patient limited by other medical complications  [] Other:     Prognosis: [x] Good [] Fair  [] Poor    Patient Requires Follow-up: [x] Yes  [] No      Goals:  Short term goals  Time Frame for Short term goals: 3 weeks  Short term goal 1: Initiate HEP  Short term goal 2: Decrease R foot pain to <5/10 for improved ease with ADL and ambulation  Short term goal 3: Increase R foot AROM to WNL for improved ease with ADL and ambulation    Long term goals  Time Frame for Long term goals : 6 weeks  Long term goal 1: Indep with HEP  Long term goal 2: Decrease R foot pain to 0/10 for improved ease with ADL and ambulation  Long term goal 3:  Increase R foot strength to WNL for improved ease with ADL and ambulation  Long term goal 4: Pt to stand/ambulate for >45 min without pain or antagia for return to previous level of function  Long term goal 5: LEFS score <15% disabled for return to previous level of function    Plan:   [] Continue per plan of care [] Alter current plan (see comments)  [x] Plan of care initiated [] Hold pending MD visit [] Discharge    Plan for Next Session:  Progress as tolerated    Electronically signed by:  Justo Prasad DPT

## 2020-07-31 ENCOUNTER — HOSPITAL ENCOUNTER (OUTPATIENT)
Dept: PHYSICAL THERAPY | Age: 65
Setting detail: THERAPIES SERIES
Discharge: HOME | End: 2020-07-31
Payer: MEDICAID

## 2020-07-31 PROCEDURE — 97110 THERAPEUTIC EXERCISES: CPT | Performed by: PHYSICAL THERAPIST

## 2020-07-31 NOTE — FLOWSHEET NOTE
Physical Therapy Daily Treatment Note    Date:  2020    Patient Name:  Alejandra Zhu    :  1955  MRN: [id number]  Restrictions/Precautions:     Medical/Treatment Diagnosis Information:   · Diagnosis: Arthritis of midfoot  · Treatment Diagnosis: R foot pain  Insurance/Certification information:  PT Insurance Information: Tommy Martins  Physician Information:  Referring Practitioner: Pavan Zaidi MD  Plan of care signed (Y/N):  Y  Visit# / total visits:    Pain level: /10     Time In: 9:05   Time Out: 9:50    Progress Note: []  Yes  [x]  No  Next due by: Visit #12 or by 2020      Subjective:   \"My foot hasn't hurt as bad as it was, the past 2 days have been okay. \"    Objective:   Observation:   Test measurements:      Exercises:   Exercise/Equipment Resistance/Repetitions Other comments        Airdyne 5'    Counter Ex  On Foam 15x each HR/TR, Marches, Mini Squat   Gastroc Step Stretch 3 x 30\"    Slant Board 3 x 30\"    Foam balance FTEC  3x 30\"    Tandem Balance 2 x 30\" each              Ankle 4 way 15x each    Ankle ABCs A-Z 1x    Ankle Circles 15x each    Active Arches 2\" x 15    Toe Yoga 15x each    Belt Stretch Gastroc 3 way 3 x 30\"    Toe Curls, Ext 15x each    Towel ankle swivels (twists) 15x each    Skateboard 2'              [x] Provided verbal/tactile cueing for activities related to strengthening, flexibility, endurance, ROM. (80430)  [] Provided verbal/tactile cueing for activities related to improving balance, coordination, kinesthetic sense, posture, motor skill, proprioception. (62847)    Therapeutic Activities:     [] Therapeutic activities, direct (one-on-one) patient contact (use of dynamic activities to improve functional performance). (34062)    Gait:   [] Provided training and instruction to the patient for ambulation re-education.  (84977)    Self-Care/ADL's  [] Self-care/home management training and compensatory training, meal preparation, safety procedures, and instructions in use of assistive technology devices/adaptive equipment, direct one-on-one contact. (71767)    Home Exercise Program:     [x] Reviewed/Progressed HEP activities related to strengthening, flexibility, endurance, ROM. (48204)  [] Reviewed/Progressed HEP activities related to improving balance, coordination, kinesthetic sense, posture, motor skill, proprioception.  (30312)    Manual Treatments:    [] Provided manual therapy to mobilize soft tissue/joints for the purpose of modulating pain, promoting relaxation,  increasing ROM, reducing/eliminating soft tissue swelling/inflammation/restriction, improving soft tissue extensibility. (85855)    Service Based Modalities:      Timed Code Treatment Minutes:   39' there-ex/HEP    Total Treatment Minutes:  39'     Treatment/Activity Tolerance:  [x] Patient tolerated treatment well [] Patient limited by fatique  [] Patient limited by pain  [] Patient limited by other medical complications  [] Other:     Prognosis: [x] Good [] Fair  [] Poor    Patient Requires Follow-up: [x] Yes  [] No      Goals:  Short term goals  Time Frame for Short term goals: 3 weeks  Short term goal 1: Initiate HEP  Short term goal 2: Decrease R foot pain to <5/10 for improved ease with ADL and ambulation  Short term goal 3: Increase R foot AROM to WNL for improved ease with ADL and ambulation    Long term goals  Time Frame for Long term goals : 6 weeks  Long term goal 1: Indep with HEP  Long term goal 2: Decrease R foot pain to 0/10 for improved ease with ADL and ambulation  Long term goal 3:  Increase R foot strength to WNL for improved ease with ADL and ambulation  Long term goal 4: Pt to stand/ambulate for >45 min without pain or antagia for return to previous level of function  Long term goal 5: LEFS score <15% disabled for return to previous level of function    Plan:   [x] Continue per plan of care [] Alter current plan (see comments)  [] Plan of care initiated [] Hold pending MD visit [] Discharge    Plan for Next Session:  Progress as tolerated    Electronically signed by:  Rossie Cheadle, DPT

## 2020-08-03 ENCOUNTER — HOSPITAL ENCOUNTER (OUTPATIENT)
Dept: PHYSICAL THERAPY | Age: 65
Setting detail: THERAPIES SERIES
Discharge: HOME | End: 2020-08-03
Payer: MEDICAID

## 2020-08-03 PROCEDURE — 97140 MANUAL THERAPY 1/> REGIONS: CPT | Performed by: PHYSICAL THERAPIST

## 2020-08-03 PROCEDURE — 97110 THERAPEUTIC EXERCISES: CPT | Performed by: PHYSICAL THERAPIST

## 2020-08-03 NOTE — FLOWSHEET NOTE
Physical Therapy Daily Treatment Note    Date:  8/3/2020    Patient Name:  Mauri Otero    :  1955  MRN: [id number]  Restrictions/Precautions:     Medical/Treatment Diagnosis Information:   · Diagnosis: Arthritis of midfoot  · Treatment Diagnosis: R foot pain  Insurance/Certification information:  PT Insurance Information: [employer] (roll.to)  Physician Information:  Referring Practitioner: Pipe Rai MD  Plan of care signed (Y/N):  Y  Visit# / total visits:  3/12  Pain level: 1-10     Time In: 9:08   Time Out: 10:08    Progress Note: []  Yes  [x]  No  Next due by: Visit #12 or by 2020      Subjective: \"The pain in my foot has gone down to a 1-2 instead of an 8/10. I haven't had to do anything differently due to pain. \"    Objective:   Observation:   Test measurements:    AROM R ankle DF: 15 degrees   EV: 13 degrees    Exercises:   Exercise/Equipment Resistance/Repetitions Other comments        Miki 5'    Counter Ex  On Foam 15x each HR/TR, Marches, Mini Squat   Gastroc Step Stretch 3 x 30\"    Slant Board 3 x 30\"    Foam balance FTEC  3x 30\"    Tandem Balance on AIREX 2 x 30\" each    SLS on stable surface 3 x 30\"    ProStretch Rocker 3\" x 20                   Ankle 4 way 15x each GREEN T-Band   Ankle ABCs A-Z 1x    Ankle Circles 15x each GREEN T-Band    Active Arches 2\" x 15    Toe Yoga 15x each    Belt Stretch Gastroc 3 way 3 x 30\"    Toe Curls, Ext 15x each    Towel ankle swivels (twists) 15x each    Skateboard 2'         Manual: Jt Mobs 10'    [x] Provided verbal/tactile cueing for activities related to strengthening, flexibility, endurance, ROM. (48595)  [] Provided verbal/tactile cueing for activities related to improving balance, coordination, kinesthetic sense, posture, motor skill, proprioception. (10569)    Therapeutic Activities:     [] Therapeutic activities, direct (one-on-one) patient contact (use of dynamic activities to improve functional performance).  (09337)    Gait:   [] Provided training and instruction to the patient for ambulation re-education. (89739)    Self-Care/ADL's  [] Self-care/home management training and compensatory training, meal preparation, safety procedures, and instructions in use of assistive technology devices/adaptive equipment, direct one-on-one contact. (41652)    Home Exercise Program:     [x] Reviewed/Progressed HEP activities related to strengthening, flexibility, endurance, ROM. (59394)  [] Reviewed/Progressed HEP activities related to improving balance, coordination, kinesthetic sense, posture, motor skill, proprioception.  (78105)    Manual Treatments:    [x] Provided manual therapy to mobilize soft tissue/joints for the purpose of modulating pain, promoting relaxation,  increasing ROM, reducing/eliminating soft tissue swelling/inflammation/restriction, improving soft tissue extensibility. (04374)    Service Based Modalities:      Timed Code Treatment Minutes:   48' there-ex/HEP       10' Manual    Total Treatment Minutes:  61'     Treatment/Activity Tolerance:  [x] Patient tolerated treatment well [] Patient limited by fatique  [] Patient limited by pain  [] Patient limited by other medical complications  [] Other:     Prognosis: [x] Good [] Fair  [] Poor    Patient Requires Follow-up: [x] Yes  [] No      Goals:  Short term goals  Time Frame for Short term goals: 3 weeks  Short term goal 1: Initiate HEP MET  Short term goal 2: Decrease R foot pain to <5/10 for improved ease with ADL and ambulation MET  Short term goal 3: Increase R foot AROM to WNL for improved ease with ADL and ambulation In progress    Long term goals  Time Frame for Long term goals : 6 weeks  Long term goal 1: Indep with HEP  Long term goal 2: Decrease R foot pain to 0/10 for improved ease with ADL and ambulation  Long term goal 3:  Increase R foot strength to WNL for improved ease with ADL and ambulation  Long term goal 4: Pt to stand/ambulate for >45 min without pain or antagia for return to

## 2020-08-04 ENCOUNTER — APPOINTMENT (OUTPATIENT)
Dept: PHYSICAL THERAPY | Age: 65
End: 2020-08-04
Payer: MEDICAID

## 2020-08-10 ENCOUNTER — HOSPITAL ENCOUNTER (OUTPATIENT)
Dept: PHYSICAL THERAPY | Age: 65
Setting detail: THERAPIES SERIES
Discharge: HOME | End: 2020-08-10
Payer: MEDICAID

## 2020-08-10 PROCEDURE — 97110 THERAPEUTIC EXERCISES: CPT | Performed by: PHYSICAL THERAPIST

## 2020-08-10 PROCEDURE — 97140 MANUAL THERAPY 1/> REGIONS: CPT | Performed by: PHYSICAL THERAPIST

## 2020-08-10 NOTE — FLOWSHEET NOTE
Physical Therapy Daily Treatment Note    Date:  8/10/2020    Patient Name:  Koffi Mann    :  1955  MRN: [id number]  Restrictions/Precautions:     Medical/Treatment Diagnosis Information:   · Diagnosis: Arthritis of midfoot  · Treatment Diagnosis: R foot pain  Insurance/Certification information:  PT Insurance Information: [address] [place] [address]  Physician Information:  Referring Practitioner: To Montiel MD  Plan of care signed (Y/N):  Y  Visit# / total visits:    Pain level: 1-2/10     Time In: 10:08   Time Out: 11:08    Progress Note: []  Yes  [x]  No  Next due by: Visit #12 or by 2020      Subjective:   \"I went camping last week, which involved a lot of bike riding and hiking and I had minimal pain and discomfort throughout all 5 days of activities. Overall it's nowhere near as bad as it once was, as far as pain and aching. \"    Objective:   Observation:   Test measurements:    AROM R ankle DF: 15 degrees   EV: 13 degrees    Exercises:   Exercise/Equipment Resistance/Repetitions Other comments        Miki 5'    Counter Ex  On Foam 15x each HR/TR, Marches, Mini Squat   Gastroc Step Stretch 3 x 30\"    Slant Board 3 x 30\"    Foam balance FTEC  3x 30\"    Tandem Balance on AIREX 2 x 30\" each    SLS on stable surface 3 x 30\"    ProStretch Rocker 3\" x 20    SLS on AIREX with ball circles opposite foot 8# ball  15 circles each bilat    DD DLS 3 way 33\" each         Ankle 4 way 20x each GREEN T-Band   Ankle ABCs A-Z 1x    Ankle Circles 15x each GREEN T-Band    Active Arches 2\" x 15    Toe Yoga 15x each    Belt Stretch Gastroc 3 way 3 x 30\"    Toe Curls, Ext 15x each    Towel ankle swivels (twists) 15x each    Skateboard 2'         Manual: Garfield Conn 10'    [x] Provided verbal/tactile cueing for activities related to strengthening, flexibility, endurance, ROM. (64011)  [] Provided verbal/tactile cueing for activities related to improving balance, coordination, kinesthetic sense, posture, motor skill, proprioception. (55997)    Therapeutic Activities:     [] Therapeutic activities, direct (one-on-one) patient contact (use of dynamic activities to improve functional performance). (02097)    Gait:   [] Provided training and instruction to the patient for ambulation re-education. (86943)    Self-Care/ADL's  [] Self-care/home management training and compensatory training, meal preparation, safety procedures, and instructions in use of assistive technology devices/adaptive equipment, direct one-on-one contact. (04821)    Home Exercise Program:     [x] Reviewed/Progressed HEP activities related to strengthening, flexibility, endurance, ROM. (33550)  [] Reviewed/Progressed HEP activities related to improving balance, coordination, kinesthetic sense, posture, motor skill, proprioception.  (04465)    Manual Treatments:    [x] Provided manual therapy to mobilize soft tissue/joints for the purpose of modulating pain, promoting relaxation,  increasing ROM, reducing/eliminating soft tissue swelling/inflammation/restriction, improving soft tissue extensibility. (43803)    Service Based Modalities:      Timed Code Treatment Minutes:   48' there-ex/HEP       10' Manual    Total Treatment Minutes:  61'     Treatment/Activity Tolerance:  [x] Patient tolerated treatment well [] Patient limited by fatique  [] Patient limited by pain  [] Patient limited by other medical complications  [] Other:     Prognosis: [x] Good [] Fair  [] Poor    Patient Requires Follow-up: [x] Yes  [] No      Goals:  Short term goals  Time Frame for Short term goals: 3 weeks  Short term goal 1: Initiate HEP MET  Short term goal 2: Decrease R foot pain to <5/10 for improved ease with ADL and ambulation MET  Short term goal 3:  Increase R foot AROM to WNL for improved ease with ADL and ambulation In progress    Long term goals  Time Frame for Long term goals : 6 weeks  Long term goal 1: Indep with HEP  Long term goal 2: Decrease R foot pain to 0/10 for improved ease with ADL

## 2020-08-11 ENCOUNTER — OFFICE VISIT (OUTPATIENT)
Dept: BEHAVIORAL/MENTAL HEALTH | Age: 65
End: 2020-08-11
Payer: COMMERCIAL

## 2020-08-11 PROCEDURE — 90837 PSYTX W PT 60 MINUTES: CPT | Performed by: COUNSELOR

## 2020-08-11 NOTE — PROGRESS NOTES
Behavioral Health Consultation/Psychotherapy Note  Nicole Matamoros Psy.D. Visit Date:  8/11/2020    Patient:  Le Ferreira  YOB: 1955  Chief Complaint:  Follow-up    Duration of session:  75 minutes      S:     Patient presented alone for appointment and engaged readily. Patient reported that he continues to do well and has been going camping weekly with his partner. Patient stated that this has prompted him to unplug from electronics and social media more often and to spend more time exercising and enjoying nature. Patient stated that he has noticed observable benefits to his anxiety and perceived stress levels as a result. Patient discussed final scoring of his returned behavioral questionnaires and reviewed implications. Patient discussed ways of integrating this information to improve his sense of stability and self-outlook. Patient discussed options for further development and reviewed his progress to consolidate gains. Patient was noted to be calmer and more relaxed at this appointment, although still animated and effusive. O:    Appearance    Patient presents as alert, oriented, and cooperative  Appetite normal  Sleep disturbance No  Loss of pleasure No  Speech    Hyperverbal, rapid and interrupting, but clear and understandable  Mood    Euthymic  Affect    normal affect  Thought Process    tangential  Insight    Good  Judgment    Notable impulsivity, but generally intact  Memory    recent and remote memory intact  Suicide Assessment    no suicidal ideation      A:    1. Anxiety disorder, unspecified type    2. Executive function deficit        Patient continues to demonstrate markedly improved anxiety symptoms and continues to report ongoing feelings of relaxation and happiness. Patient's demeanor is notably different from his presentation at our original encounter.  Patient did confirm through discussion that he uses multiple unrecognized accommodations for underlying executive function deficits, and that his submitted questionnaires do indeed reflect underreporting. It is very probable that patient does have some form of neurodivergence despite his scores. Patient has been able to achieve a better sense of balance and satisfaction in spite of this, however, and further clarification and targeted treatment is neither necessary nor appropriate at this time. Patient has determined that he feels ready to terminate this treatment episode, but has indicated that he will reschedule in future if symptoms of anxiety or executive dysfunction become sufficiently intrusive. P:    Discussed various factors related to the development and maintenance of  anxiety (including biological, cognitive, behavioral, and environmental factors), Developed Crisis Response Plan, Trained in relaxation strategies, Trained in improving communication skills, Discussed self-care (sleep, nutrition, rewarding activities, social support, exercise), Discussed and problem-solved barriers in adhering to behavioral change plan, Motivational Interviewing to increase patient confidence and compliance with adhering to behavioral change plan, Motivational Interviewing to determine importance and readiness for change, Discussed potential barriers to change, Established rapport, Conducted functional assessment, Victor-setting to identify pt's primary goals for [facility] visit / overall health, Supportive techniques, Provided Psychoeducation re: symptom tracking, CBT to target cognitive distortions, Identified maladaptive thoughts and Problem-solving re: obtaining necessary insurance information    Patient Plan:  1) Try out the information I gave you today on grounding techniques and the worry log. See what works for you. 2) Keep track of your symptoms and how you're feeling. Bring the sheets with you next time for us to review. Patient to return as needed for follow-up. All questions about treatment plan answered.  Patient instructed to call the crisis line and/or proceed to emergency room if suicidal or homicidal ideations occur outside of clinic hours and crisis management skills do not provide relief. Patient stated understanding and is agreeable to treatment and crisis plan.     (Please note that portions of this note were completed with a voice recognition program. Efforts were made to edit the dictations but occasionally words are mis-transcribed.)    Provider Signature:  Electronically signed by Lisa Ivory PSYD on 8/11/2020 at 6:13 PM

## 2020-08-13 ENCOUNTER — TELEPHONE (OUTPATIENT)
Dept: PHARMACY | Facility: CLINIC | Age: 65
End: 2020-08-13

## 2020-08-13 NOTE — TELEPHONE ENCOUNTER
Dr. Carlos A Richmond,     Patient identified as not currently taking or prescribed statin therapy. Per your OV note on 3/2/2020 \"hyperlipidemia - continue statin\". Patient with DM and ASCVD risk score = 26.5%. Should the patient be taking a statin? If so, a new prescription will need to be sent to his pharmacy. I will contact the patient with your response. Thank you,   Oscar Haas, PharmD, [facility]  Direct: [phone]  Department, toll free [phone], option 7    ========================================================================    CLINICAL PHARMACY: STATIN THERAPY REVIEW    SUBJECTIVE:   Identified care gap per [place] [employer]: statin use in persons with diabetes and adherence     Last Office Visit: 6/12/2020 with PCP; 3/2/2020 with cardiologist     ASSESSMENT  Ramya Gregory IDENTIFIED    Per chart review, cardiology OV note on 3/2/2020 states \"hyperlipidemia - continue statin\". Patient is not currently prescribed a statin and I cannot find where he has ever been prescribed a statin. Lab Results   Component Value Date    CHOL 151 10/21/2019    TRIG 159 (H) 10/21/2019    HDL 39 (L) 10/21/2019    LDLCHOLESTEROL 80 10/21/2019     ALT   Date Value Ref Range Status   01/31/2020 18 5 - 41 U/L Final     AST   Date Value Ref Range Status   01/31/2020 22 <40 U/L Final     The 10-year ASCVD risk score (Zulema Chin, et al., 2013) is: 26.5%    Values used to calculate the score:      Age: 72 years      Sex: Male      Is Non- : No      Diabetic: Yes      Tobacco smoker: No      Systolic Blood Pressure: 709 mmHg      Is BP treated: Yes      HDL Cholesterol: 39 mg/dL      Total Cholesterol: 151 mg/dL     Hyperlipidemia Goal: Patient has a 10-yr ASCVD risk of >7.5% with DM and is therefore a candidate for high-intensity statin therapy based on updated guidelines.     2019 ADA Guidelines Age:   [employer]  >/= 36years old:   o History of ASCVD or 10-year ASCVD

## 2020-08-14 RX ORDER — ROSUVASTATIN CALCIUM 5 MG/1
5 TABLET, COATED ORAL DAILY
Qty: 30 TABLET | Refills: 5 | Status: SHIPPED | OUTPATIENT
Start: 2020-08-14 | End: 2021-02-15 | Stop reason: SDUPTHER

## 2020-08-14 NOTE — TELEPHONE ENCOUNTER
We have discussed going on a statin many times. He is certainly a good candidate. He just did not want another medication to take. He is compliant and will take one if I suggest it. Will go ahead and start crestor 5 mg daily. Will send in. Please notify pt. Repeat lipids 3 months fasting.  Thanks    Forwarded to cardiology too

## 2020-08-14 NOTE — TELEPHONE ENCOUNTER
Last Appt:  3/2/20  Next Appt:   9/14/20  Med verified in Epic    No record of any statin prescribed in chart and med list. Please advise.

## 2020-08-17 ENCOUNTER — PATIENT MESSAGE (OUTPATIENT)
Dept: FAMILY MEDICINE CLINIC | Age: 65
End: 2020-08-17

## 2020-08-17 NOTE — TELEPHONE ENCOUNTER
Thank you for the quick response! Attempted to contact the patient to discuss new therapy. Left detailed message and call back number if patient has any questions/concerns. Will sign off at this time.      Xuan Balderas, PharmD, [facility]  Direct: [phone]  Department, toll free [phone], option 7        For Pharmacy Admin Tracking Only    PHSO: Yes  Total # of Interventions Recommended: 1  - New Order #: 1 New Medication Order Reason(s): Needs Additional Medication Therapy  - Maintenance Safety Lab Monitoring #: 1  Recommended intervention potential cost savings: 1  Total Interventions Accepted: 1  Time Spent (min): 15

## 2020-08-17 NOTE — TELEPHONE ENCOUNTER
From: Anthony Rainey  To: PING Abdi  Sent: 8/17/2020 2:20 PM EDT  Subject: Non-Urgent Medical Question    Yes, its a new problem. .. My next scheduled visit is not until November. Should I schedule an appointment? Its a pain everyday! Regan Black      ----- Message -----     PING [address]   Sent:10/14/5 1:53 PM EDT   To:Binu Rowland   Subject:RE: Non-Urgent Medical Question    Hi again,    This looks like it is a new problem. Should see me first then we can decide what to do from there. Ice biofreeze. See you soon, Sandy Batista      ----- Message -----   From:Binu Rowland   Sent:8/17/2020 10:29 AM EDT   To:PING Del Toro   Subject:Non-Urgent Medical Question    Morning, I have 2 questions. ... #1. I have been having shoulder pain in my Left shoulder for about 2 weeks. Certain things I do makes if hurt more. Reaching up or carrying things can aggravate it. Sometimes its like I can feel something move inside. Do I need to see you or a shoulder/joint Dr?? Is this more arthritis issues?      I have to send the next question in another message as I am limited with # of characters  Wiley Mendiola

## 2020-08-17 NOTE — TELEPHONE ENCOUNTER
From: Latoya Arrington  To: PING Gaytan  Sent: 8/17/2020 10:29 AM EDT  Subject: Non-Urgent Medical Question    #2. I got a phone call this morning from a lady who says she is a pharmacist with my insurance company and that You and Dr Tim Linder want me to start taking a cholesterol pill and its at [facility] to . When I have asked you in the past about my cholesterol. .. you have always said it was good/OK. I have not seen Dr Tim Linder for 6 months and have not seen you in 2-3 months. .. why would both of you decide to put me on a cholesterol meds with out doing blood work. .. and seeing me in person? ? I think this is just the insurance company doing this around your back. ......... Collette Ruts Shelvia Endow

## 2020-08-17 NOTE — TELEPHONE ENCOUNTER
From: Sanchez Archer  To: Gary Boxer, PA  Sent: 8/17/2020 10:29 AM EDT  Subject: Non-Urgent Medical Question    Morning, I have 2 questions. ... #1. I have been having shoulder pain in my Left shoulder for about 2 weeks. Certain things I do makes if hurt more. Reaching up or carrying things can aggravate it. Sometimes its like I can feel something move inside. Do I need to see you or a shoulder/joint Dr?? Is this more arthritis issues?      I have to send the next question in another message as I am limited with # of characters  Elli Lucero

## 2020-08-19 ENCOUNTER — HOSPITAL ENCOUNTER (OUTPATIENT)
Dept: GENERAL RADIOLOGY | Age: 65
Discharge: HOME | End: 2020-08-21
Payer: MEDICAID

## 2020-08-19 ENCOUNTER — OFFICE VISIT (OUTPATIENT)
Dept: PRIMARY CARE CLINIC | Age: 65
End: 2020-08-19
Payer: MEDICAID

## 2020-08-19 VITALS
HEART RATE: 70 BPM | SYSTOLIC BLOOD PRESSURE: 130 MMHG | WEIGHT: 241 LBS | TEMPERATURE: 96.3 F | HEIGHT: 71 IN | RESPIRATION RATE: 16 BRPM | BODY MASS INDEX: 33.74 KG/M2 | DIASTOLIC BLOOD PRESSURE: 70 MMHG | OXYGEN SATURATION: 97 %

## 2020-08-19 PROCEDURE — 1036F TOBACCO NON-USER: CPT | Performed by: PHYSICIAN ASSISTANT

## 2020-08-19 PROCEDURE — G8427 DOCREV CUR MEDS BY ELIG CLIN: HCPCS | Performed by: PHYSICIAN ASSISTANT

## 2020-08-19 PROCEDURE — 3017F COLORECTAL CA SCREEN DOC REV: CPT | Performed by: PHYSICIAN ASSISTANT

## 2020-08-19 PROCEDURE — 1123F ACP DISCUSS/DSCN MKR DOCD: CPT | Performed by: PHYSICIAN ASSISTANT

## 2020-08-19 PROCEDURE — 73030 X-RAY EXAM OF SHOULDER: CPT

## 2020-08-19 PROCEDURE — G8417 CALC BMI ABV UP PARAM F/U: HCPCS | Performed by: PHYSICIAN ASSISTANT

## 2020-08-19 PROCEDURE — 4040F PNEUMOC VAC/ADMIN/RCVD: CPT | Performed by: PHYSICIAN ASSISTANT

## 2020-08-19 PROCEDURE — 99212 OFFICE O/P EST SF 10 MIN: CPT | Performed by: PHYSICIAN ASSISTANT

## 2020-08-19 PROCEDURE — 99213 OFFICE O/P EST LOW 20 MIN: CPT | Performed by: PHYSICIAN ASSISTANT

## 2020-08-19 RX ORDER — METHYLPREDNISOLONE 4 MG/1
TABLET ORAL
Qty: 1 KIT | Refills: 0 | Status: SHIPPED | OUTPATIENT
Start: 2020-08-19 | End: 2020-08-27

## 2020-08-19 ASSESSMENT — PATIENT HEALTH QUESTIONNAIRE - PHQ9
SUM OF ALL RESPONSES TO PHQ QUESTIONS 1-9: 0
1. LITTLE INTEREST OR PLEASURE IN DOING THINGS: 0
SUM OF ALL RESPONSES TO PHQ9 QUESTIONS 1 & 2: 0
SUM OF ALL RESPONSES TO PHQ QUESTIONS 1-9: 0
2. FEELING DOWN, DEPRESSED OR HOPELESS: 0

## 2020-08-19 ASSESSMENT — ENCOUNTER SYMPTOMS
VOMITING: 0
NAUSEA: 0
DIARRHEA: 0
RESPIRATORY NEGATIVE: 1

## 2020-08-19 NOTE — PROGRESS NOTES
[facility]    Subjective:      Patient ID: Jaxon Yin is a 72 y.o. y.o. male. Patient is seen due to left shoulder pain it is getting much worse. It has been for 6-8 months. Hears grinding clicking in [facility] joint. The [facility] hurts and has pain in lateral deltoid area. Picking something up hurts. Has dull achy right now. It does not keep awake. If rolls over it can awaken him briefly. Reaching up over head bothers, picking things driving. Reaching bothers. Sitting still helps some. The past 2 weeks the pain is present all the time. If moves then it is more severe. Past Medical History:   Diagnosis Date    Anxiety     Aortic regurgitation     Bicuspid aortic valve     Diabetes mellitus ([address] since March 2018    on Rx.     GERD (gastroesophageal reflux disease)     Hypertension     Left renal mass 10/2018    Osteoarthritis of left knee     Wears glasses        Past Surgical History:   Procedure Laterality Date    CYST REMOVAL      tail bone    MOUTH SURGERY  2015    PARTIAL NEPHRECTOMY Left 11/30/2018    ROBOTIC PARTIAL NEPHRECTOMY,     WY COLONOSCOPY FLX DX W/COLLJ SPEC WHEN PFRMD N/A 5/14/2018    normal colon, Dr. Jayro Jimenez Left 11/30/2018    XI ROBOTIC PARTIAL NEPHRECTOMY, INTRA-OP LAP ULTRASOUND performed by Román Wheatley MD at [address] [place] [address] Drive EXTRACTION         Family History   Problem Relation Age of Onset    High Blood Pressure Mother     Diabetes Mother         impaired fasting glucose    Other Mother         short term memory loss after MVA    Glaucoma Mother     Stroke Father 66    High Blood Pressure Father     Diabetes Father         impaired fasting glucose    Glaucoma Father     Cancer Father         bladder     Heart Attack Father 66    Glaucoma Brother     Diabetes Maternal Grandmother     Other Paternal Grandmother         gallbladder disease    Stroke Paternal Grandfather     Other Paternal Grandfather         gallbladder disease    Diabetes Paternal Grandfather     No Known Problems Brother        Allergies   Allergen Reactions    Codeine      Severe Abdominal pain    Sulfa Antibiotics      Patient unsure of reaction       Current Outpatient Medications   Medication Sig Dispense Refill    rosuvastatin (CRESTOR) 5 MG tablet Take 1 tablet by mouth daily 30 tablet 5    losartan-hydrochlorothiazide (HYZAAR) 100-25 MG per tablet Take 1 tablet by mouth daily 90 tablet 1    meloxicam (MOBIC) 7.5 MG tablet Take 1 tablet by mouth 2 times daily (with meals) 180 tablet 1    pioglitazone (ACTOS) 15 MG tablet Take 1 tablet by mouth daily 30 tablet 5    furosemide (LASIX) 20 MG tablet Take 1 tablet by mouth daily 90 tablet 3    cetirizine (ZYRTEC) 10 MG tablet Take 1 tablet by mouth daily 30 tablet 6    carvedilol (COREG) 25 MG tablet Take 1 tablet by mouth 2 times daily 180 tablet 2    metFORMIN (GLUCOPHAGE) 500 MG tablet Take 1 tablet by mouth 2 times daily (with meals) 180 tablet 2    amLODIPine (NORVASC) 10 MG tablet Take 1 tablet by mouth daily 90 tablet 2    Blood Pressure Monitoring (BLOOD PRESSURE CUFF) MISC DX: I10 HTN 1 each 0    sildenafil (REVATIO) 20 MG tablet Take 1 tablet by mouth as needed (ED) Take 1-5 tabs as needed PRN for ED 60 tablet 3    fluticasone (FLONASE) 50 MCG/ACT nasal spray 1 spray by Nasal route daily (Patient taking differently: 1 spray by Nasal route daily as needed ) 1 Bottle 3    Elastic Bandages & Supports (JOBST OPAQUE KNEE 15-20MMHG XL) MISC #2 pair knee highs  Varicose veins 2 each 0    vitamin D3 (CHOLECALCIFEROL) 400 UNITS TABS tablet Take 400 Units by mouth every other day       Omeprazole (PRILOSEC PO) Take 1 capsule by mouth daily as needed       diclofenac sodium (VOLTAREN) 1 % GEL Apply 4 g topically 4 times daily as needed for Pain 100 g 0    sildenafil (REVATIO) 20 MG tablet Take 1 tablet by mouth daily as needed (ed) 25 tablet 5     No current facility-administered medications for this visit. Review of Systems   Constitutional: Positive for activity change. Negative for appetite change, chills, fatigue and fever. Respiratory: Negative. Cardiovascular: Negative for chest pain and palpitations. Gastrointestinal: Negative for diarrhea, nausea and vomiting. Musculoskeletal: Positive for arthralgias. Negative for myalgias. Skin: Positive for rash. Has what may be bug bites or a rash on upper thighs. They were bad June and July. Is fading now and improved. This is recurrent. Had last year. This year was the worse. No one else has. Not itching. They camp a lot. No other changes. No significant leg sweating. Don't feel shorts rubbing. Nl does not treat. Neurological: Negative for weakness, light-headedness, numbness and headaches. Psychiatric/Behavioral: Positive for sleep disturbance. The patient is not nervous/anxious. Objective:      /70 (Site: Left Upper Arm, Position: Sitting, Cuff Size: Medium Adult)   Pulse 70   Temp 96.3 °F (35.7 °C) (Tympanic)   Resp 16   Ht 5' 11\" (1.803 m)   Wt 241 lb (109.3 kg)   SpO2 97%   BMI 33.61 kg/m²     Physical Exam  Vitals signs and nursing note reviewed. Constitutional:       General: He is not in acute distress. Appearance: Normal appearance. He is well-developed. He is not ill-appearing. HENT:      Head: Normocephalic and atraumatic. Right Ear: External ear normal.      Left Ear: External ear normal.      Nose: Nose normal. No rhinorrhea. Mouth/Throat:      Pharynx: No oropharyngeal exudate. Eyes:      General: No scleral icterus. Conjunctiva/sclera: Conjunctivae normal.   Neck:      Musculoskeletal: Normal range of motion and neck supple. No neck rigidity or muscular tenderness. Vascular: No carotid bruit. Cardiovascular:      Rate and Rhythm: Normal rate and regular rhythm.       Pulses: Normal pulses. Heart sounds: Murmur present. Pulmonary:      Effort: Pulmonary effort is normal. No respiratory distress. Breath sounds: Normal breath sounds. No wheezing, rhonchi or rales. Musculoskeletal:         General: Tenderness present. Right lower leg: No edema. Left lower leg: No edema. Comments: He has pain with palpation over the left AC joint. Pain with internal external rotation. Pain with abduction noted. Positive empty pop can at 90 and 30 degrees. Lymphadenopathy:      Cervical: No cervical adenopathy. Skin:     General: Skin is warm and dry. Findings: No erythema or rash. Neurological:      General: No focal deficit present. Mental Status: He is alert and oriented to person, place, and time. Sensory: No sensory deficit. Motor: No weakness. Gait: Gait normal.      Comments: Nl strength and sensation upper extremities bilaterally. Psychiatric:         Mood and Affect: Mood normal.         Behavior: Behavior normal.         Thought Content: Thought content normal.         Judgment: Judgment normal.       Xr Shoulder Left (min 2 Views)    Result Date: 8/19/2020  EXAMINATION: TWO XRAY VIEWS OF THE LEFT SHOULDER 8/19/2020 11:48 am COMPARISON: None. HISTORY: ORDERING SYSTEM PROVIDED HISTORY: Chronic left shoulder pain TECHNOLOGIST PROVIDED HISTORY: chronic left shoulder pain  worse the past 2 weeks no trauma Reason for Exam: Left shoulder pain with any movement- chronic but worse the past 2 weeks, no known injury. Acuity: Chronic Type of Exam: Initial FINDINGS: There are mild degenerative changes of the left acromioclavicular joint with joint space compromise/minimal spurring. There is no fracture, dislocation, abnormal soft tissue calcification or bony destructive lesions seen     Mild degenerative changes No acute bony or joint space abnormality       Assessment & Plan:     1.  Chronic left shoulder pain    - [facility] -

## 2020-08-24 ENCOUNTER — HOSPITAL ENCOUNTER (OUTPATIENT)
Dept: PHYSICAL THERAPY | Age: 65
Setting detail: THERAPIES SERIES
Discharge: HOME | End: 2020-08-24
Payer: MEDICAID

## 2020-08-24 PROCEDURE — 97110 THERAPEUTIC EXERCISES: CPT | Performed by: PHYSICAL THERAPIST

## 2020-08-24 PROCEDURE — 97161 PT EVAL LOW COMPLEX 20 MIN: CPT | Performed by: PHYSICAL THERAPIST

## 2020-08-24 ASSESSMENT — PAIN DESCRIPTION - PAIN TYPE: TYPE: CHRONIC PAIN

## 2020-08-24 ASSESSMENT — PAIN SCALES - GENERAL: PAINLEVEL_OUTOF10: 9

## 2020-08-24 ASSESSMENT — PAIN DESCRIPTION - ONSET: ONSET: PROGRESSIVE

## 2020-08-24 ASSESSMENT — PAIN DESCRIPTION - DESCRIPTORS: DESCRIPTORS: ACHING;SHARP

## 2020-08-24 ASSESSMENT — PAIN DESCRIPTION - ORIENTATION: ORIENTATION: LEFT

## 2020-08-24 ASSESSMENT — PAIN DESCRIPTION - LOCATION: LOCATION: SHOULDER

## 2020-08-24 ASSESSMENT — PAIN - FUNCTIONAL ASSESSMENT: PAIN_FUNCTIONAL_ASSESSMENT: PREVENTS OR INTERFERES WITH MANY ACTIVE NOT PASSIVE ACTIVITIES

## 2020-08-24 ASSESSMENT — PAIN DESCRIPTION - PROGRESSION: CLINICAL_PROGRESSION: GRADUALLY WORSENING

## 2020-08-24 ASSESSMENT — PAIN DESCRIPTION - FREQUENCY: FREQUENCY: CONTINUOUS

## 2020-08-24 NOTE — FLOWSHEET NOTE
reduction: supine pecy stretch, extension stretch, int rot stretch   [x] Reviewed/Progressed HEP activities related to strengthening, flexibility, endurance, ROM. (37070)  [] Reviewed/Progressed HEP activities related to improving balance, coordination, kinesthetic sense, posture, motor skill, proprioception.  (23152)    Manual Treatments:    [] Provided manual therapy to mobilize soft tissue/joints for the purpose of modulating pain, promoting relaxation,  increasing ROM, reducing/eliminating soft tissue swelling/inflammation/restriction, improving soft tissue extensibility. (01009)    Service Based Modalities:      Timed Code Treatment Minutes:   There ex/ HEP 23'     Total Treatment Minutes:   23'    Treatment/Activity Tolerance:  [x] Patient tolerated treatment well [] Patient limited by fatique  [] Patient limited by pain  [] Patient limited by other medical complications  [] Other:     Prognosis: [x] Good [] Fair  [] Poor    Patient Requires Follow-up: [x] Yes  [] No      Goals:  Short term goals  Time Frame for Short term goals: 1 week  Short term goal 1: Start HEP    Long term goals  Time Frame for Long term goals : 4 weeks  Long term goal 1: L shld pain controlled 2-3/10 with active use  Long term goal 2: AROM flexion, abd thru 170 deg, no pain, behind head to T3  Long term goal 3: 5-/5 strength for required lifting  Long term goal 4: SPADI reduce to 13/130 for 10% disability or less          Plan:   [] Continue per plan of care [] Alter current plan (see comments)  [x] Plan of care initiated [] Hold pending MD visit [] Discharge  Plan for Next Session: 6-way t-band HEP     Electronically signed by:  Javier Driver

## 2020-08-24 NOTE — PLAN OF CARE
[facility]    [x] [place]  Phone: [phone]  Fax: [phone]      [] [place]  Phone: [phone]  Fax: [phone]        To: Referring Practitioner: Oscar FENG      Patient: Kory Galeana  : 1955   MRN: [id number]  Evaluation Date: 2020      Diagnosis Information:  · Diagnosis: M25.512, G89.29 chronic L shld pain   · Treatment Diagnosis: M25.512 L shld pain, impingement     Physical Therapy Certification Form  Dear Oscar FENG  The following patient has been evaluated for physical therapy services and for therapy to continue, Medicare requires monthly physician review of the treatment plan. Please review the attached evaluation and/or summary of the patient's plan of care, and verify that you agree therapy should continue by signing the attached document and sending it back to our office. Plan of Care/Treatment to date:  [x] Therapeutic Exercise    [] Modalities:  [] Therapeutic Activity     [x] Ultrasound  [x] Electrical Stimulation  [] Gait Training      [] Cervical Traction [] Lumbar Traction  [] Neuromuscular Re-education    [] Cold/hotpack [] Iontophoresis   [x] Instruction in HEP     Other:  [x] Manual Therapy      []             [] Aquatic Therapy      []           ? Goals:  Short term goals  Time Frame for Short term goals: 1 week  Short term goal 1: Start HEP    Long term goals  Time Frame for Long term goals : 4 weeks  Long term goal 1: L shld pain controlled 2-3/10 with active use  Long term goal 2: AROM flexion, abd thru 170 deg, no pain, behind head to T3  Long term goal 3: 5-/5 strength for required lifting  Long term goal 4: SPADI reduce to 13/130 for 10% disability or less    Frequency/Duration:20 - 20  # Days per week: [] 1 day # Weeks: [] 1 week [] 5 weeks     [x] 2 days?    [] 2 weeks [] 6 weeks     [] 3 days   [] 3 weeks [] 7 weeks     [] 4 days   [x] 4 weeks [] 8 weeks    Rehab

## 2020-08-24 NOTE — PROGRESS NOTES
0-90: 80 +pain  L Shoulder Horiz ABduction 0-40: 25 +pain  AROM LUE (degrees)  L Shoulder Flexion 0-180: 160  L Shoulder Extension 0-45: 40  L Shoulder ABduction 0-180: 155  L Shoulder Int Rotation  0-70: T12  L Shoulder Ext Rotation  0-90: T1    Strength LUE  Comment: mild pain and weakness abd  L Shoulder Flexion: 4+/5  L Shoulder Extension: 5/5  L Shoulder ABduction: 4-/5  L Shoulder Internal Rotation: 5/5  L Shoulder External Rotation: 4+/5     Additional Measures  Special Tests: + impingement Neers, Horiz-add, Rj Maurer  Other: + Pain ful arc and crepitus AC jt, and supra-humeral space     Assessment   Conditions Requiring Skilled Therapeutic Intervention  Body structures, Functions, Activity limitations: Decreased strength;Decreased ROM; Increased pain  Treatment Diagnosis: M25.512 L shld pain, impingement  Prognosis: Good  Decision Making: Low Complexity  REQUIRES PT FOLLOW UP: Yes         Plan   Plan  Times per week: 2  Plan weeks: 4  Current Treatment Recommendations: Strengthening, ROM, Home Exercise Program, Integrated Dry Needling, Modalities, Manual Therapy - Joint Manipulation    OutComes Score   SPADI 45/130 = 35% disability            Goals  Short term goals  Time Frame for Short term goals: 1 week  Short term goal 1: Start HEP  Long term goals  Time Frame for Long term goals : 4 weeks  Long term goal 1: L shld pain controlled 2-3/10 with active use  Long term goal 2: AROM flexion, abd thru 170 deg, no pain, behind head to T3  Long term goal 3: 5-/5 strength for required lifting  Long term goal 4: SPADI reduce to 13/130 for 10% disability or less       Therapy Time   Individual Concurrent Group Co-treatment   Time In 1250         Time Out 1341         Minutes 51                 Mary Anne Solares, PT

## 2020-08-25 ENCOUNTER — HOSPITAL ENCOUNTER (OUTPATIENT)
Dept: PHYSICAL THERAPY | Age: 65
Setting detail: THERAPIES SERIES
Discharge: HOME | End: 2020-08-25
Payer: MEDICAID

## 2020-08-25 PROCEDURE — 97110 THERAPEUTIC EXERCISES: CPT

## 2020-08-25 PROCEDURE — 97140 MANUAL THERAPY 1/> REGIONS: CPT

## 2020-08-25 NOTE — FLOWSHEET NOTE
Towel ankle swivels (twists) 15x each    Skateboard 2'         Manual: Garfield Mobs 10'    [x] Provided verbal/tactile cueing for activities related to strengthening, flexibility, endurance, ROM. (22467)  [] Provided verbal/tactile cueing for activities related to improving balance, coordination, kinesthetic sense, posture, motor skill, proprioception. (80981)    Therapeutic Activities:     [] Therapeutic activities, direct (one-on-one) patient contact (use of dynamic activities to improve functional performance). (34470)    Gait:   [] Provided training and instruction to the patient for ambulation re-education. (85067)    Self-Care/ADL's  [] Self-care/home management training and compensatory training, meal preparation, safety procedures, and instructions in use of assistive technology devices/adaptive equipment, direct one-on-one contact. (39447)    Home Exercise Program:     [x] Reviewed/Progressed HEP activities related to strengthening, flexibility, endurance, ROM. (02094)  [] Reviewed/Progressed HEP activities related to improving balance, coordination, kinesthetic sense, posture, motor skill, proprioception.  (18464)    Manual Treatments:    [x] Provided manual therapy to mobilize soft tissue/joints for the purpose of modulating pain, promoting relaxation,  increasing ROM, reducing/eliminating soft tissue swelling/inflammation/restriction, improving soft tissue extensibility.  (01528)    Service Based Modalities:      Timed Code Treatment Minutes:   50' there-ex/HEP       10' Manual    Total Treatment Minutes:  62'     Treatment/Activity Tolerance:  [x] Patient tolerated treatment well [] Patient limited by fatique  [] Patient limited by pain  [] Patient limited by other medical complications  [] Other:     Prognosis: [x] Good [] Fair  [] Poor    Patient Requires Follow-up: [x] Yes  [] No      Goals:  Short term goals  Time Frame for Short term goals: 3 weeks  Short term goal 1: Initiate HEP MET  Short term goal 2: Decrease R foot pain to <5/10 for improved ease with ADL and ambulation MET  Short term goal 3: Increase R foot AROM to WNL for improved ease with ADL and ambulation In progress    Long term goals  Time Frame for Long term goals : 6 weeks  Long term goal 1: Indep with HEP  Long term goal 2: Decrease R foot pain to 0/10 for improved ease with ADL and ambulation  Long term goal 3: Increase R foot strength to WNL for improved ease with ADL and ambulation  Long term goal 4: Pt to stand/ambulate for >45 min without pain or antagia for return to previous level of function  Long term goal 5: LEFS score <15% disabled for return to previous level of function    Plan:   [x] Continue per plan of care [] Alter current plan (see comments)  [] Plan of care initiated [] Hold pending MD visit [] Discharge    Plan for Next Session:  Monitor response from today's session and progress as tolerated to improve strength and ROM for functional mobility.     Electronically signed by:  Calixto Roger PTA

## 2020-08-27 ENCOUNTER — OFFICE VISIT (OUTPATIENT)
Dept: ORTHOPEDIC SURGERY | Age: 65
End: 2020-08-27
Payer: MEDICAID

## 2020-08-27 ENCOUNTER — HOSPITAL ENCOUNTER (OUTPATIENT)
Dept: PHYSICAL THERAPY | Age: 65
Setting detail: THERAPIES SERIES
Discharge: HOME | End: 2020-08-27
Payer: MEDICAID

## 2020-08-27 VITALS
WEIGHT: 241 LBS | BODY MASS INDEX: 33.74 KG/M2 | HEIGHT: 71 IN | DIASTOLIC BLOOD PRESSURE: 78 MMHG | SYSTOLIC BLOOD PRESSURE: 128 MMHG | HEART RATE: 75 BPM

## 2020-08-27 PROCEDURE — 4040F PNEUMOC VAC/ADMIN/RCVD: CPT | Performed by: ORTHOPAEDIC SURGERY

## 2020-08-27 PROCEDURE — G8417 CALC BMI ABV UP PARAM F/U: HCPCS | Performed by: ORTHOPAEDIC SURGERY

## 2020-08-27 PROCEDURE — 1036F TOBACCO NON-USER: CPT | Performed by: ORTHOPAEDIC SURGERY

## 2020-08-27 PROCEDURE — G8427 DOCREV CUR MEDS BY ELIG CLIN: HCPCS | Performed by: ORTHOPAEDIC SURGERY

## 2020-08-27 PROCEDURE — 1123F ACP DISCUSS/DSCN MKR DOCD: CPT | Performed by: ORTHOPAEDIC SURGERY

## 2020-08-27 PROCEDURE — 97110 THERAPEUTIC EXERCISES: CPT | Performed by: PHYSICAL THERAPIST

## 2020-08-27 PROCEDURE — 3017F COLORECTAL CA SCREEN DOC REV: CPT | Performed by: ORTHOPAEDIC SURGERY

## 2020-08-27 PROCEDURE — 99213 OFFICE O/P EST LOW 20 MIN: CPT | Performed by: ORTHOPAEDIC SURGERY

## 2020-08-27 PROCEDURE — 99214 OFFICE O/P EST MOD 30 MIN: CPT | Performed by: ORTHOPAEDIC SURGERY

## 2020-08-27 NOTE — FLOWSHEET NOTE
Physical Therapy Daily Treatment Note    Date:  2020    Patient Name:  Johanna Peace    :  1955  MRN: [id number]  Restrictions/Precautions:     Medical/Treatment Diagnosis Information:   · Diagnosis: M25.512, G89.29 chronic L shld pain  · Treatment Diagnosis: M25.512 L shld pain, impingement  Insurance/Certification information:  PT Insurance Information: Sarah  Physician Information:  Referring Practitioner: Ashley FENG  Plan of care signed (Y/N):  n  Visit# / total visits:   Pain level: 7-9/10     Time In: 1:33   Time Out: 1:28    Progress Note: []  Yes  [x]  No  Next due by: Visit #8, or 20      Subjective:   \"My shoulder has been clicking and popping whenever I lift the arm all the way up. \"    Objective:   Observation:   Test measurements:  AROM  L shoulder flexion: 167°      Exercises: there ex for impingement reduction, strength, ROM  Exercise/Equipment Resistance/Repetitions Other comments   Supine pect stretch 5' On 1/ roll   Int rotation stretch 5\", 10x    Extension stretch 10x 10\" Palm up on counter        6-way isometric     B rowing/ extension BLUE 15x each    6-way t-band GREEN 15x         External rotate sidely 15x      Abd in sidely 15x     SA punches 15x         ROM/ mob 5' Humeral head depression        [x] Provided verbal/tactile cueing for activities related to strengthening, flexibility, endurance, ROM. (82585)  [] Provided verbal/tactile cueing for activities related to improving balance, coordination, kinesthetic sense, posture, motor skill, proprioception. (82197)    Therapeutic Activities:     [] Therapeutic activities, direct (one-on-one) patient contact (use of dynamic activities to improve functional performance). (79234)    Gait:   [] Provided training and instruction to the patient for ambulation re-education.  (22711)    Self-Care/ADL's  [] Self-care/home management training and compensatory training, meal preparation, safety procedures, and instructions in use of assistive technology devices/adaptive equipment, direct one-on-one contact. (63121)    Home Exercise Program:   Impingement reduction: supine pecy stretch, extension stretch, int rot stretch   [x] Reviewed/Progressed HEP activities related to strengthening, flexibility, endurance, ROM. (22130)  [] Reviewed/Progressed HEP activities related to improving balance, coordination, kinesthetic sense, posture, motor skill, proprioception.  (35577)    Manual Treatments:    [] Provided manual therapy to mobilize soft tissue/joints for the purpose of modulating pain, promoting relaxation,  increasing ROM, reducing/eliminating soft tissue swelling/inflammation/restriction, improving soft tissue extensibility. (11382)    Service Based Modalities:      Timed Code Treatment Minutes:   There ex/ HEP 55'     Total Treatment Minutes:   54'    Treatment/Activity Tolerance:  [x] Patient tolerated treatment well [] Patient limited by fatique  [] Patient limited by pain  [] Patient limited by other medical complications  [] Other:     Prognosis: [x] Good [] Fair  [] Poor    Patient Requires Follow-up: [x] Yes  [] No      Goals:  Short term goals  Time Frame for Short term goals: 1 week  Short term goal 1: Start HEP    Long term goals  Time Frame for Long term goals : 4 weeks  Long term goal 1: L shld pain controlled 2-3/10 with active use  Long term goal 2: AROM flexion, abd thru 170 deg, no pain, behind head to T3  Long term goal 3: 5-/5 strength for required lifting  Long term goal 4: SPADI reduce to 13/130 for 10% disability or less    Plan:   [x] Continue per plan of care [] Alter current plan (see comments)  [] Plan of care initiated [] Hold pending MD visit [] Discharge    Plan for Next Session: 6-way t-band HEP     Electronically signed by:  Aleksandar Davidson DPT

## 2020-08-27 NOTE — PROGRESS NOTES
[address]  [address] [place] [address]  Dept: Liborio Odell: [phone]    Ambulatory Orthopedic Consult      CHIEF COMPLAINT:    Chief Complaint   Patient presents with    Foot Pain     rech right foot       HISTORY OF PRESENT ILLNESS:      The patient is a 72 y.o. male who is being seen for consultation and evaluation of pain at the right lateral midfoot/hindfoot, which began atraumatically about 15 to 20 years ago. The pain is described mainly with mechanical terms (dull/sharp/throbbing). The pain is worse with activity and better with rest. The patient reports a progressive course. The patient has tried:      [x]  rest/activity modification          [x]  NSAIDs      []  opiates      [x]  orthotics        []  change in shoes   []  home exercises  []  physical therapy      []  CAM boot     []  brace:    []  injection:       []  surgery:      He is referred here today by No ref. provider found. The patient has previously seen Dr. Zamzam Thomas in the past for this problem. INTERVAL HISTORY 8/27/2020:  He is seen again today in the office for follow up of a previous issue (as above). Since being seen last, the patient is doing better. He is ambulating today without a brace or assistive device. The location and quality of the pain have not significantly changed since the last visit. REVIEW OF SYSTEMS:  Constitutional: Negative for fever. HENT: Negative for tinnitus. Eyes: Negative for pain. Respiratory: Negative for shortness of breath. Cardiovascular: Negative for chest pain. Gastrointestinal: Negative for abdominal pain. Genitourinary: Negative for dysuria. Skin: Negative for rash. Neurological: Negative for headaches. Hematological: Does not bruise/bleed easily.    Musculoskeletal: See HPI for pertinent positives     Past Medical History:    He  has a past medical history of Anxiety, Aortic regurgitation, Bicuspid aortic valve, Diabetes mellitus ([facility] [address] (since March 2018), GERD (gastroesophageal reflux disease), Hypertension, Left renal mass (10/2018), Osteoarthritis of left knee, and Wears glasses. Past Surgical History:    He  has a past surgical history that includes cyst removal; [place] tooth extraction; Mouth surgery (2015); pr colonoscopy flx dx w/collj spec when pfrmd (N/A, 5/14/2018); Tonsillectomy and adenoidectomy (1960); partial nephrectomy (Left, 11/30/2018); and pr lap,partial nephrectomy (Left, 11/30/2018).      Current Medications:     Current Outpatient Medications:     rosuvastatin (CRESTOR) 5 MG tablet, Take 1 tablet by mouth daily, Disp: 30 tablet, Rfl: 5    losartan-hydrochlorothiazide (HYZAAR) 100-25 MG per tablet, Take 1 tablet by mouth daily, Disp: 90 tablet, Rfl: 1    meloxicam (MOBIC) 7.5 MG tablet, Take 1 tablet by mouth 2 times daily (with meals), Disp: 180 tablet, Rfl: 1    pioglitazone (ACTOS) 15 MG tablet, Take 1 tablet by mouth daily, Disp: 30 tablet, Rfl: 5    furosemide (LASIX) 20 MG tablet, Take 1 tablet by mouth daily, Disp: 90 tablet, Rfl: 3    cetirizine (ZYRTEC) 10 MG tablet, Take 1 tablet by mouth daily, Disp: 30 tablet, Rfl: 6    carvedilol (COREG) 25 MG tablet, Take 1 tablet by mouth 2 times daily, Disp: 180 tablet, Rfl: 2    metFORMIN (GLUCOPHAGE) 500 MG tablet, Take 1 tablet by mouth 2 times daily (with meals), Disp: 180 tablet, Rfl: 2    amLODIPine (NORVASC) 10 MG tablet, Take 1 tablet by mouth daily, Disp: 90 tablet, Rfl: 2    sildenafil (REVATIO) 20 MG tablet, Take 1 tablet by mouth daily as needed (ed), Disp: 25 tablet, Rfl: 5    Blood Pressure Monitoring (BLOOD PRESSURE CUFF) MISC, DX: I10 HTN, Disp: 1 each, Rfl: 0    sildenafil (REVATIO) 20 MG tablet, Take 1 tablet by mouth as needed (ED) Take 1-5 tabs as needed PRN for ED, Disp: 60 tablet, Rfl: 3    fluticasone (FLONASE) 50 MCG/ACT nasal spray, 1 spray by Nasal route daily (Patient taking differently: 1 spray by Nasal route daily as needed ), Disp: 1 Bottle, Rfl: 3    Elastic Bandages & Supports (JOBST OPAQUE KNEE 15-20MMHG XL) MISC, #2 pair knee highs Varicose veins, Disp: 2 each, Rfl: 0    vitamin D3 (CHOLECALCIFEROL) 400 UNITS TABS tablet, Take 400 Units by mouth every other day , Disp: , Rfl:     Omeprazole (PRILOSEC PO), Take 1 capsule by mouth daily as needed , Disp: , Rfl:      Allergies:    Codeine and Sulfa antibiotics    Family History:  family history includes Cancer in his father; Diabetes in his father, maternal grandmother, mother, and paternal grandfather; Glaucoma in his brother, father, and mother; Heart Attack (age of onset: 66) in his father; High Blood Pressure in his father and mother; No Known Problems in his brother; Other in his mother, paternal grandfather, and paternal grandmother; Stroke in his paternal grandfather; Stroke (age of onset: 66) in his father. Social History:   Social History     Occupational History    Not on file   Tobacco Use    Smoking status: Never Smoker    Smokeless tobacco: Never Used   Substance and Sexual Activity    Alcohol use: Yes     Comment: very rare    Drug use: No    Sexual activity: Yes     Partners: Male     Occupation: [employer] part-time, reports that he is on his feet all day     OBJECTIVE:  /78   Pulse 75   Ht 5' 11\" (1.803 m)   Wt 241 lb (109.3 kg)   BMI 33.61 kg/m²    Psych: alert and oriented to person, time, and place  Cardio:  well perfused extremities  Resp:  normal respiratory effort  Skin:  no cyanosis  Hem/lymph:  no lymphedema  Neuro:  sensation to light touch grossly intact throughout all nerve distributions in the foot   Musculoskeletal:    RLE:  Alignment:  Heel neutral subtle cavus  Vascular: Toes warm and well perfused, compartments soft/compressible. No significant swelling of foot. Skin: Intact without rash/lesions/AV malformations.   Strength: Able to fire/perform the following with appropriate strength:    [x]  Tib Ant:     [x]  Gastroc-Soleus:         [x]  Inversion:    [x]  Eversion:         [x]  FHL:     [x]  EHL:      Motion:  Normal for the following joints:    [x]  Ankle:     [x]  Subtalar:        [x]  1st MTP:      []  1st TMT:            Tenderness to Palpation:    Tenderness to palpation: Lateral midfoot/hindfoot at the 4-5-cuboid joint greater than calcaneocuboid joint  -Gastroc equinus, contracture peroneus longus      LLE:  Alignment:  Heel neutral subtle cavus  Vascular: Toes warm and well perfused, compartments soft/compressible. No significant swelling of foot. Skin: Intact without rash/lesions/AV malformations. Strength: Able to fire/perform the following with appropriate strength:    [x]  Tib Ant:     [x]  Gastroc-Soleus:         [x]  Inversion:    [x]  Eversion:         [x]  FHL:     [x]  EHL:      Motion:  Normal for the following joints:    [x]  Ankle:     [x]  Subtalar:        [x]  1st MTP:      []  1st TMT:            Tenderness to Palpation:    Tenderness to palpation: None  -Gastroc equinus, contracture peroneus longus with callus at the plantar medial aspect of the first metatarsal head      RADIOLOGY:   8/27/2020 Prior images reviewed for reference. CT images and radiology report reviewed, as below:    1. Moderate degenerative changes of the 3rd through 5th TMT joints primarily    at the 5th TMT joint. 2. Mild hallux valgus/metatarsus varus deformity of the 1st MTP joint. 3. No acute osseous abnormality. 4. Mild edema in the soft tissues about the foot.  Moderate edema in the soft    tissues about the right ankle. 5. No acute osseous abnormality. 6. Subcortical cystic changes around the medial aspect of the subtalar    joint/talocalcaneal articulation which could be related to a nonosseous    tarsal coalition. X-rays reviewed, both images and report as below. Limited studies.   There are degenerative changes diffusely throughout the midfoot, most pronounced at the 4 5 cuboid joint. Xr Ankle Right (min 3 Views)    Result Date: 6/25/2020  Moderate degenerative changes about the ankle and foot. Xr Foot Right (min 3 Views)    Result Date: 6/25/2020  Moderate degenerative changes about the ankle and foot. ASSESSMENT AND PLAN:  Agus Che was seen today for Foot Pain (rech right foot)  The primary encounter diagnosis was Arthritis of midfoot. Diagnoses of Cavus deformity of foot, acquired, Gastrocnemius equinus, unspecified laterality, and Contracture of muscles of both lower extremities were also pertinent to this visit. Body mass index is 33.61 kg/m². He has lateral column midfoot arthritis, most pronounced at his 4-5-cuboid joint and somewhat at his third TMT joint. Notably, he has a history of aortic regurgitation, aortic aneurysm, kidney cancer status post mass resection, and non-insulin-dependent diabetes. I had another discussion today with the patient about the likely diagnosis and its natural history, physical exam and imaging findings, as well as treatment options in detail. We discussed rest/activity modification, swelling control, NSAIDs/Acetaminophen/topical anesthetics, orthotics/shoewear modification, bracing/immobilization, injections, and physical therapy. Surgically, we discussed future fourth and fifth TMT resection arthroplasties with possible third TMT fusion, depending on his symptoms in the future. At this point, as he is doing better with physical therapy, he wishes to continue conservative management. The patient wishes to proceed with the recommendations as above. Orders/referrals were placed as below at today's visit. I encouraged him to continue home exercises per physical therapy. He was also provided information on how to obtain over-the-counter cavus orthotic. I provided a prescription for Voltaren (4g TOPL q QID PRN pain).  I recommend this over the use of oral NSAIDs because given the patient's condition, the use of oral NSAIDs for an extended period of time will likely be necessary to help allow appropriate meaningful baseline function; and given the risk for development of side effects (GI bleeding/ulcers) with chronic use of oral anti-inflammatories, this is a much safer option. This is especially important in this situation given the patient's age as well. All questions were answered and the patient agrees with the above plan. The patient will return to clinic in about 3 months without x-rays. At his next visit, depending on how he is doing, we may again discuss surgery as above, versus over-the-counter versus custom rocker-bottom shoes. Return in about 3 months (around 11/27/2020). No orders of the defined types were placed in this encounter. No orders of the defined types were placed in this encounter. Arlet Prescott MD  Orthopedic Surgery, Foot and Ankle        Please excuse any typos/errors, as this note was created with the assistance of voice recognition software. While intending to generate a document that actually reflects the content of the visit, the document can still have some errors including those of syntax and sound-a-like substitutions which may escape proof reading. In such instances, actual meaning can be extrapolated by context.

## 2020-08-28 NOTE — TELEPHONE ENCOUNTER
Bacilio Bass called requesting a refill of the below medication which has been pended for you:     Requested Prescriptions     Pending Prescriptions Disp Refills    cetirizine (ZYRTEC) 10 MG tablet 30 tablet 6     Sig: Take 1 tablet by mouth daily       Last Appointment Date: 8/19/2020  Next Appointment Date: 11/13/2020    Allergies   Allergen Reactions    Codeine      Severe Abdominal pain    Sulfa Antibiotics      Patient unsure of reaction

## 2020-09-01 RX ORDER — CETIRIZINE HYDROCHLORIDE 10 MG/1
10 TABLET ORAL DAILY
Qty: 90 TABLET | Refills: 1 | Status: SHIPPED | OUTPATIENT
Start: 2020-09-01 | End: 2021-01-03 | Stop reason: SDUPTHER

## 2020-09-08 ENCOUNTER — HOSPITAL ENCOUNTER (OUTPATIENT)
Dept: NON INVASIVE DIAGNOSTICS | Age: 65
Discharge: HOME | End: 2020-09-08
Payer: MEDICAID

## 2020-09-08 ENCOUNTER — HOSPITAL ENCOUNTER (OUTPATIENT)
Dept: PHYSICAL THERAPY | Age: 65
Setting detail: THERAPIES SERIES
Discharge: HOME | End: 2020-09-08
Payer: MEDICAID

## 2020-09-08 ENCOUNTER — TELEPHONE (OUTPATIENT)
Dept: CARDIOLOGY | Age: 65
End: 2020-09-08

## 2020-09-08 LAB
LV EF: 70 %
LVEF MODALITY: NORMAL

## 2020-09-08 PROCEDURE — 97110 THERAPEUTIC EXERCISES: CPT

## 2020-09-08 PROCEDURE — 93306 TTE W/DOPPLER COMPLETE: CPT

## 2020-09-08 NOTE — FLOWSHEET NOTE
Physical Therapy Daily Treatment Note    Date:  2020    Patient Name:  Jaxon Yin    :  1955  MRN: [id number]  Restrictions/Precautions:     Medical/Treatment Diagnosis Information:   · Diagnosis: M25.512, G89.29 chronic L shld pain  · Treatment Diagnosis: M25.512 L shld pain, impingement  Insurance/Certification information:  PT Insurance Information: Sarah  Physician Information:  Referring Practitioner: Gabriela FENG  Plan of care signed (Y/N):  n  Visit# / total visits: 3/8  Pain level: 3-4/10     Time In: 10:50   Time Out: 11:29    Progress Note: []  Yes  [x]  No  Next due by: Visit #8, or 20      Subjective: Pt rpts to clinic with mild complaints of L shoulder pain stating \"It really depends on what I am doing. Today its feeling okay but I can do certain things that make it worse quick\". Objective: Pt tolerated todays session well indicating no increase in pain post session. Pt was able to progress multiple exercises this date and required vc for proper performance. Able to undergo manual PROM with good tolerance. Most challenged by 6-way T-band noting fatigue and given said exercise for HEP this date. Observation:   Test measurements:  AROM  L shoulder flexion: 167°      Exercises: there ex for impingement reduction, strength, ROM  Exercise/Equipment Resistance/Repetitions Other comments   Supine pect stretch 5' On 1/2 roll   Int rotation stretch 5\", 10x    Extension stretch 10x 10\" Palm up on counter        6-way isometric     B rowing/ extension BLUE 15x each    6-way t-band GREEN 15x         External rotate sidely 15x      Abd in sidely 15x     SA punches 15x         ROM/ mob 5' Humeral head depression        [x] Provided verbal/tactile cueing for activities related to strengthening, flexibility, endurance, ROM. (72960)  [] Provided verbal/tactile cueing for activities related to improving balance, coordination, kinesthetic sense, posture, motor skill, proprioception. or less    Plan:   [x] Continue per plan of care [] Alter current plan (see comments)  [] Plan of care initiated [] Hold pending MD visit [] Discharge    Plan for Next Session: Progress to tolerance.      Electronically signed by:  Jim Hoffmann PTA

## 2020-09-09 NOTE — PROGRESS NOTES
I have reviewed and agree to the content of the note written by the PTA.   Electronically signed by Shaunna Trinidad PT [id number]

## 2020-09-10 ENCOUNTER — HOSPITAL ENCOUNTER (OUTPATIENT)
Dept: PHYSICAL THERAPY | Age: 65
Setting detail: THERAPIES SERIES
Discharge: HOME | End: 2020-09-10
Payer: MEDICAID

## 2020-09-10 PROCEDURE — 97110 THERAPEUTIC EXERCISES: CPT

## 2020-09-10 PROCEDURE — 97140 MANUAL THERAPY 1/> REGIONS: CPT

## 2020-09-10 NOTE — FLOWSHEET NOTE
related to improving balance, coordination, kinesthetic sense, posture, motor skill, proprioception. (59372)    Therapeutic Activities:     [] Therapeutic activities, direct (one-on-one) patient contact (use of dynamic activities to improve functional performance). (63318)    Gait:   [] Provided training and instruction to the patient for ambulation re-education. (27290)    Self-Care/ADL's  [] Self-care/home management training and compensatory training, meal preparation, safety procedures, and instructions in use of assistive technology devices/adaptive equipment, direct one-on-one contact. (62102)    Home Exercise Program:   Impingement reduction: supine pecy stretch, extension stretch, int rot stretch   [x] Reviewed/Progressed HEP activities related to strengthening, flexibility, endurance, ROM. (72937)  [] Reviewed/Progressed HEP activities related to improving balance, coordination, kinesthetic sense, posture, motor skill, proprioception.  (29464)    Manual Treatments:  PROM x10'  [x] Provided manual therapy to mobilize soft tissue/joints for the purpose of modulating pain, promoting relaxation,  increasing ROM, reducing/eliminating soft tissue swelling/inflammation/restriction, improving soft tissue extensibility. (13264)    Service Based Modalities:      Timed Code Treatment Minutes:   There ex 43'        Manual 10'    Total Treatment Minutes:   48'    Treatment/Activity Tolerance:  [x] Patient tolerated treatment well [] Patient limited by fatique  [] Patient limited by pain  [] Patient limited by other medical complications  [] Other:     Prognosis: [x] Good [] Fair  [] Poor    Patient Requires Follow-up: [x] Yes  [] No      Goals:  Short term goals  Time Frame for Short term goals: 1 week   Short term goal 1: Start HEP    Long term goals  Time Frame for Long term goals : 4 weeks  Long term goal 1: L shld pain controlled 2-3/10 with active use  Long term goal 2: AROM flexion, abd thru 170 deg, no pain, behind head to T3  Long term goal 3: 5-/5 strength for required lifting  Long term goal 4: SPADI reduce to 13/130 for 10% disability or less    Plan:   [x] Continue per plan of care [] Alter current plan (see comments)  [] Plan of care initiated [] Hold pending MD visit [] Discharge    Plan for Next Session: Progress to tolerance.      Electronically signed by:  Mumtaz Serna PTA

## 2020-09-11 ENCOUNTER — HOSPITAL ENCOUNTER (OUTPATIENT)
Dept: CT IMAGING | Age: 65
Discharge: HOME | End: 2020-09-13
Payer: COMMERCIAL

## 2020-09-11 LAB
BUN BLDV-MCNC: 21 MG/DL (ref 8–23)
CREAT SERPL-MCNC: 0.79 MG/DL (ref 0.7–1.2)
GFR AFRICAN AMERICAN: >60 ML/MIN
GFR NON-AFRICAN AMERICAN: >60 ML/MIN
GFR SERPL CREATININE-BSD FRML MDRD: NORMAL ML/MIN/{1.73_M2}
GFR SERPL CREATININE-BSD FRML MDRD: NORMAL ML/MIN/{1.73_M2}

## 2020-09-11 PROCEDURE — 84520 ASSAY OF UREA NITROGEN: CPT

## 2020-09-11 PROCEDURE — 82565 ASSAY OF CREATININE: CPT

## 2020-09-11 PROCEDURE — 2580000003 HC RX 258: Performed by: INTERNAL MEDICINE

## 2020-09-11 PROCEDURE — 71275 CT ANGIOGRAPHY CHEST: CPT

## 2020-09-11 PROCEDURE — 6360000004 HC RX CONTRAST MEDICATION: Performed by: INTERNAL MEDICINE

## 2020-09-11 PROCEDURE — 36415 COLL VENOUS BLD VENIPUNCTURE: CPT

## 2020-09-11 RX ORDER — SODIUM CHLORIDE 0.9 % (FLUSH) 0.9 %
10 SYRINGE (ML) INJECTION PRN
Status: DISCONTINUED | OUTPATIENT
Start: 2020-09-11 | End: 2020-09-14 | Stop reason: HOSPADM

## 2020-09-11 RX ORDER — 0.9 % SODIUM CHLORIDE 0.9 %
80 INTRAVENOUS SOLUTION INTRAVENOUS ONCE
Status: COMPLETED | OUTPATIENT
Start: 2020-09-11 | End: 2020-09-11

## 2020-09-11 RX ADMIN — Medication 10 ML: at 11:35

## 2020-09-11 RX ADMIN — SODIUM CHLORIDE 80 ML: 9 INJECTION, SOLUTION INTRAVENOUS at 11:35

## 2020-09-11 RX ADMIN — IOVERSOL 75 ML: 741 INJECTION INTRA-ARTERIAL; INTRAVENOUS at 11:35

## 2020-09-11 NOTE — TELEPHONE ENCOUNTER
Pt notified of echo and CTA results. Pt very happy and thanks Dr. Yuki Núñez for reviewing his test so quickly.

## 2020-09-11 NOTE — PROGRESS NOTES
I have reviewed and agree to the content of the note written by the PTA.   Electronically signed by Flash Sheldon PT [id number]

## 2020-09-14 ENCOUNTER — OFFICE VISIT (OUTPATIENT)
Dept: ORTHOPEDIC SURGERY | Age: 65
End: 2020-09-14
Payer: MEDICAID

## 2020-09-14 ENCOUNTER — HOSPITAL ENCOUNTER (OUTPATIENT)
Dept: PHYSICAL THERAPY | Age: 65
Setting detail: THERAPIES SERIES
Discharge: HOME | End: 2020-09-14
Payer: MEDICAID

## 2020-09-14 VITALS
SYSTOLIC BLOOD PRESSURE: 131 MMHG | WEIGHT: 241 LBS | BODY MASS INDEX: 33.74 KG/M2 | DIASTOLIC BLOOD PRESSURE: 74 MMHG | HEIGHT: 71 IN | HEART RATE: 80 BPM

## 2020-09-14 PROCEDURE — 97110 THERAPEUTIC EXERCISES: CPT

## 2020-09-14 PROCEDURE — 3017F COLORECTAL CA SCREEN DOC REV: CPT | Performed by: ORTHOPAEDIC SURGERY

## 2020-09-14 PROCEDURE — 99214 OFFICE O/P EST MOD 30 MIN: CPT | Performed by: ORTHOPAEDIC SURGERY

## 2020-09-14 PROCEDURE — 4040F PNEUMOC VAC/ADMIN/RCVD: CPT | Performed by: ORTHOPAEDIC SURGERY

## 2020-09-14 PROCEDURE — G8427 DOCREV CUR MEDS BY ELIG CLIN: HCPCS | Performed by: ORTHOPAEDIC SURGERY

## 2020-09-14 PROCEDURE — 1123F ACP DISCUSS/DSCN MKR DOCD: CPT | Performed by: ORTHOPAEDIC SURGERY

## 2020-09-14 PROCEDURE — G8417 CALC BMI ABV UP PARAM F/U: HCPCS | Performed by: ORTHOPAEDIC SURGERY

## 2020-09-14 PROCEDURE — 1036F TOBACCO NON-USER: CPT | Performed by: ORTHOPAEDIC SURGERY

## 2020-09-14 PROCEDURE — 97035 APP MDLTY 1+ULTRASOUND EA 15: CPT

## 2020-09-14 NOTE — PROGRESS NOTES
Orthopedic Office Note  [facility] [address]  [address], [address]  [facility] [address] [phone]      CHIEF COMPLAINT:    Chief Complaint   Patient presents with    Shoulder Pain     left shoulder pain, films here       HISTORY OF PRESENT ILLNESS:      The patient is a 72 y.o. male  who presents today for left shoulder pain which is been present over the past year. He denies any specific injury. Within the past 6 to 8 months he reports his symptoms have become more severe. He has moderate sharp shoulder pain. He has pain on a daily basis. Occasionally it will wake him up at night. He has pain throughout the day and pain with use of his left shoulder. He has been in physical therapy which has provided some pain relief. He denies paresthesias. He has not had a steroid injection. Past Medical History:    Past Medical History:   Diagnosis Date    Anxiety     Aortic regurgitation     Bicuspid aortic valve     Diabetes mellitus ([address] since March 2018    on Rx.     GERD (gastroesophageal reflux disease)     Hypertension     Left renal mass 10/2018    Osteoarthritis of left knee     Wears glasses        Past Surgical History:    Past Surgical History:   Procedure Laterality Date    CYST REMOVAL      tail bone    MOUTH SURGERY  2015    PARTIAL NEPHRECTOMY Left 11/30/2018    ROBOTIC PARTIAL NEPHRECTOMY,     [place] COLONOSCOPY FLX DX W/COLLJ SPEC WHEN PFRMD N/A 5/14/2018    normal colon, Dr. Yessica Duggan Left 11/30/2018    XI ROBOTIC PARTIAL NEPHRECTOMY, INTRA-OP LAP ULTRASOUND performed by Swati Zelaya MD at [address] [place] [address]         Medications Prior to Admission:   Current Outpatient Medications   Medication Sig Dispense Refill    cetirizine (ZYRTEC) 10 MG tablet Take 1 tablet by mouth daily 90 tablet 1    diclofenac sodium (VOLTAREN) 1 % GEL Apply 4 g topically 4 times daily as needed for Pain 100 g 0    rosuvastatin (CRESTOR) 5 MG tablet Take 1 tablet by mouth daily 30 tablet 5    losartan-hydrochlorothiazide (HYZAAR) 100-25 MG per tablet Take 1 tablet by mouth daily 90 tablet 1    meloxicam (MOBIC) 7.5 MG tablet Take 1 tablet by mouth 2 times daily (with meals) 180 tablet 1    pioglitazone (ACTOS) 15 MG tablet Take 1 tablet by mouth daily 30 tablet 5    furosemide (LASIX) 20 MG tablet Take 1 tablet by mouth daily 90 tablet 3    carvedilol (COREG) 25 MG tablet Take 1 tablet by mouth 2 times daily 180 tablet 2    metFORMIN (GLUCOPHAGE) 500 MG tablet Take 1 tablet by mouth 2 times daily (with meals) 180 tablet 2    amLODIPine (NORVASC) 10 MG tablet Take 1 tablet by mouth daily 90 tablet 2    sildenafil (REVATIO) 20 MG tablet Take 1 tablet by mouth daily as needed (ed) 25 tablet 5    Blood Pressure Monitoring (BLOOD PRESSURE CUFF) MISC DX: I10 HTN 1 each 0    sildenafil (REVATIO) 20 MG tablet Take 1 tablet by mouth as needed (ED) Take 1-5 tabs as needed PRN for ED 60 tablet 3    fluticasone (FLONASE) 50 MCG/ACT nasal spray 1 spray by Nasal route daily (Patient taking differently: 1 spray by Nasal route daily as needed ) 1 Bottle 3    Elastic Bandages & Supports (JOBST OPAQUE KNEE 15-20MMHG XL) MISC #2 pair knee highs  Varicose veins 2 each 0    vitamin D3 (CHOLECALCIFEROL) 400 UNITS TABS tablet Take 400 Units by mouth every other day       Omeprazole (PRILOSEC PO) Take 1 capsule by mouth daily as needed        No current facility-administered medications for this visit.         Allergies:  Codeine and Sulfa antibiotics    Social History:   Social History     Tobacco Use   Smoking Status Never Smoker   Smokeless Tobacco Never Used     Social History     Substance and Sexual Activity   Alcohol Use Yes    Comment: very rare     Social History     Substance and Sexual Activity   Drug Use No       Family History:  Family History   Problem Relation Age of Onset    High Blood Pressure Mother     Diabetes Mother         impaired fasting glucose    Other Mother         short term memory loss after MVA    Glaucoma Mother     Stroke Father 66    High Blood Pressure Father     Diabetes Father         impaired fasting glucose    Glaucoma Father     Cancer Father         bladder     Heart Attack Father 66    Glaucoma Brother     Diabetes Maternal Grandmother     Other Paternal Grandmother         gallbladder disease    Stroke Paternal Grandfather     Other Paternal Grandfather         gallbladder disease    Diabetes Paternal Grandfather     No Known Problems Brother          REVIEW OF SYSTEMS:  Please see the ROS form attached to today's encounter. I have reviewed it with the patient during the visit. All other systems were reviewed and are negative. PHYSICAL EXAM:  On exam today he is alert and oriented x3. He is in no obvious distress. General appearance within normal limits. Left shoulder skin is intact. He has full left shoulder range of motion with discomfort. He has pain but no weakness with resisted rotator cuff testing. He has a positive Holly and Neer maneuver. He has no acromioclavicular joint tenderness. He has no muscle atrophy. He has full finger and wrist range of motion. He has normal sensation. He has no lymphadenopathy. Radiology:  X-rays of his left shoulder reviewed from last month and show some mild degenerative changes of the acromioclavicular joint. No glenohumeral joint abnormalities. ASSESSMENT/PLAN:  1. Chronic left shoulder pain        His pain likely represents bursitis and tendinitis. He has had good relief with Voltaren gel to his foot and I have recommended he try this for his shoulder. If this does not help with symptoms he will check with his primary care provider about obtaining a steroid injection given his history of diabetes.   He will follow-up on an as-needed basis. No orders of the defined types were placed in this encounter.        Guerita Juan MD

## 2020-09-14 NOTE — FLOWSHEET NOTE
Physical Therapy Daily Treatment Note    Date:  2020    Patient Name:  Ben Gray    :  1955  MRN: [id number]  Restrictions/Precautions:     Medical/Treatment Diagnosis Information:   · Diagnosis: M25.512, G89.29 chronic L shld pain  · Treatment Diagnosis: M25.512 L shld pain, impingement  Insurance/Certification information:  PT Insurance Information: Sarah  Physician Information:  Referring Practitioner: Tony FENG  Plan of care signed (Y/N):  n  Visit# / total visits:   Pain level: 4-5/10     Time In: 10:49   Time Out: 11:32    Progress Note: []  Yes  [x]  No  Next due by: Visit #8, or 20      Subjective: Pt rpts to clinic with no change in symptoms from previous session stating \"I have no change in the pain symptoms today. I haven't really improved but I haven't really regressed at all\". Objective: Pt tolerated todays session well noting more tolerable pain free motion after todays session. Pt was able to perform all MARIFER per flow chart with vc required for proper performance. Most challenged by stretches this date noting fatigue but able to complete all MARIFER without rest. Initiated US this session with good tolerance noted.    Observation:   Test measurements:  AROM  L shoulder flexion: 167°      Exercises: there ex for impingement reduction, strength, ROM  Exercise/Equipment Resistance/Repetitions Other comments   Supine pect stretch 5' On 1/2 roll   Int rotation stretch 10\", 10x    Extension stretch 10x 10\" Palm up on counter        6-way isometric     B rowing/ extension BLUE 15x each    6-way t-band GREEN 15x    UBE 6' retro    External rotate sidely 15x  2#    Abd in sidely 15x  2#   Supine ABC 1x 2#   SA punches 15x 2#   T-band accordian, HA x15 ea grn   ROM/ mob 5' Humeral head depression   US  8'    [x] Provided verbal/tactile cueing for activities related to strengthening, flexibility, endurance, ROM. ()  [] Provided verbal/tactile cueing for activities related to improving balance, coordination, kinesthetic sense, posture, motor skill, proprioception. (81244)    Therapeutic Activities:     [] Therapeutic activities, direct (one-on-one) patient contact (use of dynamic activities to improve functional performance). (33233)    Gait:   [] Provided training and instruction to the patient for ambulation re-education. (72815)    Self-Care/ADL's  [] Self-care/home management training and compensatory training, meal preparation, safety procedures, and instructions in use of assistive technology devices/adaptive equipment, direct one-on-one contact. (75641)    Home Exercise Program:   Impingement reduction: supine pecy stretch, extension stretch, int rot stretch   [x] Reviewed/Progressed HEP activities related to strengthening, flexibility, endurance, ROM. (01941)  [] Reviewed/Progressed HEP activities related to improving balance, coordination, kinesthetic sense, posture, motor skill, proprioception.  (91055)    Manual Treatments:  PROM x10'  [x] Provided manual therapy to mobilize soft tissue/joints for the purpose of modulating pain, promoting relaxation,  increasing ROM, reducing/eliminating soft tissue swelling/inflammation/restriction, improving soft tissue extensibility. (75586)    Service Based Modalities:  US application to L anterior shoulder x 8'     Timed Code Treatment Minutes:   There ex 35'            Total Treatment Minutes:   37'    Treatment/Activity Tolerance:  [x] Patient tolerated treatment well [] Patient limited by fatique  [] Patient limited by pain  [] Patient limited by other medical complications  [] Other:     Prognosis: [x] Good [] Fair  [] Poor    Patient Requires Follow-up: [x] Yes  [] No      Goals:  Short term goals  Time Frame for Short term goals: 1 week   Short term goal 1: Start HEP    Long term goals  Time Frame for Long term goals : 4 weeks  Long term goal 1: L shld pain controlled 2-3/10 with active use  Long term goal 2: AROM flexion, abd thru 170 deg, no pain, behind head to T3  Long term goal 3: 5-/5 strength for required lifting  Long term goal 4: SPADI reduce to 13/130 for 10% disability or less    Plan:   [x] Continue per plan of care [] Alter current plan (see comments)  [] Plan of care initiated [] Hold pending MD visit [] Discharge    Plan for Next Session: Progress to tolerance.      Electronically signed by:  Teddy Hamilton PTA

## 2020-09-16 ENCOUNTER — HOSPITAL ENCOUNTER (OUTPATIENT)
Dept: PHYSICAL THERAPY | Age: 65
Setting detail: THERAPIES SERIES
Discharge: HOME | End: 2020-09-16
Payer: MEDICAID

## 2020-09-16 PROCEDURE — 97110 THERAPEUTIC EXERCISES: CPT

## 2020-09-16 NOTE — FLOWSHEET NOTE
Physical Therapy Daily Treatment Note    Date:  2020    Patient Name:  Kory Galeana    :  1955  MRN: [id number]  Restrictions/Precautions:     Medical/Treatment Diagnosis Information:   · Diagnosis: M25.512, G89.29 chronic L shld pain  · Treatment Diagnosis: M25.512 L shld pain, impingement  Insurance/Certification information:  PT Insurance Information: Sarah  Physician Information:  Referring Practitioner: Oscar FENG  Plan of care signed (Y/N):  n  Visit# / total visits:   Pain level: 4-5/10     Time In: 10:49   Time Out: 11:35    Progress Note: []  Yes  [x]  No  Next due by: Visit #8, or 20      Subjective: Pt rpts to clinic with no change in pain symptoms stating \"Honestly after the last session I hardly felt any pain. I really like the US. The pain comes back but I feel like I am doing more\". Objective: Pt tolerated todays session well indicating no increase in pain post session. Pt was able to perform all MARIFER per flow chart with vc required for proper performance. Most challenged by initiated face pulls noting fatigue but enjoyed exercise. Able to use biofreeze with US this date noting good tolerance. Progressing well.    Observation:   Test measurements:  AROM  L shoulder flexion: 167°      Exercises: there ex for impingement reduction, strength, ROM  Exercise/Equipment Resistance/Repetitions Other comments   Supine pect stretch 5' On 1/ roll   Int rotation stretch 10\", 10x    Extension stretch 10x 10\" Palm up on counter   T-band face pulls x20 grn    6-way isometric     B rowing/ extension BLUE 20x each    Table push ups 2x10    6-way t-band isatu 15x    UBE 6' retro    External rotate sidely 15x  2#    Abd in sidely 15x  2#   Supine ABC 1x 2#   SA punches 15x 2#   T-band accordian, HA x15 ea isatu   ROM/ mob 5' Humeral head depression   US  8'    [x] Provided verbal/tactile cueing for activities related to strengthening, flexibility, endurance, ROM. (26810)  [] Provided verbal/tactile cueing for activities related to improving balance, coordination, kinesthetic sense, posture, motor skill, proprioception. (02686)    Therapeutic Activities:     [] Therapeutic activities, direct (one-on-one) patient contact (use of dynamic activities to improve functional performance). (56195)    Gait:   [] Provided training and instruction to the patient for ambulation re-education. (58941)    Self-Care/ADL's  [] Self-care/home management training and compensatory training, meal preparation, safety procedures, and instructions in use of assistive technology devices/adaptive equipment, direct one-on-one contact. (91890)    Home Exercise Program:   Impingement reduction: supine pecy stretch, extension stretch, int rot stretch   [x] Reviewed/Progressed HEP activities related to strengthening, flexibility, endurance, ROM. (29723)  [] Reviewed/Progressed HEP activities related to improving balance, coordination, kinesthetic sense, posture, motor skill, proprioception.  (48130)    Manual Treatments:  PROM x10'  [x] Provided manual therapy to mobilize soft tissue/joints for the purpose of modulating pain, promoting relaxation,  increasing ROM, reducing/eliminating soft tissue swelling/inflammation/restriction, improving soft tissue extensibility. (58754)    Service Based Modalities:  US application to L anterior shoulder x 8' (NC) w/ biofreeze    Timed Code Treatment Minutes:   There ex 38'            Total Treatment Minutes:   55'    Treatment/Activity Tolerance:  [x] Patient tolerated treatment well [] Patient limited by fatique  [] Patient limited by pain  [] Patient limited by other medical complications  [] Other:     Prognosis: [x] Good [] Fair  [] Poor    Patient Requires Follow-up: [x] Yes  [] No      Goals:  Short term goals  Time Frame for Short term goals: 1 week   Short term goal 1: Start HEP    Long term goals  Time Frame for Long term goals : 4 weeks  Long term goal 1: L shld pain controlled 2-3/10 with active use  Long term goal 2: AROM flexion, abd thru 170 deg, no pain, behind head to T3  Long term goal 3: 5-/5 strength for required lifting  Long term goal 4: SPADI reduce to 13/130 for 10% disability or less    Plan:   [x] Continue per plan of care [] Alter current plan (see comments)  [] Plan of care initiated [] Hold pending MD visit [] Discharge    Plan for Next Session: Test AROM next session.      Electronically signed by:  Lyric Toribio PTA

## 2020-09-19 NOTE — PROGRESS NOTES
I have reviewed and agree to the content of the note written by the PTA.   Electronically signed by Flash Sheldon PT [id number]

## 2020-09-28 ENCOUNTER — HOSPITAL ENCOUNTER (OUTPATIENT)
Dept: PHYSICAL THERAPY | Age: 65
Setting detail: THERAPIES SERIES
Discharge: HOME | End: 2020-09-28
Payer: MEDICAID

## 2020-09-28 PROCEDURE — 97110 THERAPEUTIC EXERCISES: CPT

## 2020-09-28 NOTE — FLOWSHEET NOTE
Physical Therapy Daily Treatment Note    Date:  2020    Patient Name:  Jeannine Porter    :  1955  MRN: [id number]  Restrictions/Precautions:     Medical/Treatment Diagnosis Information:   · Diagnosis: M25.512, G89.29 chronic L shld pain  · Treatment Diagnosis: M25.512 L shld pain, impingement  Insurance/Certification information:  PT Insurance Information: Sarah   Physician Information:  Referring Practitioner: Tesfaye FENG  Plan of care signed (Y/N):  n  Visit# / total visits:     Pain level: 2-3/10     Time In: 10:52   Time Out: 11:33    Progress Note: []  Yes  [x]  No  Next due by: Visit #8, or 20      Subjective: Pt rpts to clinic with mild complaints of L shoulder pain stating \"For the most part the shoulder is feeling pretty good. It only hurts when I do anything over my head\". Objective: Pt tolerated todays session well indicating no increase in pain post session. Pt was able to undergo all testing of goals with full ROM exhibited pain free and good GMMT measures without pain. Able to progress multiple exercises with good tolerance. Will assess for if further therapy is necessary at next visit.    Observation:   Test measurements: Reference below goals      Exercises: there ex for impingement reduction, strength, ROM  Exercise/Equipment Resistance/Repetitions Other comments   Supine pect stretch 5' On 1/2 roll   Int rotation stretch 10\", 10x    Extension stretch 10x 10\" Palm up on counter   Ball up wall x15    T-band face pulls x20 grn    6-way isometric     B rowing/ extension BLUE 20x each    Table push ups 2x10    6-way t-band isatu 15x    UBE 6' retro    External rotate sidely 15x  2#    Abd in sidely 15x  2#   Supine ABC 1x 2#   SA punches 15x 2#   T-band accordian, HA x15 ea isatu   VALPAR x1 triangle lv5   ROM/ mob 5' Humeral head depression   US  8'    [x] Provided verbal/tactile cueing for activities related to strengthening, flexibility, endurance, ROM. (25122)  [] Provided 28SEP)  Long term goal 2: AROM flexion, abd thru 170 deg, no pain, behind head to T3 (flx: 180, abd: 180, IR: T8, ER: T3. 28SEP)  Long term goal 3: 5-/5 strength for required lifting (flx: 5/5, abd: 5/5, ext: 5/5, add: 5/5, ER: 5/5, IR: 5/5. 28SEP)  Long term goal 4: SPADI reduce to 13/130 for 10% disability or less (Scored 19/130=15% disability. 28SEP)    Plan:   [x] Continue per plan of care [] Alter current plan (see comments)  [] Plan of care initiated [] Hold pending MD visit [] Discharge    Plan for Next Session: Assess for 2nd POC and the need for further therapy.       Electronically signed by:  Azucena Saab PTA

## 2020-09-29 NOTE — PROGRESS NOTES
I have reviewed and agree to the content of the note written by the PTA.   Electronically signed by Luis Angel Rocha PT [id number]

## 2020-09-30 ENCOUNTER — HOSPITAL ENCOUNTER (OUTPATIENT)
Dept: PHYSICAL THERAPY | Age: 65
Setting detail: THERAPIES SERIES
Discharge: HOME | End: 2020-09-30
Payer: MEDICAID

## 2020-09-30 PROCEDURE — 97110 THERAPEUTIC EXERCISES: CPT

## 2020-09-30 NOTE — DISCHARGE SUMMARY
[facility]    [x] [place]  Phone: [phone]  Fax: [phone]      [] [place]  Phone: [phone]  Fax: [phone]    Physical Therapy Discharge Note  Date: 2020        Patient Name:  Brandon López    :  1955  MRN: [id number]  Restrictions/Precautions:      Medical/Treatment Diagnosis Information:  ·   Diagnosis: M25.512, G89.29 chronic L shld pain  · Treatment Diagnosis: M25.512 L shld pain, impingement  ·    Insurance/Certification information:     Sarah   Physician Information:     Courtney FENG  Plan of care signed (Y/N): y  Visit# / total visits:  8  Pain level: 1-2/10     Plan of Care/Treatment to date:  [x] Therapeutic Exercise    [] Modalities:  [] Therapeutic Activity     [] Ultrasound  [] Electrical Stimulation  [] Gait Training      [] Cervical Traction    [] Lumbar Traction  [] Neuromuscular Re-education  [] Cold/hotpack [] Iontophoresis  [x] Instruction in HEP      Other:  [x] Manual Therapy       []    [] Aquatic Therapy       []                    ? Subjective:    Pt rpts to clinic with only mild complaints of L shoulder pain stating \"Honestly I barely feel it anymore unless I am doing intense activity\". Objective:    flx: 180, abd: 180, IR: T8, ER: T3.    flx: 5/5, abd: 5/5, ext: 5/5, add: 5/5, ER: 5/5, IR: 5/5    SPADI 19/130=15% disability           Plan:    d/c       Goals:    Short term goals  Time Frame for Short term goals: 1 week     Short term goal 1: Start HEP MET     Long term goals  Time Frame for Long term goals : 4 weeks  Long term goal 1: L shld pain controlled 2-3/10 with active use -met  Long term goal 2: AROM flexion, abd thru 170 deg, no pain, behind head to T3 -met  Long term goal 3: 5-/5 strength for required lifting -met  Long term goal 4: SPADI reduce to 13/130 for 10% disability or less (Scored 19/130=15% disability.  28SEP)         Percentage of Goals Met: 95 Discharge Prognosis: [] Excellent [x] Good [] Fair  [] Poor     Goal Status:  [x] Achieved [] Partially Achieved  [] Not Achieved       Electronically signed by:  Filiberto Flores, PT

## 2020-09-30 NOTE — FLOWSHEET NOTE
flx/ext/ IR x15 7#   VALPAR x1 triangle lv5   ROM/ mob 5' Humeral head depression   US  8'    [x] Provided verbal/tactile cueing for activities related to strengthening, flexibility, endurance, ROM. (29303)  [] Provided verbal/tactile cueing for activities related to improving balance, coordination, kinesthetic sense, posture, motor skill, proprioception. (57975)    Therapeutic Activities:     [] Therapeutic activities, direct (one-on-one) patient contact (use of dynamic activities to improve functional performance). (59283)    Gait:   [] Provided training and instruction to the patient for ambulation re-education. (92028)    Self-Care/ADL's  [] Self-care/home management training and compensatory training, meal preparation, safety procedures, and instructions in use of assistive technology devices/adaptive equipment, direct one-on-one contact. (69119)    Home Exercise Program:   Impingement reduction: supine pec stretch, extension stretch, int rot stretch   [x] Reviewed/Progressed HEP activities related to strengthening, flexibility, endurance, ROM. (43216)  [] Reviewed/Progressed HEP activities related to improving balance, coordination, kinesthetic sense, posture, motor skill, proprioception.  (23311)    Manual Treatments:  PROM x10'  [] Provided manual therapy to mobilize soft tissue/joints for the purpose of modulating pain, promoting relaxation,  increasing ROM, reducing/eliminating soft tissue swelling/inflammation/restriction, improving soft tissue extensibility. (91534)     Service Based Modalities:      Timed Code Treatment Minutes:   There ex 52'            Total Treatment Minutes:   52'      Treatment/Activity Tolerance:  [x] Patient tolerated treatment well [] Patient limited by fatique  [] Patient limited by pain  [] Patient limited by other medical complications  [] Other:     Prognosis: [x] Good [] Fair  [] Poor    Patient Requires Follow-up: [] Yes  [x] No      Goals:  Short term goals  Time Frame for Short term goals: 1 week     Short term goal 1: Start HEP MET    Long term goals  Time Frame for Long term goals : 4 weeks  Long term goal 1: L shld pain controlled 2-3/10 with active use (Rpts with 2-3/10. 28SEP)  Long term goal 2: AROM flexion, abd thru 170 deg, no pain, behind head to T3 (flx: 180, abd: 180, IR: T8, ER: T3. 28SEP)  Long term goal 3: 5-/5 strength for required lifting (flx: 5/5, abd: 5/5, ext: 5/5, add: 5/5, ER: 5/5, IR: 5/5. 28SEP)  Long term goal 4: SPADI reduce to 13/130 for 10% disability or less (Scored 19/130=15% disability. 28SEP)    Plan:   [] Continue per plan of care [] Alter current plan (see comments)  [] Plan of care initiated [] Hold pending MD visit [x] Discharge    Plan for Next Session: Pt d/c'd this session.      Electronically signed by:  Tre Lindo PTA

## 2020-10-12 ENCOUNTER — PATIENT MESSAGE (OUTPATIENT)
Dept: PRIMARY CARE CLINIC | Age: 65
End: 2020-10-12

## 2020-10-13 NOTE — TELEPHONE ENCOUNTER
From: Hamzah Daniel  To: PING Rao  Sent: 10/12/2020 11:22 AM EDT  Subject: Non-Urgent Medical Question    I have a blood draw scheduled on Tues Oct 20th for my Oncologist appointment. .. do you want to add any blood work to this for my November Dr visit with you? ? Then I don't have to go get poked AGAIN! haha You know I don't like needles!     Fernanda Herrera

## 2020-10-20 ENCOUNTER — HOSPITAL ENCOUNTER (OUTPATIENT)
Dept: LAB | Age: 65
Discharge: HOME | End: 2020-10-20
Payer: MEDICAID

## 2020-10-20 LAB
ABSOLUTE EOS #: 0.06 K/UL (ref 0–0.44)
ABSOLUTE IMMATURE GRANULOCYTE: <0.03 K/UL (ref 0–0.3)
ABSOLUTE LYMPH #: 1.54 K/UL (ref 1.1–3.7)
ABSOLUTE MONO #: 0.77 K/UL (ref 0.1–1.2)
ALBUMIN SERPL-MCNC: 4.4 G/DL (ref 3.5–5.2)
ALBUMIN/GLOBULIN RATIO: 1.4 (ref 1–2.5)
ALP BLD-CCNC: 33 U/L (ref 40–129)
ALT SERPL-CCNC: 21 U/L (ref 5–41)
ANION GAP SERPL CALCULATED.3IONS-SCNC: 11 MMOL/L (ref 9–17)
AST SERPL-CCNC: 19 U/L
BASOPHILS # BLD: 0 % (ref 0–2)
BASOPHILS ABSOLUTE: <0.03 K/UL (ref 0–0.2)
BILIRUB SERPL-MCNC: 0.56 MG/DL (ref 0.3–1.2)
BUN BLDV-MCNC: 21 MG/DL (ref 8–23)
BUN/CREAT BLD: 22 (ref 9–20)
CALCIUM SERPL-MCNC: 9.5 MG/DL (ref 8.6–10.4)
CHLORIDE BLD-SCNC: 96 MMOL/L (ref 98–107)
CHOLESTEROL, FASTING: 96 MG/DL
CHOLESTEROL/HDL RATIO: 2.9
CO2: 29 MMOL/L (ref 20–31)
CREAT SERPL-MCNC: 0.94 MG/DL (ref 0.7–1.2)
CREATININE URINE: 271.9 MG/DL (ref 39–259)
DIFFERENTIAL TYPE: ABNORMAL
EOSINOPHILS RELATIVE PERCENT: 1 % (ref 1–4)
ESTIMATED AVERAGE GLUCOSE: 143 MG/DL
GFR AFRICAN AMERICAN: >60 ML/MIN
GFR NON-AFRICAN AMERICAN: >60 ML/MIN
GFR SERPL CREATININE-BSD FRML MDRD: ABNORMAL ML/MIN/{1.73_M2}
GFR SERPL CREATININE-BSD FRML MDRD: ABNORMAL ML/MIN/{1.73_M2}
GLUCOSE BLD-MCNC: 186 MG/DL (ref 70–99)
HBA1C MFR BLD: 6.6 % (ref 4.8–5.9)
HCT VFR BLD CALC: 38.6 % (ref 40.7–50.3)
HDLC SERPL-MCNC: 33 MG/DL
HEMOGLOBIN: 13.3 G/DL (ref 13–17)
IMMATURE GRANULOCYTES: 0 %
LDL CHOLESTEROL: 47 MG/DL (ref 0–130)
LYMPHOCYTES # BLD: 31 % (ref 24–43)
MCH RBC QN AUTO: 32.3 PG (ref 25.2–33.5)
MCHC RBC AUTO-ENTMCNC: 34.5 G/DL (ref 25.2–33.5)
MCV RBC AUTO: 93.7 FL (ref 82.6–102.9)
MICROALBUMIN/CREAT 24H UR: 42 MG/L
MICROALBUMIN/CREAT UR-RTO: 15 MCG/MG CREAT
MONOCYTES # BLD: 15 % (ref 3–12)
NRBC AUTOMATED: 0 PER 100 WBC
PDW BLD-RTO: 12.6 % (ref 11.8–14.4)
PLATELET # BLD: ABNORMAL K/UL (ref 138–453)
PLATELET ESTIMATE: ABNORMAL
PLATELET, FLUORESCENCE: 104 K/UL (ref 138–453)
PLATELET, IMMATURE FRACTION: 6.6 % (ref 1.1–10.3)
PMV BLD AUTO: ABNORMAL FL (ref 8.1–13.5)
POTASSIUM SERPL-SCNC: 4.2 MMOL/L (ref 3.7–5.3)
RBC # BLD: 4.12 M/UL (ref 4.21–5.77)
RBC # BLD: ABNORMAL 10*6/UL
SEG NEUTROPHILS: 52 % (ref 36–65)
SEGMENTED NEUTROPHILS ABSOLUTE COUNT: 2.61 K/UL (ref 1.5–8.1)
SODIUM BLD-SCNC: 136 MMOL/L (ref 135–144)
TOTAL PROTEIN: 7.6 G/DL (ref 6.4–8.3)
TRIGLYCERIDE, FASTING: 82 MG/DL
TSH SERPL DL<=0.05 MIU/L-ACNC: 2.37 MIU/L (ref 0.3–5)
VLDLC SERPL CALC-MCNC: ABNORMAL MG/DL (ref 1–30)
WBC # BLD: 5 K/UL (ref 3.5–11.3)
WBC # BLD: ABNORMAL 10*3/UL

## 2020-10-20 PROCEDURE — 83036 HEMOGLOBIN GLYCOSYLATED A1C: CPT

## 2020-10-20 PROCEDURE — 85025 COMPLETE CBC W/AUTO DIFF WBC: CPT

## 2020-10-20 PROCEDURE — 80061 LIPID PANEL: CPT

## 2020-10-20 PROCEDURE — 82570 ASSAY OF URINE CREATININE: CPT

## 2020-10-20 PROCEDURE — 80053 COMPREHEN METABOLIC PANEL: CPT

## 2020-10-20 PROCEDURE — 36415 COLL VENOUS BLD VENIPUNCTURE: CPT

## 2020-10-20 PROCEDURE — 85055 RETICULATED PLATELET ASSAY: CPT

## 2020-10-20 PROCEDURE — 82043 UR ALBUMIN QUANTITATIVE: CPT

## 2020-10-20 PROCEDURE — 84443 ASSAY THYROID STIM HORMONE: CPT

## 2020-10-20 RX ORDER — AMLODIPINE BESYLATE 10 MG/1
10 TABLET ORAL DAILY
Qty: 90 TABLET | Refills: 2 | Status: SHIPPED | OUTPATIENT
Start: 2020-10-20 | End: 2021-05-21 | Stop reason: SDUPTHER

## 2020-10-29 ENCOUNTER — HOSPITAL ENCOUNTER (OUTPATIENT)
Dept: ULTRASOUND IMAGING | Age: 65
Discharge: HOME | End: 2020-10-31
Payer: COMMERCIAL

## 2020-10-29 ENCOUNTER — HOSPITAL ENCOUNTER (OUTPATIENT)
Dept: CT IMAGING | Age: 65
Discharge: HOME | End: 2020-10-31
Payer: COMMERCIAL

## 2020-10-29 PROCEDURE — 76775 US EXAM ABDO BACK WALL LIM: CPT

## 2020-10-29 PROCEDURE — 2709999900 CT ABDOMEN PELVIS W IV CONTRAST

## 2020-10-29 PROCEDURE — 6360000004 HC RX CONTRAST MEDICATION: Performed by: INTERNAL MEDICINE

## 2020-10-29 RX ADMIN — IOHEXOL 50 ML: 240 INJECTION, SOLUTION INTRATHECAL; INTRAVASCULAR; INTRAVENOUS; ORAL at 10:23

## 2020-10-29 RX ADMIN — IOPAMIDOL 100 ML: 755 INJECTION, SOLUTION INTRAVENOUS at 10:23

## 2020-10-30 ENCOUNTER — OFFICE VISIT (OUTPATIENT)
Dept: UROLOGY | Age: 65
End: 2020-10-30
Payer: MEDICAID

## 2020-10-30 VITALS — SYSTOLIC BLOOD PRESSURE: 124 MMHG | DIASTOLIC BLOOD PRESSURE: 60 MMHG | HEART RATE: 75 BPM

## 2020-10-30 PROCEDURE — 99214 OFFICE O/P EST MOD 30 MIN: CPT

## 2020-10-30 PROCEDURE — G8417 CALC BMI ABV UP PARAM F/U: HCPCS | Performed by: UROLOGY

## 2020-10-30 PROCEDURE — G8427 DOCREV CUR MEDS BY ELIG CLIN: HCPCS | Performed by: UROLOGY

## 2020-10-30 PROCEDURE — 3017F COLORECTAL CA SCREEN DOC REV: CPT | Performed by: UROLOGY

## 2020-10-30 PROCEDURE — 1123F ACP DISCUSS/DSCN MKR DOCD: CPT | Performed by: UROLOGY

## 2020-10-30 PROCEDURE — 4040F PNEUMOC VAC/ADMIN/RCVD: CPT | Performed by: UROLOGY

## 2020-10-30 PROCEDURE — 99214 OFFICE O/P EST MOD 30 MIN: CPT | Performed by: UROLOGY

## 2020-10-30 PROCEDURE — 1036F TOBACCO NON-USER: CPT | Performed by: UROLOGY

## 2020-10-30 PROCEDURE — G8484 FLU IMMUNIZE NO ADMIN: HCPCS | Performed by: UROLOGY

## 2020-10-30 RX ORDER — CARVEDILOL 25 MG/1
25 TABLET ORAL 2 TIMES DAILY
Qty: 180 TABLET | Refills: 2 | Status: SHIPPED | OUTPATIENT
Start: 2020-10-30 | End: 2021-05-17

## 2020-10-30 NOTE — PROGRESS NOTES
Lakesha Nelson MD   Urology Clinic Progress Note      Patient:  Dragan Carrera  YOB: 1955  Date: 10/30/2020    HISTORY OF PRESENT ILLNESS:   The patient is a 72 y.o. male who presents today for follow-up for the following problem(s): left renal mass  Overall the problem(s) : show no change. Associated Symptoms: No dysuria, gross hematuria. Pain Severity:      Summary of old records:left renal mass noted on Chest CT for [facility]    11/30/2018, left partial nephrectomy  -- Diagnosis --   KIDNEY, PARTIAL NEPHRECTOMY (LEFT):       - RENAL CELL CARCINOMA, CLEAR CELL TYPE WITH PROMINENT CYSTIC        CHANGE, GRADE 2.       - PARENCHYMAL AND SOFT TISSUE SURGICAL MARGINS NEGATIVE FOR         NEOPLASM. Additional History:   Doing well. No recent issues. No gross hematuria. No flank pain. He believes he may have had a cold recently 2 weeks. Self resolving. mild chills. No cough. No fevers. No new LUTS    I independently reviewed and verified the images and reports from:    Ct Abdomen Pelvis W Iv Contrast Additional Contrast? Oral    Result Date: 10/29/2020  EXAMINATION: CT OF THE ABDOMEN AND PELVIS WITH CONTRAST 10/29/2020 10:23 am TECHNIQUE: CT of the abdomen and pelvis was performed with the administration of intravenous contrast. Multiplanar reformatted images are provided for review. Dose modulation, iterative reconstruction, and/or weight based adjustment of the mA/kV was utilized to reduce the radiation dose to as low as reasonably achievable. COMPARISON: CT abdomen/pelvis 10/11/2019, 03/13/2019. CTA chest 09/11/2020. HISTORY: ORDERING SYSTEM PROVIDED HISTORY: Carcinoma of left kidney [facility] TECHNOLOGIST PROVIDED HISTORY: follow 2000 [facility] Reason for Exam: F/u renal CA. Hx partial kidney removed Acuity: Chronic Type of Exam: Subsequent/Follow-up FINDINGS: Thorax base:  Borderline enlarged heart size with no significant pericardial effusion.   The lung bases demonstrate no acute consolidation or effusion. Multifocal bilateral lung base 0.6 cm pulmonary and pleural based non solid nodular densities. Abdomen:  Abdominal aortic atherosclerosis with no aneurysm formation. Mild diffuse hepatic steatosis. No intrahepatic mass or biliary dilatation. The portal vasculature is patent. Cholelithiasis with no adjacent inflammatory changes. The common bile duct, pancreas, and adrenals are normal.  Stable mild splenomegaly with no acute abnormality. The right kidney appears stable with subcentimeter renal cyst.  No acute abnormality. Evidence of prior left kidney prior lesion resection. There are few scattered subcentimeter cysts. No acute abnormality. The stomach, small bowel, and appendix are normal.  The colon demonstrates no acute abnormality. Stable mild rectal circumferential wall thickening. No ascites or free air. No significant enlarged peritoneal or retropectoral lymph nodes. Pelvis:  Prostatomegaly. The bladder is unremarkable. Mild perirectal fat induration stable compared to the prior exam.  No abscess. No significant enlarged iliac chain lymph nodes. Musculoskeletal structures: The body wall soft tissues demonstrate no acute abnormality. No significant enlarged inguinal lymph nodes. Normal lumbar spine alignment with stable multilevel degenerative changes. Stable fat density lucency along the left iliac bone which may represent a lipoma. No acute osseous abnormality. No suspicious sclerotic or lytic lesions. 1. New since the prior CTA chest exam 09/11/2020 there is multifocal basilar subcentimeter non solid pulmonary nodules. Given the acuity of the changes this most likely represents an acute infectious/inflammatory process such as multifocal pneumonia versus possible vasculitis or septic emboli. Metastatic pulmonary disease is less likely. 2. No acute abdominal/pelvic process or evidence of metastatic disease.      Us Renal Limited    Result Date: 10/29/2020  EXAMINATION: ULTRASOUND OF THE KIDNEYS 10/29/2020 8:55 am COMPARISON: CT scan of the abdomen and pelvis from 10/11/2019, and previous ultrasound from 10/09/2018. HISTORY: ORDERING SYSTEM PROVIDED HISTORY: Renal cell carcinoma of left kidney ([facility] [address] status post partial left nephrectomy. FINDINGS: The right kidney measures 11.1 x 6.1 x 5.1 cm and the left kidney measures 12.2 x 5.6 x 5.1 cm. Cortical thickness is 1.3 cm on the right and 1.4 cm on the left. Kidneys demonstrate normal cortical echogenicity. No hydronephrosis or intrarenal stones. Unchanged interpolar renal cyst on the right measuring 1.8 x 1.7 x 1.7 cm, compared to 1.4 x 1.3 cm on CT from 2019. Stable post partial left nephrectomy changes. No recurrent mass or acute abnormality. Known slightly larger right renal cyst.         12/2018  CREATININE 0.76        Last several PSA's:  Lab Results   Component Value Date    PSA 0.77 01/26/2016       Last total testosterone:  Lab Results   Component Value Date    TESTOSTERONE 439 10/21/2019       Urinalysis today:  No results found for this visit on 10/30/20. Last BUN and creatinine:  Lab Results   Component Value Date    BUN 21 10/20/2020     Lab Results   Component Value Date    CREATININE 0.94 10/20/2020       Imaging Reviewed during this Office Visit:   (results were independently reviewed by physician and radiology report verified)    PAST MEDICAL, FAMILY AND SOCIAL HISTORY UPDATE:  Past Medical History:   Diagnosis Date    Anxiety     Aortic regurgitation     Bicuspid aortic valve     Diabetes mellitus ([address] since March 2018    on Rx.     GERD (gastroesophageal reflux disease)     Hypertension     Left renal mass 10/2018    Osteoarthritis of left knee     Wears glasses      Past Surgical History:   Procedure Laterality Date    CYST REMOVAL      tail bone    MOUTH SURGERY  2015    PARTIAL NEPHRECTOMY Left 11/30/2018    ROBOTIC PARTIAL NEPHRECTOMY,     ND COLONOSCOPY FLX DX W/COLLJ SPEC WHEN PFRMD N/A 5/14/2018    normal colon, Dr. Pepe Guerrero Left 11/30/2018    XI ROBOTIC PARTIAL NEPHRECTOMY, INTRA-OP LAP ULTRASOUND performed by Dory Ballard MD at [address] [place] [address] Drive EXTRACTION       Family History   Problem Relation Age of Onset    High Blood Pressure Mother     Diabetes Mother         impaired fasting glucose    Other Mother         short term memory loss after MVA    Glaucoma Mother     Stroke Father 66    High Blood Pressure Father     Diabetes Father         impaired fasting glucose    Glaucoma Father     Cancer Father         bladder     Heart Attack Father 66    Glaucoma Brother     Diabetes Maternal Grandmother     Other Paternal Grandmother         gallbladder disease    Stroke Paternal Grandfather     Other Paternal Grandfather         gallbladder disease    Diabetes Paternal Grandfather     No Known Problems Brother      Outpatient Medications Marked as Taking for the 10/30/20 encounter (Office Visit) with Dory Ballard MD   Medication Sig Dispense Refill    metFORMIN (GLUCOPHAGE) 500 MG tablet Take 1 tablet by mouth 2 times daily (with meals) 180 tablet 2    amLODIPine (NORVASC) 10 MG tablet Take 1 tablet by mouth daily 90 tablet 2    cetirizine (ZYRTEC) 10 MG tablet Take 1 tablet by mouth daily 90 tablet 1    rosuvastatin (CRESTOR) 5 MG tablet Take 1 tablet by mouth daily 30 tablet 5    losartan-hydrochlorothiazide (HYZAAR) 100-25 MG per tablet Take 1 tablet by mouth daily 90 tablet 1    meloxicam (MOBIC) 7.5 MG tablet Take 1 tablet by mouth 2 times daily (with meals) 180 tablet 1    pioglitazone (ACTOS) 15 MG tablet Take 1 tablet by mouth daily 30 tablet 5    furosemide (LASIX) 20 MG tablet Take 1 tablet by mouth daily 90 tablet 3    carvedilol (COREG) 25 MG tablet Take 1 tablet by mouth 2 times daily 180 tablet 2    sildenafil (REVATIO) 20 MG tablet Take 1 tablet by mouth daily as needed (ed) 25 tablet 5    Blood Pressure Monitoring (BLOOD PRESSURE CUFF) MISC DX: I10 HTN 1 each 0    sildenafil (REVATIO) 20 MG tablet Take 1 tablet by mouth as needed (ED) Take 1-5 tabs as needed PRN for ED 60 tablet 3    fluticasone (FLONASE) 50 MCG/ACT nasal spray 1 spray by Nasal route daily (Patient taking differently: 1 spray by Nasal route daily as needed ) 1 Bottle 3    Elastic Bandages & Supports (JOBST OPAQUE KNEE 15-20MMHG XL) MISC #2 pair knee highs  Varicose veins 2 each 0    vitamin D3 (CHOLECALCIFEROL) 400 UNITS TABS tablet Take 400 Units by mouth every other day       Omeprazole (PRILOSEC PO) Take 1 capsule by mouth daily as needed          Codeine and Sulfa antibiotics  Social History     Tobacco Use   Smoking Status Never Smoker   Smokeless Tobacco Never Used       Social History     Substance and Sexual Activity   Alcohol Use Yes    Comment: very rare       REVIEW OF SYSTEMS:  Constitutional: negative  Eyes: negative  Respiratory: negative  Cardiovascular: negative  Gastrointestinal: negative  Musculoskeletal: negative  Genitourinary: negative except for what is in HPI  Skin: negative   Neurological: negative  Hematological/Lymphatic: negative  Psychological: negative    Physical Exam:      Vitals:    10/30/20 0831   BP: 124/60   Pulse: 75     Patient is a 72 y.o. male in no acute distress and alert and oriented to person, place and time. NAD, A/o      Assessment and Plan      1. Renal mass    2. Renal cell carcinoma of left kidney (HCC)    3. Erectile dysfunction, unspecified erectile dysfunction type    4. Pulmonary nodule           Plan:       RCC:  no evidence of recurrence. Reviewed images with patient.   Reviewed CT of the chest.  Recommend PCP/pulmonology evaluation  ED: continue sildenafil  Follow-up in 12 months with renal ultrasound           [facility]

## 2020-11-02 ENCOUNTER — OFFICE VISIT (OUTPATIENT)
Dept: ONCOLOGY | Age: 65
End: 2020-11-02
Payer: MEDICAID

## 2020-11-02 VITALS
BODY MASS INDEX: 34.02 KG/M2 | DIASTOLIC BLOOD PRESSURE: 82 MMHG | OXYGEN SATURATION: 98 % | HEIGHT: 71 IN | SYSTOLIC BLOOD PRESSURE: 136 MMHG | WEIGHT: 243 LBS | HEART RATE: 70 BPM | TEMPERATURE: 96.1 F

## 2020-11-02 PROCEDURE — 99214 OFFICE O/P EST MOD 30 MIN: CPT | Performed by: INTERNAL MEDICINE

## 2020-11-02 PROCEDURE — G8427 DOCREV CUR MEDS BY ELIG CLIN: HCPCS | Performed by: INTERNAL MEDICINE

## 2020-11-02 PROCEDURE — 4040F PNEUMOC VAC/ADMIN/RCVD: CPT | Performed by: INTERNAL MEDICINE

## 2020-11-02 PROCEDURE — 3017F COLORECTAL CA SCREEN DOC REV: CPT | Performed by: INTERNAL MEDICINE

## 2020-11-02 PROCEDURE — G8484 FLU IMMUNIZE NO ADMIN: HCPCS | Performed by: INTERNAL MEDICINE

## 2020-11-02 PROCEDURE — 1123F ACP DISCUSS/DSCN MKR DOCD: CPT | Performed by: INTERNAL MEDICINE

## 2020-11-02 PROCEDURE — 99214 OFFICE O/P EST MOD 30 MIN: CPT

## 2020-11-02 PROCEDURE — G8417 CALC BMI ABV UP PARAM F/U: HCPCS | Performed by: INTERNAL MEDICINE

## 2020-11-02 PROCEDURE — 1036F TOBACCO NON-USER: CPT | Performed by: INTERNAL MEDICINE

## 2020-11-02 NOTE — PROGRESS NOTES
by mouth 2 times daily (with meals) 180 tablet 1    pioglitazone (ACTOS) 15 MG tablet Take 1 tablet by mouth daily 30 tablet 5    furosemide (LASIX) 20 MG tablet Take 1 tablet by mouth daily 90 tablet 3    sildenafil (REVATIO) 20 MG tablet Take 1 tablet by mouth daily as needed (ed) 25 tablet 5    Blood Pressure Monitoring (BLOOD PRESSURE CUFF) MISC DX: I10 HTN 1 each 0    sildenafil (REVATIO) 20 MG tablet Take 1 tablet by mouth as needed (ED) Take 1-5 tabs as needed PRN for ED 60 tablet 3    fluticasone (FLONASE) 50 MCG/ACT nasal spray 1 spray by Nasal route daily (Patient taking differently: 1 spray by Nasal route daily as needed ) 1 Bottle 3    Elastic Bandages & Supports (JOBST OPAQUE KNEE 15-20MMHG XL) MISC #2 pair knee highs  Varicose veins 2 each 0    vitamin D3 (CHOLECALCIFEROL) 400 UNITS TABS tablet Take 400 Units by mouth every other day       Omeprazole (PRILOSEC PO) Take 1 capsule by mouth daily as needed        No current facility-administered medications for this visit. REVIEW OF SYSTEM:     Constitutional: No fever or chills. No night sweats, no weight loss   Eyes: No eye discharge, double vision, or eye pain   HEENT: negative for sore mouth, sore throat, hoarseness and voice change   Respiratory: negative for cough , sputum, dyspnea, wheezing, hemoptysis, chest pain   Cardiovascular: negative for chest pain, dyspnea, palpitations, orthopnea, PND   Gastrointestinal: negative for nausea, vomiting, diarrhea, constipation, abdominal pain, Dysphagia, hematemesis and hematochezia   Genitourinary: negative for frequency, dysuria, nocturia, urinary incontinence, and hematuria   Integument: negative for rash, skin lesions, bruises.    Hematologic/Lymphatic: negative for easy bruising, bleeding, lymphadenopathy, petechiae and swelling/edema   Endocrine: negative for heat or cold intolerance, tremor, weight changes, change in bowel habits and hair loss   Musculoskeletal: negative for myalgias, arthralgias, pain, joint swelling,and bone pain   Neurological: negative for headaches, dizziness, seizures, weakness, numbness   OBJECTIVE:         Vitals:    11/02/20 1035   BP: 136/82   Pulse: 70   Temp: 96.1 °F (35.6 °C)   SpO2: 98%       PHYSICAL EXAM:   General appearance - well appearing, no in pain or distress   Mental status - alert and cooperative   Eyes - pupils equal and reactive, extraocular eye movements intact   Ears - bilateral TM's and external ear canals normal   Mouth - mucous membranes moist, pharynx normal without lesions   Neck - supple, no significant adenopathy   Lymphatics - no palpable lymphadenopathy, no hepatosplenomegaly   Chest - clear to auscultation, no wheezes, rales or rhonchi, symmetric air entry   Heart - normal rate, regular rhythm, normal S1, S2, no murmurs, rubs, clicks or gallops   Abdomen - soft, nontender, nondistended, no masses or organomegaly   Neurological - alert, oriented, normal speech, no focal findings or movement disorder noted   Musculoskeletal - no joint tenderness, deformity or swelling   Extremities - peripheral pulses normal, no pedal edema, no clubbing or cyanosis   Skin - normal coloration and turgor, no rashes, no suspicious skin lesions noted   LABORATORY DATA:     Lab Results   Component Value Date    WBC 5.0 10/20/2020    HGB 13.3 10/20/2020    HCT 38.6 (L) 10/20/2020    MCV 93.7 10/20/2020    PLT See Reflexed IPF Result 10/20/2020    LYMPHOPCT 31 10/20/2020    RBC 4.12 (L) 10/20/2020    MCH 32.3 10/20/2020    MCHC 34.5 (H) 10/20/2020    RDW 12.6 10/20/2020    MONOPCT 15 (H) 10/20/2020    BASOPCT 0 10/20/2020    NEUTROABS 2.61 10/20/2020    LYMPHSABS 1.54 10/20/2020    MONOSABS 0.77 10/20/2020    EOSABS 0.06 10/20/2020    BASOSABS <0.03 10/20/2020         Chemistry        Component Value Date/Time     10/20/2020 0929    K 4.2 10/20/2020 0929    CL 96 (L) 10/20/2020 0929    CO2 29 10/20/2020 0929    BUN 21 10/20/2020 0929    CREATININE 0.94 chest imaging 7   months ago.  No other finding to suggest metastatic disease in the chest.   While this favors a benign process, attention to on routine oncologic   follow-up is recommended. CT chest abd pelvis 10/11/19  Impression   *Stable aneurysmal dilatation of the ascending thoracic aorta measuring 4.7   cm.  No dissection or rupture. *Stable 3 mm solid noncalcified pulmonary nodule right lower lobe.  Attention   on follow-up is suggested given the history of renal cell carcinoma. *No CT evidence of tumor recurrence or metastatic disease seen within the   abdomen or pelvis. *Cholelithiasis. *Cardiomegaly. CT abd pelvis 10/19/20  Impression    1. New since the prior CTA chest exam 09/11/2020 there is multifocal basilar    subcentimeter non solid pulmonary nodules.  Given the acuity of the changes    this most likely represents an acute infectious/inflammatory process such as    multifocal pneumonia versus possible vasculitis or septic emboli.  Metastatic    pulmonary disease is less likely. 2. No acute abdominal/pelvic process or evidence of metastatic disease. ASSESSMENT:    Mr. 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 Thrombocytopenia: His platelet are stable. At this point there is no indication to initiate treatment. Renal cell cancer: He had left partial nephrectomy on 11/30/18 and showed showed stage I RCC. I discussed the pathology results and NCCN guidelines and recent data. NO adjuvant therapy indicated. Right lower lung nodule: Has been stable. This has been stable for over 2 years with no further follow-up indicated. However recent CT showing subcentimeter nonsolid nodules. And I advised to follow with with PCP with possible reimaging in 3 months. Patient's questions were sought and answered to his satisfaction and he agreed to proceed with the plan.      PLAN:   No evidence of recurrence  Return to clinic in 6 months with labs    I spent more than 25 minutes examining, evaluating, reviewing data and counseling the patient. Greater than 50% of that time was spent face-to-face with the patient in counseling and coordinating her care.       Dillan Shepherd MD  Hematology/Oncology

## 2020-11-09 ENCOUNTER — OFFICE VISIT (OUTPATIENT)
Dept: CARDIOLOGY | Age: 65
End: 2020-11-09
Payer: COMMERCIAL

## 2020-11-09 VITALS
WEIGHT: 244 LBS | SYSTOLIC BLOOD PRESSURE: 136 MMHG | BODY MASS INDEX: 34.16 KG/M2 | HEIGHT: 71 IN | DIASTOLIC BLOOD PRESSURE: 60 MMHG

## 2020-11-09 PROCEDURE — 4040F PNEUMOC VAC/ADMIN/RCVD: CPT | Performed by: INTERNAL MEDICINE

## 2020-11-09 PROCEDURE — 93010 ELECTROCARDIOGRAM REPORT: CPT | Performed by: INTERNAL MEDICINE

## 2020-11-09 PROCEDURE — 99214 OFFICE O/P EST MOD 30 MIN: CPT | Performed by: INTERNAL MEDICINE

## 2020-11-09 PROCEDURE — 3017F COLORECTAL CA SCREEN DOC REV: CPT | Performed by: INTERNAL MEDICINE

## 2020-11-09 PROCEDURE — G8484 FLU IMMUNIZE NO ADMIN: HCPCS | Performed by: INTERNAL MEDICINE

## 2020-11-09 PROCEDURE — G8417 CALC BMI ABV UP PARAM F/U: HCPCS | Performed by: INTERNAL MEDICINE

## 2020-11-09 PROCEDURE — 93005 ELECTROCARDIOGRAM TRACING: CPT | Performed by: INTERNAL MEDICINE

## 2020-11-09 PROCEDURE — 1036F TOBACCO NON-USER: CPT | Performed by: INTERNAL MEDICINE

## 2020-11-09 PROCEDURE — G8427 DOCREV CUR MEDS BY ELIG CLIN: HCPCS | Performed by: INTERNAL MEDICINE

## 2020-11-09 PROCEDURE — 1123F ACP DISCUSS/DSCN MKR DOCD: CPT | Performed by: INTERNAL MEDICINE

## 2020-11-09 NOTE — PROGRESS NOTES
[facility]    CC: Follow up for thoracic aortic aneurysm, Bicuspid AV with AI    HPI:  Patient is here for follow up. Denies any cp, sob, orthopnea, pnd, le edema. States was very active this summer, lots of out door activities, biking, hiking trails. Had Echo and CTA chest on 9/20 to monitor his BAV, AI, Asc Aortic Aneurysm. Past Medical History:   Diagnosis Date    Anxiety     Aortic regurgitation     Bicuspid aortic valve     Diabetes mellitus ([address] since March 2018    on Rx.  GERD (gastroesophageal reflux disease)     Hypertension     Left renal mass 10/2018    Osteoarthritis of left knee     Wears glasses        Past Surgical History:   Procedure Laterality Date    CYST REMOVAL      tail bone    MOUTH SURGERY  2015    PARTIAL NEPHRECTOMY Left 11/30/2018    ROBOTIC PARTIAL NEPHRECTOMY,     [place] COLONOSCOPY FLX DX W/COLLJ SPEC WHEN PFRMD N/A 5/14/2018    normal colon, Dr. Erwin Knox Left 11/30/2018    XI ROBOTIC PARTIAL NEPHRECTOMY, INTRA-OP LAP ULTRASOUND performed by Deng Romero MD at [address] [place] [address] Drive EXTRACTION         Family History   Problem Relation Age of Onset    High Blood Pressure Mother     Diabetes Mother         impaired fasting glucose    Other Mother         short term memory loss after MVA    Glaucoma Mother     Stroke Father 66    High Blood Pressure Father     Diabetes Father         impaired fasting glucose    Glaucoma Father     Cancer Father         bladder     Heart Attack Father 66    Glaucoma Brother     Diabetes Maternal Grandmother     Other Paternal Grandmother         gallbladder disease    Stroke Paternal Grandfather     Other Paternal Grandfather         gallbladder disease    Diabetes Paternal Grandfather     No Known Problems Brother        REVIEW OF SYSTEMS:    · Constitutional: there has been no unanticipated weight loss.  There's been No change in energy level, No change in activity level. · Eyes: No visual changes or diplopia. No scleral icterus. · ENT: No Headaches, hearing loss or vertigo. No mouth sores or sore throat. · Cardiovascular: as hpi  · Respiratory: as hpi  · Gastrointestinal: No abdominal pain, appetite loss, blood in stools. No change in bowel or bladder habits. · Genitourinary: No dysuria, trouble voiding, or hematuria. · Musculoskeletal:  No gait disturbance, No weakness or joint complaints. · Integumentary: No rash or pruritis. · Neurological: No headache, diplopia, change in muscle strength, numbness or tingling. No change in gait, balance, coordination, mood, affect, memory, mentation, behavior. · Psychiatric: No new anxiety or depression. · Endocrine: No temperature intolerance. No excessive thirst, fluid intake, or urination. No tremor. · Hematologic/Lymphatic: No abnormal bruising or bleeding, blood clots or swollen lymph nodes. · Allergic/Immunologic: No nasal congestion or hives. Physical Exam:  /60 (Site: Right Upper Arm, Position: Sitting)   Ht 5' 11\" (1.803 m)   Wt 244 lb (110.7 kg)   BMI 34.03 kg/m²   General appearance: alert and cooperative with exam  HEENT: Head: Normocephalic, no lesions, without obvious abnormality.   Eyes: PERRL, EOMI  Ears: Not obvious deformations or lack of hearing  Neck: no carotid bruit, no JVD  Lungs: clear to auscultation bilaterally  Heart: regular rate and rhythm, S1, S2 normal, no murmur, click, rub or gallop  Abdomen: soft, non-tender; bowel sounds normal; no masses,  no organomegaly  Extremities: extremities normal, atraumatic, no cyanosis or edema  Neurologic: Mental status: Alert, oriented, thought content appropriate  Skin: WNL for age and condition, no obvious rashes or leasions    LABS  Lab Results   Component Value Date    CHOL 151 10/21/2019    TRIG 159 (H) 10/21/2019    HDL 33 (L) 10/20/2020    LDLCHOLESTEROL 47 10/20/2020    VLDL NOT REPORTED 10/20/2020 CHOLHDLRATIO 2.9 10/20/2020       Lab Results   Component Value Date     10/20/2020    K 4.2 10/20/2020    CL 96 (L) 10/20/2020    CO2 29 10/20/2020    BUN 21 10/20/2020    CREATININE 0.94 10/20/2020    GLUCOSE 186 (H) 10/20/2020    CALCIUM 9.5 10/20/2020    PROT 7.6 10/20/2020    LABALBU 4.4 10/20/2020    BILITOT 0.56 10/20/2020    ALKPHOS 33 (L) 10/20/2020    AST 19 10/20/2020    ALT 21 10/20/2020    LABGLOM >60 10/20/2020    GFRAA >60 10/20/2020       EKG:   Sinus  Rhythm  - frequent PAC s # PACs = 2. Voltage criteria for LVH     TTE 8/27/18  Left ventricle is normal in size Global left ventricular systolic function is normal   Estimated ejection fraction is 60 % . Mild left ventricular hypertrophy. Grade II (moderate) left ventricular diastolic dysfunction. Left atrium is mildly dilated. Bicuspid aortic valve. Moderate eccentric jet of aortic insufficiency. Normal tricuspid valve leaflets. Trivial tricuspid regurgitation. Estimated right ventricular systolic pressure is 33 mmHg. No pulmonary hypertension. Aortic root is mildly dilated at 4.4 cm. CTA 9/19/2018  *Aneurysmal dilatation of the aortic root measuring 4.7 cm.  Normal caliber   remaining thoracic aorta.  No dissection or rupture. *Questionable enhancing mass involving the left kidney measuring 2.5 x 2.4   cm.  Renal cell carcinoma cannot be excluded and complete evaluation with CT   or MRI utilizing renal mass protocol is recommended. *Cholelithiasis. *Cardiomegaly. Echo 8/19  Normal left ventricular diameter. Mild left ventricular hypertrophy. Left ventricular systolic function is normal.  Left ventricular ejection fraction 55 %. Left atrium is mildly dilated. Bicuspid aortic valve (fusion of Right and Left Coronary Cusps). Moderate aortic insufficiency, with highly eccentric jet, that maybe be  underestimating the degree of AI.   Aortic root is mildly dilated at 4.5 m.    CT chest abd pelvis: 10/11/19  *Stable aneurysmal dilatation of the ascending thoracic aorta measuring 4.7   cm.  No dissection or rupture. *Stable 3 mm solid noncalcified pulmonary nodule right lower lobe.  Attention   on follow-up is suggested given the history of renal cell carcinoma. *No CT evidence of tumor recurrence or metastatic disease seen within the   abdomen or pelvis. *Cholelithiasis. *Cardiomegaly. Echo 9/20:  Left ventricle is mildly dilated. Moderate left ventricular hypertrophy. Hyperdynamic left ventricular function. Left ventricular ejection fraction 70 %. Diastolic dysfunction. Left atrium is moderately dilated. Right atrium is mildly dilated . Right ventricular dilatation with normal systolic function. Functionally bicuspid aortic valve with mild aortic regurgitation. Normal mitral valve structure and function. Normal tricuspid valve leaflets. Trivial tricuspid regurgitation. Estimated right ventricular systolic pressure is 39 mmHg. No significant pericardial effusion is seen. Aortic root is mildly to moderately dilated at 4.5 cm. CTA chest 9/20:  1. The thoracic aorta is upper limits of normal in size as measured    perpendicular to the direction of flow as detailed above.  No aneurysm- 4 cm max diameter of Ascending aorta.   Mild    atherosclerotic vascular disease. 2. 3 mm nodule in the right lower lobe unchanged from at least 2018.  No    follow-up recommended. Assessment and Plan:    1. Bicuspid AV- with previously Moderate AI- now Mild AI on Echo 9/20. Continue aggressive BP control/Afterload reduction. On Norvasc, Coreg, Hyzaar. Repeat Echo around 9/2021  2. Ascending aortic aneurysm- 4 cm max diameter on CTA 9/20 (previously larger 4.7 cm on 10/19). Continue aggressive BP control/Afterload reduction. On Norvasc, Coreg, Hyzaar. Repeat CTA around 9/2021  3. Renal CA- following with urology/oncology  4. HTN- controlled on current meds  5.  Overweight - encouraged diet, exercise, and discussed weight loss extensively. 6. Hyperlipidemia- continue statin. LDL 47 on 10/20    The patient is to continue heart healthy diet, weight loss and exercise as tolerated. Patient's medications and side effects were discussed. Medication refills were provided if needed. Follow up appointment timing was discussed. All questions and concerns were addressed to patient's satisfaction. The patient is to follow up in 6 months or sooner if necessary. Thank you for allowing me to participate in the care of this patient, please do not hesitate to call if you have any questions. George Perez DO, [facility], [address], [address] [facility]  [email or web address]  [phone]

## 2020-11-11 ENCOUNTER — OFFICE VISIT (OUTPATIENT)
Dept: FAMILY MEDICINE CLINIC | Age: 65
End: 2020-11-11
Payer: COMMERCIAL

## 2020-11-11 VITALS
HEART RATE: 68 BPM | SYSTOLIC BLOOD PRESSURE: 128 MMHG | HEIGHT: 71 IN | OXYGEN SATURATION: 99 % | WEIGHT: 241 LBS | DIASTOLIC BLOOD PRESSURE: 72 MMHG | RESPIRATION RATE: 16 BRPM | BODY MASS INDEX: 33.74 KG/M2

## 2020-11-11 PROCEDURE — 3017F COLORECTAL CA SCREEN DOC REV: CPT | Performed by: PHYSICIAN ASSISTANT

## 2020-11-11 PROCEDURE — G8427 DOCREV CUR MEDS BY ELIG CLIN: HCPCS | Performed by: PHYSICIAN ASSISTANT

## 2020-11-11 PROCEDURE — 99213 OFFICE O/P EST LOW 20 MIN: CPT | Performed by: PHYSICIAN ASSISTANT

## 2020-11-11 PROCEDURE — G8484 FLU IMMUNIZE NO ADMIN: HCPCS | Performed by: PHYSICIAN ASSISTANT

## 2020-11-11 PROCEDURE — 99214 OFFICE O/P EST MOD 30 MIN: CPT | Performed by: PHYSICIAN ASSISTANT

## 2020-11-11 PROCEDURE — 90694 VACC AIIV4 NO PRSRV 0.5ML IM: CPT | Performed by: PHYSICIAN ASSISTANT

## 2020-11-11 PROCEDURE — 1123F ACP DISCUSS/DSCN MKR DOCD: CPT | Performed by: PHYSICIAN ASSISTANT

## 2020-11-11 PROCEDURE — 1036F TOBACCO NON-USER: CPT | Performed by: PHYSICIAN ASSISTANT

## 2020-11-11 PROCEDURE — G8417 CALC BMI ABV UP PARAM F/U: HCPCS | Performed by: PHYSICIAN ASSISTANT

## 2020-11-11 PROCEDURE — 4040F PNEUMOC VAC/ADMIN/RCVD: CPT | Performed by: PHYSICIAN ASSISTANT

## 2020-11-11 PROCEDURE — 2022F DILAT RTA XM EVC RTNOPTHY: CPT | Performed by: PHYSICIAN ASSISTANT

## 2020-11-11 PROCEDURE — 3044F HG A1C LEVEL LT 7.0%: CPT | Performed by: PHYSICIAN ASSISTANT

## 2020-11-11 RX ORDER — SILDENAFIL CITRATE 20 MG/1
TABLET ORAL
Qty: 25 TABLET | Refills: 5 | Status: SHIPPED | OUTPATIENT
Start: 2020-11-11 | End: 2021-05-21 | Stop reason: SDUPTHER

## 2020-11-11 RX ORDER — PIOGLITAZONEHYDROCHLORIDE 15 MG/1
15 TABLET ORAL DAILY
Qty: 90 TABLET | Refills: 1 | Status: SHIPPED | OUTPATIENT
Start: 2020-11-11 | End: 2021-03-25

## 2020-11-11 ASSESSMENT — ENCOUNTER SYMPTOMS
DIARRHEA: 0
VOMITING: 0
NAUSEA: 0

## 2020-11-11 ASSESSMENT — PATIENT HEALTH QUESTIONNAIRE - PHQ9
SUM OF ALL RESPONSES TO PHQ QUESTIONS 1-9: 0
SUM OF ALL RESPONSES TO PHQ QUESTIONS 1-9: 0
2. FEELING DOWN, DEPRESSED OR HOPELESS: 0
SUM OF ALL RESPONSES TO PHQ QUESTIONS 1-9: 0
1. LITTLE INTEREST OR PLEASURE IN DOING THINGS: 0
SUM OF ALL RESPONSES TO PHQ9 QUESTIONS 1 & 2: 0

## 2020-11-11 NOTE — PROGRESS NOTES
[facility]    Subjective:      Patient ID: Hamzah Daniel is a 72 y.o. y.o. male. Patient is seen today for several things. He did want flu shot today. Now only has to see Dr. Zofia Huff annually. Will do US not CT scans. Sees oncology they had questions regarding his recent CT abdomen pelvis. 10 ten days prior he was ill for 2-3 days with fatigue chills bad one night. No fever. No change taste or smell. No cough. Had congestion and thick postnasal drip. Nothing since is healthy. No problems. Had change in CT of lungs from 9/11/2020 to 10/29/2020. Has questions about tumeric. Using voltaren gel it helps a lot for foot pain. Just saw Dr. Joyce Garcia and things look better. He was very happy with recent improvement on echo. Past Medical History:   Diagnosis Date    Anxiety     Aortic regurgitation     Bicuspid aortic valve     Diabetes mellitus ([address] since March 2018    on Rx.     GERD (gastroesophageal reflux disease)     Hypertension     Left renal mass 10/2018    Osteoarthritis of left knee     Wears glasses        Past Surgical History:   Procedure Laterality Date    CYST REMOVAL      tail bone    MOUTH SURGERY  2015    PARTIAL NEPHRECTOMY Left 11/30/2018    ROBOTIC PARTIAL NEPHRECTOMY,     [place] COLONOSCOPY FLX DX W/COLLJ SPEC WHEN PFRMD N/A 5/14/2018    normal colon, Dr. Sharona Garsia Left 11/30/2018    XI ROBOTIC PARTIAL NEPHRECTOMY, INTRA-OP LAP ULTRASOUND performed by Panfilo Arthur MD at [address] [place] [address] EXTRACTION         Family History   Problem Relation Age of Onset    High Blood Pressure Mother     Diabetes Mother         impaired fasting glucose    Other Mother         short term memory loss after MVA    Glaucoma Mother     Stroke Father 66    High Blood Pressure Father     Diabetes Father         impaired fasting glucose    Glaucoma Father     Cancer Father         bladder     Heart Attack Father 66    Glaucoma Brother     Diabetes Maternal Grandmother     Other Paternal Grandmother         gallbladder disease    Stroke Paternal Grandfather     Other Paternal Grandfather         gallbladder disease    Diabetes Paternal Grandfather     No Known Problems Brother        Allergies   Allergen Reactions    Codeine      Severe Abdominal pain    Sulfa Antibiotics      Patient unsure of reaction       Current Outpatient Medications   Medication Sig Dispense Refill    sildenafil (REVATIO) 20 MG tablet Take as directed prn up to 5 tabs daily 25 tablet 5    pioglitazone (ACTOS) 15 MG tablet Take 1 tablet by mouth daily 90 tablet 1    carvedilol (COREG) 25 MG tablet Take 1 tablet by mouth 2 times daily 180 tablet 2    metFORMIN (GLUCOPHAGE) 500 MG tablet Take 1 tablet by mouth 2 times daily (with meals) 180 tablet 2    amLODIPine (NORVASC) 10 MG tablet Take 1 tablet by mouth daily 90 tablet 2    cetirizine (ZYRTEC) 10 MG tablet Take 1 tablet by mouth daily 90 tablet 1    rosuvastatin (CRESTOR) 5 MG tablet Take 1 tablet by mouth daily 30 tablet 5    losartan-hydrochlorothiazide (HYZAAR) 100-25 MG per tablet Take 1 tablet by mouth daily 90 tablet 1    meloxicam (MOBIC) 7.5 MG tablet Take 1 tablet by mouth 2 times daily (with meals) 180 tablet 1    pioglitazone (ACTOS) 15 MG tablet Take 1 tablet by mouth daily 30 tablet 5    furosemide (LASIX) 20 MG tablet Take 1 tablet by mouth daily 90 tablet 3    Blood Pressure Monitoring (BLOOD PRESSURE CUFF) MISC DX: I10 HTN 1 each 0    fluticasone (FLONASE) 50 MCG/ACT nasal spray 1 spray by Nasal route daily (Patient taking differently: 1 spray by Nasal route daily as needed ) 1 Bottle 3    Elastic Bandages & Supports (JOBST OPAQUE KNEE 15-20MMHG XL) MISC #2 pair knee highs  Varicose veins 2 each 0    vitamin D3 (CHOLECALCIFEROL) 400 UNITS TABS tablet Take 400 Units by mouth every other day       Omeprazole (PRILOSEC PO) Take 1 capsule by mouth daily as needed       diclofenac sodium (VOLTAREN) 1 % GEL Apply 4 g topically 4 times daily as needed for Pain 100 g 0     No current facility-administered medications for this visit. Review of Systems   Constitutional: Negative for appetite change, chills, fatigue and fever. Cardiovascular: Negative for chest pain and palpitations. Gastrointestinal: Negative for diarrhea, nausea and vomiting. Musculoskeletal: Negative for myalgias. Skin: Negative for rash. Neurological: Negative for light-headedness, numbness and headaches. Psychiatric/Behavioral: Negative for sleep disturbance. The patient is not nervous/anxious. Objective:      /72   Pulse 68   Resp 16   Ht 5' 11\" (1.803 m)   Wt 241 lb (109.3 kg)   SpO2 99%   BMI 33.61 kg/m²     Physical Exam  Vitals signs and nursing note reviewed. Constitutional:       General: He is not in acute distress. Appearance: Normal appearance. He is well-developed. He is not ill-appearing. HENT:      Head: Normocephalic and atraumatic. Right Ear: Tympanic membrane, ear canal and external ear normal.      Left Ear: Tympanic membrane, ear canal and external ear normal.      Nose: Nose normal. No congestion or rhinorrhea. Mouth/Throat:      Mouth: Mucous membranes are moist.      Pharynx: No oropharyngeal exudate or posterior oropharyngeal erythema. Eyes:      General: No scleral icterus. Conjunctiva/sclera: Conjunctivae normal.   Neck:      Musculoskeletal: Normal range of motion and neck supple. No neck rigidity or muscular tenderness. Thyroid: No thyroid mass, thyromegaly or thyroid tenderness. Vascular: No carotid bruit. Cardiovascular:      Rate and Rhythm: Normal rate and regular rhythm. Heart sounds: Murmur present. No gallop. Comments: 2/6 systolic murmur noted along the left lateral sternal border to the apex.   Pulmonary:      Effort: Pulmonary effort is normal. No respiratory distress. Breath sounds: Normal breath sounds. No wheezing, rhonchi or rales. Abdominal:      General: Bowel sounds are normal. There is no distension. Palpations: Abdomen is soft. There is no mass. Tenderness: There is no abdominal tenderness. There is no guarding or rebound. Hernia: No hernia is present. Musculoskeletal:         General: No tenderness, deformity or signs of injury. Comments: Trace pedal edema noted in the lower extremities bilaterally pretibial areas. Lymphadenopathy:      Cervical: No cervical adenopathy. Skin:     General: Skin is warm and dry. Findings: No erythema or rash. Neurological:      General: No focal deficit present. Mental Status: He is alert and oriented to person, place, and time. Sensory: No sensory deficit. Motor: No weakness. Gait: Gait normal.   Psychiatric:         Mood and Affect: Mood normal.         Behavior: Behavior normal.         Thought Content: Thought content normal.         Judgment: Judgment normal.       Ct Abdomen Pelvis W Iv Contrast Additional Contrast? Oral    Result Date: 10/29/2020  EXAMINATION: CT OF THE ABDOMEN AND PELVIS WITH CONTRAST 10/29/2020 10:23 am TECHNIQUE: CT of the abdomen and pelvis was performed with the administration of intravenous contrast. Multiplanar reformatted images are provided for review. Dose modulation, iterative reconstruction, and/or weight based adjustment of the mA/kV was utilized to reduce the radiation dose to as low as reasonably achievable. COMPARISON: CT abdomen/pelvis 10/11/2019, 03/13/2019. CTA chest 09/11/2020. HISTORY: ORDERING SYSTEM PROVIDED HISTORY: Carcinoma of left kidney [facility]) TECHNOLOGIST PROVIDED HISTORY: follow [address] Reason for Exam: F/u renal CA. Hx partial kidney removed Acuity: Chronic Type of Exam: Subsequent/Follow-up FINDINGS: Thorax base:  Borderline enlarged heart size with no significant pericardial effusion.   The lung bases demonstrate no 10/29/2020  EXAMINATION: ULTRASOUND OF THE KIDNEYS 10/29/2020 8:55 am COMPARISON: CT scan of the abdomen and pelvis from 10/11/2019, and previous ultrasound from 10/09/2018. HISTORY: ORDERING SYSTEM PROVIDED HISTORY: Renal cell carcinoma of left kidney ([address] status post partial left nephrectomy. FINDINGS: The right kidney measures 11.1 x 6.1 x 5.1 cm and the left kidney measures 12.2 x 5.6 x 5.1 cm. Cortical thickness is 1.3 cm on the right and 1.4 cm on the left. Kidneys demonstrate normal cortical echogenicity. No hydronephrosis or intrarenal stones. Unchanged interpolar renal cyst on the right measuring 1.8 x 1.7 x 1.7 cm, compared to 1.4 x 1.3 cm on CT from 2019. Stable post partial left nephrectomy changes. No recurrent mass or acute abnormality. Known slightly larger right renal cyst.   Hospital Outpatient Visit on 10/20/2020   Component Date Value Ref Range Status    TSH 10/20/2020 2.37  0.30 - 5.00 mIU/L Final    Cholesterol, Fasting 10/20/2020 96  <200 mg/dL Final    Comment:    Cholesterol Guidelines:      <200  Desirable   200-240  Borderline      >240  Undesirable         HDL 10/20/2020 33* >40 mg/dL Final    Comment:    HDL Guidelines:    <40     Undesirable   40-59    Borderline    >59     Desirable         LDL Cholesterol 10/20/2020 47  0 - 130 mg/dL Final    Comment:    LDL Guidelines:     <100    Desirable   100-129   Near to/above Desirable   130-159   Borderline      >159   Undesirable     Direct (measured) LDL and calculated LDL are not interchangeable tests.  Chol/HDL Ratio 10/20/2020 2.9  <5 Final            Triglyceride, Fasting 10/20/2020 82  <150 mg/dL Final    Comment:    Triglyceride Guidelines:     <150   Desirable   150-199  Borderline   200-499  High     >499   Very high   Based on AHA Guidelines for fasting triglyceride, October 2012.          VLDL 10/20/2020 NOT REPORTED  1 - 30 mg/dL Final    Hemoglobin A1C 10/20/2020 6.6* 4.8 - 5.9 % Final    Estimated Avg Glucose 10/20/2020 143  mg/dL Final    Microalb, Ur 10/20/2020 42* <21 mg/L Final    Creatinine, Ur 10/20/2020 271.9* 39.0 - 259.0 mg/dL Final    Microalb/Crt. Ratio 10/20/2020 15  <17 mcg/mg creat Final   Hospital Outpatient Visit on 10/20/2020   Component Date Value Ref Range Status    Glucose 10/20/2020 186* 70 - 99 mg/dL Final    BUN 10/20/2020 21  8 - 23 mg/dL Final    CREATININE 10/20/2020 0.94  0.70 - 1.20 mg/dL Final    Bun/Cre Ratio 10/20/2020 22* 9 - 20 Final    Calcium 10/20/2020 9.5  8.6 - 10.4 mg/dL Final    Sodium 10/20/2020 136  135 - 144 mmol/L Final    Potassium 10/20/2020 4.2  3.7 - 5.3 mmol/L Final    Chloride 10/20/2020 96* 98 - 107 mmol/L Final    CO2 10/20/2020 29  20 - 31 mmol/L Final    Anion Gap 10/20/2020 11  9 - 17 mmol/L Final    Alkaline Phosphatase 10/20/2020 33* 40 - 129 U/L Final    ALT 10/20/2020 21  5 - 41 U/L Final    AST 10/20/2020 19  <40 U/L Final    Total Bilirubin 10/20/2020 0.56  0.3 - 1.2 mg/dL Final    Total Protein 10/20/2020 7.6  6.4 - 8.3 g/dL Final    Alb 10/20/2020 4.4  3.5 - 5.2 g/dL Final    Albumin/Globulin Ratio 10/20/2020 1.4  1.0 - 2.5 Final    GFR Non- 10/20/2020 >60  >60 mL/min Final    GFR  10/20/2020 >60  >60 mL/min Final    GFR Comment 10/20/2020        Final    Comment: Average GFR for 61-76 years old:   80 mL/min/1.73sq m  Chronic Kidney Disease:   <60 mL/min/1.73sq m  Kidney failure:   <15 mL/min/1.73sq m              GFR is a calculated value that has proven clinically to  be a more effective measure of   kidney function when reported with serum creatinine.             GFR Staging 10/20/2020 NOT REPORTED   Final    WBC 10/20/2020 5.0  3.5 - 11.3 k/uL Final    RBC 10/20/2020 4.12* 4.21 - 5.77 m/uL Final    Hemoglobin 10/20/2020 13.3  13.0 - 17.0 g/dL Final    Hematocrit 10/20/2020 38.6* 40.7 - 50.3 % Final    MCV 10/20/2020 93.7  82.6 - 102.9 fL Final    MCH 10/20/2020 32.3  25.2 - 33.5 pg Final  MCHC 10/20/2020 34.5* 25.2 - 33.5 g/dL Final    RDW 10/20/2020 12.6  11.8 - 14.4 % Final    Platelets 51/51/7779 See Reflexed IPF Result  138 - 453 k/uL Final    MPV 10/20/2020 NOT REPORTED  8.1 - 13.5 fL Final    NRBC Automated 10/20/2020 0.0  0.0 per 100 WBC Final    Differential Type 10/20/2020 NOT REPORTED   Final    WBC Morphology 10/20/2020 NOT REPORTED   Final    RBC Morphology 10/20/2020 NOT REPORTED   Final    Platelet Estimate 69/40/0741 NOT REPORTED   Final    Seg Neutrophils 10/20/2020 52  36 - 65 % Final    Lymphocytes 10/20/2020 31  24 - 43 % Final    Monocytes 10/20/2020 15* 3 - 12 % Final    Eosinophils % 10/20/2020 1  1 - 4 % Final    Basophils 10/20/2020 0  0 - 2 % Final    Immature Granulocytes 10/20/2020 0  0 % Final    Segs Absolute 10/20/2020 2.61  1.50 - 8.10 k/uL Final    Absolute Lymph # 10/20/2020 1.54  1.10 - 3.70 k/uL Final    Absolute Mono # 10/20/2020 0.77  0.10 - 1.20 k/uL Final    Absolute Eos # 10/20/2020 0.06  0.00 - 0.44 k/uL Final    Basophils Absolute 10/20/2020 <0.03  0.00 - 0.20 k/uL Final    Absolute Immature Granulocyte 10/20/2020 <0.03  0.00 - 0.30 k/uL Final    Platelet, Immature Fraction 10/20/2020 6.6  1.1 - 10.3 % Final    Platelet, Fluorescence 10/20/2020 104* 138 - 453 k/uL Final         Assessment & Plan:     1. Need for vaccination    - INFLUENZA, QUADV, ADJUVANTED, 65 YRS =, IM, PF, PREFILL SYR, 0.5ML (FLUAD)    2. Type 2 diabetes mellitus with other circulatory complication, without long-term current use of insulin (HCC)    - pioglitazone (ACTOS) 15 MG tablet; Take 1 tablet by mouth daily  Dispense: 90 tablet; Refill: 1  - Microalbumin, Ur; Future    3. ED  - sildenafil (REVATIO) 20 MG tablet; Take as directed prn up to 5 tabs daily  Dispense: 25 tablet; Refill: 5    4. Abnormal CT of the chest    - CT CHEST W CONTRAST; Future    5. Pulmonary nodule    - CT CHEST W CONTRAST; Future    6. Chronic foot pain, right  Follow-up with Ortho    7. Generalized anxiety disorder      8. Carcinoma of kidney, unspecified laterality ([facility]      9. Mild intermittent asthma without complication      10. Essential hypertension      11. Seasonal allergic rhinitis due to pollen      12.  Dilated aortic root ([facility]        Repeat ct chest one month ordered  Answered his questions  Work on lifestyle changes  Follow-up with specialty as scheduled  Reviewed all imaging and testing  Follow-up with me 3 months sooner if problems  Updated flu shot today in the office      [place], [place]  11/25/2020 2:17 PM EST    (Pleasenote that portions of this note were completed with a voice recognition program.Efforts were made to edit the dictations but occasionally words are mis-transcribed.)

## 2020-12-03 ENCOUNTER — TELEPHONE (OUTPATIENT)
Dept: FAMILY MEDICINE CLINIC | Age: 65
End: 2020-12-03

## 2020-12-04 ENCOUNTER — HOSPITAL ENCOUNTER (OUTPATIENT)
Dept: LAB | Age: 65
Discharge: HOME | End: 2020-12-04
Payer: COMMERCIAL

## 2020-12-04 LAB
ANION GAP SERPL CALCULATED.3IONS-SCNC: 8 MMOL/L (ref 9–17)
BUN BLDV-MCNC: 27 MG/DL (ref 8–23)
BUN/CREAT BLD: 37 (ref 9–20)
CALCIUM SERPL-MCNC: 9.9 MG/DL (ref 8.6–10.4)
CHLORIDE BLD-SCNC: 100 MMOL/L (ref 98–107)
CO2: 28 MMOL/L (ref 20–31)
CREAT SERPL-MCNC: 0.73 MG/DL (ref 0.7–1.2)
GFR AFRICAN AMERICAN: >60 ML/MIN
GFR NON-AFRICAN AMERICAN: >60 ML/MIN
GFR SERPL CREATININE-BSD FRML MDRD: ABNORMAL ML/MIN/{1.73_M2}
GFR SERPL CREATININE-BSD FRML MDRD: ABNORMAL ML/MIN/{1.73_M2}
GLUCOSE BLD-MCNC: 184 MG/DL (ref 70–99)
POTASSIUM SERPL-SCNC: 4.5 MMOL/L (ref 3.7–5.3)
SODIUM BLD-SCNC: 136 MMOL/L (ref 135–144)

## 2020-12-04 PROCEDURE — 36415 COLL VENOUS BLD VENIPUNCTURE: CPT

## 2020-12-04 PROCEDURE — 80048 BASIC METABOLIC PNL TOTAL CA: CPT

## 2020-12-07 ENCOUNTER — HOSPITAL ENCOUNTER (OUTPATIENT)
Dept: CT IMAGING | Age: 65
Discharge: HOME | End: 2020-12-09
Payer: COMMERCIAL

## 2020-12-07 PROCEDURE — 6360000004 HC RX CONTRAST MEDICATION: Performed by: PHYSICIAN ASSISTANT

## 2020-12-07 PROCEDURE — 2709999900 CT CHEST W CONTRAST

## 2020-12-07 RX ADMIN — IOPAMIDOL 100 ML: 755 INJECTION, SOLUTION INTRAVENOUS at 10:39

## 2021-01-03 DIAGNOSIS — I10 ESSENTIAL HYPERTENSION: ICD-10-CM

## 2021-01-03 DIAGNOSIS — J30.2 OTHER SEASONAL ALLERGIC RHINITIS: ICD-10-CM

## 2021-01-05 RX ORDER — LOSARTAN POTASSIUM AND HYDROCHLOROTHIAZIDE 25; 100 MG/1; MG/1
1 TABLET ORAL DAILY
Qty: 90 TABLET | Refills: 1 | Status: SHIPPED | OUTPATIENT
Start: 2021-01-05 | End: 2021-07-07 | Stop reason: SDUPTHER

## 2021-01-05 RX ORDER — CETIRIZINE HYDROCHLORIDE 10 MG/1
10 TABLET ORAL DAILY
Qty: 90 TABLET | Refills: 1 | Status: SHIPPED | OUTPATIENT
Start: 2021-01-05 | End: 2021-03-01 | Stop reason: SDUPTHER

## 2021-02-15 DIAGNOSIS — E11.59 TYPE 2 DIABETES MELLITUS WITH OTHER CIRCULATORY COMPLICATION, WITHOUT LONG-TERM CURRENT USE OF INSULIN (HCC): ICD-10-CM

## 2021-02-15 RX ORDER — PIOGLITAZONEHYDROCHLORIDE 15 MG/1
15 TABLET ORAL DAILY
Qty: 90 TABLET | Refills: 1 | Status: CANCELLED | OUTPATIENT
Start: 2021-02-15

## 2021-03-01 DIAGNOSIS — J30.2 OTHER SEASONAL ALLERGIC RHINITIS: ICD-10-CM

## 2021-03-01 RX ORDER — CETIRIZINE HYDROCHLORIDE 10 MG/1
10 TABLET ORAL DAILY
Qty: 30 TABLET | Refills: 1 | Status: SHIPPED | OUTPATIENT
Start: 2021-03-01 | End: 2021-10-04 | Stop reason: SDUPTHER

## 2021-03-01 NOTE — TELEPHONE ENCOUNTER
Last Appt:  11/11/2020 (follow up)  Next Appt:   4/2/2021 (new to [facility])    30 day supply pending, once re-est can send in 90 day supply.    Med verified in Epic

## 2021-03-25 ENCOUNTER — OFFICE VISIT (OUTPATIENT)
Dept: FAMILY MEDICINE CLINIC | Age: 66
End: 2021-03-25
Payer: MEDICAID

## 2021-03-25 ENCOUNTER — IMMUNIZATION (OUTPATIENT)
Dept: LAB | Age: 66
End: 2021-03-25
Payer: MEDICAID

## 2021-03-25 VITALS
OXYGEN SATURATION: 98 % | HEIGHT: 71 IN | WEIGHT: 248.5 LBS | DIASTOLIC BLOOD PRESSURE: 70 MMHG | HEART RATE: 70 BPM | SYSTOLIC BLOOD PRESSURE: 150 MMHG | TEMPERATURE: 96.2 F | BODY MASS INDEX: 34.79 KG/M2

## 2021-03-25 DIAGNOSIS — M54.41 ACUTE RIGHT-SIDED LOW BACK PAIN WITH RIGHT-SIDED SCIATICA: Primary | ICD-10-CM

## 2021-03-25 DIAGNOSIS — E11.9 TYPE 2 DIABETES MELLITUS WITHOUT COMPLICATION, WITHOUT LONG-TERM CURRENT USE OF INSULIN (HCC): ICD-10-CM

## 2021-03-25 DIAGNOSIS — R09.82 POST-NASAL DRIP: ICD-10-CM

## 2021-03-25 DIAGNOSIS — H61.23 BILATERAL IMPACTED CERUMEN: ICD-10-CM

## 2021-03-25 DIAGNOSIS — I10 ESSENTIAL HYPERTENSION: ICD-10-CM

## 2021-03-25 DIAGNOSIS — L57.0 AK (ACTINIC KERATOSIS): ICD-10-CM

## 2021-03-25 PROCEDURE — 4040F PNEUMOC VAC/ADMIN/RCVD: CPT | Performed by: FAMILY MEDICINE

## 2021-03-25 PROCEDURE — 99213 OFFICE O/P EST LOW 20 MIN: CPT | Performed by: FAMILY MEDICINE

## 2021-03-25 PROCEDURE — G8427 DOCREV CUR MEDS BY ELIG CLIN: HCPCS | Performed by: FAMILY MEDICINE

## 2021-03-25 PROCEDURE — 3046F HEMOGLOBIN A1C LEVEL >9.0%: CPT | Performed by: FAMILY MEDICINE

## 2021-03-25 PROCEDURE — 1123F ACP DISCUSS/DSCN MKR DOCD: CPT | Performed by: FAMILY MEDICINE

## 2021-03-25 PROCEDURE — 2022F DILAT RTA XM EVC RTNOPTHY: CPT | Performed by: FAMILY MEDICINE

## 2021-03-25 PROCEDURE — 99214 OFFICE O/P EST MOD 30 MIN: CPT | Performed by: FAMILY MEDICINE

## 2021-03-25 PROCEDURE — 91301 COVID-19, MODERNA VACCINE 100MCG/0.5ML DOSE: CPT

## 2021-03-25 PROCEDURE — 69210 REMOVE IMPACTED EAR WAX UNI: CPT | Performed by: FAMILY MEDICINE

## 2021-03-25 PROCEDURE — 3017F COLORECTAL CA SCREEN DOC REV: CPT | Performed by: FAMILY MEDICINE

## 2021-03-25 PROCEDURE — G8417 CALC BMI ABV UP PARAM F/U: HCPCS | Performed by: FAMILY MEDICINE

## 2021-03-25 PROCEDURE — G8484 FLU IMMUNIZE NO ADMIN: HCPCS | Performed by: FAMILY MEDICINE

## 2021-03-25 PROCEDURE — 1036F TOBACCO NON-USER: CPT | Performed by: FAMILY MEDICINE

## 2021-03-25 RX ORDER — BACLOFEN 10 MG/1
10 TABLET ORAL NIGHTLY PRN
Qty: 30 TABLET | Refills: 1 | Status: SHIPPED | OUTPATIENT
Start: 2021-03-25

## 2021-03-25 RX ORDER — MONTELUKAST SODIUM 10 MG/1
10 TABLET ORAL NIGHTLY
Qty: 30 TABLET | Refills: 3 | Status: SHIPPED | OUTPATIENT
Start: 2021-03-25 | End: 2021-07-24 | Stop reason: SDUPTHER

## 2021-03-25 RX ORDER — FLUTICASONE PROPIONATE 50 MCG
1 SPRAY, SUSPENSION (ML) NASAL 2 TIMES DAILY
Qty: 1 BOTTLE | Refills: 5 | Status: SHIPPED | OUTPATIENT
Start: 2021-03-25

## 2021-03-25 ASSESSMENT — ENCOUNTER SYMPTOMS
BACK PAIN: 1
CHEST TIGHTNESS: 0
COUGH: 0
SHORTNESS OF BREATH: 0
BOWEL INCONTINENCE: 0
WHEEZING: 0

## 2021-03-25 ASSESSMENT — PATIENT HEALTH QUESTIONNAIRE - PHQ9
DEPRESSION UNABLE TO ASSESS: PT REFUSES
SUM OF ALL RESPONSES TO PHQ QUESTIONS 1-9: 0

## 2021-03-25 NOTE — PROGRESS NOTES
Cerum removal of bilateral ears by irrigation. Pt tolerated, voiced no pain or dizziness during or after irrigation.

## 2021-03-25 NOTE — PROGRESS NOTES
PATRICIA Theresalax[address]  [address] [place] [address]  Dept: [phone]  Dept Fax: [phone]  Loc: [phone]    Adah Saint is a 72 y.o. male who presents today for his medical conditions/complaints as noted below. Adah Saint is c/o of   Chief Complaint   Patient presents with    Back Pain       HPI:     Here today for back pain, HTN and sinus issues. Back Pain  This is a recurrent (he injured his back in 1980s) problem. The current episode started 1 to 4 weeks ago (2 weeks). The quality of the pain is described as aching. The pain radiates to the right knee. The pain is at a severity of 4/10. The pain is moderate. The pain is the same all the time. The symptoms are aggravated by bending, twisting, position and sitting. Pertinent negatives include no bladder incontinence, bowel incontinence, chest pain, dysuria, fever, headaches, leg pain, numbness, paresis, paresthesias, tingling or weakness. Risk factors include history of cancer and obesity. He has tried heat and analgesics (tylenol) for the symptoms. The treatment provided mild relief. He typically needs his ears cleaned yearly and he would like me to check them. He has chronic tinnitus but he has noticed a different buzzing his right ear recently. He has had his hearing his checked and he has a decreased high pitched sounds. He is not yet mentally ready for hearing aids. He has chronic PND. He can't sing because of that. HTN: worsening; today his bp is high due to concerns about his covid vaccine. He was having headaches prior to getting his bp under control. He is not having any issues with chest pain or shortness of breath. He has not had any issues with leg swelling. He is not having any issues with blurry vision.      Past Medical History:   Diagnosis Date    Anxiety     Aortic regurgitation     Bicuspid aortic valve     Diabetes mellitus [facility]) since March 2018    on Rx.  GERD (gastroesophageal reflux disease)     Hypertension     Left renal mass 10/2018    Osteoarthritis of left knee     Wears glasses           Social History     Tobacco Use    Smoking status: Never Smoker    Smokeless tobacco: Never Used   Substance Use Topics    Alcohol use: Yes     Comment: very rare     Current Outpatient Medications   Medication Sig Dispense Refill    fluticasone (FLONASE) 50 MCG/ACT nasal spray 1 spray by Nasal route 2 times daily 1 Bottle 5    montelukast (SINGULAIR) 10 MG tablet Take 1 tablet by mouth nightly 30 tablet 3    baclofen (LIORESAL) 10 MG tablet Take 1 tablet by mouth nightly as needed (muscle spasm) 30 tablet 1    cetirizine (ZYRTEC) 10 MG tablet Take 1 tablet by mouth daily 30 tablet 1    rosuvastatin (CRESTOR) 5 MG tablet Take 1 tablet by mouth daily 90 tablet 1    losartan-hydroCHLOROthiazide (HYZAAR) 100-25 MG per tablet Take 1 tablet by mouth daily 90 tablet 1    sildenafil (REVATIO) 20 MG tablet Take as directed prn up to 5 tabs daily 25 tablet 5    carvedilol (COREG) 25 MG tablet Take 1 tablet by mouth 2 times daily 180 tablet 2    metFORMIN (GLUCOPHAGE) 500 MG tablet Take 1 tablet by mouth 2 times daily (with meals) 180 tablet 2    amLODIPine (NORVASC) 10 MG tablet Take 1 tablet by mouth daily 90 tablet 2    meloxicam (MOBIC) 7.5 MG tablet Take 1 tablet by mouth 2 times daily (with meals) 180 tablet 1    pioglitazone (ACTOS) 15 MG tablet Take 1 tablet by mouth daily 30 tablet 5    furosemide (LASIX) 20 MG tablet Take 1 tablet by mouth daily 90 tablet 3    Elastic Bandages & Supports (JOBST OPAQUE KNEE 15-20MMHG XL) MISC #2 pair knee highs  Varicose veins 2 each 0    vitamin D3 (CHOLECALCIFEROL) 400 UNITS TABS tablet Take 400 Units by mouth every other day       Omeprazole (PRILOSEC PO) Take 1 capsule by mouth daily as needed        No current facility-administered medications for this visit.            Allergies Allergen Reactions    Codeine      Severe Abdominal pain    Sulfa Antibiotics      Patient unsure of reaction       Subjective:     Review of Systems   Constitutional: Negative for activity change, appetite change, chills, fatigue and fever. HENT: Positive for postnasal drip. Eyes: Negative for visual disturbance. Respiratory: Negative for cough, chest tightness, shortness of breath and wheezing. Cardiovascular: Negative for chest pain, palpitations and leg swelling. Gastrointestinal: Negative for bowel incontinence. Genitourinary: Negative for bladder incontinence, difficulty urinating and dysuria. Musculoskeletal: Positive for back pain. Skin: Negative for rash. Neurological: Negative for dizziness, tingling, syncope, weakness, light-headedness, numbness, headaches and paresthesias. Objective:      Physical Exam  Vitals signs and nursing note reviewed. Constitutional:       General: He is not in acute distress. Appearance: He is well-developed. HENT:      Right Ear: There is impacted cerumen. Left Ear: There is impacted cerumen. Eyes:      Conjunctiva/sclera: Conjunctivae normal.   Neck:      Musculoskeletal: Normal range of motion and neck supple. Cardiovascular:      Rate and Rhythm: Normal rate and regular rhythm. Heart sounds: Normal heart sounds. No murmur. Pulmonary:      Effort: Pulmonary effort is normal. No respiratory distress. Breath sounds: Normal breath sounds. No wheezing or rales. Musculoskeletal:      Lumbar back: He exhibits decreased range of motion and spasm. He exhibits no tenderness, no swelling and no edema. Comments: Modified straight leg raise negative   Lymphadenopathy:      Cervical: No cervical adenopathy. Skin:     General: Skin is warm and dry. Findings: No rash. Neurological:      Mental Status: He is alert and oriented to person, place, and time. Sensory: No sensory deficit.       Deep Tendon Reflexes: Reflex Scores:       Patellar reflexes are 2+ on the right side and 2+ on the left side. Psychiatric:         Mood and Affect: Mood normal.         Behavior: Behavior normal.       BP (!) 150/70 Comment: recheck  Pulse 70   Temp 96.2 °F (35.7 °C)   Ht 5' 11\" (1.803 m)   Wt 248 lb 8 oz (112.7 kg)   SpO2 98%   BMI 34.66 kg/m²     Assessment:       Diagnosis Orders   1. Acute right-sided low back pain with right-sided sciatica     2. AK (actinic keratosis)  Bridget Guevara MD, Dermatology, [place]   3. Essential hypertension     4. Post-nasal drip     5. Bilateral impacted cerumen  [place] REMOVAL IMPACTED CERUMEN INSTRUMENTATION UNILAT   6. Type 2 diabetes mellitus without complication, without long-term current use of insulin ([facility])  Hemoglobin A1C    Lipid Panel             Plan:        Back pain: worsening; I explained that I do not like to treat back pain with narcotics. I recommended tylenol, heat, ice and back exercises. he was given back exercises to take home. I also recommended an occasional muscle relaxer at bedtime if he needs it. I also talked about the importance of good posture and staying active. If his pain does not improve I recommended PT.    AKs: he has a history of multiple AKs and he would like to see derm so I put in a referral    HTN: his blood pressure is elevated today likely due to his concern about his covid vaccine and meeting me. I will recheck at his next visit. PND: he is very bothered by this so I recommended flonase and singulair in addition to his zyrtec. Cerumen impaction: Procedure:  Pre procedure diagnosis: bilateral cerumen impaction  Post procedure diagnosis: resolved  Description: Ceruminosis is noted. Wax is removed by syringing and manual debridement with a curette. Instructions for home care to prevent wax buildup are given. DM: will address at his next visit    Return for keep appointment as scheduled in July.     Orders Placed This Encounter   Procedures  Hemoglobin A1C     Standing Status:   Future     Standing Expiration Date:   3/25/2022    Lipid Panel     Standing Status:   Future     Standing Expiration Date:   3/25/2022     Order Specific Question:   Is Patient Fasting?/# of Hours     Answer:   0 per dr Jessica Bar MD, Dermatology, [facility]     Referral Priority:   Routine     Referral Type:   Eval and Treat     Referral Reason:   Specialty Services Required     Referred to Provider:   Trung Huddleston MD     Requested Specialty:   Dermatology     Number of Visits Requested:   1    [place] REMOVAL IMPACTED CERUMEN INSTRUMENTATION UNILAT     Orders Placed This Encounter   Medications    fluticasone (FLONASE) 50 MCG/ACT nasal spray     Si spray by Nasal route 2 times daily     Dispense:  1 Bottle     Refill:  5    montelukast (SINGULAIR) 10 MG tablet     Sig: Take 1 tablet by mouth nightly     Dispense:  30 tablet     Refill:  3    baclofen (LIORESAL) 10 MG tablet     Sig: Take 1 tablet by mouth nightly as needed (muscle spasm)     Dispense:  30 tablet     Refill:  1       Patientgiven educational materials - see patient instructions. Discussed use, benefit,and side effects of prescribed medications. All patient questions answered. Ptvoiced understanding. Reviewed health maintenance. Instructed to continue currentmedications, diet and exercise. Patient agreed with treatment plan. Follow up asdirected.      Electronically signed by Yoana Heaton MD on 3/25/2021 at 9:46 AM

## 2021-03-25 NOTE — PATIENT INSTRUCTIONS
Patient Education        Low Back Pain: Exercises  Introduction  Here are some examples of exercises for you to try. The exercises may be suggested for a condition or for rehabilitation. Start each exercise slowly. Ease off the exercises if you start to have pain. You will be told when to start these exercises and which ones will work best for you. How to do the exercises  Press-up   1. Lie on your stomach, supporting your body with your forearms. 2. Press your elbows down into the floor to raise your upper back. As you do this, relax your stomach muscles and allow your back to arch without using your back muscles. As your press up, do not let your hips or pelvis come off the floor. 3. Hold for 15 to 30 seconds, then relax. 4. Repeat 2 to 4 times. Alternate arm and leg (bird dog) exercise   Do this exercise slowly. Try to keep your body straight at all times, and do not let one hip drop lower than the other. 1. Start on the floor, on your hands and knees. 2. Tighten your belly muscles. 3. Raise one leg off the floor, and hold it straight out behind you. Be careful not to let your hip drop down, because that will twist your trunk. 4. Hold for about 6 seconds, then lower your leg and switch to the other leg. 5. Repeat 8 to 12 times on each leg. 6. Over time, work up to holding for 10 to 30 seconds each time. 7. If you feel stable and secure with your leg raised, try raising the opposite arm straight out in front of you at the same time. Knee-to-chest exercise   1. Lie on your back with your knees bent and your feet flat on the floor. 2. Bring one knee to your chest, keeping the other foot flat on the floor (or keeping the other leg straight, whichever feels better on your lower back). 3. Keep your lower back pressed to the floor. Hold for at least 15 to 30 seconds. 4. Relax, and lower the knee to the starting position. 5. Repeat with the other leg. Repeat 2 to 4 times with each leg.   6. To get more stretch, put your other leg flat on the floor while pulling your knee to your chest.    Curl-ups   1. Lie on the floor on your back with your knees bent at a 90-degree angle. Your feet should be flat on the floor, about 12 inches from your buttocks. 2. Cross your arms over your chest. If this bothers your neck, try putting your hands behind your neck (not your head), with your elbows spread apart. 3. Slowly tighten your belly muscles and raise your shoulder blades off the floor. 4. Keep your head in line with your body, and do not press your chin to your chest.  5. Hold this position for 1 or 2 seconds, then slowly lower yourself back down to the floor. 6. Repeat 8 to 12 times. Pelvic tilt exercise   1. Lie on your back with your knees bent. 2. \"Brace\" your stomach. This means to tighten your muscles by pulling in and imagining your belly button moving toward your spine. You should feel like your back is pressing to the floor and your hips and pelvis are rocking back. 3. Hold for about 6 seconds while you breathe smoothly. 4. Repeat 8 to 12 times. Heel dig bridging   1. Lie on your back with both knees bent and your ankles bent so that only your heels are digging into the floor. Your knees should be bent about 90 degrees. 2. Then push your heels into the floor, squeeze your buttocks, and lift your hips off the floor until your shoulders, hips, and knees are all in a straight line. 3. Hold for about 6 seconds as you continue to breathe normally, and then slowly lower your hips back down to the floor and rest for up to 10 seconds. 4. Do 8 to 12 repetitions. Hamstring stretch in doorway   1. Lie on your back in a doorway, with one leg through the open door. 2. Slide your leg up the wall to straighten your knee. You should feel a gentle stretch down the back of your leg. 3. Hold the stretch for at least 15 to 30 seconds. Do not arch your back, point your toes, or bend either knee.  Keep one heel touching the floor and the other heel touching the wall. 4. Repeat with your other leg. 5. Do 2 to 4 times for each leg. Hip flexor stretch   1. Kneel on the floor with one knee bent and one leg behind you. Place your forward knee over your foot. Keep your other knee touching the floor. 2. Slowly push your hips forward until you feel a stretch in the upper thigh of your rear leg. 3. Hold the stretch for at least 15 to 30 seconds. Repeat with your other leg. 4. Do 2 to 4 times on each side. Wall sit   1. Stand with your back 10 to 12 inches away from a wall. 2. Lean into the wall until your back is flat against it. 3. Slowly slide down until your knees are slightly bent, pressing your lower back into the wall. 4. Hold for about 6 seconds, then slide back up the wall. 5. Repeat 8 to 12 times. Follow-up care is a key part of your treatment and safety. Be sure to make and go to all appointments, and call your doctor if you are having problems. It's also a good idea to know your test results and keep a list of the medicines you take. Where can you learn more? Go to https://K[employer]speKeaton Row.[employer]. org and sign in to your Spot formerly PlacePop account. Enter Q674 in the ZoomSafer box to learn more about \"Low Back Pain: Exercises. \"     If you do not have an account, please click on the \"Sign Up Now\" link. Current as of: November 16, 2020               Content Version: 12.8  © 2006-2021 Healthwise, Incorporated. Care instructions adapted under license by [facility] ([facility]). If you have questions about a medical condition or this instruction, always ask your healthcare professional. [place] [address] any warranty or liability for your use of this information. Patient Education        Acute Low Back Pain: Exercises  Introduction  Here are some examples of typical rehabilitation exercises for your condition. Start each exercise slowly.  Ease off the exercise if you start to have pain.  Your doctor or physical therapist will tell you when you can start these exercises and which ones will work best for you. When you are not being active, find a comfortable position for rest. Some people are comfortable on the floor or a medium-firm bed with a small pillow under their head and another under their knees. Some people prefer to lie on their side with a pillow between their knees. Don't stay in one position for too long. Take short walks (10 to 20 minutes) every 2 to 3 hours. Avoid slopes, hills, and stairs until you feel better. Walk only distances you can manage without pain, especially leg pain. How to do the exercises  Back stretches   1. Get down on your hands and knees on the floor. 2. Relax your head and allow it to droop. Round your back up toward the ceiling until you feel a nice stretch in your upper, middle, and lower back. Hold this stretch for as long as it feels comfortable, or about 15 to 30 seconds. 3. Return to the starting position with a flat back while you are on your hands and knees. 4. Let your back sway by pressing your stomach toward the floor. Lift your buttocks toward the ceiling. 5. Hold this position for 15 to 30 seconds. 6. Repeat 2 to 4 times. Follow-up care is a key part of your treatment and safety. Be sure to make and go to all appointments, and call your doctor if you are having problems. It's also a good idea to know your test results and keep a list of the medicines you take. Where can you learn more? Go to https://Ixchelsisjohn.Signiant. org and sign in to your Nistica account. Enter Y974 in the YourTeamOnline box to learn more about \"Acute Low Back Pain: Exercises. \"     If you do not have an account, please click on the \"Sign Up Now\" link. Current as of: November 16, 2020               Content Version: 12.8  © 0367-6394 Healthwise, Incorporated. Care instructions adapted under license by [facility] ([facility]).  If you have questions about a medical condition or this instruction, always ask your healthcare professional. [place] [address] any warranty or liability for your use of this information.

## 2021-04-19 DIAGNOSIS — M79.89 LEG SWELLING: ICD-10-CM

## 2021-04-19 RX ORDER — FUROSEMIDE 20 MG/1
20 TABLET ORAL DAILY
Qty: 90 TABLET | Refills: 1 | Status: SHIPPED | OUTPATIENT
Start: 2021-04-19 | End: 2021-10-04 | Stop reason: SDUPTHER

## 2021-04-19 NOTE — TELEPHONE ENCOUNTER
Latesha Leyva called requesting a refill of the below medication which has been pended for you:     Requested Prescriptions     Pending Prescriptions Disp Refills    furosemide (LASIX) 20 MG tablet 90 tablet 1     Sig: Take 1 tablet by mouth daily       Last Appointment Date: 3/25/2021  Next Appointment Date: 7/19/2021    Allergies   Allergen Reactions    Codeine      Severe Abdominal pain    Sulfa Antibiotics      Patient unsure of reaction

## 2021-04-21 ENCOUNTER — IMMUNIZATION (OUTPATIENT)
Dept: LAB | Age: 66
End: 2021-04-21
Payer: MEDICAID

## 2021-04-21 PROCEDURE — 91301 COVID-19, MODERNA VACCINE 100MCG/0.5ML DOSE: CPT

## 2021-04-26 ENCOUNTER — HOSPITAL ENCOUNTER (OUTPATIENT)
Dept: LAB | Age: 66
Discharge: HOME | End: 2021-04-26
Payer: MEDICAID

## 2021-04-26 DIAGNOSIS — E11.59 TYPE 2 DIABETES MELLITUS WITH OTHER CIRCULATORY COMPLICATION, WITHOUT LONG-TERM CURRENT USE OF INSULIN (HCC): ICD-10-CM

## 2021-04-26 DIAGNOSIS — E11.9 TYPE 2 DIABETES MELLITUS WITHOUT COMPLICATION, WITHOUT LONG-TERM CURRENT USE OF INSULIN (HCC): ICD-10-CM

## 2021-04-26 DIAGNOSIS — C64.9 RENAL CELL CARCINOMA, UNSPECIFIED LATERALITY (HCC): ICD-10-CM

## 2021-04-26 LAB
ABSOLUTE EOS #: 0.09 K/UL (ref 0–0.44)
ABSOLUTE IMMATURE GRANULOCYTE: 0.04 K/UL (ref 0–0.3)
ABSOLUTE LYMPH #: 1.65 K/UL (ref 1.1–3.7)
ABSOLUTE MONO #: 0.54 K/UL (ref 0.1–1.2)
ALBUMIN SERPL-MCNC: 4.5 G/DL (ref 3.5–5.2)
ALBUMIN/GLOBULIN RATIO: 1.4 (ref 1–2.5)
ALP BLD-CCNC: 29 U/L (ref 40–129)
ALT SERPL-CCNC: 14 U/L (ref 5–41)
ANION GAP SERPL CALCULATED.3IONS-SCNC: 9 MMOL/L (ref 9–17)
AST SERPL-CCNC: 13 U/L
BASOPHILS # BLD: 1 % (ref 0–2)
BASOPHILS ABSOLUTE: 0.04 K/UL (ref 0–0.2)
BILIRUB SERPL-MCNC: 0.51 MG/DL (ref 0.3–1.2)
BUN BLDV-MCNC: 22 MG/DL (ref 8–23)
BUN/CREAT BLD: 28 (ref 9–20)
CALCIUM SERPL-MCNC: 9.5 MG/DL (ref 8.6–10.4)
CHLORIDE BLD-SCNC: 101 MMOL/L (ref 98–107)
CHOLESTEROL/HDL RATIO: 2.8
CHOLESTEROL: 102 MG/DL
CO2: 29 MMOL/L (ref 20–31)
CREAT SERPL-MCNC: 0.78 MG/DL (ref 0.7–1.2)
CREATININE URINE: 219.5 MG/DL (ref 39–259)
DIFFERENTIAL TYPE: ABNORMAL
EOSINOPHILS RELATIVE PERCENT: 2 % (ref 1–4)
ESTIMATED AVERAGE GLUCOSE: 151 MG/DL
GFR AFRICAN AMERICAN: >60 ML/MIN
GFR NON-AFRICAN AMERICAN: >60 ML/MIN
GFR SERPL CREATININE-BSD FRML MDRD: ABNORMAL ML/MIN/{1.73_M2}
GFR SERPL CREATININE-BSD FRML MDRD: ABNORMAL ML/MIN/{1.73_M2}
GLUCOSE BLD-MCNC: 151 MG/DL (ref 70–99)
HBA1C MFR BLD: 6.9 % (ref 4–6)
HCT VFR BLD CALC: 36.8 % (ref 40.7–50.3)
HDLC SERPL-MCNC: 36 MG/DL
HEMOGLOBIN: 12.4 G/DL (ref 13–17)
IMMATURE GRANULOCYTES: 1 %
LDL CHOLESTEROL: 50 MG/DL (ref 0–130)
LYMPHOCYTES # BLD: 35 % (ref 24–43)
MCH RBC QN AUTO: 32.6 PG (ref 25.2–33.5)
MCHC RBC AUTO-ENTMCNC: 33.7 G/DL (ref 25.2–33.5)
MCV RBC AUTO: 96.8 FL (ref 82.6–102.9)
MICROALBUMIN/CREAT 24H UR: 27 MG/L
MICROALBUMIN/CREAT UR-RTO: 12 MCG/MG CREAT
MONOCYTES # BLD: 11 % (ref 3–12)
NRBC AUTOMATED: 0 PER 100 WBC
PDW BLD-RTO: 13 % (ref 11.8–14.4)
PLATELET # BLD: ABNORMAL K/UL (ref 138–453)
PLATELET ESTIMATE: ABNORMAL
PLATELET, FLUORESCENCE: 124 K/UL (ref 138–453)
PLATELET, IMMATURE FRACTION: 5.4 % (ref 1.1–10.3)
PMV BLD AUTO: ABNORMAL FL (ref 8.1–13.5)
POTASSIUM SERPL-SCNC: 4 MMOL/L (ref 3.7–5.3)
RBC # BLD: 3.8 M/UL (ref 4.21–5.77)
RBC # BLD: ABNORMAL 10*6/UL
SEG NEUTROPHILS: 50 % (ref 36–65)
SEGMENTED NEUTROPHILS ABSOLUTE COUNT: 2.37 K/UL (ref 1.5–8.1)
SODIUM BLD-SCNC: 139 MMOL/L (ref 135–144)
TOTAL PROTEIN: 7.8 G/DL (ref 6.4–8.3)
TRIGL SERPL-MCNC: 81 MG/DL
VLDLC SERPL CALC-MCNC: ABNORMAL MG/DL (ref 1–30)
WBC # BLD: 4.7 K/UL (ref 3.5–11.3)
WBC # BLD: ABNORMAL 10*3/UL

## 2021-04-26 PROCEDURE — 83036 HEMOGLOBIN GLYCOSYLATED A1C: CPT

## 2021-04-26 PROCEDURE — 80053 COMPREHEN METABOLIC PANEL: CPT

## 2021-04-26 PROCEDURE — 82043 UR ALBUMIN QUANTITATIVE: CPT

## 2021-04-26 PROCEDURE — 80061 LIPID PANEL: CPT

## 2021-04-26 PROCEDURE — 85025 COMPLETE CBC W/AUTO DIFF WBC: CPT

## 2021-04-26 PROCEDURE — 85055 RETICULATED PLATELET ASSAY: CPT

## 2021-04-26 PROCEDURE — 36415 COLL VENOUS BLD VENIPUNCTURE: CPT

## 2021-04-26 PROCEDURE — 82570 ASSAY OF URINE CREATININE: CPT

## 2021-05-03 ENCOUNTER — OFFICE VISIT (OUTPATIENT)
Dept: ONCOLOGY | Age: 66
End: 2021-05-03
Payer: MEDICARE

## 2021-05-03 VITALS
BODY MASS INDEX: 34.86 KG/M2 | SYSTOLIC BLOOD PRESSURE: 126 MMHG | HEIGHT: 71 IN | OXYGEN SATURATION: 97 % | DIASTOLIC BLOOD PRESSURE: 72 MMHG | HEART RATE: 64 BPM | WEIGHT: 249 LBS

## 2021-05-03 DIAGNOSIS — D69.6 THROMBOCYTOPENIA (HCC): ICD-10-CM

## 2021-05-03 DIAGNOSIS — C64.9 RENAL CELL CARCINOMA, UNSPECIFIED LATERALITY (HCC): Primary | ICD-10-CM

## 2021-05-03 DIAGNOSIS — C64.2 CARCINOMA OF LEFT KIDNEY (HCC): ICD-10-CM

## 2021-05-03 DIAGNOSIS — R91.1 LUNG NODULE: ICD-10-CM

## 2021-05-03 PROCEDURE — 99214 OFFICE O/P EST MOD 30 MIN: CPT | Performed by: INTERNAL MEDICINE

## 2021-05-13 ENCOUNTER — OFFICE VISIT (OUTPATIENT)
Dept: PRIMARY CARE CLINIC | Age: 66
End: 2021-05-13
Payer: MEDICARE

## 2021-05-13 VITALS
DIASTOLIC BLOOD PRESSURE: 70 MMHG | RESPIRATION RATE: 16 BRPM | OXYGEN SATURATION: 97 % | BODY MASS INDEX: 34.3 KG/M2 | HEART RATE: 70 BPM | HEIGHT: 71 IN | SYSTOLIC BLOOD PRESSURE: 124 MMHG | WEIGHT: 245 LBS

## 2021-05-13 DIAGNOSIS — H01.021 SQUAMOUS BLEPHARITIS OF RIGHT UPPER EYELID: Primary | ICD-10-CM

## 2021-05-13 DIAGNOSIS — H10.31 ACUTE BACTERIAL CONJUNCTIVITIS OF RIGHT EYE: ICD-10-CM

## 2021-05-13 PROCEDURE — 99213 OFFICE O/P EST LOW 20 MIN: CPT | Performed by: FAMILY MEDICINE

## 2021-05-13 RX ORDER — MOXIFLOXACIN 5 MG/ML
1 SOLUTION/ DROPS OPHTHALMIC 3 TIMES DAILY
Qty: 3 ML | Refills: 0 | Status: SHIPPED | OUTPATIENT
Start: 2021-05-13 | End: 2021-05-20

## 2021-05-13 ASSESSMENT — ENCOUNTER SYMPTOMS
RHINORRHEA: 0
EYE PAIN: 1
EYE REDNESS: 1
SINUS PAIN: 0
EYE DISCHARGE: 1
SORE THROAT: 0
SINUS PRESSURE: 0
EYE ITCHING: 0

## 2021-05-13 ASSESSMENT — PATIENT HEALTH QUESTIONNAIRE - PHQ9
SUM OF ALL RESPONSES TO PHQ QUESTIONS 1-9: 0
2. FEELING DOWN, DEPRESSED OR HOPELESS: 0
SUM OF ALL RESPONSES TO PHQ QUESTIONS 1-9: 0

## 2021-05-13 NOTE — PROGRESS NOTES
2021     Maury Durant (:  1955) is a 77 y.o. male, here for evaluation of the following medical concerns:    Conjunctivitis   The current episode started today. The onset was sudden. The problem occurs continuously. The problem has been unchanged. The problem is moderate. Relieved by: did get in the shower and used warm heat to eye. Associated symptoms include decreased vision (slight blurred vision due to film on eye), eye discharge (did have some crusting noted to the eye lid this morning), eye pain (right eye lid is swollen and irritated) and eye redness. Pertinent negatives include no fever, no eye itching, no congestion, no rhinorrhea and no sore throat. Did review patient's med list, allergies, social history,pmhx and pshx today as noted in the record. Review of Systems   Constitutional: Negative for chills, fatigue and fever. HENT: Negative for congestion, postnasal drip, rhinorrhea, sinus pressure, sinus pain and sore throat. Eyes: Positive for pain (right eye lid is swollen and irritated), discharge (did have some crusting noted to the eye lid this morning) and redness. Negative for itching. Prior to Visit Medications    Medication Sig Taking?  Authorizing Provider   moxifloxacin (VIGAMOX) 0.5 % ophthalmic solution Place 1 drop into the right eye 3 times daily for 7 days Yes Fadi Newberry DO   furosemide (LASIX) 20 MG tablet Take 1 tablet by mouth daily Yes Akilah Lomas MD   fluticasone (FLONASE) 50 MCG/ACT nasal spray 1 spray by Nasal route 2 times daily Yes Akilah Lomas MD   montelukast (SINGULAIR) 10 MG tablet Take 1 tablet by mouth nightly Yes Akilah Lomas MD   baclofen (LIORESAL) 10 MG tablet Take 1 tablet by mouth nightly as needed (muscle spasm) Yes Akilah Lomas MD   cetirizine (ZYRTEC) 10 MG tablet Take 1 tablet by mouth daily Yes Kathi Bates APRN - CNP   rosuvastatin (CRESTOR) 5 MG tablet Take 1 tablet by mouth daily Yes Patrick Mclean Kathi Mckenzie MD   losartan-hydroCHLOROthiazide [facility]) 100-25 MG per tablet Take 1 tablet by mouth daily Yes PING Brandt   sildenafil (REVATIO) 20 MG tablet Take as directed prn up to 5 tabs daily Yes PING Brandt   carvedilol (COREG) 25 MG tablet Take 1 tablet by mouth 2 times daily Yes PING Brandt   metFORMIN (GLUCOPHAGE) 500 MG tablet Take 1 tablet by mouth 2 times daily (with meals) Yes PING Brandt   amLODIPine (NORVASC) 10 MG tablet Take 1 tablet by mouth daily Yes PING Brandt   meloxicam (MOBIC) 7.5 MG tablet Take 1 tablet by mouth 2 times daily (with meals) Yes PING Brandt   pioglitazone (ACTOS) 15 MG tablet Take 1 tablet by mouth daily Yes PING Brandt   vitamin D3 (CHOLECALCIFEROL) 400 UNITS TABS tablet Take 400 Units by mouth every other day  Yes Historical Provider, MD   Omeprazole (PRILOSEC PO) Take 1 capsule by mouth daily as needed  Yes Historical Provider, MD        Social History     Tobacco Use    Smoking status: Never Smoker    Smokeless tobacco: Never Used   Substance Use Topics    Alcohol use: Yes     Comment: very rare        Vitals:    05/13/21 1036   BP: 124/70   Site: Left Upper Arm   Position: Sitting   Cuff Size: Large Adult   Pulse: 70   Resp: 16   SpO2: 97%   Weight: 245 lb (111.1 kg)   Height: 5' 11\" (1.803 m)     Estimated body mass index is 34.17 kg/m² as calculated from the following:    Height as of this encounter: 5' 11\" (1.803 m). Weight as of this encounter: 245 lb (111.1 kg). Physical Exam  Vitals signs and nursing note reviewed. Constitutional:       General: He is not in acute distress. Appearance: He is well-developed. He is not diaphoretic. HENT:      Head: Normocephalic and atraumatic. Eyes:      General:         Left eye: No discharge. Extraocular Movements: Extraocular movements intact. Pupils: Pupils are equal, round, and reactive to light.       Comments: Right upper eyelid is swollen and erythematous. There is slight crusting noted to the upper lid margin. The sclera of the right eye is slightly injected. Conjunctivae is pink in color. Neck:      Musculoskeletal: Normal range of motion. Pulmonary:      Effort: Pulmonary effort is normal.   Skin:     General: Skin is warm and dry. Coloration: Skin is not pale. Findings: No erythema or rash. Neurological:      Mental Status: He is alert and oriented to person, place, and time. Psychiatric:         Behavior: Behavior normal.         Thought Content: Thought content normal.         Judgment: Judgment normal.         ASSESSMENT/PLAN:  Encounter Diagnoses   Name Primary?  Squamous blepharitis of right upper eyelid Yes    Acute bacterial conjunctivitis of right eye      Orders Placed This Encounter   Medications    moxifloxacin (VIGAMOX) 0.5 % ophthalmic solution     Sig: Place 1 drop into the right eye 3 times daily for 7 days     Dispense:  3 mL     Refill:  0     RX as noted above for acute infection. Lid scrubs with baby shampoo. Warm compresses as needed and hand washing recommended after direct contact with the eye. Return  if no improvement in symptoms or if any further symptoms arise. No follow-ups on file. An electronic signature was used to authenticate this note.     --Zuri Mcdonough DO on 5/13/2021 at 10:49 AM

## 2021-05-17 ENCOUNTER — OFFICE VISIT (OUTPATIENT)
Dept: PRIMARY CARE CLINIC | Age: 66
End: 2021-05-17
Payer: MEDICARE

## 2021-05-17 ENCOUNTER — OFFICE VISIT (OUTPATIENT)
Dept: CARDIOLOGY | Age: 66
End: 2021-05-17
Payer: MEDICARE

## 2021-05-17 VITALS
BODY MASS INDEX: 34.3 KG/M2 | SYSTOLIC BLOOD PRESSURE: 157 MMHG | HEIGHT: 71 IN | WEIGHT: 245 LBS | HEART RATE: 65 BPM | DIASTOLIC BLOOD PRESSURE: 77 MMHG

## 2021-05-17 VITALS
DIASTOLIC BLOOD PRESSURE: 80 MMHG | SYSTOLIC BLOOD PRESSURE: 138 MMHG | OXYGEN SATURATION: 97 % | TEMPERATURE: 98.4 F | HEART RATE: 64 BPM | WEIGHT: 245 LBS | BODY MASS INDEX: 34.17 KG/M2

## 2021-05-17 DIAGNOSIS — H10.501 BLEPHAROCONJUNCTIVITIS OF RIGHT EYE, UNSPECIFIED BLEPHAROCONJUNCTIVITIS TYPE: Primary | ICD-10-CM

## 2021-05-17 DIAGNOSIS — Q23.1 BICUSPID AORTIC VALVE: ICD-10-CM

## 2021-05-17 DIAGNOSIS — I38 MURMUR, DIASTOLIC: ICD-10-CM

## 2021-05-17 DIAGNOSIS — I10 ESSENTIAL HYPERTENSION: Primary | ICD-10-CM

## 2021-05-17 DIAGNOSIS — I71.20 THORACIC AORTIC ANEURYSM WITHOUT RUPTURE: ICD-10-CM

## 2021-05-17 DIAGNOSIS — I77.810 DILATED AORTIC ROOT (HCC): ICD-10-CM

## 2021-05-17 DIAGNOSIS — Q23.1 AORTIC REGURGITATION DUE TO BICUSPID AORTIC VALVE: ICD-10-CM

## 2021-05-17 DIAGNOSIS — H02.9 LESION OF RIGHT UPPER EYELID: ICD-10-CM

## 2021-05-17 PROCEDURE — 99213 OFFICE O/P EST LOW 20 MIN: CPT | Performed by: FAMILY MEDICINE

## 2021-05-17 PROCEDURE — 93010 ELECTROCARDIOGRAM REPORT: CPT | Performed by: INTERNAL MEDICINE

## 2021-05-17 PROCEDURE — 99214 OFFICE O/P EST MOD 30 MIN: CPT | Performed by: INTERNAL MEDICINE

## 2021-05-17 PROCEDURE — 93005 ELECTROCARDIOGRAM TRACING: CPT | Performed by: INTERNAL MEDICINE

## 2021-05-17 PROCEDURE — 99213 OFFICE O/P EST LOW 20 MIN: CPT

## 2021-05-17 RX ORDER — CARVEDILOL 25 MG/1
37.5 TABLET ORAL 2 TIMES DAILY
Qty: 270 TABLET | Refills: 1 | Status: SHIPPED | OUTPATIENT
Start: 2021-05-17 | End: 2021-07-04 | Stop reason: SDUPTHER

## 2021-05-17 RX ORDER — ERYTHROMYCIN 5 MG/G
OINTMENT OPHTHALMIC
Qty: 1 TUBE | Refills: 0 | Status: SHIPPED | OUTPATIENT
Start: 2021-05-17 | End: 2021-07-19

## 2021-05-17 ASSESSMENT — ENCOUNTER SYMPTOMS
EYE DISCHARGE: 1
PHOTOPHOBIA: 0
EYE ITCHING: 0
EYE REDNESS: 1
EYE PAIN: 0

## 2021-05-17 NOTE — PROGRESS NOTES
[address] [facility]  1955  [id number]    CC: Follow up for thoracic aortic aneurysm, Bicuspid AV with AI    HPI:  Patient is here for follow up. Denies any cp, sob, orthopnea, pnd, le edema. Forgot to take his BP pills yesterday morning due to traveling. BP at Urgent care was 124. Has been active, did hiking at [place] with no issues. Past Medical History:   Diagnosis Date    Anxiety     Aortic regurgitation     Bicuspid aortic valve     Diabetes mellitus ([address] since March 2018    on Rx.     GERD (gastroesophageal reflux disease)     Hypertension     Left renal mass 10/2018    Osteoarthritis of left knee     Wears glasses        Past Surgical History:   Procedure Laterality Date    CYST REMOVAL      tail bone    MOUTH SURGERY  2015    PARTIAL NEPHRECTOMY Left 11/30/2018    ROBOTIC PARTIAL NEPHRECTOMY,     TX COLONOSCOPY FLX DX W/COLLJ SPEC WHEN PFRMD N/A 5/14/2018    normal colon, Dr. Lu Brantley Left 11/30/2018    XI ROBOTIC PARTIAL NEPHRECTOMY, INTRA-OP LAP ULTRASOUND performed by Ailyn Nicolas MD at [address] [place] [address] Drive EXTRACTION         Family History   Problem Relation Age of Onset    High Blood Pressure Mother     Diabetes Mother         impaired fasting glucose    Other Mother         short term memory loss after MVA    Glaucoma Mother     Stroke Father 66    High Blood Pressure Father     Diabetes Father         impaired fasting glucose    Glaucoma Father     Cancer Father         bladder     Heart Attack Father 66    Glaucoma Brother     Diabetes Maternal Grandmother     Other Paternal Grandmother         gallbladder disease    Stroke Paternal Grandfather     Other Paternal Grandfather         gallbladder disease    Diabetes Paternal Grandfather     No Known Problems Brother        REVIEW OF SYSTEMS:    · Constitutional: there has been no unanticipated weight loss. There's been No change in energy level, No change in activity level. · Eyes: No visual changes or diplopia. No scleral icterus. · ENT: No Headaches, hearing loss or vertigo. No mouth sores or sore throat. · Cardiovascular: as hpi  · Respiratory: as hpi  · Gastrointestinal: No abdominal pain, appetite loss, blood in stools. No change in bowel or bladder habits. · Genitourinary: No dysuria, trouble voiding, or hematuria. · Musculoskeletal:  No gait disturbance, No weakness or joint complaints. · Integumentary: No rash or pruritis. · Neurological: No headache, diplopia, change in muscle strength, numbness or tingling. No change in gait, balance, coordination, mood, affect, memory, mentation, behavior. · Psychiatric: No new anxiety or depression. · Endocrine: No temperature intolerance. No excessive thirst, fluid intake, or urination. No tremor. · Hematologic/Lymphatic: No abnormal bruising or bleeding, blood clots or swollen lymph nodes. · Allergic/Immunologic: No nasal congestion or hives. Physical Exam:  BP (!) 143/66   Pulse 65   Ht 5' 11\" (1.803 m)   Wt 245 lb (111.1 kg)   BMI 34.17 kg/m²   General appearance: alert and cooperative with exam  HEENT: Head: Normocephalic, no lesions, without obvious abnormality.   Eyes: PERRL, EOMI  Ears: Not obvious deformations or lack of hearing  Neck: no carotid bruit, no JVD  Lungs: clear to auscultation bilaterally  Heart: regular rate and rhythm, S1, S2 normal, no murmur, click, rub or gallop  Abdomen: soft, non-tender; bowel sounds normal; no masses,  no organomegaly  Extremities: extremities normal, atraumatic, no cyanosis or edema  Neurologic: Mental status: Alert, oriented, thought content appropriate  Skin: WNL for age and condition, no obvious rashes or leasions    LABS  Lab Results   Component Value Date    CHOL 102 04/26/2021    TRIG 81 04/26/2021    HDL 36 (L) 04/26/2021    LDLCHOLESTEROL 50 04/26/2021    VLDL NOT REPORTED 04/26/2021 at 4.5 m.    CT chest abd pelvis: 10/11/19  *Stable aneurysmal dilatation of the ascending thoracic aorta measuring 4.7   cm.  No dissection or rupture. *Stable 3 mm solid noncalcified pulmonary nodule right lower lobe.  Attention   on follow-up is suggested given the history of renal cell carcinoma. *No CT evidence of tumor recurrence or metastatic disease seen within the   abdomen or pelvis. *Cholelithiasis. *Cardiomegaly. Echo 9/20:  Left ventricle is mildly dilated. Moderate left ventricular hypertrophy. Hyperdynamic left ventricular function. Left ventricular ejection fraction 70 %. Diastolic dysfunction. Left atrium is moderately dilated. Right atrium is mildly dilated . Right ventricular dilatation with normal systolic function. Functionally bicuspid aortic valve with mild aortic regurgitation. Trivial tricuspid regurgitation. Estimated right ventricular systolic pressure is 39 mmHg. Aortic root is mildly to moderately dilated at 4.5 cm. CTA chest 9/20:  1. The thoracic aorta is upper limits of normal in size as measured    perpendicular to the direction of flow as detailed above.  No aneurysm- 4 cm max diameter of Ascending aorta.   Mild    atherosclerotic vascular disease. 2. 3 mm nodule in the right lower lobe unchanged from at least 2018.  No    follow-up recommended. Assessment and Plan:    1. Bicuspid AV- with previously Moderate AI- now Mild AI on Echo 9/20. Continue aggressive BP control/Afterload reduction. On Norvasc, Coreg, Hyzaar. Check Echo around 9/2021.  2. Ascending aortic aneurysm- 4 cm max diameter on CTA 9/20 (previously larger 4.7 cm on 10/19). Continue aggressive BP control/Afterload reduction. On Norvasc, Coreg, Hyzaar. Check CTA around 9/2021.  3. Renal CA- following with urology/oncology  4. HTN- higher today. Increase Coreg 37.5 mg bid. Continue current meds.   5. Overweight: encouraged diet, exercise, and discussed weight loss extensively. 6. Hyperlipidemia- continue statin. LDL 50 on 4/21    The patient is to continue heart healthy diet, weight loss and exercise as tolerated. Patient's medications and side effects were discussed. Medication refills were provided if needed. Follow up appointment timing was discussed. All questions and concerns were addressed to patient's satisfaction. The patient is to follow up in 6 months or sooner if necessary. Thank you for allowing me to participate in the care of this patient, please do not hesitate to call if you have any questions. Calixto Ochoa DO, [facility], [address], [address] [facility]  [facility] [employer]  [phone]

## 2021-05-17 NOTE — PROGRESS NOTES
[address]  [address], [address] Annabelle Chandra  [phone]      Hema Morin is a 77 y.o. male who is c/o of Eye Drainage (R eye. currently on vigamox. eye is still pink with slight drainage. eyelid swelling/poss stye.)      HPI:     HPI  Pt here today for re-check of conjunctivitis R eye. Pt is approx 25% improved since he was here in [facility] on 5/13; however, he states usually by now, his sx's are resolved. Has been using his Vigamox drops TID as directed; has used this before with good results. Still woke up with some crusting/gooey drainage today yet, although it is less than it was. R upper eyelid is less swollen than it was last week, but he has a focal bump on the mid-upper eyelid. Approx 2 weeks ago, he started to feel an area of tenderness on the mid-upper R eyelid; pt wondered if he was getting a stye. It then resolved within a day or so. Then, when he woke up on 5/13, he had a visible bump in the area and the eyeball was red with drainage. Using a warm compress over the area nightly. Subjective:      Past Medical History:   Diagnosis Date    Anxiety     Aortic regurgitation     Bicuspid aortic valve     Diabetes mellitus ([address] since March 2018    on Rx.     GERD (gastroesophageal reflux disease)     Hypertension     Left renal mass 10/2018    Osteoarthritis of left knee     Wears glasses       Past Surgical History:   Procedure Laterality Date    CYST REMOVAL      tail bone    MOUTH SURGERY  2015    PARTIAL NEPHRECTOMY Left 11/30/2018    ROBOTIC PARTIAL NEPHRECTOMY,     VA COLONOSCOPY FLX DX W/COLLJ SPEC WHEN PFRMD N/A 5/14/2018    normal colon, Dr. Tomy Baumann Left 11/30/2018    XI ROBOTIC PARTIAL NEPHRECTOMY, INTRA-OP LAP ULTRASOUND performed by Ken Parsons MD at [facility]         Social History     Tobacco Use    Smoking status: Never Smoker    Smokeless tobacco: Never Used   Substance Use Topics    Alcohol use: Yes     Comment: very rare      Current Outpatient Medications   Medication Sig Dispense Refill    carvedilol (COREG) 25 MG tablet Take 1.5 tablets by mouth 2 times daily 270 tablet 1    erythromycin (ROMYCIN) 5 MG/GM ophthalmic ointment APPLY HALF INCH RIBBON OVER EYELID OF AFFECTED EYE TWICE DAILY FOR 7 DAYS. 1 Tube 0    furosemide (LASIX) 20 MG tablet Take 1 tablet by mouth daily 90 tablet 1    fluticasone (FLONASE) 50 MCG/ACT nasal spray 1 spray by Nasal route 2 times daily 1 Bottle 5    montelukast (SINGULAIR) 10 MG tablet Take 1 tablet by mouth nightly 30 tablet 3    baclofen (LIORESAL) 10 MG tablet Take 1 tablet by mouth nightly as needed (muscle spasm) 30 tablet 1    cetirizine (ZYRTEC) 10 MG tablet Take 1 tablet by mouth daily 30 tablet 1    rosuvastatin (CRESTOR) 5 MG tablet Take 1 tablet by mouth daily 90 tablet 1    losartan-hydroCHLOROthiazide (HYZAAR) 100-25 MG per tablet Take 1 tablet by mouth daily 90 tablet 1    vitamin D3 (CHOLECALCIFEROL) 400 UNITS TABS tablet Take 400 Units by mouth every other day       Omeprazole (PRILOSEC PO) Take 1 capsule by mouth daily as needed       meloxicam (MOBIC) 7.5 MG tablet Take 1 tablet by mouth 2 times daily (with meals) 180 tablet 1    sildenafil (REVATIO) 20 MG tablet Take 1 tablet by mouth as needed (Erectile dysfunction) 25 tablet 1    amLODIPine (NORVASC) 10 MG tablet Take 1 tablet by mouth daily 90 tablet 1    metFORMIN (GLUCOPHAGE) 500 MG tablet Take 1 tablet by mouth 2 times daily (with meals) 180 tablet 2    pioglitazone (ACTOS) 15 MG tablet Take 1 tablet by mouth daily 90 tablet 1     No current facility-administered medications for this visit.      Allergies   Allergen Reactions    Codeine      Severe Abdominal pain    Sulfa Antibiotics      Patient unsure of reaction       Review of Systems   Constitutional: Negative for chills and fever.   Eyes: Positive for discharge (slightly improved) and redness (slightly improved). Negative for photophobia, pain, itching and visual disturbance. Objective:     Vitals:    05/17/21 1115   BP: 138/80   Site: Right Upper Arm   Position: Sitting   Cuff Size: Large Adult   Pulse: 64   Temp: 98.4 °F (36.9 °C)   TempSrc: Temporal   SpO2: 97%   Weight: 245 lb (111.1 kg)     Physical Exam  Vitals and nursing note reviewed. Constitutional:       General: He is not in acute distress. Appearance: He is well-developed. HENT:      Head: Normocephalic and atraumatic. Right Ear: Tympanic membrane, ear canal and external ear normal.      Left Ear: Tympanic membrane, ear canal and external ear normal.      Nose: Nose normal.      Mouth/Throat:      Mouth: Mucous membranes are moist.      Pharynx: Oropharynx is clear. No oropharyngeal exudate. Eyes:      Conjunctiva/sclera: Conjunctivae normal.   Cardiovascular:      Rate and Rhythm: Normal rate and regular rhythm. Heart sounds: Normal heart sounds. Pulmonary:      Effort: Pulmonary effort is normal. No respiratory distress. Breath sounds: Normal breath sounds. Abdominal:      General: Bowel sounds are normal. There is no distension. Palpations: Abdomen is soft. Tenderness: There is no abdominal tenderness. Skin:     General: Skin is warm and dry. Neurological:      Mental Status: He is alert and oriented to person, place, and time. Assessment:       Diagnosis Orders   1. Blepharoconjunctivitis of right eye, unspecified blepharoconjunctivitis type  erythromycin (ROMYCIN) 5 MG/GM ophthalmic ointment   2. Lesion of right upper eyelid  erythromycin (ROMYCIN) 5 MG/GM ophthalmic ointment       Plan:      Return if symptoms worsen or fail to improve in 3-4 days. No orders of the defined types were placed in this encounter.     Orders Placed This Encounter   Medications    erythromycin (ROMYCIN) 5 MG/GM ophthalmic ointment Sig: APPLY HALF INCH RIBBON OVER EYELID OF AFFECTED EYE TWICE DAILY FOR 7 DAYS. Dispense:  1 Tube     Refill:  0       Patient given educational materials - see patient instructions. Discussed use, benefit, and side effects of prescribed medications. All patient questions answered. Pt voiced understanding.        Electronically signed by Brianda Andujar DO, DO on 5/24/2021 at 12:00 AM

## 2021-05-17 NOTE — PATIENT INSTRUCTIONS
Patient Education        Styes and Chalazia: Care Instructions  Your Care Instructions     Styes and chalazia (say \"ksw-EDV-arr-uh\") are both conditions that can cause swelling of the eyelid. A stye is an infection in the root of an eyelash. The infection causes a tender red lump on the edge of the eyelid. The infection can spread until the whole eyelid becomes red and inflamed. Styes usually break open, and a tiny amount of pus drains. They usually clear up on their own in about a week, but they sometimes need treatment with antibiotics. A chalazion is a lump or cyst in the eyelid (chalazion is singular; chalazia is plural). It is caused by swelling and inflammation of deep oil glands inside the eyelid. Chalazia are usually not infected. They can take a few months to heal.  If a chalazion becomes more swollen and painful or does not go away, you may need to have it drained by your doctor. Follow-up care is a key part of your treatment and safety. Be sure to make and go to all appointments, and call your doctor if you are having problems. It's also a good idea to know your test results and keep a list of the medicines you take. How can you care for yourself at home? · Do not rub your eyes. Do not squeeze or try to open a stye or chalazion. · To help a stye or chalazion heal faster:  ? Put a warm, moist compress on your eye for 5 to 10 minutes, 3 to 6 times a day. Heat often brings a stye to a point where it drains on its own. Keep in mind that warm compresses will often increase swelling a little at first.  ? Do not use hot water or heat a wet cloth in a microwave oven. The compress may get too hot and can burn the eyelid. · Always wash your hands before and after you use a compress or touch your eyes. · If the doctor gave you antibiotic drops or ointment, use the medicine exactly as directed. Use the medicine for as long as instructed, even if your eye starts to feel better.   · To put in eyedrops or ointment:  ? Tilt your head back, and pull your lower eyelid down with one finger. ? Drop or squirt the medicine inside the lower lid. ? Close your eye for 30 to 60 seconds to let the drops or ointment move around. ? Do not touch the ointment or dropper tip to your eyelashes or any other surface. · Do not wear eye makeup or contact lenses until the stye or chalazion heals. · Do not share towels, pillows, or washcloths while you have a stye. When should you call for help? Call your doctor now or seek immediate medical care if:    · You have pain in your eye.     · You have a change in vision or loss of vision.     · Redness and swelling get much worse. Watch closely for changes in your health, and be sure to contact your doctor if:    · Your stye does not get better in 1 week.     · Your chalazion does not start to get better after several weeks. Where can you learn more? Go to https://[employer]. org and sign in to your "[employer] account. Enter C834 in the [employer] box to learn more about \"Styes and Chalazia: Care Instructions. \"     If you do not have an account, please click on the \"Sign Up Now\" link. Current as of: August 31, 2020               Content Version: 12.8  © 0790-3817 [employer]wise[employer] Incorporated. Care instructions adapted under license by [facility] ([facility]). If you have questions about a medical condition or this instruction, always ask your healthcare professional. [place] [address] any warranty or liability for your use of this information.

## 2021-05-21 DIAGNOSIS — E11.59 TYPE 2 DIABETES MELLITUS WITH OTHER CIRCULATORY COMPLICATION, WITHOUT LONG-TERM CURRENT USE OF INSULIN (HCC): ICD-10-CM

## 2021-05-21 DIAGNOSIS — N52.9 ERECTILE DYSFUNCTION, UNSPECIFIED ERECTILE DYSFUNCTION TYPE: ICD-10-CM

## 2021-05-21 DIAGNOSIS — I10 ESSENTIAL HYPERTENSION: ICD-10-CM

## 2021-05-21 DIAGNOSIS — M79.671 RIGHT FOOT PAIN: ICD-10-CM

## 2021-05-21 DIAGNOSIS — E11.9 TYPE 2 DIABETES MELLITUS WITHOUT COMPLICATION, WITHOUT LONG-TERM CURRENT USE OF INSULIN (HCC): ICD-10-CM

## 2021-05-21 RX ORDER — MELOXICAM 7.5 MG/1
7.5 TABLET ORAL 2 TIMES DAILY WITH MEALS
Qty: 180 TABLET | Refills: 1 | Status: SHIPPED | OUTPATIENT
Start: 2021-05-21 | End: 2022-03-13 | Stop reason: SDUPTHER

## 2021-05-21 RX ORDER — AMLODIPINE BESYLATE 10 MG/1
10 TABLET ORAL DAILY
Qty: 90 TABLET | Refills: 1 | Status: SHIPPED | OUTPATIENT
Start: 2021-05-21 | End: 2022-01-09 | Stop reason: SDUPTHER

## 2021-05-21 RX ORDER — PIOGLITAZONEHYDROCHLORIDE 15 MG/1
15 TABLET ORAL DAILY
Qty: 90 TABLET | Refills: 1 | Status: SHIPPED | OUTPATIENT
Start: 2021-05-21 | End: 2021-11-15 | Stop reason: SDUPTHER

## 2021-05-21 RX ORDER — SILDENAFIL CITRATE 20 MG/1
20 TABLET ORAL PRN
Qty: 25 TABLET | Refills: 1 | Status: SHIPPED | OUTPATIENT
Start: 2021-05-21 | End: 2022-01-02 | Stop reason: SDUPTHER

## 2021-06-01 ENCOUNTER — OFFICE VISIT (OUTPATIENT)
Dept: PRIMARY CARE CLINIC | Age: 66
End: 2021-06-01
Payer: MEDICARE

## 2021-06-01 ENCOUNTER — OFFICE VISIT (OUTPATIENT)
Dept: OPTOMETRY | Age: 66
End: 2021-06-01
Payer: MEDICARE

## 2021-06-01 VITALS
DIASTOLIC BLOOD PRESSURE: 78 MMHG | HEIGHT: 71 IN | SYSTOLIC BLOOD PRESSURE: 150 MMHG | WEIGHT: 249 LBS | HEART RATE: 67 BPM | BODY MASS INDEX: 34.86 KG/M2 | OXYGEN SATURATION: 98 % | TEMPERATURE: 97.9 F

## 2021-06-01 DIAGNOSIS — H10.11 ALLERGIC CONJUNCTIVITIS OF RIGHT EYE: Primary | ICD-10-CM

## 2021-06-01 DIAGNOSIS — H57.89 EYE REDNESS: Primary | ICD-10-CM

## 2021-06-01 PROCEDURE — 99999 PR OFFICE/OUTPT VISIT,PROCEDURE ONLY: CPT | Performed by: PHYSICIAN ASSISTANT

## 2021-06-01 PROCEDURE — 99213 OFFICE O/P EST LOW 20 MIN: CPT

## 2021-06-01 PROCEDURE — 99204 OFFICE O/P NEW MOD 45 MIN: CPT | Performed by: OPTOMETRIST

## 2021-06-01 PROCEDURE — 99212 OFFICE O/P EST SF 10 MIN: CPT | Performed by: PHYSICIAN ASSISTANT

## 2021-06-01 RX ORDER — TOBRAMYCIN AND DEXAMETHASONE 3; 1 MG/ML; MG/ML
1 SUSPENSION/ DROPS OPHTHALMIC 4 TIMES DAILY
Qty: 1 BOTTLE | Refills: 0 | Status: SHIPPED | OUTPATIENT
Start: 2021-06-01 | End: 2021-06-08

## 2021-06-01 ASSESSMENT — TONOMETRY
IOP_METHOD: NON-CONTACT AIR PUFF
OD_IOP_MMHG: 27
OS_IOP_MMHG: 33

## 2021-06-01 ASSESSMENT — ENCOUNTER SYMPTOMS
EYE DISCHARGE: 1
GASTROINTESTINAL NEGATIVE: 0
EYE REDNESS: 1
RESPIRATORY NEGATIVE: 0
DOUBLE VISION: 0
EYE ITCHING: 0
ALLERGIC/IMMUNOLOGIC NEGATIVE: 0
EYES NEGATIVE: 1
PHOTOPHOBIA: 0

## 2021-06-01 ASSESSMENT — VISUAL ACUITY
METHOD: SNELLEN - LINEAR
OS_CC+: -1
OS_CC: 20/20

## 2021-06-01 ASSESSMENT — SLIT LAMP EXAM - LIDS: COMMENTS: NORMAL

## 2021-06-01 NOTE — PROGRESS NOTES
Subjective:      Patient ID: Shira Mccray is a 77 y.o. male. Conjunctivitis   The current episode started more than 2 weeks ago. The symptoms are relieved by one or more prescription drugs (Vigamox, Erythromycin). Associated symptoms include eye discharge and eye redness. Pertinent negatives include no decreased vision, no double vision, no eye itching and no photophobia. Patient wears glasses. Review of Systems   Eyes: Positive for discharge and redness. Negative for double vision, photophobia and itching. Objective:   Physical Exam    Assessment:      1. Eye redness          Plan:      Optometry agreed to evaluate patient.         PING Cruz

## 2021-06-01 NOTE — PROGRESS NOTES
Sergio Burns presents today for   Chief Complaint   Patient presents with    Eye Pain    Referral - General     Referral - red eye, not responding to treatment   . HPI     Eye Pain       In right eye. Characterized as irritation. Associated symptoms include discharge and redness. Negative for itching, photophobia and double vision. Referral - General      Additional comments: Referral - red eye, not responding to treatment              Comments     Right eye irritation started the beginning of May. Vigamox 3 times a day for 7 days, then erythromycin was prescribed a few weeks later. Minor improvement with medications. Right eye is red, irritated and mattery with slight discharge. Bump on right upper eyelid ; smeared erythromycin on it and it has gone down. States it started out with a red eye (almost like pink eye) and his eye lid was all swollen, then a bump formed and came to a head but now is getting smaller. States gets pink eye 2 x a year .   3 weeks ago the eye was somewhat mattery; not mattery now   More watery                Current Outpatient Medications   Medication Sig Dispense Refill    tobramycin-dexamethasone (TOBRADEX) 0.3-0.1 % ophthalmic suspension Place 1 drop into the right eye 4 times daily for 7 days 1 Bottle 0    meloxicam (MOBIC) 7.5 MG tablet Take 1 tablet by mouth 2 times daily (with meals) 180 tablet 1    sildenafil (REVATIO) 20 MG tablet Take 1 tablet by mouth as needed (Erectile dysfunction) 25 tablet 1    amLODIPine (NORVASC) 10 MG tablet Take 1 tablet by mouth daily 90 tablet 1    metFORMIN (GLUCOPHAGE) 500 MG tablet Take 1 tablet by mouth 2 times daily (with meals) 180 tablet 2    pioglitazone (ACTOS) 15 MG tablet Take 1 tablet by mouth daily 90 tablet 1    carvedilol (COREG) 25 MG tablet Take 1.5 tablets by mouth 2 times daily 270 tablet 1    erythromycin (ROMYCIN) 5 MG/GM ophthalmic ointment APPLY HALF INCH RIBBON OVER EYELID OF AFFECTED EYE TWICE DAILY FOR 7 DAYS. 1 Tube 0    furosemide (LASIX) 20 MG tablet Take 1 tablet by mouth daily 90 tablet 1    fluticasone (FLONASE) 50 MCG/ACT nasal spray 1 spray by Nasal route 2 times daily 1 Bottle 5    montelukast (SINGULAIR) 10 MG tablet Take 1 tablet by mouth nightly 30 tablet 3    baclofen (LIORESAL) 10 MG tablet Take 1 tablet by mouth nightly as needed (muscle spasm) 30 tablet 1    cetirizine (ZYRTEC) 10 MG tablet Take 1 tablet by mouth daily 30 tablet 1    rosuvastatin (CRESTOR) 5 MG tablet Take 1 tablet by mouth daily 90 tablet 1    losartan-hydroCHLOROthiazide (HYZAAR) 100-25 MG per tablet Take 1 tablet by mouth daily 90 tablet 1    vitamin D3 (CHOLECALCIFEROL) 400 UNITS TABS tablet Take 400 Units by mouth every other day       Omeprazole (PRILOSEC PO) Take 1 capsule by mouth daily as needed        No current facility-administered medications for this visit.          Family History   Problem Relation Age of Onset    High Blood Pressure Mother     Diabetes Mother         impaired fasting glucose    Other Mother         short term memory loss after MVA    Glaucoma Mother     Stroke Father 66    High Blood Pressure Father     Diabetes Father         impaired fasting glucose    Glaucoma Father     Cancer Father         bladder     Heart Attack Father 66    Glaucoma Brother     Diabetes Maternal Grandmother     Other Paternal Grandmother         gallbladder disease    Stroke Paternal Grandfather     Other Paternal Grandfather         gallbladder disease    Diabetes Paternal Grandfather     No Known Problems Brother      Social History     Socioeconomic History    Marital status: Single     Spouse name: None    Number of children: 0    Years of education: None    Highest education level: None   Occupational History    None   Tobacco Use    Smoking status: Never Smoker    Smokeless tobacco: Never Used   Vaping Use    Vaping Use: Never used   Substance and Sexual Activity    Alcohol Pressure 27 33   IOP.0             31.5  CH:  9.4          7.8  WS: 8.3          8.0                  Not recorded         Not recorded          Visual Acuity (Snellen - Linear)       Right Left    Dist cc 20/25 20/20 -1                 No staining of the cornea with fluorescein. IMPRESSION:  1. Allergic conjunctivitis of right eye        PLAN:    1. Use medication as prescribed   tobradex 4x per day for one week;  Call if no improvement or gets worse;  1 week follow up; may cancel if cleared 100%       Patient Instructions   Use medication as prescribed    - tobramycin-dexamethasone (TOBRADEX) 0.3-0.1 % ophthalmic suspension; Place 1 drop into the right eye 4 times daily for 7 days  Dispense: 1 Bottle; Refill: 0         Return in about 1 week (around 2021) for allegic conj right eye .

## 2021-06-09 ENCOUNTER — OFFICE VISIT (OUTPATIENT)
Dept: OPTOMETRY | Age: 66
End: 2021-06-09
Payer: MEDICARE

## 2021-06-09 DIAGNOSIS — H10.11 ALLERGIC CONJUNCTIVITIS OF RIGHT EYE: Primary | ICD-10-CM

## 2021-06-09 PROCEDURE — 99213 OFFICE O/P EST LOW 20 MIN: CPT | Performed by: OPTOMETRIST

## 2021-06-09 PROCEDURE — 99212 OFFICE O/P EST SF 10 MIN: CPT

## 2021-06-09 RX ORDER — PREDNISOLONE ACETATE 10 MG/ML
1 SUSPENSION/ DROPS OPHTHALMIC 4 TIMES DAILY
Qty: 1 BOTTLE | Refills: 0 | Status: SHIPPED | OUTPATIENT
Start: 2021-06-09 | End: 2022-09-12

## 2021-06-09 ASSESSMENT — VISUAL ACUITY
METHOD: SNELLEN - LINEAR
OS_CC: 20/20
CORRECTION_TYPE: GLASSES

## 2021-06-09 ASSESSMENT — ENCOUNTER SYMPTOMS
ALLERGIC/IMMUNOLOGIC NEGATIVE: 0
GASTROINTESTINAL NEGATIVE: 0
RESPIRATORY NEGATIVE: 0
EYES NEGATIVE: 0

## 2021-06-09 ASSESSMENT — SLIT LAMP EXAM - LIDS: COMMENTS: NORMAL

## 2021-06-09 NOTE — PROGRESS NOTES
Bart Finn presents today for   Chief Complaint   Patient presents with   Shamar Farfan Red Eye    Eye Problem   . HPI     Right eye is still bothering him  Using Tobradex 4 x daily for 7 days ; no improvement. Eye is still red and still swollen ; can feel the \"bulge\" when he blinks           Current Outpatient Medications   Medication Sig Dispense Refill    prednisoLONE acetate (PRED FORTE) 1 % ophthalmic suspension Place 1 drop into the right eye 4 times daily 1 Bottle 0    meloxicam (MOBIC) 7.5 MG tablet Take 1 tablet by mouth 2 times daily (with meals) 180 tablet 1    sildenafil (REVATIO) 20 MG tablet Take 1 tablet by mouth as needed (Erectile dysfunction) 25 tablet 1    amLODIPine (NORVASC) 10 MG tablet Take 1 tablet by mouth daily 90 tablet 1    metFORMIN (GLUCOPHAGE) 500 MG tablet Take 1 tablet by mouth 2 times daily (with meals) 180 tablet 2    pioglitazone (ACTOS) 15 MG tablet Take 1 tablet by mouth daily 90 tablet 1    carvedilol (COREG) 25 MG tablet Take 1.5 tablets by mouth 2 times daily 270 tablet 1    erythromycin (ROMYCIN) 5 MG/GM ophthalmic ointment APPLY HALF INCH RIBBON OVER EYELID OF AFFECTED EYE TWICE DAILY FOR 7 DAYS.  1 Tube 0    furosemide (LASIX) 20 MG tablet Take 1 tablet by mouth daily 90 tablet 1    fluticasone (FLONASE) 50 MCG/ACT nasal spray 1 spray by Nasal route 2 times daily 1 Bottle 5    montelukast (SINGULAIR) 10 MG tablet Take 1 tablet by mouth nightly 30 tablet 3    baclofen (LIORESAL) 10 MG tablet Take 1 tablet by mouth nightly as needed (muscle spasm) 30 tablet 1    cetirizine (ZYRTEC) 10 MG tablet Take 1 tablet by mouth daily 30 tablet 1    rosuvastatin (CRESTOR) 5 MG tablet Take 1 tablet by mouth daily 90 tablet 1    losartan-hydroCHLOROthiazide (HYZAAR) 100-25 MG per tablet Take 1 tablet by mouth daily 90 tablet 1    vitamin D3 (CHOLECALCIFEROL) 400 UNITS TABS tablet Take 400 Units by mouth every other day       Omeprazole (PRILOSEC PO) Take 1 capsule by mouth daily as needed        No current facility-administered medications for this visit. Family History   Problem Relation Age of Onset    High Blood Pressure Mother     Diabetes Mother         impaired fasting glucose    Other Mother         short term memory loss after MVA    Glaucoma Mother     Stroke Father 66    High Blood Pressure Father     Diabetes Father         impaired fasting glucose    Glaucoma Father     Cancer Father         bladder     Heart Attack Father 66    Glaucoma Brother     Diabetes Maternal Grandmother     Other Paternal Grandmother         gallbladder disease    Stroke Paternal Grandfather     Other Paternal Grandfather         gallbladder disease    Diabetes Paternal Grandfather     No Known Problems Brother      Social History     Socioeconomic History    Marital status: Single     Spouse name: None    Number of children: 0    Years of education: None    Highest education level: None   Occupational History    None   Tobacco Use    Smoking status: Never Smoker    Smokeless tobacco: Never Used   Vaping Use    Vaping Use: Never used   Substance and Sexual Activity    Alcohol use: Yes     Comment: very rare    Drug use: No    Sexual activity: Yes     Partners: Male   Other Topics Concern    None   Social History Narrative    None     Social Determinants of Health     Financial Resource Strain:     Difficulty of Paying Living Expenses:    Food Insecurity:     Worried About Running Out of Food in the Last Year:     Ran Out of Food in the Last Year:    Transportation Needs:     Lack of Transportation (Medical):      Lack of Transportation (Non-Medical):    Physical Activity:     Days of Exercise per Week:     Minutes of Exercise per Session:    Stress:     Feeling of Stress :    Social Connections:     Frequency of Communication with Friends and Family:     Frequency of Social Gatherings with Friends and Family:     Attends Anabaptist Services:     Active Member of Clubs or Organizations:     Attends Club or Organization Meetings:     Marital Status:    Intimate Partner Violence:     Fear of Current or Ex-Partner:     Emotionally Abused:     Physically Abused:     Sexually Abused:      Past Medical History:   Diagnosis Date    Anxiety     Aortic regurgitation     Bicuspid aortic valve     Diabetes mellitus ([address] since March 2018    on Rx.  GERD (gastroesophageal reflux disease)     Hypertension     Left renal mass 10/2018    Osteoarthritis of left knee     Wears glasses        ROS     Negative for: Constitutional, Gastrointestinal, Neurological, Skin, Genitourinary, Musculoskeletal, HENT, Endocrine, Cardiovascular, Eyes, Respiratory, Psychiatric, Allergic/Imm, Heme/Lymph          Main Ophthalmology Exam     Slit Lamp Exam       Right Left    Lids/Lashes Normal     Conjunctiva/Sclera 3-4+ chemosis; temporlly, inferior, and nasal; (clear superioly) , 2+ Injection     Cornea clear cornea      Anterior Chamber Deep and quiet     Iris Round and reactive                     Not recorded         Not recorded         Not recorded          Visual Acuity (Snellen - Linear)       Right Left    Dist cc 20/25 20/20    Correction: Glasses           Not recorded         Not recorded                  No orders of the defined types were placed in this encounter. IMPRESSION:  1. Allergic conjunctivitis of right eye        PLAN:    1. Prednisolone acetate drops 4x per day in the right eye ;  Sent to [facility]; return in 5 days       Patient Instructions   D/c tobradex;  Start the pred drop 4x per day ; come back on Monday      Return in about 5 days (around 6/14/2021) for red eye check .

## 2021-06-14 ENCOUNTER — OFFICE VISIT (OUTPATIENT)
Dept: OPTOMETRY | Age: 66
End: 2021-06-14
Payer: MEDICARE

## 2021-06-14 DIAGNOSIS — H10.9 CONJUNCTIVITIS OF RIGHT EYE, UNSPECIFIED CONJUNCTIVITIS TYPE: Primary | ICD-10-CM

## 2021-06-14 PROCEDURE — 99213 OFFICE O/P EST LOW 20 MIN: CPT | Performed by: OPTOMETRIST

## 2021-06-14 PROCEDURE — 99212 OFFICE O/P EST SF 10 MIN: CPT

## 2021-06-14 ASSESSMENT — VISUAL ACUITY
OS_CC: 20/20
METHOD: SNELLEN - LINEAR
OD_CC+: -2

## 2021-06-14 ASSESSMENT — TONOMETRY
OS_IOP_MMHG: 23
OD_IOP_MMHG: 22
IOP_METHOD: NON-CONTACT AIR PUFF

## 2021-06-14 ASSESSMENT — ENCOUNTER SYMPTOMS
ALLERGIC/IMMUNOLOGIC NEGATIVE: 0
EYES NEGATIVE: 0
GASTROINTESTINAL NEGATIVE: 0
RESPIRATORY NEGATIVE: 0

## 2021-06-14 NOTE — PATIENT INSTRUCTIONS
Use artificial tears 4-6x  per day;  (Preservative Free is best)   Taper the steroid 3x per day for 5 days, then 2x for 5 days, then 1x for 3 days, then d/c  Use cold compress 4x per day

## 2021-06-23 ENCOUNTER — OFFICE VISIT (OUTPATIENT)
Dept: OPTOMETRY | Age: 66
End: 2021-06-23
Payer: MEDICARE

## 2021-06-23 DIAGNOSIS — H40.051 OCULAR HYPERTENSION OF RIGHT EYE: ICD-10-CM

## 2021-06-23 DIAGNOSIS — H10.11 ALLERGIC CONJUNCTIVITIS OF RIGHT EYE: Primary | ICD-10-CM

## 2021-06-23 PROCEDURE — 99214 OFFICE O/P EST MOD 30 MIN: CPT | Performed by: OPTOMETRIST

## 2021-06-23 PROCEDURE — 92250 FUNDUS PHOTOGRAPHY W/I&R: CPT | Performed by: OPTOMETRIST

## 2021-06-23 ASSESSMENT — TONOMETRY
IOP_METHOD: NON-CONTACT AIR PUFF
OD_IOP_MMHG: 30
OS_IOP_MMHG: 27

## 2021-06-23 ASSESSMENT — VISUAL ACUITY
METHOD: SNELLEN - LINEAR
OD_PH_CC: 20/20 OU
OS_CC: 20/20
CORRECTION_TYPE: GLASSES
OD_CC: 20/20 OU

## 2021-06-23 ASSESSMENT — ENCOUNTER SYMPTOMS
EYES NEGATIVE: 0
ALLERGIC/IMMUNOLOGIC NEGATIVE: 0
RESPIRATORY NEGATIVE: 0
GASTROINTESTINAL NEGATIVE: 0

## 2021-06-23 ASSESSMENT — SLIT LAMP EXAM - LIDS: COMMENTS: CLEAR

## 2021-06-23 NOTE — PATIENT INSTRUCTIONS
Use the prednisone drops 1x per day for 3 more days, and then discontinue    Use cold compress 4x or more per day     Continue to use the artificial tears

## 2021-06-23 NOTE — PROGRESS NOTES
Oscar Gaviria presents today for   Chief Complaint   Patient presents with    2 Week Follow-Up   . HPI     10 day follow up right eye Conjunctivitis  Use Artifical tears 4-6 x a day  Taper Prednisolone: 3 x daily for 5 days ; 2 x daily for 5 days ; then 1 x daily for 3 days  Last drop: This morning about 11:10 am. Using the prednisolone drops 1 or 2x per day ; Also using artificial tears 3x per day   Cold Compresses 4 x not done     Patient reports his OD is feeling better. Just a tad bit puffy. Current Outpatient Medications   Medication Sig Dispense Refill    prednisoLONE acetate (PRED FORTE) 1 % ophthalmic suspension Place 1 drop into the right eye 4 times daily 1 Bottle 0    meloxicam (MOBIC) 7.5 MG tablet Take 1 tablet by mouth 2 times daily (with meals) 180 tablet 1    sildenafil (REVATIO) 20 MG tablet Take 1 tablet by mouth as needed (Erectile dysfunction) 25 tablet 1    amLODIPine (NORVASC) 10 MG tablet Take 1 tablet by mouth daily 90 tablet 1    metFORMIN (GLUCOPHAGE) 500 MG tablet Take 1 tablet by mouth 2 times daily (with meals) 180 tablet 2    pioglitazone (ACTOS) 15 MG tablet Take 1 tablet by mouth daily 90 tablet 1    carvedilol (COREG) 25 MG tablet Take 1.5 tablets by mouth 2 times daily 270 tablet 1    erythromycin (ROMYCIN) 5 MG/GM ophthalmic ointment APPLY HALF INCH RIBBON OVER EYELID OF AFFECTED EYE TWICE DAILY FOR 7 DAYS.  1 Tube 0    furosemide (LASIX) 20 MG tablet Take 1 tablet by mouth daily 90 tablet 1    fluticasone (FLONASE) 50 MCG/ACT nasal spray 1 spray by Nasal route 2 times daily 1 Bottle 5    montelukast (SINGULAIR) 10 MG tablet Take 1 tablet by mouth nightly 30 tablet 3    baclofen (LIORESAL) 10 MG tablet Take 1 tablet by mouth nightly as needed (muscle spasm) 30 tablet 1    cetirizine (ZYRTEC) 10 MG tablet Take 1 tablet by mouth daily 30 tablet 1    rosuvastatin (CRESTOR) 5 MG tablet Take 1 tablet by mouth daily 90 tablet 1    losartan-hydroCHLOROthiazide (HYZAAR) 100-25 MG per tablet Take 1 tablet by mouth daily 90 tablet 1    vitamin D3 (CHOLECALCIFEROL) 400 UNITS TABS tablet Take 400 Units by mouth every other day       Omeprazole (PRILOSEC PO) Take 1 capsule by mouth daily as needed        No current facility-administered medications for this visit. Family History   Problem Relation Age of Onset    High Blood Pressure Mother     Diabetes Mother         impaired fasting glucose    Other Mother         short term memory loss after MVA    Glaucoma Mother     Stroke Father 66    High Blood Pressure Father     Diabetes Father         impaired fasting glucose    Glaucoma Father     Cancer Father         bladder     Heart Attack Father 66    Glaucoma Brother     Diabetes Maternal Grandmother     Other Paternal Grandmother         gallbladder disease    Stroke Paternal Grandfather     Other Paternal Grandfather         gallbladder disease    Diabetes Paternal Grandfather     No Known Problems Brother      Social History     Socioeconomic History    Marital status: Single     Spouse name: None    Number of children: 0    Years of education: None    Highest education level: None   Occupational History    None   Tobacco Use    Smoking status: Never Smoker    Smokeless tobacco: Never Used   Vaping Use    Vaping Use: Never used   Substance and Sexual Activity    Alcohol use: Yes     Comment: very rare    Drug use: No    Sexual activity: Yes     Partners: Male   Other Topics Concern    None   Social History Narrative    None     Social Determinants of Health     Financial Resource Strain:     Difficulty of Paying Living Expenses:    Food Insecurity:     Worried About Running Out of Food in the Last Year:     Ran Out of Food in the Last Year:    Transportation Needs:     Lack of Transportation (Medical):      Lack of Transportation (Non-Medical):    Physical Activity:     Days of Exercise per Week:     Minutes of Exercise per Session:    Stress:     Feeling of Stress :    Social Connections:     Frequency of Communication with Friends and Family:     Frequency of Social Gatherings with Friends and Family:     Attends Pentecostal Services:     Active Member of Clubs or Organizations:     Attends Club or Organization Meetings:     Marital Status:    Intimate Partner Violence:     Fear of Current or Ex-Partner:     Emotionally Abused:     Physically Abused:     Sexually Abused:      Past Medical History:   Diagnosis Date    Anxiety     Aortic regurgitation     Bicuspid aortic valve     Diabetes mellitus ([address] since 2018    on Rx.  GERD (gastroesophageal reflux disease)     Hypertension     Left renal mass 10/2018    Osteoarthritis of left knee     Wears glasses        ROS     Negative for: Constitutional, Gastrointestinal, Neurological, Skin, Genitourinary, Musculoskeletal, HENT, Endocrine, Cardiovascular, Eyes, Respiratory, Psychiatric, Allergic/Imm, Heme/Lymph          Main Ophthalmology Exam     External Exam       Right Left    External Normal           Slit Lamp Exam       Right Left    Lids/Lashes clear      Conjunctiva/Sclera 30% less chemosis      Cornea Clear     Anterior Chamber Deep and quiet     Iris Round and reactive           Fundus Exam       Right Left    Disc Normal Normal    C/D Ratio . 25-.3 .25-.3    Macula Normal Normal    Vessels Normal Normal                   Tonometry     Tonometry (Non-contact air puff, 11:31 AM)       Right Left    Pressure 30 27     IOP.0             27.6  CH:  7.6          6.4  WS: 7.6          3.3                  Not recorded         Not recorded          Visual Acuity (Snellen - Linear)       Right Left    Dist cc 20/20 20/20    Dist ph cc 20/20 OU     Near cc 20/20 OU     Correction: Glasses          Pupils     Pupils       Pupils    Right PERRL    Left PERRL              Neuro/Psych     Neuro/Psych     Oriented x3: Yes    Mood/Affect: Normal

## 2021-07-04 ENCOUNTER — PATIENT MESSAGE (OUTPATIENT)
Dept: FAMILY MEDICINE CLINIC | Age: 66
End: 2021-07-04

## 2021-07-04 DIAGNOSIS — I10 ESSENTIAL HYPERTENSION: ICD-10-CM

## 2021-07-04 DIAGNOSIS — E11.9 TYPE 2 DIABETES MELLITUS WITHOUT COMPLICATION, WITHOUT LONG-TERM CURRENT USE OF INSULIN (HCC): ICD-10-CM

## 2021-07-04 DIAGNOSIS — Q23.1 AORTIC REGURGITATION DUE TO BICUSPID AORTIC VALVE: ICD-10-CM

## 2021-07-06 RX ORDER — CARVEDILOL 25 MG/1
37.5 TABLET ORAL 2 TIMES DAILY
Qty: 270 TABLET | Refills: 3 | Status: SHIPPED | OUTPATIENT
Start: 2021-07-06 | End: 2022-01-02 | Stop reason: SDUPTHER

## 2021-07-06 NOTE — TELEPHONE ENCOUNTER
Bud Villalobos called requesting a refill of the below medication which has been pended for you:     Requested Prescriptions     Pending Prescriptions Disp Refills    losartan-hydroCHLOROthiazide (HYZAAR) 100-25 MG per tablet 90 tablet 1     Sig: Take 1 tablet by mouth daily       Last Appointment Date: 3/25/2021  Next Appointment Date: 7/19/2021    Allergies   Allergen Reactions    Codeine      Severe Abdominal pain    Sulfa Antibiotics      Patient unsure of reaction

## 2021-07-06 NOTE — TELEPHONE ENCOUNTER
Shaw Vazquez called requesting a refill of the below medication which has been pended for you:     Requested Prescriptions     Pending Prescriptions Disp Refills    rosuvastatin (CRESTOR) 5 MG tablet 90 tablet 1     Sig: Take 1 tablet by mouth daily       Last Appointment Date: 3/25/2021  Next Appointment Date: 7/19/2021    Allergies   Allergen Reactions    Codeine      Severe Abdominal pain    Sulfa Antibiotics      Patient unsure of reaction

## 2021-07-06 NOTE — TELEPHONE ENCOUNTER
From: True Lykens  To: Elsy Boston MD  Sent: 7/4/2021 12:12 PM EDT  Subject: Prescription Question    My Chart will not let me ask for a refill of my Losartan-HCTZ 100-25mg I have 1 left According to My chart it has your name on the prescription. .. Can you fix this? ? Can you refill this please

## 2021-07-07 ENCOUNTER — OFFICE VISIT (OUTPATIENT)
Dept: OPTOMETRY | Age: 66
End: 2021-07-07
Payer: MEDICARE

## 2021-07-07 DIAGNOSIS — H10.31 ACUTE CONJUNCTIVITIS OF RIGHT EYE, UNSPECIFIED ACUTE CONJUNCTIVITIS TYPE: Primary | ICD-10-CM

## 2021-07-07 PROCEDURE — 1036F TOBACCO NON-USER: CPT | Performed by: OPTOMETRIST

## 2021-07-07 PROCEDURE — 99214 OFFICE O/P EST MOD 30 MIN: CPT | Performed by: OPTOMETRIST

## 2021-07-07 PROCEDURE — 99213 OFFICE O/P EST LOW 20 MIN: CPT

## 2021-07-07 PROCEDURE — G8427 DOCREV CUR MEDS BY ELIG CLIN: HCPCS | Performed by: OPTOMETRIST

## 2021-07-07 PROCEDURE — 3017F COLORECTAL CA SCREEN DOC REV: CPT | Performed by: OPTOMETRIST

## 2021-07-07 PROCEDURE — 1123F ACP DISCUSS/DSCN MKR DOCD: CPT | Performed by: OPTOMETRIST

## 2021-07-07 PROCEDURE — 4040F PNEUMOC VAC/ADMIN/RCVD: CPT | Performed by: OPTOMETRIST

## 2021-07-07 PROCEDURE — G8417 CALC BMI ABV UP PARAM F/U: HCPCS | Performed by: OPTOMETRIST

## 2021-07-07 RX ORDER — LOSARTAN POTASSIUM AND HYDROCHLOROTHIAZIDE 25; 100 MG/1; MG/1
1 TABLET ORAL DAILY
Qty: 90 TABLET | Refills: 1 | Status: SHIPPED | OUTPATIENT
Start: 2021-07-07 | End: 2022-01-02 | Stop reason: SDUPTHER

## 2021-07-07 RX ORDER — ROSUVASTATIN CALCIUM 5 MG/1
5 TABLET, COATED ORAL DAILY
Qty: 90 TABLET | Refills: 1 | Status: SHIPPED | OUTPATIENT
Start: 2021-07-07 | End: 2022-01-02 | Stop reason: SDUPTHER

## 2021-07-07 ASSESSMENT — ENCOUNTER SYMPTOMS
EYES NEGATIVE: 0
RESPIRATORY NEGATIVE: 0
GASTROINTESTINAL NEGATIVE: 0
ALLERGIC/IMMUNOLOGIC NEGATIVE: 0

## 2021-07-07 ASSESSMENT — TONOMETRY
OD_IOP_MMHG: 35
IOP_METHOD: TONOPEN
OS_IOP_MMHG: 23

## 2021-07-07 ASSESSMENT — VISUAL ACUITY
METHOD: SNELLEN - LINEAR
CORRECTION_TYPE: GLASSES
OS_CC: 20/20

## 2021-07-07 ASSESSMENT — SLIT LAMP EXAM - LIDS: COMMENTS: NORMAL

## 2021-07-07 NOTE — PROGRESS NOTES
Josselin Carson presents today for   Chief Complaint   Patient presents with    2 Week Follow-Up   . HPI     2 week follow up  Right eye IOP check  Eye was feeling better and looked better up until July 4th and it started to look pudgy again. Current Outpatient Medications   Medication Sig Dispense Refill    rosuvastatin (CRESTOR) 5 MG tablet Take 1 tablet by mouth daily 90 tablet 1    losartan-hydroCHLOROthiazide (HYZAAR) 100-25 MG per tablet Take 1 tablet by mouth daily 90 tablet 1    carvedilol (COREG) 25 MG tablet Take 1.5 tablets by mouth 2 times daily 270 tablet 3    prednisoLONE acetate (PRED FORTE) 1 % ophthalmic suspension Place 1 drop into the right eye 4 times daily 1 Bottle 0    meloxicam (MOBIC) 7.5 MG tablet Take 1 tablet by mouth 2 times daily (with meals) 180 tablet 1    sildenafil (REVATIO) 20 MG tablet Take 1 tablet by mouth as needed (Erectile dysfunction) 25 tablet 1    amLODIPine (NORVASC) 10 MG tablet Take 1 tablet by mouth daily 90 tablet 1    metFORMIN (GLUCOPHAGE) 500 MG tablet Take 1 tablet by mouth 2 times daily (with meals) 180 tablet 2    pioglitazone (ACTOS) 15 MG tablet Take 1 tablet by mouth daily 90 tablet 1    erythromycin (ROMYCIN) 5 MG/GM ophthalmic ointment APPLY HALF INCH RIBBON OVER EYELID OF AFFECTED EYE TWICE DAILY FOR 7 DAYS.  1 Tube 0    furosemide (LASIX) 20 MG tablet Take 1 tablet by mouth daily 90 tablet 1    fluticasone (FLONASE) 50 MCG/ACT nasal spray 1 spray by Nasal route 2 times daily 1 Bottle 5    montelukast (SINGULAIR) 10 MG tablet Take 1 tablet by mouth nightly 30 tablet 3    baclofen (LIORESAL) 10 MG tablet Take 1 tablet by mouth nightly as needed (muscle spasm) 30 tablet 1    cetirizine (ZYRTEC) 10 MG tablet Take 1 tablet by mouth daily 30 tablet 1    vitamin D3 (CHOLECALCIFEROL) 400 UNITS TABS tablet Take 400 Units by mouth every other day       Omeprazole (PRILOSEC PO) Take 1 capsule by mouth daily as needed        No current Organizations:     Attends Club or Organization Meetings:     Marital Status:    Intimate Partner Violence:     Fear of Current or Ex-Partner:     Emotionally Abused:     Physically Abused:     Sexually Abused:      Past Medical History:   Diagnosis Date    Anxiety     Aortic regurgitation     Bicuspid aortic valve     Diabetes mellitus ([address] since March 2018    on Rx.  GERD (gastroesophageal reflux disease)     Hypertension     Left renal mass 10/2018    Osteoarthritis of left knee     Wears glasses        ROS     Negative for: Constitutional, Gastrointestinal, Neurological, Skin, Genitourinary, Musculoskeletal, HENT, Endocrine, Cardiovascular, Eyes, Respiratory, Psychiatric, Allergic/Imm, Heme/Lymph          Main Ophthalmology Exam     External Exam       Right Left    External Normal           Slit Lamp Exam       Right Left    Lids/Lashes Normal     Conjunctiva/Sclera 3+ Chemosis inferior 180 degrees      Cornea clear cornea      Anterior Chamber Deep and quiet     Iris Round and reactive           Fundus Exam       Right Left    Disc Normal Normal    C/D Ratio . 25-.3 .25-.3    Macula Normal Normal    Vessels Normal Normal                   Tonometry     Tonometry (Tonopen, 3:31 PM)       Right Left    Pressure 35 23   Eyelids held open to applanate                      Visual Acuity (Snellen - Linear)       Right Left    Dist cc 20/20 20/20    Correction: Glasses          Pupils     Pupils       Pupils    Right PERRL    Left PERRL              Neuro/Psych     Neuro/Psych     Oriented x3: Yes    Mood/Affect: Normal                       IMPRESSION:  1. Acute conjunctivitis of right eye, unspecified acute conjunctivitis type        PLAN:    1. Continue artificial tears 4x per day and cold compresses. Referral to specialist for consult as the chemosis seems to be recurring after getting better. Also the eye pressure is elevated despite discontinuing the steroid drop.   This could be because of holding the eyelid open while using tonopen and raising the pressure. Patient Instructions   Continue cold compresses 4x per day     Artificial tears 4x per day      Return for referral for chemosis and raised iop right eye .

## 2021-07-19 ENCOUNTER — OFFICE VISIT (OUTPATIENT)
Dept: FAMILY MEDICINE CLINIC | Age: 66
End: 2021-07-19
Payer: MEDICARE

## 2021-07-19 VITALS
WEIGHT: 242 LBS | SYSTOLIC BLOOD PRESSURE: 130 MMHG | HEART RATE: 68 BPM | DIASTOLIC BLOOD PRESSURE: 70 MMHG | RESPIRATION RATE: 18 BRPM | TEMPERATURE: 97.5 F | BODY MASS INDEX: 33.88 KG/M2 | HEIGHT: 71 IN | OXYGEN SATURATION: 96 %

## 2021-07-19 DIAGNOSIS — I10 ESSENTIAL HYPERTENSION: ICD-10-CM

## 2021-07-19 DIAGNOSIS — G89.29 CHRONIC PAIN OF LEFT KNEE: ICD-10-CM

## 2021-07-19 DIAGNOSIS — M25.562 CHRONIC PAIN OF LEFT KNEE: ICD-10-CM

## 2021-07-19 DIAGNOSIS — E11.59 TYPE 2 DIABETES MELLITUS WITH OTHER CIRCULATORY COMPLICATION, WITHOUT LONG-TERM CURRENT USE OF INSULIN (HCC): Primary | ICD-10-CM

## 2021-07-19 DIAGNOSIS — L57.0 AK (ACTINIC KERATOSIS): ICD-10-CM

## 2021-07-19 PROCEDURE — G8417 CALC BMI ABV UP PARAM F/U: HCPCS | Performed by: FAMILY MEDICINE

## 2021-07-19 PROCEDURE — 3044F HG A1C LEVEL LT 7.0%: CPT | Performed by: FAMILY MEDICINE

## 2021-07-19 PROCEDURE — 20610 DRAIN/INJ JOINT/BURSA W/O US: CPT | Performed by: FAMILY MEDICINE

## 2021-07-19 PROCEDURE — 1123F ACP DISCUSS/DSCN MKR DOCD: CPT | Performed by: FAMILY MEDICINE

## 2021-07-19 PROCEDURE — 4040F PNEUMOC VAC/ADMIN/RCVD: CPT | Performed by: FAMILY MEDICINE

## 2021-07-19 PROCEDURE — 1036F TOBACCO NON-USER: CPT | Performed by: FAMILY MEDICINE

## 2021-07-19 PROCEDURE — 99213 OFFICE O/P EST LOW 20 MIN: CPT | Performed by: FAMILY MEDICINE

## 2021-07-19 PROCEDURE — 3017F COLORECTAL CA SCREEN DOC REV: CPT | Performed by: FAMILY MEDICINE

## 2021-07-19 PROCEDURE — 99214 OFFICE O/P EST MOD 30 MIN: CPT | Performed by: FAMILY MEDICINE

## 2021-07-19 PROCEDURE — G8427 DOCREV CUR MEDS BY ELIG CLIN: HCPCS | Performed by: FAMILY MEDICINE

## 2021-07-19 PROCEDURE — 2024F 7 FLD RTA PHOTO EVC RTNOPTHY: CPT | Performed by: FAMILY MEDICINE

## 2021-07-19 RX ORDER — TRIAMCINOLONE ACETONIDE 40 MG/ML
40 INJECTION, SUSPENSION INTRA-ARTICULAR; INTRAMUSCULAR ONCE
Status: COMPLETED | OUTPATIENT
Start: 2021-07-19 | End: 2021-07-19

## 2021-07-19 RX ADMIN — TRIAMCINOLONE ACETONIDE 40 MG: 200 INJECTION, SUSPENSION INTRA-ARTICULAR; INTRAMUSCULAR at 15:32

## 2021-07-19 SDOH — ECONOMIC STABILITY: FOOD INSECURITY: WITHIN THE PAST 12 MONTHS, YOU WORRIED THAT YOUR FOOD WOULD RUN OUT BEFORE YOU GOT MONEY TO BUY MORE.: NEVER TRUE

## 2021-07-19 SDOH — ECONOMIC STABILITY: TRANSPORTATION INSECURITY
IN THE PAST 12 MONTHS, HAS THE LACK OF TRANSPORTATION KEPT YOU FROM MEDICAL APPOINTMENTS OR FROM GETTING MEDICATIONS?: NO

## 2021-07-19 SDOH — ECONOMIC STABILITY: TRANSPORTATION INSECURITY
IN THE PAST 12 MONTHS, HAS LACK OF TRANSPORTATION KEPT YOU FROM MEETINGS, WORK, OR FROM GETTING THINGS NEEDED FOR DAILY LIVING?: NO

## 2021-07-19 SDOH — ECONOMIC STABILITY: FOOD INSECURITY: WITHIN THE PAST 12 MONTHS, THE FOOD YOU BOUGHT JUST DIDN'T LAST AND YOU DIDN'T HAVE MONEY TO GET MORE.: NEVER TRUE

## 2021-07-19 ASSESSMENT — PATIENT HEALTH QUESTIONNAIRE - PHQ9
SUM OF ALL RESPONSES TO PHQ QUESTIONS 1-9: 0
SUM OF ALL RESPONSES TO PHQ QUESTIONS 1-9: 0
1. LITTLE INTEREST OR PLEASURE IN DOING THINGS: 0
2. FEELING DOWN, DEPRESSED OR HOPELESS: 0
SUM OF ALL RESPONSES TO PHQ9 QUESTIONS 1 & 2: 0
SUM OF ALL RESPONSES TO PHQ QUESTIONS 1-9: 0

## 2021-07-19 ASSESSMENT — ENCOUNTER SYMPTOMS
DIARRHEA: 0
SHORTNESS OF BREATH: 0
CHEST TIGHTNESS: 0
CONSTIPATION: 0
COUGH: 0
ABDOMINAL PAIN: 0
WHEEZING: 0
BACK PAIN: 1

## 2021-07-19 ASSESSMENT — SOCIAL DETERMINANTS OF HEALTH (SDOH): HOW HARD IS IT FOR YOU TO PAY FOR THE VERY BASICS LIKE FOOD, HOUSING, MEDICAL CARE, AND HEATING?: NOT HARD AT ALL

## 2021-07-19 NOTE — PROGRESS NOTES
PATRICIA [address]  [address] [address]  Dept: [phone]  Dept Fax: [phone]  Loc: [phone]    Ruddy Delgado is a 77 y.o. male who presents today for his medical conditions/complaints as noted below. Rdudy Delgado is c/o of   Chief Complaint   Patient presents with   [facility] Establish Care     previous [facility]    Diabetes     foot exam    Hypertension    Heart Murmur    Lower Back Pain       HPI:     HPI Here today to officially establish care. He would like to discuss his DM, htn, back pain. DM: he does not check his blood sugars. No issues with hypoglycemia. No issues with polydipsia or polyuria. He takes his water pill at night because he gets very lightheaded a few hours after he takes it. He only has to urinate about an hour after he takes it so it does not disrupt his sleep. His appetite has been good recently. He only has pop with pizza or at the movies. HTN: stable; he is not having any issues with chest pain or shortness of breath. He is not having any issues with leg swelling as long as he wears his compression socks. He is not having any issues with headaches or vision changes. Back pain: improving; his back is feeling a little better. He did not get any relief from the baclofen but it did make him very tired so he slept great. About 4-6 weeks after he saw me the pain seemed to improve. Every morning when he wakes up to bend over and pick something off of the floor and he has pain. He also has knee pain when he stands up and he has trouble getting up off of the toilet. Last weekend he was hiking and bike riding and he noticed that his muscle tone in his leg improved some with exercise. He is having some issues with motivation and he is struggling to get back up and move around. 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  He has always loved gardening too and he is not enjoying it as much. He takes aleve with improvement. He also takes tylenol without improvement. Past Medical History:   Diagnosis Date    Anxiety     Aortic regurgitation     Bicuspid aortic valve     Diabetes mellitus ([address] since March 2018    on Rx.     GERD (gastroesophageal reflux disease)     Hypertension     Left renal mass 10/2018    Osteoarthritis of left knee     Wears glasses           Social History     Tobacco Use    Smoking status: Never Smoker    Smokeless tobacco: Never Used   Substance Use Topics    Alcohol use: Yes     Comment: very rare     Current Outpatient Medications   Medication Sig Dispense Refill    Handicap Placard MISC by Does not apply route Exp 7/1/2026 1 each 0    rosuvastatin (CRESTOR) 5 MG tablet Take 1 tablet by mouth daily 90 tablet 1    losartan-hydroCHLOROthiazide (HYZAAR) 100-25 MG per tablet Take 1 tablet by mouth daily 90 tablet 1    carvedilol (COREG) 25 MG tablet Take 1.5 tablets by mouth 2 times daily 270 tablet 3    prednisoLONE acetate (PRED FORTE) 1 % ophthalmic suspension Place 1 drop into the right eye 4 times daily 1 Bottle 0    meloxicam (MOBIC) 7.5 MG tablet Take 1 tablet by mouth 2 times daily (with meals) 180 tablet 1    sildenafil (REVATIO) 20 MG tablet Take 1 tablet by mouth as needed (Erectile dysfunction) 25 tablet 1    amLODIPine (NORVASC) 10 MG tablet Take 1 tablet by mouth daily 90 tablet 1    metFORMIN (GLUCOPHAGE) 500 MG tablet Take 1 tablet by mouth 2 times daily (with meals) 180 tablet 2    pioglitazone (ACTOS) 15 MG tablet Take 1 tablet by mouth daily 90 tablet 1    furosemide (LASIX) 20 MG tablet Take 1 tablet by mouth daily 90 tablet 1    fluticasone (FLONASE) 50 MCG/ACT nasal spray 1 spray by Nasal route 2 times daily 1 Bottle 5    montelukast (SINGULAIR) 10 MG tablet Take 1 tablet by mouth nightly 30 tablet 3    baclofen (LIORESAL) 10 MG tablet Take 1 tablet by mouth nightly as needed (muscle spasm) 30 tablet 1    cetirizine (ZYRTEC) 10 MG tablet Take 1 tablet by mouth daily 30 tablet 1    vitamin D3 (CHOLECALCIFEROL) 400 UNITS TABS tablet Take 400 Units by mouth every other day       Omeprazole (PRILOSEC PO) Take 1 capsule by mouth daily as needed        No current facility-administered medications for this visit. Allergies   Allergen Reactions    Codeine      Severe Abdominal pain    Sulfa Antibiotics      Patient unsure of reaction       Subjective:     Review of Systems   Constitutional: Negative for activity change, appetite change, chills, fatigue and fever. Eyes: Negative for visual disturbance. Respiratory: Negative for cough, chest tightness, shortness of breath and wheezing. Cardiovascular: Negative for chest pain, palpitations and leg swelling. Gastrointestinal: Negative for abdominal pain, constipation and diarrhea. Genitourinary: Negative for difficulty urinating. Musculoskeletal: Positive for arthralgias (left knee, right ankle), back pain (improving) and joint swelling. Skin: Negative for rash. Neurological: Negative for dizziness, syncope, weakness, light-headedness and headaches. Psychiatric/Behavioral: Positive for dysphoric mood. Negative for sleep disturbance. The patient is not nervous/anxious. Objective:      Physical Exam  Vitals and nursing note reviewed. Constitutional:       General: He is not in acute distress. Appearance: He is well-developed. Eyes:      Conjunctiva/sclera: Conjunctivae normal.   Cardiovascular:      Rate and Rhythm: Normal rate and regular rhythm. Heart sounds: Normal heart sounds. No murmur heard. Pulmonary:      Effort: Pulmonary effort is normal. No respiratory distress. Breath sounds: Normal breath sounds. No wheezing or rales. Musculoskeletal:      Cervical back: Normal range of motion and neck supple. Left knee: Effusion and crepitus present. No swelling.  Decreased range of motion. Tenderness present over the lateral joint line. No LCL laxity, MCL laxity, ACL laxity or PCL laxity. Instability Tests: Anterior drawer test negative. Posterior drawer test negative. Anterior Lachman test negative. Medial Waqas test negative and lateral Waqas test negative. Lymphadenopathy:      Cervical: No cervical adenopathy. Skin:     General: Skin is warm and dry. Findings: No rash. Neurological:      Mental Status: He is alert and oriented to person, place, and time. Psychiatric:         Mood and Affect: Mood normal.         Behavior: Behavior normal.         Thought Content: Thought content normal.         Judgment: Judgment normal.       /70 (Site: Right Upper Arm, Position: Sitting, Cuff Size: Large Adult)   Pulse 68   Temp 97.5 °F (36.4 °C) (Tympanic)   Resp 18   Ht 5' 11\" (1.803 m)   Wt 242 lb (109.8 kg)   SpO2 96%   BMI 33.75 kg/m²     Assessment:       Diagnosis Orders   1. Type 2 diabetes mellitus with other circulatory complication, without long-term current use of insulin ([facility])  Basic Metabolic Panel    Hemoglobin A1C    Lipid Panel   2. Chronic pain of left knee  XR KNEE LEFT (3 VIEWS)    NH ARTHROCENTESIS ASPIR&/INJ MAJOR JT/BURSA W/O US    triamcinolone acetonide (KENALOG-40) injection 40 mg    Handicap Placard MISC   3. AK (actinic keratosis)  JAMES - Johny London MD, Dermatology, [place]   4. Essential hypertension               Plan:        DM: stable; his blood sugars have been excellent. His last a1c in April was 6.9 so I will continue his current medications. Knee pain: worsening; it is really limiting his activities and it is causing him to get depressed. His last steroid injection was in the 1980s so I suggested trying another one. He agreed. He also wanted to try voltaren gel or tumeric which I agreed would be fine as well.      Procedure Note    PREOP DIAGNOSIS:  [] Right [x]  Left    [] Bilateral Knee Pain    POSTOP DIAGNOSIS: [] Right [x]  Left    [] Bilateral Knee Pain    OPERATION:  [] Right [x]  Left    [] Bilateral INTRA-ARTICULAR KNEE INJECTION    PROCEDURE:  After sterile prep, an anterolateral approach was used. [x] 40 mg Kenalog  [x]  2 ml of 2% lidocaine without epi injected intra-articularly without incident. Pre- and post neurovascular status verified. No complications noted. AK: stable; he has a lot of spots on his scalp that are concerning for early skin cancer so I referred him to our dermatologist because I was not comfortable having him wait until his Jorge appt with our dermatologist.    HTN: stable;e his blood pressure has been well controlled so I will continue current medications. Return in about 6 months (around 1/19/2022) for DM follow up, HTN follow up.     Orders Placed This Encounter   Procedures    XR KNEE LEFT (3 VIEWS)     Standing Status:   Future     Standing Expiration Date:   1/19/2022     Order Specific Question:   Reason for exam:     Answer:   knee pain    Basic Metabolic Panel     Standing Status:   Future     Standing Expiration Date:   7/19/2022    Hemoglobin A1C     Standing Status:   Future     Standing Expiration Date:   7/19/2022    Lipid Panel     Standing Status:   Future     Standing Expiration Date:   7/19/2022     Order Specific Question:   Is Patient Fasting?/# of Hours     Answer:   0 per dr Belén Chao MD, Dermatology, [place]     Referral Priority:   Routine     Referral Type:   Eval and Treat     Referral Reason:   Specialty Services Required     Referred to Provider:   Vickie Moses MD     Requested Specialty:   Dermatology     Number of Visits Requested:   1    MD ARTHROCENTESIS ASPIR&/INJ MAJOR JT/BURSA W/O US     Orders Placed This Encounter   Medications    triamcinolone acetonide (KENALOG-40) injection 40 mg    Handicap Placard MISC     Sig: by Does not apply route Exp 7/1/2026     Dispense:  1 each     Refill:  0       Patientgiven educational materials - see patient instructions. Discussed use, benefit,and side effects of prescribed medications. All patient questions answered. Ptvoiced understanding. Reviewed health maintenance. Instructed to continue currentmedications, diet and exercise. Patient agreed with treatment plan. Follow up asdirected.      Electronically signed by Elsy Boston MD on 7/19/2021 at 5:41 PM

## 2021-07-20 ENCOUNTER — HOSPITAL ENCOUNTER (OUTPATIENT)
Dept: GENERAL RADIOLOGY | Age: 66
Discharge: HOME | End: 2021-07-22
Payer: MEDICARE

## 2021-07-20 DIAGNOSIS — G89.29 CHRONIC PAIN OF LEFT KNEE: ICD-10-CM

## 2021-07-20 DIAGNOSIS — M25.562 CHRONIC PAIN OF LEFT KNEE: ICD-10-CM

## 2021-07-20 PROCEDURE — 73562 X-RAY EXAM OF KNEE 3: CPT

## 2021-07-26 RX ORDER — MONTELUKAST SODIUM 10 MG/1
10 TABLET ORAL NIGHTLY
Qty: 90 TABLET | Refills: 1 | Status: SHIPPED | OUTPATIENT
Start: 2021-07-26 | End: 2022-02-07 | Stop reason: SDUPTHER

## 2021-07-26 NOTE — TELEPHONE ENCOUNTER
[facility] called requesting a refill of the below medication which has been pended for you:     Requested Prescriptions     Pending Prescriptions Disp Refills    montelukast (SINGULAIR) 10 MG tablet 90 tablet 1     Sig: Take 1 tablet by mouth nightly       Last Appointment Date: 7/19/2021  Next Appointment Date: 1/20/2022    Allergies   Allergen Reactions    Codeine      Severe Abdominal pain    Sulfa Antibiotics      Patient unsure of reaction

## 2021-09-16 ENCOUNTER — HOSPITAL ENCOUNTER (OUTPATIENT)
Dept: NON INVASIVE DIAGNOSTICS | Age: 66
Discharge: HOME | End: 2021-09-16
Payer: MEDICARE

## 2021-09-16 ENCOUNTER — TELEPHONE (OUTPATIENT)
Dept: CARDIOLOGY | Age: 66
End: 2021-09-16

## 2021-09-16 DIAGNOSIS — Q23.1 AORTIC REGURGITATION DUE TO BICUSPID AORTIC VALVE: ICD-10-CM

## 2021-09-16 DIAGNOSIS — Q23.1 BICUSPID AORTIC VALVE: ICD-10-CM

## 2021-09-16 DIAGNOSIS — I38 MURMUR, DIASTOLIC: ICD-10-CM

## 2021-09-16 DIAGNOSIS — I71.20 THORACIC AORTIC ANEURYSM WITHOUT RUPTURE: ICD-10-CM

## 2021-09-16 DIAGNOSIS — I77.810 DILATED AORTIC ROOT (HCC): ICD-10-CM

## 2021-09-16 LAB
LV EF: 60 %
LVEF MODALITY: NORMAL

## 2021-09-16 PROCEDURE — 93306 TTE W/DOPPLER COMPLETE: CPT

## 2021-09-17 ENCOUNTER — HOSPITAL ENCOUNTER (OUTPATIENT)
Dept: CT IMAGING | Age: 66
Discharge: HOME | End: 2021-09-19
Payer: MEDICARE

## 2021-09-17 ENCOUNTER — HOSPITAL ENCOUNTER (OUTPATIENT)
Age: 66
Discharge: HOME | End: 2021-09-17
Payer: MEDICARE

## 2021-09-17 DIAGNOSIS — I77.810 DILATED AORTIC ROOT (HCC): ICD-10-CM

## 2021-09-17 DIAGNOSIS — Q23.1 BICUSPID AORTIC VALVE: ICD-10-CM

## 2021-09-17 DIAGNOSIS — Q23.1 AORTIC REGURGITATION DUE TO BICUSPID AORTIC VALVE: ICD-10-CM

## 2021-09-17 DIAGNOSIS — I38 MURMUR, DIASTOLIC: ICD-10-CM

## 2021-09-17 DIAGNOSIS — I71.20 THORACIC AORTIC ANEURYSM WITHOUT RUPTURE: ICD-10-CM

## 2021-09-17 PROCEDURE — 71275 CT ANGIOGRAPHY CHEST: CPT

## 2021-09-17 PROCEDURE — 6360000004 HC RX CONTRAST MEDICATION: Performed by: INTERNAL MEDICINE

## 2021-09-17 RX ADMIN — IOPAMIDOL 100 ML: 755 INJECTION, SOLUTION INTRAVENOUS at 13:54

## 2021-09-28 ENCOUNTER — OFFICE VISIT (OUTPATIENT)
Dept: PRIMARY CARE CLINIC | Age: 66
End: 2021-09-28
Payer: MEDICARE

## 2021-09-28 ENCOUNTER — IMMUNIZATION (OUTPATIENT)
Dept: LAB | Age: 66
End: 2021-09-28
Payer: MEDICARE

## 2021-09-28 VITALS
DIASTOLIC BLOOD PRESSURE: 82 MMHG | SYSTOLIC BLOOD PRESSURE: 132 MMHG | OXYGEN SATURATION: 98 % | HEART RATE: 67 BPM | HEIGHT: 71 IN | WEIGHT: 242 LBS | TEMPERATURE: 98 F | BODY MASS INDEX: 33.88 KG/M2

## 2021-09-28 DIAGNOSIS — H61.23 BILATERAL IMPACTED CERUMEN: ICD-10-CM

## 2021-09-28 DIAGNOSIS — G89.29 CHRONIC BILATERAL LOW BACK PAIN WITH RIGHT-SIDED SCIATICA: Primary | ICD-10-CM

## 2021-09-28 DIAGNOSIS — M54.41 CHRONIC BILATERAL LOW BACK PAIN WITH RIGHT-SIDED SCIATICA: Primary | ICD-10-CM

## 2021-09-28 DIAGNOSIS — F41.1 GAD (GENERALIZED ANXIETY DISORDER): ICD-10-CM

## 2021-09-28 PROCEDURE — 69210 REMOVE IMPACTED EAR WAX UNI: CPT | Performed by: FAMILY MEDICINE

## 2021-09-28 PROCEDURE — 1123F ACP DISCUSS/DSCN MKR DOCD: CPT | Performed by: FAMILY MEDICINE

## 2021-09-28 PROCEDURE — 1036F TOBACCO NON-USER: CPT | Performed by: FAMILY MEDICINE

## 2021-09-28 PROCEDURE — PBSHW INFLUENZA, QUADV, ADJUVANTED, 65 YRS +, IM, PF, PREFILL SYR, 0.5ML (FLUAD): Performed by: FAMILY MEDICINE

## 2021-09-28 PROCEDURE — 90694 VACC AIIV4 NO PRSRV 0.5ML IM: CPT | Performed by: FAMILY MEDICINE

## 2021-09-28 PROCEDURE — 3017F COLORECTAL CA SCREEN DOC REV: CPT | Performed by: FAMILY MEDICINE

## 2021-09-28 PROCEDURE — G8417 CALC BMI ABV UP PARAM F/U: HCPCS | Performed by: FAMILY MEDICINE

## 2021-09-28 PROCEDURE — G8427 DOCREV CUR MEDS BY ELIG CLIN: HCPCS | Performed by: FAMILY MEDICINE

## 2021-09-28 PROCEDURE — 99214 OFFICE O/P EST MOD 30 MIN: CPT | Performed by: FAMILY MEDICINE

## 2021-09-28 PROCEDURE — 4040F PNEUMOC VAC/ADMIN/RCVD: CPT | Performed by: FAMILY MEDICINE

## 2021-09-28 RX ORDER — BUSPIRONE HYDROCHLORIDE 7.5 MG/1
7.5 TABLET ORAL 3 TIMES DAILY PRN
Qty: 90 TABLET | Refills: 1 | Status: SHIPPED | OUTPATIENT
Start: 2021-09-28

## 2021-09-28 RX ORDER — PREDNISONE 20 MG/1
20 TABLET ORAL DAILY
Qty: 5 TABLET | Refills: 0 | Status: SHIPPED | OUTPATIENT
Start: 2021-09-28 | End: 2021-10-03

## 2021-09-28 ASSESSMENT — ENCOUNTER SYMPTOMS
DIARRHEA: 0
NAUSEA: 0
WHEEZING: 0
CHEST TIGHTNESS: 0
SHORTNESS OF BREATH: 0
COUGH: 0
CONSTIPATION: 0
BOWEL INCONTINENCE: 0
BACK PAIN: 1
ABDOMINAL PAIN: 0

## 2021-09-28 NOTE — PROGRESS NOTES
[address] [place] [address]  Dept: [phone]  Dept Fax: [phone]  Loc: [phone]    Flaca Cole is a 77 y.o. male who presents today for his medical conditions/complaints as noted below. Flaca Cole is c/o of   Chief Complaint   Patient presents with    Anxiety    Back Pain     middle lower back       HPI:     Here today for back pain and anxiety. Back Pain  This is a recurrent problem. The problem occurs constantly. The problem has been gradually worsening since onset. The pain is present in the lumbar spine. The pain radiates to the right knee. The pain is at a severity of 4/10. The pain is moderate. The pain is the same all the time. The symptoms are aggravated by position, bending, twisting and standing. Associated symptoms include leg pain. Pertinent negatives include no abdominal pain, bladder incontinence, bowel incontinence, chest pain, dysuria, fever, headaches, numbness, paresis, paresthesias, perianal numbness, tingling or weakness. (His legs get tried from the mid shin to his foot. His feet ache. ) Risk factors include obesity. He has tried heat, analgesics, chiropractic manipulation and muscle relaxant (tyelnol, he has not done PT for his back) for the symptoms. The treatment provided no relief. It is worsening as the chilly weather is coming. He is getting anxious about his back and he has been having issues with fatigue and loss of energy. He typically likes to be very active but right now he doesn't want to do things because he knows that if he gets up his back will hurt. He is worrying about the fall clean up for the gardens in the fall which he normally enjoys, but this year it feels like a chore. He is worried about this loss of energy is due to his heart although he is not having any chest pain or shortness of breath. He is not aging well.  He does not know what to do.     Past Medical History:   Diagnosis Date    Anxiety     Aortic regurgitation     Bicuspid aortic valve     Diabetes mellitus ([address] since March 2018    on Rx.     GERD (gastroesophageal reflux disease)     Hypertension     Left renal mass 10/2018    Osteoarthritis of left knee     Wears glasses           Social History     Tobacco Use    Smoking status: Never Smoker    Smokeless tobacco: Never Used   Substance Use Topics    Alcohol use: Yes     Comment: very rare     Current Outpatient Medications   Medication Sig Dispense Refill    predniSONE (DELTASONE) 20 MG tablet Take 1 tablet by mouth daily for 5 days 5 tablet 0    busPIRone (BUSPAR) 7.5 MG tablet Take 1 tablet by mouth 3 times daily as needed (anxiety) 90 tablet 1    montelukast (SINGULAIR) 10 MG tablet Take 1 tablet by mouth nightly 90 tablet 1    Handicap Placard MISC by Does not apply route Exp 7/1/2026 1 each 0    rosuvastatin (CRESTOR) 5 MG tablet Take 1 tablet by mouth daily 90 tablet 1    losartan-hydroCHLOROthiazide (HYZAAR) 100-25 MG per tablet Take 1 tablet by mouth daily 90 tablet 1    carvedilol (COREG) 25 MG tablet Take 1.5 tablets by mouth 2 times daily 270 tablet 3    prednisoLONE acetate (PRED FORTE) 1 % ophthalmic suspension Place 1 drop into the right eye 4 times daily 1 Bottle 0    meloxicam (MOBIC) 7.5 MG tablet Take 1 tablet by mouth 2 times daily (with meals) 180 tablet 1    sildenafil (REVATIO) 20 MG tablet Take 1 tablet by mouth as needed (Erectile dysfunction) 25 tablet 1    amLODIPine (NORVASC) 10 MG tablet Take 1 tablet by mouth daily 90 tablet 1    metFORMIN (GLUCOPHAGE) 500 MG tablet Take 1 tablet by mouth 2 times daily (with meals) 180 tablet 2    pioglitazone (ACTOS) 15 MG tablet Take 1 tablet by mouth daily 90 tablet 1    furosemide (LASIX) 20 MG tablet Take 1 tablet by mouth daily 90 tablet 1    fluticasone (FLONASE) 50 MCG/ACT nasal spray 1 spray by Nasal route 2 times daily 1 Bottle 5    baclofen (LIORESAL) 10 MG tablet Take 1 tablet by mouth nightly as needed (muscle spasm) 30 tablet 1    cetirizine (ZYRTEC) 10 MG tablet Take 1 tablet by mouth daily 30 tablet 1    vitamin D3 (CHOLECALCIFEROL) 400 UNITS TABS tablet Take 400 Units by mouth every other day       Omeprazole (PRILOSEC PO) Take 1 capsule by mouth daily as needed        No current facility-administered medications for this visit. Allergies   Allergen Reactions    Codeine      Severe Abdominal pain    Sulfa Antibiotics      Patient unsure of reaction       Subjective:     Review of Systems   Constitutional: Negative for activity change, appetite change, chills, fatigue and fever. Respiratory: Negative for cough, chest tightness, shortness of breath and wheezing. Cardiovascular: Negative for chest pain, palpitations and leg swelling. Gastrointestinal: Negative for abdominal pain, bowel incontinence, constipation, diarrhea and nausea. Genitourinary: Negative for bladder incontinence, difficulty urinating and dysuria. Musculoskeletal: Positive for back pain. Negative for arthralgias, gait problem, joint swelling, neck pain and neck stiffness. Skin: Negative for rash. Neurological: Negative for dizziness, tingling, syncope, weakness, light-headedness, numbness, headaches and paresthesias. Psychiatric/Behavioral: Positive for dysphoric mood. Negative for sleep disturbance. The patient is nervous/anxious. Objective:      Physical Exam  Vitals and nursing note reviewed. Constitutional:       General: He is not in acute distress. Appearance: He is well-developed. He is obese. HENT:      Right Ear: There is impacted cerumen. Left Ear: There is impacted cerumen. Eyes:      Conjunctiva/sclera: Conjunctivae normal.   Cardiovascular:      Rate and Rhythm: Normal rate and regular rhythm. Heart sounds: Normal heart sounds. No murmur heard.      Pulmonary:      Effort: Pulmonary effort is normal. No respiratory distress. Breath sounds: Normal breath sounds. No wheezing or rales. Musculoskeletal:         General: Normal range of motion. Cervical back: Normal range of motion and neck supple. Lumbar back: No swelling, edema, spasms or tenderness. Normal range of motion. Feet:       Comments: Modified straight leg raise negative   Lymphadenopathy:      Cervical: No cervical adenopathy. Skin:     General: Skin is warm and dry. Findings: No rash. Neurological:      Mental Status: He is alert and oriented to person, place, and time. Sensory: No sensory deficit. Deep Tendon Reflexes:      Reflex Scores:       Patellar reflexes are 2+ on the right side and 2+ on the left side. Psychiatric:         Attention and Perception: Attention normal.         Mood and Affect: Mood is anxious. Behavior: Behavior is hyperactive. Behavior is cooperative. Thought Content: Thought content normal.         Cognition and Memory: Cognition normal.         Judgment: Judgment normal.       /82   Pulse 67   Temp 98 °F (36.7 °C)   Ht 5' 11\" (1.803 m)   Wt 242 lb (109.8 kg)   SpO2 98%   BMI 33.75 kg/m²     Assessment:       Diagnosis Orders   1. Chronic bilateral low back pain with right-sided sciatica  [facility] [place]   2. TWAN (generalized anxiety disorder)     3. Bilateral impacted cerumen  HI REMOVAL IMPACTED CERUMEN INSTRUMENTATION UNILAT             Plan:        Back pain: worsening; he is having trouble with anxiety due to his back pain. I explained that I do not like to treat back pain with narcotics. I recommended prednisone, heat, ice and back exercises. He was referred to PT. I also recommended an occasional muscle relaxer at bedtime if he needs it. I also talked about the importance of good posture and staying active. TWAN: worsening; he would like something to use prn so I started him on buspar.     Cerumen impaction:   Procedure:  Pre procedure diagnosis: bilateral cerumen impaction  Post procedure diagnosis: resolved  Description: Ceruminosis is noted. Wax is removed by syringing and manual debridement with a curette. Instructions for home care to prevent wax buildup are given. Return if symptoms worsen or fail to improve. Orders Placed This Encounter   Procedures    [facility] [place]     Referral Priority:   Routine     Referral Type:   Eval and Treat     Referral Reason:   Specialty Services Required     Requested Specialty:   Physical Therapy     Number of Visits Requested:   1    RI REMOVAL IMPACTED CERUMEN INSTRUMENTATION UNILAT     Orders Placed This Encounter   Medications    predniSONE (DELTASONE) 20 MG tablet     Sig: Take 1 tablet by mouth daily for 5 days     Dispense:  5 tablet     Refill:  0    busPIRone (BUSPAR) 7.5 MG tablet     Sig: Take 1 tablet by mouth 3 times daily as needed (anxiety)     Dispense:  90 tablet     Refill:  1       Patientgiven educational materials - see patient instructions. Discussed use, benefit,and side effects of prescribed medications. All patient questions answered. Ptvoiced understanding. Reviewed health maintenance. Instructed to continue currentmedications, diet and exercise. Patient agreed with treatment plan. Follow up asdirected.      Electronically signed by Shanta Kenny MD on 9/28/2021 at 4:11 PM

## 2021-10-04 ENCOUNTER — HOSPITAL ENCOUNTER (OUTPATIENT)
Dept: PHYSICAL THERAPY | Age: 66
Setting detail: THERAPIES SERIES
Discharge: HOME | End: 2021-10-04
Payer: MEDICARE

## 2021-10-04 DIAGNOSIS — J30.2 OTHER SEASONAL ALLERGIC RHINITIS: ICD-10-CM

## 2021-10-04 DIAGNOSIS — M79.89 LEG SWELLING: ICD-10-CM

## 2021-10-04 PROCEDURE — 97110 THERAPEUTIC EXERCISES: CPT | Performed by: PHYSICAL THERAPIST

## 2021-10-04 PROCEDURE — 97161 PT EVAL LOW COMPLEX 20 MIN: CPT | Performed by: PHYSICAL THERAPIST

## 2021-10-04 RX ORDER — CETIRIZINE HYDROCHLORIDE 10 MG/1
10 TABLET ORAL DAILY
Qty: 30 TABLET | Refills: 5 | Status: SHIPPED | OUTPATIENT
Start: 2021-10-04 | End: 2022-01-20 | Stop reason: SDUPTHER

## 2021-10-04 RX ORDER — FUROSEMIDE 20 MG/1
20 TABLET ORAL DAILY
Qty: 90 TABLET | Refills: 1 | Status: SHIPPED | OUTPATIENT
Start: 2021-10-04 | End: 2022-03-13 | Stop reason: SDUPTHER

## 2021-10-04 ASSESSMENT — PAIN DESCRIPTION - DESCRIPTORS: DESCRIPTORS: ACHING;SHOOTING

## 2021-10-04 ASSESSMENT — PAIN DESCRIPTION - PROGRESSION: CLINICAL_PROGRESSION: GRADUALLY WORSENING

## 2021-10-04 ASSESSMENT — PAIN SCALES - GENERAL: PAINLEVEL_OUTOF10: 9

## 2021-10-04 ASSESSMENT — PAIN DESCRIPTION - FREQUENCY: FREQUENCY: INTERMITTENT

## 2021-10-04 ASSESSMENT — PAIN DESCRIPTION - LOCATION: LOCATION: BACK;HIP

## 2021-10-04 ASSESSMENT — PAIN DESCRIPTION - PAIN TYPE: TYPE: CHRONIC PAIN

## 2021-10-04 ASSESSMENT — PAIN DESCRIPTION - ORIENTATION: ORIENTATION: RIGHT

## 2021-10-04 ASSESSMENT — PAIN - FUNCTIONAL ASSESSMENT: PAIN_FUNCTIONAL_ASSESSMENT: PREVENTS OR INTERFERES WITH ALL ACTIVE AND SOME PASSIVE ACTIVITIES

## 2021-10-04 ASSESSMENT — PAIN DESCRIPTION - ONSET: ONSET: PROGRESSIVE

## 2021-10-04 NOTE — PLAN OF CARE
[facility]    [x] [place]  Phone: [phone]  Fax: [phone]      [] [place]  Phone: [phone]  Fax: [phone]        To: Referring Practitioner: Juana Child      Patient: Emili Pond  : 1955   MRN: [id number]  Evaluation Date: 10/4/2021      Diagnosis Information:  · Diagnosis: M54.41 LBP, R sciatica   · Treatment Diagnosis: M54.41 LBP & R sciatica     Physical Therapy Certification Form  Dear Collin Neal  The following patient has been evaluated for physical therapy services and for therapy to continue, Medicare requires monthly physician review of the treatment plan. Please review the attached evaluation and/or summary of the patient's plan of care, and verify that you agree therapy should continue by signing the attached document and sending it back to our office.     Plan of Care/Treatment to date:  [x] Therapeutic Exercise    [] Modalities:  [] Therapeutic Activity     [] Ultrasound  [x] Electrical Stimulation  [] Gait Training      [] Cervical Traction [] Lumbar Traction  [] Neuromuscular Re-education    [] Cold/hotpack [] Iontophoresis   [x] Instruction in HEP     Other:  [x] Manual Therapy      []             [] Aquatic Therapy      []                 Goals:  Short term goals  Time Frame for Short term goals: 1 week  Short term goal 1: Start HEP    Long term goals  Time Frame for Long term goals : 4 weeks  Long term goal 1: LBP and R sciatica controlled 2/10  Long term goal 2: Able to carry  laundry without pain rise  Long term goal 3: Continue part-time job at 2/10 pain  Long term goal 4: Able to stand 1 hr    Frequency/Duration: 10/4/21- 11/3/21  # Days per week: [] 1 day # Weeks: [] 1 week [] 5 weeks     [x] 2 days   [] 2 weeks [] 6 weeks     [] 3 days   [] 3 weeks [] 7 weeks     [] 4 days   [x] 4 weeks [] 8 weeks    Rehab Potential: [] Excellent [x] Good [] Fair  [] Poor     Electronically signed by:  Bernarda Medrano PT      If you have any questions or concerns, please don't hesitate to call.   Thank you for your referral.      Physician Signature:________________________________Date:__________________  By signing above, therapists plan is approved by physician

## 2021-10-04 NOTE — PROGRESS NOTES
of gardening with a lot of bending. Spine  Lumbar: Flexion major loss to the knees. Extension mod loss of 20%. B side glide mod loss  Special Tests: FIS, RFIS increase, worse. EIS, TON decrease, better. DANIELA, RFIL increase, worse. EIL, REIL decrease, better, increased ROM.  R SGIL decrease, better  Joint Mobility  Spine: Lumbar post derangement, unilat, assymmetrical above the knee    Strength RLE  Strength RLE: WNL  Strength LLE  Strength LLE: WNL     Additional Measures  Special Tests: Slump negative  Other: B SLR 75 deg, negative dural tension     Assessment   Conditions Requiring Skilled Therapeutic Intervention  Body structures, Functions, Activity limitations: Increased pain;Decreased ROM  Treatment Diagnosis: M54.41 LBP & R sciatica  Prognosis: Good  Decision Making: Low Complexity  REQUIRES PT FOLLOW UP: Yes  Activity Tolerance  Activity Tolerance: Patient Tolerated treatment well         Plan   Plan  Times per week: 2  Plan weeks: 4  Current Treatment Recommendations: Home Exercise Program, Strengthening, Manual Therapy - Soft Tissue Mobilization, Modalities    OutComes Score   Oswestry LBP Scale 24/50 = 48% disability          Goals  Short term goals  Time Frame for Short term goals: 1 week  Short term goal 1: Start HEP  Long term goals  Time Frame for Long term goals : 4 weeks  Long term goal 1: LBP and R sciatica controlled 2/10  Long term goal 2: Able to carry  laundry without pain rise  Long term goal 3: Continue part-time job at 2/10 pain  Long term goal 4: Able to stand 1 hr       Therapy Time   Individual Concurrent Group Co-treatment   Time In 1000         Time Out 1041         Minutes 41                 Kathy Guevara, PT

## 2021-10-04 NOTE — FLOWSHEET NOTE
Physical Therapy Daily Treatment Note    Date:  10/4/2021    Patient Name:  Miguel Valadez    :  1955  MRN: [id number]  Restrictions/Precautions:     Medical/Treatment Diagnosis Information:   · Diagnosis: M54.41 LBP, R sciatica  · Treatment Diagnosis: M54.41 LBP & R sciatica  Insurance/Certification information:  PT Insurance Information: Medicare  Physician Information:  Referring Practitioner: Chad Brooks of care signed (Y/N):  n  Visit# / total visits:   Pain level: 910       Time In:10:00   Time Out:10:41    Progress Note: [x]  Yes  []  No  Next due by: Visit #8, or 11/3/21      Subjective: See eval      Objective: See eval  Observation:   Test measurements:      Exercises: there ex for lumbar ROM  Exercise/Equipment Resistance/Repetitions Other comments   Lay prone, R side glide 2' x2    Prone on elbows 3'    Press up 10x x2    B PKF 10x x2    Modified extension 10x    Back bend 5x         R clamshell                                    [x] Provided verbal/tactile cueing for activities related to strengthening, flexibility, endurance, ROM. (91681)  [] Provided verbal/tactile cueing for activities related to improving balance, coordination, kinesthetic sense, posture, motor skill, proprioception. (59960)    Therapeutic Activities:     [] Therapeutic activities, direct (one-on-one) patient contact (use of dynamic activities to improve functional performance). (40621)    Gait:   [] Provided training and instruction to the patient for ambulation re-education. (95699)    Self-Care/ADL's  [] Self-care/home management training and compensatory training, meal preparation, safety procedures, and instructions in use of assistive technology devices/adaptive equipment, direct one-on-one contact.  (22753)    Home Exercise Program: Lumbar extension progression for post derangement    [x] Reviewed/Progressed HEP activities related to strengthening, flexibility, endurance, ROM. (81524)  [] Reviewed/Progressed HEP activities related to improving balance, coordination, kinesthetic sense, posture, motor skill, proprioception.  (73026)    Manual Treatments:    [] Provided manual therapy to mobilize soft tissue/joints for the purpose of modulating pain, promoting relaxation,  increasing ROM, reducing/eliminating soft tissue swelling/inflammation/restriction, improving soft tissue extensibility.  (12146)    Service Based Modalities:  Eval 26'    Timed Code Treatment Minutes:   thr ex/ HEP 15'    Total Treatment Minutes:   39'    Treatment/Activity Tolerance:  [x] Patient tolerated treatment well [] Patient limited by fatique  [] Patient limited by pain  [] Patient limited by other medical complications  [] Other:     Prognosis: [x] Good [] Fair  [] Poor    Patient Requires Follow-up: [x] Yes  [] No      Goals:  Short term goals  Time Frame for Short term goals: 1 week  Short term goal 1: Start HEP    Long term goals  Time Frame for Long term goals : 4 weeks  Long term goal 1: LBP and R sciatica controlled 2/10  Long term goal 2: Able to carry  laundry without pain rise  Long term goal 3: 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

## 2021-10-04 NOTE — TELEPHONE ENCOUNTER
Steve Opitz called requesting a refill of the below medication which has been pended for you:     Requested Prescriptions     Pending Prescriptions Disp Refills    furosemide (LASIX) 20 MG tablet 90 tablet 1     Sig: Take 1 tablet by mouth daily       Last Appointment Date: 9/28/2021  Next Appointment Date: 1/20/2022    Allergies   Allergen Reactions    Codeine      Severe Abdominal pain    Sulfa Antibiotics      Patient unsure of reaction

## 2021-10-06 ENCOUNTER — HOSPITAL ENCOUNTER (OUTPATIENT)
Dept: PHYSICAL THERAPY | Age: 66
Setting detail: THERAPIES SERIES
Discharge: HOME | End: 2021-10-06
Payer: MEDICARE

## 2021-10-06 PROCEDURE — 97110 THERAPEUTIC EXERCISES: CPT

## 2021-10-06 PROCEDURE — G0283 ELEC STIM OTHER THAN WOUND: HCPCS

## 2021-10-06 NOTE — FLOWSHEET NOTE
Physical Therapy Daily Treatment Note    Date:  10/6/2021    Patient Name:  Emili Pond    :  1955  MRN: [id number]  Restrictions/Precautions:     Medical/Treatment Diagnosis Information:   · Diagnosis: M54.41 LBP, R sciatica  · Treatment Diagnosis: M54.41 LBP & R sciatica  Insurance/Certification information:  PT Insurance Information: Medicare  Physician Information:  Referring Practitioner: Elisa Diver of care signed (Y/N):  y  Visit# / total visits: 2  Pain level: 8/10 stiff      Time In: 11:00   Time Out:11:47    Progress Note: []  Yes  [x]  No  Next due by: Visit #8, or 11/3/21      Subjective:  Pt states very stiff this morning with a pain of 8/10. 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 Patient states radicular symtpoms comes and goes. Objective: MARIFER performed per flow sheet for increased mobility, strength, and decrease in pain for improved ambulation and ease of daily living activities. Verbal cueing provided for sequencing. Added exercises wit good tolerance. Pt educated on completing stretches with teaching to prevent pain and stiffness. IFC applied to low back for decreased in pain at end of session with good tolerance.  Pt reported decrease in pain    Observation:   Test measurements:  Pt able to achieve 80% lumbar extension    Exercises: there ex for lumbar ROM  Exercise/Equipment Resistance/Repetitions Other comments   Lay prone, R side glide 2' x2    Prone on elbows 3'    Press up 10x x2    Prone hip ext 10x initiated   B PKF 10x x2    Modified extension 10x    Back bend 10x adv        R clamshell 15x initiated   LTR 10x5\" initiated   Abdominal Sets  10x5\" Initiated    Bridges 10x initiated                  [x] Provided verbal/tactile cueing for activities related to strengthening, flexibility, endurance, ROM. (24652)   [] Provided verbal/tactile cueing for activities related to improving balance, coordination, kinesthetic sense, posture, motor skill, proprioception. (98888)    Therapeutic Activities:     [] Therapeutic activities, direct (one-on-one) patient contact (use of dynamic activities to improve functional performance). (92391)    Gait:   [] Provided training and instruction to the patient for ambulation re-education. (60519)    Self-Care/ADL's  [] Self-care/home management training and compensatory training, meal preparation, safety procedures, and instructions in use of assistive technology devices/adaptive equipment, direct one-on-one contact. (64086)    Home Exercise Program: Lumbar extension progression for post derangement    [x] Reviewed/Progressed HEP activities related to strengthening, flexibility, endurance, ROM. (55225)  [] Reviewed/Progressed HEP activities related to improving balance, coordination, kinesthetic sense, posture, motor skill, proprioception.  (69255)    Manual Treatments:    [] Provided manual therapy to mobilize soft tissue/joints for the purpose of modulating pain, promoting relaxation,  increasing ROM, reducing/eliminating soft tissue swelling/inflammation/restriction, improving soft tissue extensibility.  (10229)    Service Based Modalities:  15' IFC for decreased pain    Timed Code Treatment Minutes:   ther ex 28'    Total Treatment Minutes:   52'    Treatment/Activity Tolerance:  [x] Patient tolerated treatment well [] Patient limited by fatique  [] Patient limited by pain  [] Patient limited by other medical complications  [] Other:     Prognosis: [x] Good [] Fair  [] Poor    Patient Requires Follow-up: [x] Yes  [] No      Goals:  Short term goals  Time Frame for Short term goals: 1 week  Short term goal 1: Start HEP    Long term goals  Time Frame for Long term goals : 4 weeks  Long term goal 1: LBP and R sciatica controlled 2/10 (see above)  Long term goal 2: Able to carry  laundry without pain rise  Long term goal 3: 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

## 2021-10-07 NOTE — PROGRESS NOTES
I have reviewed and agree to the content of the note written by the PTA.   Electronically signed by Corine Padilla PT [id number]

## 2021-10-11 ENCOUNTER — HOSPITAL ENCOUNTER (OUTPATIENT)
Dept: PHYSICAL THERAPY | Age: 66
Setting detail: THERAPIES SERIES
Discharge: HOME | End: 2021-10-11
Payer: MEDICARE

## 2021-10-11 PROCEDURE — G0283 ELEC STIM OTHER THAN WOUND: HCPCS

## 2021-10-11 PROCEDURE — 97110 THERAPEUTIC EXERCISES: CPT

## 2021-10-11 NOTE — FLOWSHEET NOTE
Physical Therapy Daily Treatment Note    Date:  10/11/2021    Patient Name:  Lindsay Mast    :  1955  MRN: [id number]  Restrictions/Precautions:     Medical/Treatment Diagnosis Information:   · Diagnosis: M54.41 LBP, R sciatica  · Treatment Diagnosis: M54.41 LBP & R sciatica  Insurance/Certification information:  PT Insurance Information: Medicare  Physician Information:  Referring Practitioner: Catracho Mccord of care signed (Y/N):  y  Visit# / total visits: 3/8  Pain level: 7/10 stiff      Time In: 11:00   Time Out:11:55    Progress Note: []  Yes  [x]  No  Next due by: Visit #8, or 11/3/21      Subjective:  Pt states very stiff this morning with a pain of 7/10 with 9/10 stiffness. Patient states on his final day of a five day pill for tension relief. Pt states was walking a lot over the weekend. Patient states pain is mostly in back however more on the right. Objective: MARIFER performed per flow sheet for increased mobility, strength, and decrease in pain for improved ambulation and ease of daily living activities. Verbal cueing provided for sequencing. Added exercises wit good tolerance. Pt educated on completing stretches with teaching to prevent pain and stiffness. IFC applied to low back for decreased in pain at end of session with good tolerance.  Pt reported decrease in pain    Observation:   Test measurements:  Decreased TA control    Exercises: there ex for lumbar ROM  Exercise/Equipment Resistance/Repetitions Other comments   Lay prone, R side glide 2' x2    Prone on elbows 3'    Press up 10x x2    Prone hip ext 10x2 adv   B PKF 10x x2    Modified extension 10x2 adv   Back bend 10x         R clamshell 15x    LTR 10x5\"    Abdominal Sets  10x5\"    Bridges 10x Narrow, wide          adv        Counter Exercises 10x Hip ext, abd    initiated        [x] Provided verbal/tactile cueing for activities related to strengthening, flexibility, endurance, ROM. (00321)   [] Provided verbal/tactile cueing for activities related to improving balance, coordination, kinesthetic sense, posture, motor skill, proprioception. (69738)    Therapeutic Activities:     [] Therapeutic activities, direct (one-on-one) patient contact (use of dynamic activities to improve functional performance). (73521)    Gait:   [] Provided training and instruction to the patient for ambulation re-education. (08686)    Self-Care/ADL's  [] Self-care/home management training and compensatory training, meal preparation, safety procedures, and instructions in use of assistive technology devices/adaptive equipment, direct one-on-one contact. (03056)    Home Exercise Program: Lumbar extension progression for post derangement    [x] Reviewed/Progressed HEP activities related to strengthening, flexibility, endurance, ROM. (66909)  [] Reviewed/Progressed HEP activities related to improving balance, coordination, kinesthetic sense, posture, motor skill, proprioception.  (23556)    Manual Treatments:    [] Provided manual therapy to mobilize soft tissue/joints for the purpose of modulating pain, promoting relaxation,  increasing ROM, reducing/eliminating soft tissue swelling/inflammation/restriction, improving soft tissue extensibility.  (96065)    Service Based Modalities:  15' IFC for decreased pain    Timed Code Treatment Minutes:   ther ex 36'    Total Treatment Minutes:   54'    Treatment/Activity Tolerance:  [x] Patient tolerated treatment well [] Patient limited by fatique  [] Patient limited by pain  [] Patient limited by other medical complications  [] Other:     Prognosis: [x] Good [] Fair  [] Poor    Patient Requires Follow-up: [x] Yes  [] No      Goals:  Short term goals  Time Frame for Short term goals: 1 week  Short term goal 1: Start HEP    Long term goals  Time Frame for Long term goals : 4 weeks  Long term goal 1: LBP and R sciatica controlled 2/10 (see above)  Long term goal 2: Able to carry  laundry without pain rise (states able)  Long term room air

## 2021-10-13 ENCOUNTER — HOSPITAL ENCOUNTER (OUTPATIENT)
Dept: PHYSICAL THERAPY | Age: 66
Setting detail: THERAPIES SERIES
Discharge: HOME | End: 2021-10-13
Payer: MEDICARE

## 2021-10-13 PROCEDURE — 97110 THERAPEUTIC EXERCISES: CPT | Performed by: PHYSICAL THERAPIST

## 2021-10-13 PROCEDURE — G0283 ELEC STIM OTHER THAN WOUND: HCPCS | Performed by: PHYSICAL THERAPIST

## 2021-10-13 NOTE — FLOWSHEET NOTE
Physical Therapy Daily Treatment Note    Date:  10/13/2021    Patient Name:  Tayo Deutsch    :  1955  MRN: [id number]  Restrictions/Precautions:     Medical/Treatment Diagnosis Information:   · Diagnosis: M54.41 LBP, R sciatica  · Treatment Diagnosis: M54.41 LBP & R sciatica  Insurance/Certification information:  PT Insurance Information: Medicare  Physician Information:  Referring Practitioner: Cierra Park of care signed (Y/N):  y  Visit# / total visits:   Pain level: 7/10 stiff      Time In: 11:02   Time Out:11:58    Progress Note: []  Yes  [x]  No  Next due by: Visit #8, or 11/3/21      Subjective: Still having the same back pain that has been there for the past year. A little less to the R side  Objective:   Observation:   Habitually does a lot of flexion position with gardening  Test measurements:   Lumbar extension deficient 5% after repeated EIL  Needs to concentrate on full range motion with press ups  All pain central     Exercises: there ex for lumbar ROM  Exercise/Equipment Resistance/Repetitions Other comments   Lay prone, R side glide 3' x2    Prone on elbows 3'    Press up 10x x3 1 set PT OP   Prone hip ext 10x2    B PKF 10x x3    Modified extension 10x2    Back bend 10x         R clamshell 20x    LTR 10xR, 10x L    Abdominal Sets      Bridges  Narrow, wide                  Counter Exercises 10x Hip ext, abd    initiated   P/A mob 2'    [x] Provided verbal/tactile cueing for activities related to strengthening, flexibility, endurance, ROM. (14010)   [] Provided verbal/tactile cueing for activities related to improving balance, coordination, kinesthetic sense, posture, motor skill, proprioception. (51861)    Therapeutic Activities:     [] Therapeutic activities, direct (one-on-one) patient contact (use of dynamic activities to improve functional performance). (72967)    Gait:   [] Provided training and instruction to the patient for ambulation re-education.  (77455)    Self-Care/ADL's  [] Self-care/home management training and compensatory training, meal preparation, safety procedures, and instructions in use of assistive technology devices/adaptive equipment, direct one-on-one contact. (31160)    Home Exercise Program: Lumbar extension progression for post derangement    [x] Reviewed/Progressed HEP activities related to strengthening, flexibility, endurance, ROM. (15259)  [] Reviewed/Progressed HEP activities related to improving balance, coordination, kinesthetic sense, posture, motor skill, proprioception.  (38371)    Manual Treatments:    [] Provided manual therapy to mobilize soft tissue/joints for the purpose of modulating pain, promoting relaxation,  increasing ROM, reducing/eliminating soft tissue swelling/inflammation/restriction, improving soft tissue extensibility.  (21548)    Service Based Modalities:  15' IFC for decreased pain    Timed Code Treatment Minutes:   ther ex 36'    Total Treatment Minutes:   54'    Treatment/Activity Tolerance:  [x] Patient tolerated treatment well [] Patient limited by fatique  [] Patient limited by pain  [] Patient limited by other medical complications  [] Other:     Prognosis: [x] Good [] Fair  [] Poor    Patient Requires Follow-up: [x] Yes  [] No      Goals:  Short term goals  Time Frame for Short term goals: 1 week  Short term goal 1: Start HEP- met    Long term goals  Time Frame for Long term goals : 4 weeks  Long term goal 1: LBP and R sciatica controlled 2/10   Long term goal 2: Able to carry  laundry without pain rise   Long term goal 3: 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

## 2021-10-25 ENCOUNTER — HOSPITAL ENCOUNTER (OUTPATIENT)
Dept: LAB | Age: 66
Discharge: HOME | End: 2021-10-25
Payer: MEDICARE

## 2021-10-25 DIAGNOSIS — C64.9 RENAL CELL CARCINOMA, UNSPECIFIED LATERALITY (HCC): ICD-10-CM

## 2021-10-25 LAB
ABSOLUTE EOS #: 0.06 K/UL (ref 0–0.44)
ABSOLUTE IMMATURE GRANULOCYTE: <0.03 K/UL (ref 0–0.3)
ABSOLUTE LYMPH #: 1.7 K/UL (ref 1.1–3.7)
ABSOLUTE MONO #: 0.6 K/UL (ref 0.1–1.2)
ALBUMIN SERPL-MCNC: 4.9 G/DL (ref 3.5–5.2)
ALBUMIN/GLOBULIN RATIO: 1.7 (ref 1–2.5)
ALP BLD-CCNC: 31 U/L (ref 40–129)
ALT SERPL-CCNC: 18 U/L (ref 5–41)
ANION GAP SERPL CALCULATED.3IONS-SCNC: 9 MMOL/L (ref 9–17)
AST SERPL-CCNC: 16 U/L
BASOPHILS # BLD: 1 % (ref 0–2)
BASOPHILS ABSOLUTE: 0.04 K/UL (ref 0–0.2)
BILIRUB SERPL-MCNC: 0.58 MG/DL (ref 0.3–1.2)
BUN BLDV-MCNC: 27 MG/DL (ref 8–23)
BUN/CREAT BLD: 29 (ref 9–20)
CALCIUM SERPL-MCNC: 9.9 MG/DL (ref 8.6–10.4)
CHLORIDE BLD-SCNC: 100 MMOL/L (ref 98–107)
CO2: 29 MMOL/L (ref 20–31)
CREAT SERPL-MCNC: 0.93 MG/DL (ref 0.7–1.2)
DIFFERENTIAL TYPE: ABNORMAL
EOSINOPHILS RELATIVE PERCENT: 1 % (ref 1–4)
GFR AFRICAN AMERICAN: >60 ML/MIN
GFR NON-AFRICAN AMERICAN: >60 ML/MIN
GFR SERPL CREATININE-BSD FRML MDRD: ABNORMAL ML/MIN/{1.73_M2}
GFR SERPL CREATININE-BSD FRML MDRD: ABNORMAL ML/MIN/{1.73_M2}
GLUCOSE FASTING: 187 MG/DL (ref 70–99)
HCT VFR BLD CALC: 38.1 % (ref 40.7–50.3)
HEMOGLOBIN: 13 G/DL (ref 13–17)
IMMATURE GRANULOCYTES: 0 %
LYMPHOCYTES # BLD: 28 % (ref 24–43)
MCH RBC QN AUTO: 32.5 PG (ref 25.2–33.5)
MCHC RBC AUTO-ENTMCNC: 34.1 G/DL (ref 25.2–33.5)
MCV RBC AUTO: 95.3 FL (ref 82.6–102.9)
MONOCYTES # BLD: 10 % (ref 3–12)
NRBC AUTOMATED: 0 PER 100 WBC
PDW BLD-RTO: 12.9 % (ref 11.8–14.4)
PLATELET # BLD: ABNORMAL K/UL (ref 138–453)
PLATELET ESTIMATE: ABNORMAL
PLATELET, FLUORESCENCE: 130 K/UL (ref 138–453)
PLATELET, IMMATURE FRACTION: 5.6 % (ref 1.1–10.3)
PMV BLD AUTO: ABNORMAL FL (ref 8.1–13.5)
POTASSIUM SERPL-SCNC: 4.2 MMOL/L (ref 3.7–5.3)
RBC # BLD: 4 M/UL (ref 4.21–5.77)
RBC # BLD: ABNORMAL 10*6/UL
SEG NEUTROPHILS: 61 % (ref 36–65)
SEGMENTED NEUTROPHILS ABSOLUTE COUNT: 3.74 K/UL (ref 1.5–8.1)
SODIUM BLD-SCNC: 138 MMOL/L (ref 135–144)
TOTAL PROTEIN: 7.8 G/DL (ref 6.4–8.3)
WBC # BLD: 6.2 K/UL (ref 3.5–11.3)
WBC # BLD: ABNORMAL 10*3/UL

## 2021-10-25 PROCEDURE — 85055 RETICULATED PLATELET ASSAY: CPT

## 2021-10-25 PROCEDURE — 80053 COMPREHEN METABOLIC PANEL: CPT

## 2021-10-25 PROCEDURE — 85025 COMPLETE CBC W/AUTO DIFF WBC: CPT

## 2021-10-25 PROCEDURE — 36415 COLL VENOUS BLD VENIPUNCTURE: CPT

## 2021-10-28 ENCOUNTER — HOSPITAL ENCOUNTER (OUTPATIENT)
Dept: ULTRASOUND IMAGING | Age: 66
Discharge: HOME | End: 2021-10-30
Payer: MEDICARE

## 2021-10-28 DIAGNOSIS — N28.89 RENAL MASS: ICD-10-CM

## 2021-10-28 DIAGNOSIS — C64.2 RENAL CELL CARCINOMA OF LEFT KIDNEY (HCC): ICD-10-CM

## 2021-10-28 PROCEDURE — 76775 US EXAM ABDO BACK WALL LIM: CPT

## 2021-11-01 ENCOUNTER — HOSPITAL ENCOUNTER (OUTPATIENT)
Dept: CT IMAGING | Age: 66
Discharge: HOME | End: 2021-11-03
Payer: MEDICARE

## 2021-11-01 DIAGNOSIS — C64.9 RENAL CELL CARCINOMA, UNSPECIFIED LATERALITY (HCC): ICD-10-CM

## 2021-11-01 PROCEDURE — 6360000004 HC RX CONTRAST MEDICATION: Performed by: INTERNAL MEDICINE

## 2021-11-01 PROCEDURE — 74176 CT ABD & PELVIS W/O CONTRAST: CPT

## 2021-11-01 RX ADMIN — IOHEXOL 50 ML: 240 INJECTION, SOLUTION INTRATHECAL; INTRAVASCULAR; INTRAVENOUS; ORAL at 11:55

## 2021-11-03 ENCOUNTER — OFFICE VISIT (OUTPATIENT)
Dept: UROLOGY | Age: 66
End: 2021-11-03
Payer: MEDICARE

## 2021-11-03 VITALS
WEIGHT: 244.4 LBS | HEART RATE: 72 BPM | HEIGHT: 71 IN | BODY MASS INDEX: 34.22 KG/M2 | SYSTOLIC BLOOD PRESSURE: 130 MMHG | DIASTOLIC BLOOD PRESSURE: 76 MMHG

## 2021-11-03 DIAGNOSIS — N13.8 BPH WITH OBSTRUCTION/LOWER URINARY TRACT SYMPTOMS: ICD-10-CM

## 2021-11-03 DIAGNOSIS — N52.9 ERECTILE DYSFUNCTION, UNSPECIFIED ERECTILE DYSFUNCTION TYPE: ICD-10-CM

## 2021-11-03 DIAGNOSIS — N40.1 BPH WITH OBSTRUCTION/LOWER URINARY TRACT SYMPTOMS: ICD-10-CM

## 2021-11-03 DIAGNOSIS — C64.2 RENAL CELL CARCINOMA OF LEFT KIDNEY (HCC): Primary | ICD-10-CM

## 2021-11-03 DIAGNOSIS — R91.1 PULMONARY NODULE: ICD-10-CM

## 2021-11-03 PROCEDURE — 99214 OFFICE O/P EST MOD 30 MIN: CPT | Performed by: UROLOGY

## 2021-11-03 PROCEDURE — G8427 DOCREV CUR MEDS BY ELIG CLIN: HCPCS | Performed by: UROLOGY

## 2021-11-03 PROCEDURE — 1036F TOBACCO NON-USER: CPT | Performed by: UROLOGY

## 2021-11-03 PROCEDURE — G8484 FLU IMMUNIZE NO ADMIN: HCPCS | Performed by: UROLOGY

## 2021-11-03 PROCEDURE — 4040F PNEUMOC VAC/ADMIN/RCVD: CPT | Performed by: UROLOGY

## 2021-11-03 PROCEDURE — G8417 CALC BMI ABV UP PARAM F/U: HCPCS | Performed by: UROLOGY

## 2021-11-03 PROCEDURE — 1123F ACP DISCUSS/DSCN MKR DOCD: CPT | Performed by: UROLOGY

## 2021-11-03 PROCEDURE — 99213 OFFICE O/P EST LOW 20 MIN: CPT | Performed by: UROLOGY

## 2021-11-03 PROCEDURE — 3017F COLORECTAL CA SCREEN DOC REV: CPT | Performed by: UROLOGY

## 2021-11-03 NOTE — PROGRESS NOTES
Katy Carr MD   Urology Clinic Progress Note      Patient:  Dory Sy  YOB: 1955  Date: 11/3/2021    HISTORY OF PRESENT ILLNESS:   The patient is a 77 y.o. male who presents today for follow-up for the following problem(s): left renal mass  Overall the problem(s) : show no change. Associated Symptoms: No dysuria, gross hematuria. Pain Severity:      Summary of old records:left renal mass noted on Chest CT for [facility]    11/30/2018, left partial nephrectomy  -- Diagnosis --   KIDNEY, PARTIAL NEPHRECTOMY (LEFT):       - RENAL CELL CARCINOMA, CLEAR CELL TYPE WITH PROMINENT CYSTIC        CHANGE, GRADE 2.       - PARENCHYMAL AND SOFT TISSUE SURGICAL MARGINS NEGATIVE FOR         NEOPLASM. Additional History:   Doing well. No recent issues. No gross hematuria. No flank pain. No new LUTS          I independently reviewed and verified the images and reports from:    CT ABDOMEN PELVIS WO CONTRAST Additional Contrast? Oral    Result Date: 11/1/2021  EXAMINATION: CT OF THE ABDOMEN AND PELVIS WITHOUT CONTRAST 11/1/2021 11:13 am TECHNIQUE: CT of the abdomen and pelvis was performed without the administration of intravenous contrast. Multiplanar reformatted images are provided for review. Dose modulation, iterative reconstruction, and/or weight based adjustment of the mA/kV was utilized to reduce the radiation dose to as low as reasonably achievable. COMPARISON: Renal ultrasound 10/28/2021. Contrast-enhanced CT abdomen and pelvis 10/29/2020 and 03/13/2019. chest CT 09/17/2021. HISTORY: ORDERING SYSTEM PROVIDED HISTORY: Renal cell carcinoma, unspecified laterality ([facility] [address] TECHNOLOGIST PROVIDED HISTORY: Reason for Exam: Hx renal CA, partial nephrectomy Acuity: Chronic Type of Exam: Subsequent/Follow-up FINDINGS: Lower Chest: Stable 4 mm nodule in the posterolateral right lower lobe on axial image 9 since at least 2019, consistent with a benign process. No new airspace disease identified. Organs: Evaluation of the abdominal and pelvic viscera is limited in the absence of contrast.  Grossly unchanged appearance of the kidneys. Partial nephrectomy changes along the anterior aspect of the superior left kidney again noted. Bilateral renal cysts again noted. A hyperdense intraparenchymal right renal lesion in the interpolar region appears unchanged measuring up to 0.8 cm. Cholelithiasis. The liver, pancreas, spleen and adrenals otherwise appear grossly unremarkable. GI/Bowel: There is no bowel dilatation or wall thickening identified. Pelvis: No acute findings. Peritoneum/Retroperitoneum: No free air. No ascites. No enlarged or suspicious-appearing lymph nodes. Bones/Soft Tissues: An area of demineralization in the medial left iliac bone on image 119 is a stable finding. No new osseous abnormality identified. Multilevel advanced disc disease and degenerative endplate changes are again demonstrated. Stable exam of the abdomen and pelvis without evidence for active disease. Ct Abdomen Pelvis W Iv Contrast Additional Contrast? Oral  Result Date: 10/29/2020  1. New since the prior CTA chest exam 09/11/2020 there is multifocal basilar subcentimeter non solid pulmonary nodules. Given the acuity of the changes this most likely represents an acute infectious/inflammatory process such as multifocal pneumonia versus possible vasculitis or septic emboli. Metastatic pulmonary disease is less likely. 2. No acute abdominal/pelvic process or evidence of metastatic disease. Us Renal Limited  Result Date: 10/29/2020    Stable post partial left nephrectomy changes. No recurrent mass or acute abnormality.   Known slightly larger right renal cyst.       CREATININE 0.93  0.70 - 1.20 mg/dL Final 10/25/2021 10:34 AM       Last several PSA's:  Lab Results   Component Value Date    PSA 0.77 01/26/2016       Last total testosterone:  Lab Results   Component Value Date    TESTOSTERONE 439 10/21/2019 Urinalysis today:  No results found for this visit on 11/03/21. Last BUN and creatinine:  Lab Results   Component Value Date    BUN 27 (H) 10/25/2021     Lab Results   Component Value Date    CREATININE 0.93 10/25/2021       Imaging Reviewed during this Office Visit:   (results were independently reviewed by physician and radiology report verified)    PAST MEDICAL, FAMILY AND SOCIAL HISTORY UPDATE:  Past Medical History:   Diagnosis Date    Anxiety     Aortic regurgitation     Bicuspid aortic valve     Diabetes mellitus ([address] since March 2018    on Rx.     GERD (gastroesophageal reflux disease)     Hypertension     Left renal mass 10/2018    Osteoarthritis of left knee     Wears glasses      Past Surgical History:   Procedure Laterality Date    CYST REMOVAL      tail bone    MOUTH SURGERY  2015    PARTIAL NEPHRECTOMY Left 11/30/2018    ROBOTIC PARTIAL NEPHRECTOMY,     VT COLONOSCOPY FLX DX W/COLLJ SPEC WHEN PFRMD N/A 5/14/2018    normal colon, Dr. Brandon Godinez Left 11/30/2018    XI ROBOTIC PARTIAL NEPHRECTOMY, INTRA-OP LAP ULTRASOUND performed by Alize Luz MD at [address] [place] [address] Drive EXTRACTION       Family History   Problem Relation Age of Onset    High Blood Pressure Mother     Diabetes Mother         impaired fasting glucose    Other Mother         short term memory loss after MVA    Glaucoma Mother     Stroke Father 66    High Blood Pressure Father     Diabetes Father         impaired fasting glucose    Glaucoma Father     Cancer Father         bladder     Heart Attack Father 66    Glaucoma Brother     Diabetes Maternal Grandmother     Other Paternal Grandmother         gallbladder disease    Stroke Paternal Grandfather     Other Paternal Grandfather         gallbladder disease    Diabetes Paternal Grandfather     No Known Problems Brother      No outpatient medications have been marked as taking for

## 2021-11-08 ENCOUNTER — OFFICE VISIT (OUTPATIENT)
Dept: ONCOLOGY | Age: 66
End: 2021-11-08
Payer: MEDICARE

## 2021-11-08 VITALS
OXYGEN SATURATION: 98 % | WEIGHT: 248 LBS | BODY MASS INDEX: 34.72 KG/M2 | SYSTOLIC BLOOD PRESSURE: 126 MMHG | RESPIRATION RATE: 16 BRPM | HEIGHT: 71 IN | DIASTOLIC BLOOD PRESSURE: 60 MMHG | HEART RATE: 68 BPM | TEMPERATURE: 97.9 F

## 2021-11-08 DIAGNOSIS — D69.6 THROMBOCYTOPENIA (HCC): ICD-10-CM

## 2021-11-08 DIAGNOSIS — C64.9 RENAL CELL CARCINOMA, UNSPECIFIED LATERALITY (HCC): Primary | ICD-10-CM

## 2021-11-08 PROCEDURE — 99214 OFFICE O/P EST MOD 30 MIN: CPT | Performed by: INTERNAL MEDICINE

## 2021-11-08 PROCEDURE — 3017F COLORECTAL CA SCREEN DOC REV: CPT | Performed by: INTERNAL MEDICINE

## 2021-11-08 PROCEDURE — G8427 DOCREV CUR MEDS BY ELIG CLIN: HCPCS | Performed by: INTERNAL MEDICINE

## 2021-11-08 PROCEDURE — 1036F TOBACCO NON-USER: CPT | Performed by: INTERNAL MEDICINE

## 2021-11-08 PROCEDURE — 4040F PNEUMOC VAC/ADMIN/RCVD: CPT | Performed by: INTERNAL MEDICINE

## 2021-11-08 PROCEDURE — G8417 CALC BMI ABV UP PARAM F/U: HCPCS | Performed by: INTERNAL MEDICINE

## 2021-11-08 PROCEDURE — 1123F ACP DISCUSS/DSCN MKR DOCD: CPT | Performed by: INTERNAL MEDICINE

## 2021-11-08 PROCEDURE — G8484 FLU IMMUNIZE NO ADMIN: HCPCS | Performed by: INTERNAL MEDICINE

## 2021-11-08 NOTE — PROGRESS NOTES
suspension Place 1 drop into the right eye 4 times daily 1 Bottle 0    meloxicam (MOBIC) 7.5 MG tablet Take 1 tablet by mouth 2 times daily (with meals) 180 tablet 1    sildenafil (REVATIO) 20 MG tablet Take 1 tablet by mouth as needed (Erectile dysfunction) 25 tablet 1    amLODIPine (NORVASC) 10 MG tablet Take 1 tablet by mouth daily 90 tablet 1    metFORMIN (GLUCOPHAGE) 500 MG tablet Take 1 tablet by mouth 2 times daily (with meals) 180 tablet 2    pioglitazone (ACTOS) 15 MG tablet Take 1 tablet by mouth daily 90 tablet 1    fluticasone (FLONASE) 50 MCG/ACT nasal spray 1 spray by Nasal route 2 times daily 1 Bottle 5    baclofen (LIORESAL) 10 MG tablet Take 1 tablet by mouth nightly as needed (muscle spasm) 30 tablet 1    vitamin D3 (CHOLECALCIFEROL) 400 UNITS TABS tablet Take 400 Units by mouth every other day       Omeprazole (PRILOSEC PO) Take 1 capsule by mouth daily as needed        No current facility-administered medications for this visit. REVIEW OF SYSTEM:     Constitutional: No fever or chills. No night sweats, no weight loss   Eyes: No eye discharge, double vision, or eye pain   HEENT: negative for sore mouth, sore throat, hoarseness and voice change   Respiratory: negative for cough , sputum, dyspnea, wheezing, hemoptysis, chest pain   Cardiovascular: negative for chest pain, dyspnea, palpitations, orthopnea, PND   Gastrointestinal: negative for nausea, vomiting, diarrhea, constipation, abdominal pain, Dysphagia, hematemesis and hematochezia   Genitourinary: negative for frequency, dysuria, nocturia, urinary incontinence, and hematuria   Integument: negative for rash, skin lesions, bruises.    Hematologic/Lymphatic: negative for easy bruising, bleeding, lymphadenopathy, petechiae and swelling/edema   Endocrine: negative for heat or cold intolerance, tremor, weight changes, change in bowel habits and hair loss   Musculoskeletal: negative for myalgias, arthralgias, pain, joint swelling,and bone pain   Neurological: negative for headaches, dizziness, seizures, weakness, numbness   OBJECTIVE:         Vitals:    11/08/21 1033   BP: 126/60   Pulse: 68   Resp: 16   Temp: 97.9 °F (36.6 °C)   SpO2: 98%       PHYSICAL EXAM:   General appearance - well appearing, no in pain or distress   Mental status - alert and cooperative   Eyes - pupils equal and reactive, extraocular eye movements intact   Ears - bilateral TM's and external ear canals normal   Mouth - mucous membranes moist, pharynx normal without lesions   Neck - supple, no significant adenopathy   Lymphatics - no palpable lymphadenopathy, no hepatosplenomegaly   Chest - clear to auscultation, no wheezes, rales or rhonchi, symmetric air entry   Heart - normal rate, regular rhythm, normal S1, S2, no murmurs, rubs, clicks or gallops   Abdomen - soft, nontender, nondistended, no masses or organomegaly   Neurological - alert, oriented, normal speech, no focal findings or movement disorder noted   Musculoskeletal - no joint tenderness, deformity or swelling   Extremities - peripheral pulses normal, no pedal edema, no clubbing or cyanosis   Skin - normal coloration and turgor, no rashes, no suspicious skin lesions noted   LABORATORY DATA:     Lab Results   Component Value Date    WBC 6.2 10/25/2021    HGB 13.0 10/25/2021    HCT 38.1 (L) 10/25/2021    MCV 95.3 10/25/2021    PLT See Reflexed IPF Result 10/25/2021    LYMPHOPCT 28 10/25/2021    RBC 4.00 (L) 10/25/2021    MCH 32.5 10/25/2021    MCHC 34.1 (H) 10/25/2021    RDW 12.9 10/25/2021    MONOPCT 10 10/25/2021    BASOPCT 1 10/25/2021    NEUTROABS 3.74 10/25/2021    LYMPHSABS 1.70 10/25/2021    MONOSABS 0.60 10/25/2021    EOSABS 0.06 10/25/2021    BASOSABS 0.04 10/25/2021         Chemistry        Component Value Date/Time     10/25/2021 1034    K 4.2 10/25/2021 1034     10/25/2021 1034    CO2 29 10/25/2021 1034    BUN 27 (H) 10/25/2021 1034    CREATININE 0.93 10/25/2021 1034        Component Value Date/Time    CALCIUM 9.9 10/25/2021 1034    ALKPHOS 31 (L) 10/25/2021 1034    AST 16 10/25/2021 1034    ALT 18 10/25/2021 1034    BILITOT 0.58 10/25/2021 1034      1/22/2017  5:23 PM - [place], Lab [facility] [employer]   Component Value Ref Range & Units Status   IgG Plt Ab Direct Negative Negative Final   (NOTE)   Platelet Associated Antibodies, Direct Assay was performed on a   suboptimal submission. The maximum 48 hour stability was exceeded   at the time of test analysis. Please interpret the results with   caution. IgM Plt Ab Direct Positive Negative Final   (NOTE)      PATHOLOGY DATA:   Collected: 11/30/2018   -- Diagnosis --   KIDNEY, PARTIAL NEPHRECTOMY (LEFT):       - RENAL CELL CARCINOMA, CLEAR CELL TYPE WITH PROMINENT CYSTIC        CHANGE, GRADE 2.       - PARENCHYMAL AND SOFT TISSUE SURGICAL MARGINS NEGATIVE FOR         NEOPLASM. PATHOLOGIC STAGE CLASSIFICATION:     pT1a pNX       IMAGING DATA:    Reviewed  CT chest abd pelvis 3/27/19  FINDINGS:   Mediastinum: The heart and great vessels are normal in size.  No pericardial effusion.  No enlarged or suspicious-appearing lymph nodes are identified.          Lungs/pleura: 3 mm noncalcified nodule in the right lower lobe on image 71 is   unchanged compared to the prior chest CT 7 months ago.  Minimal linear   opacities in the lung bases are noted compatible with subsegmental   atelectasis.  The lungs are otherwise clear.  No effusion.  The airway is   patent.       Upper Abdomen: Cholelithiasis.  Findings suggestive of mild hepatic   steatosis.  The visualized adrenal glands appear within normal limits.    Subcentimeter cyst in the superior right kidney.       Soft Tissues/Bones: Multilevel mild disc space narrowing and degenerative   endplate changes are noted without suspicious lytic or blastic lesion   identified.           Impression   Stable 3 mm nodule in the right lower lobe compared to prior chest imaging 7   months ago.  No other finding to suggest metastatic disease in the chest.   While this favors a benign process, attention to on routine oncologic   follow-up is recommended. CT chest abd pelvis 10/11/19  Impression   *Stable aneurysmal dilatation of the ascending thoracic aorta measuring 4.7   cm.  No dissection or rupture. *Stable 3 mm solid noncalcified pulmonary nodule right lower lobe.  Attention   on follow-up is suggested given the history of renal cell carcinoma. *No CT evidence of tumor recurrence or metastatic disease seen within the   abdomen or pelvis. *Cholelithiasis. *Cardiomegaly. CT abd pelvis 10/19/20  Impression    1. New since the prior CTA chest exam 09/11/2020 there is multifocal basilar    subcentimeter non solid pulmonary nodules.  Given the acuity of the changes    this most likely represents an acute infectious/inflammatory process such as    multifocal pneumonia versus possible vasculitis or septic emboli.  Metastatic    pulmonary disease is less likely. 2. No acute abdominal/pelvic process or evidence of metastatic disease. ASSESSMENT:    Mr. 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 Thrombocytopenia: His platelet are stable. At this point there is no indication to initiate treatment. Renal cell cancer: He had left partial nephrectomy on 11/30/18 and showed showed stage I RCC. I discussed the pathology results and NCCN guidelines and recent data. NO adjuvant therapy indicated. Right lower lung nodule: Has been stable. This has been stable for over 2 years with no further follow-up indicated. However recent CT showing subcentimeter nonsolid nodules. And I advised to follow with with PCP with possible reimaging in 3 months. Back pain and right foot  Patient's questions were sought and answered to his satisfaction and he agreed to proceed with the plan.      PLAN:   I reviewed results of recent lab work and imaging studies and discussed the treatment recommendation with patient   His CT scan showed no evidence of recurrence   His platelets are slightly improved   He denies any bleeding symptoms   Return to clinic in 1 year with labs prior       I spent more than 25 minutes examining, evaluating, reviewing data and counseling the patient. Greater than 50% of that time was spent face-to-face with the patient in counseling and coordinating her care.       Mikel Shepherd MD  Hematology/Oncology

## 2021-11-15 ENCOUNTER — PATIENT MESSAGE (OUTPATIENT)
Dept: PRIMARY CARE CLINIC | Age: 66
End: 2021-11-15

## 2021-11-15 DIAGNOSIS — M54.41 CHRONIC BILATERAL LOW BACK PAIN WITH RIGHT-SIDED SCIATICA: Primary | ICD-10-CM

## 2021-11-15 DIAGNOSIS — G89.29 CHRONIC BILATERAL LOW BACK PAIN WITH RIGHT-SIDED SCIATICA: Primary | ICD-10-CM

## 2021-11-15 DIAGNOSIS — E11.59 TYPE 2 DIABETES MELLITUS WITH OTHER CIRCULATORY COMPLICATION, WITHOUT LONG-TERM CURRENT USE OF INSULIN (HCC): ICD-10-CM

## 2021-11-15 RX ORDER — PIOGLITAZONEHYDROCHLORIDE 15 MG/1
15 TABLET ORAL DAILY
Qty: 90 TABLET | Refills: 1 | Status: SHIPPED | OUTPATIENT
Start: 2021-11-15 | End: 2022-04-25 | Stop reason: SDUPTHER

## 2021-11-15 NOTE — TELEPHONE ENCOUNTER
Robert Sierra called requesting a refill of the below medication which has been pended for you:     Requested Prescriptions     Pending Prescriptions Disp Refills    pioglitazone (ACTOS) 15 MG tablet 90 tablet 1     Sig: Take 1 tablet by mouth daily       Last Appointment Date: 9/28/2021  Next Appointment Date: 1/20/2022    Allergies   Allergen Reactions    Codeine      Severe Abdominal pain    Sulfa Antibiotics      Patient unsure of reaction

## 2021-11-15 NOTE — TELEPHONE ENCOUNTER
From: Олег Varner  To: Dr. Patel Keys: 11/15/2021 2:59 PM EST  Subject: My Back    hello, well its been a few weeks since the therapy on my back! That WAS A JOKE!!! They told me my vertebrae were sticking out and I had to do these stretches to push them back. .. I just laughed. .. how would they know my vertebrae were sticking out unless they had X-rays. a few days after my last Therapy I had my monthly Chiropractor appt. .. we had a good laugh over the \"vertebrae sticking out\" He did say that he thought I needed an X-ray, which you said was my next step, if I remember right. Just before my Chiropractor appt my basement flooded and I threw my back out trying to move water soaked totes. ...so many muscle spams a day so I went and got a heating pad . .. I sit each morning in a chair with the heating pad on my back and a HOT shower to get me moving and less pain. I have back pain TIM, my movability has been cut in half!! The pain does not keep me awake, but if I roll over or move in my sleep there is some sharp pain. My quality of life is in the   toilet! There has to be something that can be done! I discussed this with my Oncologist at my appointment last week when he asked how I was. ... he even said that my quality of life has changed and maybe I need to see someone for my back. ...SO, once again. ....... what do we do!

## 2021-11-22 ENCOUNTER — OFFICE VISIT (OUTPATIENT)
Dept: CARDIOLOGY | Age: 66
End: 2021-11-22
Payer: MEDICARE

## 2021-11-22 VITALS
DIASTOLIC BLOOD PRESSURE: 72 MMHG | HEIGHT: 71 IN | BODY MASS INDEX: 34.72 KG/M2 | WEIGHT: 248 LBS | HEART RATE: 64 BPM | SYSTOLIC BLOOD PRESSURE: 157 MMHG

## 2021-11-22 DIAGNOSIS — I77.810 DILATED AORTIC ROOT (HCC): ICD-10-CM

## 2021-11-22 DIAGNOSIS — I10 ESSENTIAL HYPERTENSION: Primary | ICD-10-CM

## 2021-11-22 DIAGNOSIS — I38 MURMUR, DIASTOLIC: ICD-10-CM

## 2021-11-22 DIAGNOSIS — Q23.1 AORTIC REGURGITATION DUE TO BICUSPID AORTIC VALVE: ICD-10-CM

## 2021-11-22 DIAGNOSIS — Q23.1 BICUSPID AORTIC VALVE: ICD-10-CM

## 2021-11-22 DIAGNOSIS — I71.20 THORACIC AORTIC ANEURYSM WITHOUT RUPTURE: ICD-10-CM

## 2021-11-22 PROCEDURE — 93010 ELECTROCARDIOGRAM REPORT: CPT | Performed by: INTERNAL MEDICINE

## 2021-11-22 PROCEDURE — G8417 CALC BMI ABV UP PARAM F/U: HCPCS | Performed by: INTERNAL MEDICINE

## 2021-11-22 PROCEDURE — 93005 ELECTROCARDIOGRAM TRACING: CPT | Performed by: INTERNAL MEDICINE

## 2021-11-22 PROCEDURE — 99214 OFFICE O/P EST MOD 30 MIN: CPT | Performed by: INTERNAL MEDICINE

## 2021-11-22 PROCEDURE — G8484 FLU IMMUNIZE NO ADMIN: HCPCS | Performed by: INTERNAL MEDICINE

## 2021-11-22 PROCEDURE — 4040F PNEUMOC VAC/ADMIN/RCVD: CPT | Performed by: INTERNAL MEDICINE

## 2021-11-22 PROCEDURE — G8427 DOCREV CUR MEDS BY ELIG CLIN: HCPCS | Performed by: INTERNAL MEDICINE

## 2021-11-22 PROCEDURE — 3017F COLORECTAL CA SCREEN DOC REV: CPT | Performed by: INTERNAL MEDICINE

## 2021-11-22 PROCEDURE — 1036F TOBACCO NON-USER: CPT | Performed by: INTERNAL MEDICINE

## 2021-11-22 PROCEDURE — 1123F ACP DISCUSS/DSCN MKR DOCD: CPT | Performed by: INTERNAL MEDICINE

## 2021-11-22 NOTE — PROGRESS NOTES
[address] [facility] 
 
 
 [address] [address] [place] [facility] [address] 
[phone] Patient: Roland Castillo MRN: [id number] :1934 Visit Information Date & Time Provider Department Dept. Phone Encounter #  
 2018  2:00 PM Cate Alonso PA-C [facility] [phone] [id number] Follow-up Instructions Return in about 1 week (around 3/6/2018) for one week prior for labs. Upcoming Health Maintenance Date Due DTaP/Tdap/Td series (1 - Tdap) 1955 ZOSTER VACCINE AGE 60> 1994 GLAUCOMA SCREENING Q2Y 1999 Pneumococcal 65+ Low/Medium Risk (1 of 2 - PCV13) 1999 MEDICARE YEARLY EXAM 1999 Influenza Age 5 to Adult 2017 Allergies as of 2018  Review Complete On: 2018 By: Cate Alonso PA-C Severity Noted Reaction Type Reactions Latex, Natural Rubber  2012    Unknown (comments) Asa-acetaminophen-caff-potass  2011    Shortness of Breath Erythromycin  2011    Other (comments) bleeding Iodine And Iodide Containing Products  2012    Unknown (comments) Lemon  2012   Not Verified Unknown (comments) Other Medication  2012    Unknown (comments) Pt not sure of allergies states \"I'm allergic to more but not sure of name\" Pcn [Penicillins]  2011    Swelling Shellfish Containing Products  2012    Unknown (comments) Sulfa (Sulfonamide Antibiotics)  2011    Unknown (comments) Current Immunizations  Reviewed on 2012 No immunizations on file. Not reviewed this visit You Were Diagnosed With   
  
 Codes Comments Weakness generalized    -  Primary ICD-10-CM: R53.1 ICD-9-CM: 780.79 Laboratory exam ordered as part of routine general medical examination     ICD-10-CM: Z00.00 ICD-9-CM: V72.62 Vitamin D deficiency     ICD-10-CM: E55.9 ICD-9-CM: 268.9 [facility]  1955  [id number]    CC: Follow up for thoracic aortic aneurysm, Bicuspid AV with AI    HPI:  Patient is here for follow up. Denies any cp, sob, orthopnea, pnd, le edema. Has been dealing with back pain, so not as active. Still doing some hiking, but not as often. Past Medical History:   Diagnosis Date    Anxiety     Aortic regurgitation     Bicuspid aortic valve     Diabetes mellitus ([address] since March 2018    on Rx.  GERD (gastroesophageal reflux disease)     Hypertension     Left renal mass 10/2018    Osteoarthritis of left knee     Wears glasses        Past Surgical History:   Procedure Laterality Date    CYST REMOVAL      tail bone    MOUTH SURGERY  2015    PARTIAL NEPHRECTOMY Left 11/30/2018    ROBOTIC PARTIAL NEPHRECTOMY,     SC COLONOSCOPY FLX DX W/COLLJ SPEC WHEN PFRMD N/A 5/14/2018    normal colon, Dr. David Cooper Left 11/30/2018    XI ROBOTIC PARTIAL NEPHRECTOMY, INTRA-OP LAP ULTRASOUND performed by Adolfo Tsai MD at [address] [place] [address] Drive EXTRACTION         Family History   Problem Relation Age of Onset    High Blood Pressure Mother     Diabetes Mother         impaired fasting glucose    Other Mother         short term memory loss after MVA    Glaucoma Mother     Stroke Father 66    High Blood Pressure Father     Diabetes Father         impaired fasting glucose    Glaucoma Father     Cancer Father         bladder     Heart Attack Father 66    Glaucoma Brother     Diabetes Maternal Grandmother     Other Paternal Grandmother         gallbladder disease    Stroke Paternal Grandfather     Other Paternal Grandfather         gallbladder disease    Diabetes Paternal Grandfather     No Known Problems Brother        REVIEW OF SYSTEMS:    · Constitutional: there has been no unanticipated weight loss.  There's been No change in energy level, No change in Hyperlipidemia, unspecified hyperlipidemia type     ICD-10-CM: E78.5 ICD-9-CM: 272.4 Vitamin B12 deficiency (dietary) anemia     ICD-10-CM: D51.8 ICD-9-CM: 281.1 Elevated glucose level     ICD-10-CM: R73.09 
ICD-9-CM: 790.29 Syncope and collapse     ICD-10-CM: R55 
ICD-9-CM: 780. 2 Vitals BP Pulse Temp Resp Height(growth percentile) Weight(growth percentile) 133/72 86 98.1 °F (36.7 °C) (Oral) 16 5' 1\" (1.549 m) 129 lb 9.6 oz (58.8 kg) SpO2 BMI OB Status Smoking Status 96% 24.49 kg/m2 Hysterectomy Never Smoker BMI and BSA Data Body Mass Index Body Surface Area  
 24.49 kg/m 2 1.59 m 2 Preferred Pharmacy Pharmacy Name Phone [address], Benny Otero [address] [place] [address] [phone] [facility] [phone] Your Updated Medication List  
  
   
This list is accurate as of 2/27/18  2:49 PM.  Always use your most recent med list.  
  
  
  
  
 acetaminophen 500 mg tablet Commonly known as:  TYLENOL EX STR ARTHRITIS PAIN Take 1 Tab by mouth every six (6) hours as needed. diphenhydrAMINE 25 mg tablet Commonly known as:  BENADRYL ALLERGY Take 1 Tab by mouth every six (6) hours as needed. raNITIdine 150 mg tablet Commonly known as:  ZANTAC Take 1 Tab by mouth two (2) times a day. We Performed the Following REFERRAL TO CARDIOLOGY [YKM31 Custom] Comments:  
 Please evaluate and treat for QUIJANO, pre-syncope/syncope. Follow-up Instructions Return in about 1 week (around 3/6/2018) for one week prior for labs. To-Do List   
 02/27/2018 Lab:  VITAMIN B12   
  
 08/27/2018 Lab:  LIPID PANEL   
  
 08/27/2018 Lab:  METABOLIC PANEL, COMPREHENSIVE   
  
 08/28/2018 Lab:  CBC WITH AUTOMATED DIFF   
  
 08/28/2018 Lab:  TSH 3RD GENERATION   
  
 08/29/2018 Lab:  HEMOGLOBIN A1C W/O EAG   
  
 08/29/2018 Lab:  VITAMIN D, 25 HYDROXY Referral Information Date     10/25/2021    K 4.2 10/25/2021     10/25/2021    CO2 29 10/25/2021    BUN 27 (H) 10/25/2021    CREATININE 0.93 10/25/2021    GLUCOSE 151 (H) 04/26/2021    CALCIUM 9.9 10/25/2021    PROT 7.8 10/25/2021    LABALBU 4.9 10/25/2021    BILITOT 0.58 10/25/2021    ALKPHOS 31 (L) 10/25/2021    AST 16 10/25/2021    ALT 18 10/25/2021    LABGLOM >60 10/25/2021    GFRAA >60 10/25/2021       EKG:   Sinus  Rhythm  -First degree A-V block   Arcadio = 242  Voltage criteria for LVH  (R(I)+S(III) exceeds 2.50 mV)  -  Limited by artifact    TTE 8/27/18  Left ventricle is normal in size Global left ventricular systolic function is normal   Estimated ejection fraction is 60 % . Mild left ventricular hypertrophy. Grade II (moderate) left ventricular diastolic dysfunction. Left atrium is mildly dilated. Bicuspid aortic valve. Moderate eccentric jet of aortic insufficiency. Normal tricuspid valve leaflets. Trivial tricuspid regurgitation. Estimated right ventricular systolic pressure is 33 mmHg. No pulmonary hypertension. Aortic root is mildly dilated at 4.4 cm. CTA 9/19/2018  *Aneurysmal dilatation of the aortic root measuring 4.7 cm.  Normal caliber   remaining thoracic aorta.  No dissection or rupture. *Questionable enhancing mass involving the left kidney measuring 2.5 x 2.4   cm.  Renal cell carcinoma cannot be excluded and complete evaluation with CT   or MRI utilizing renal mass protocol is recommended. *Cholelithiasis. *Cardiomegaly. Echo 8/19  Normal left ventricular diameter. Mild left ventricular hypertrophy. Left ventricular systolic function is normal.  Left ventricular ejection fraction 55 %. Left atrium is mildly dilated. Bicuspid aortic valve (fusion of Right and Left Coronary Cusps). Moderate aortic insufficiency, with highly eccentric jet, that maybe be  underestimating the degree of AI.   Aortic root is mildly dilated at 4.5 m.    CT chest abd pelvis: 10/11/19  *Stable Referral ID Referred By Referred To  
  
 [id number] Cabrera COLLINS MD [address] Giovanni parikh, [address] Phone: [phone] Fax: [phone] Visits Status Start Date End Date 1 New Request 2/27/18 2/27/19 If your referral has a status of pending review or denied, additional information will be sent to support the outcome of this decision. Patient Instructions Preventing Falls: Care Instructions Your Care Instructions Getting around your home safely can be a challenge if you have injuries or health problems that make it easy for you to fall. Loose rugs and furniture in walkways are among the dangers for many older people who have problems walking or who have poor eyesight. People who have conditions such as arthritis, osteoporosis, or dementia also have to be careful not to fall. You can make your home safer with a few simple measures. Follow-up care is a key part of your treatment and safety. Be sure to make and go to all appointments, and call your doctor if you are having problems. It's also a good idea to know your test results and keep a list of the medicines you take. How can you care for yourself at home? Taking care of yourself · You may get dizzy if you do not drink enough water. To prevent dehydration, drink plenty of fluids, enough so that your urine is light yellow or clear like water. Choose water and other caffeine-free clear liquids. If you have kidney, heart, or liver disease and have to limit fluids, talk with your doctor before you increase the amount of fluids you drink. · Exercise regularly to improve your strength, muscle tone, and balance. Walk if you can. Swimming may be a good choice if you cannot walk easily. · Have your vision and hearing checked each year or any time you notice a change.  If you have trouble seeing and hearing, you might not be able to aneurysmal dilatation of the ascending thoracic aorta measuring 4.7   cm.  No dissection or rupture. *Stable 3 mm solid noncalcified pulmonary nodule right lower lobe.  Attention   on follow-up is suggested given the history of renal cell carcinoma. *No CT evidence of tumor recurrence or metastatic disease seen within the   abdomen or pelvis. *Cholelithiasis. *Cardiomegaly. Echo 9/20:  Left ventricle is mildly dilated. Moderate left ventricular hypertrophy. Hyperdynamic left ventricular function. Left ventricular ejection fraction 70 %. Diastolic dysfunction. Left atrium is moderately dilated. Right atrium is mildly dilated . Right ventricular dilatation with normal systolic function. Functionally bicuspid aortic valve with mild aortic regurgitation. Trivial tricuspid regurgitation. Estimated right ventricular systolic pressure is 39 mmHg. Aortic root is mildly to moderately dilated at 4.5 cm. CTA chest 9/20:  1. The thoracic aorta is upper limits of normal in size as measured    perpendicular to the direction of flow as detailed above.  No aneurysm- 4 cm max diameter of Ascending aorta.   Mild    atherosclerotic vascular disease. 2. 3 mm nodule in the right lower lobe unchanged from at least 2018.  No    follow-up recommended. Echo 9/21:  Left ventricular systolic function is normal.  Left ventricular ejection fraction 60 %. Left atrium is moderately dilated. Functionally bicuspid aortic valve with moderate eccentric regurgitation. No pericardial effusion. Aortic root is mildly dilated at 4.2 cm. CTA chest on 9/21:  Impression   Stable mild aneurysmal enlargement of the ascending thoracic aorta, measuring   4.3 cm.  No acute aortic abnormality.  No central pulmonary embolism. Assessment and Plan:    1. Bicuspid AV- Moderate AI- on 9/21. Continue aggressive BP control/Afterload reduction. On Norvasc, Coreg, Hyzaar.    2. Ascending aortic aneurysm- 4.3 cm max diameter on CTA avoid objects and could lose your balance. · Know the side effects of the medicines you take. Ask your doctor or pharmacist whether the medicines you take can affect your balance. Sleeping pills or sedatives can affect your balance. · Limit the amount of alcohol you drink. Alcohol can impair your balance and other senses. · Ask your doctor whether calluses or corns on your feet need to be removed. If you wear loose-fitting shoes because of calluses or corns, you can lose your balance and fall. · Talk to your doctor if you have numbness in your feet. Preventing falls at home · Remove raised doorway thresholds, throw rugs, and clutter. Repair loose carpet or raised areas in the floor. · Move furniture and electrical cords to keep them out of walking paths. · Use nonskid floor wax, and wipe up spills right away, especially on ceramic tile floors. · If you use a walker or cane, put rubber tips on it. If you use crutches, clean the bottoms of them regularly with an abrasive pad, such as steel wool. · Keep your house well lit, especially [place], and outside walkways. Use night-lights in areas such as hallways and bathrooms. Add extra light switches or use remote switches (such as switches that go on or off when you clap your hands) to make it easier to turn lights on if you have to get up during the night. · Install sturdy handrails on stairways. · Move items in your cabinets so that the things you use a lot are on the lower shelves (about waist level). · Keep a cordless phone and a flashlight with new batteries by your bed. If possible, put a phone in each of the main rooms of your house, or carry a cell phone in case you fall and cannot reach a phone. Or, you can wear a device around your neck or wrist. You push a button that sends a signal for help. · Wear low-heeled shoes that fit well and give your feet good support.  Use 9/21- (previously larger 4.7 cm on 10/19). Continue aggressive BP control/Afterload reduction. On Norvasc, Coreg, Hyzaar. 3. Renal CA- following with urology/oncology  4. HTN- higher in office. But well controlled at other Drs Office few days ago. Continue current meds. 5. Overweight: encouraged diet, exercise, and discussed weight loss extensively. 6. Hyperlipidemia- continue statin. LDL 50 on 4/21    The patient is to continue heart healthy diet, weight loss and exercise as tolerated. Patient's medications and side effects were discussed. Medication refills were provided if needed. Follow up appointment timing was discussed. All questions and concerns were addressed to patient's satisfaction. The patient is to follow up in 6 months or sooner if necessary. Thank you for allowing me to participate in the care of this patient, please do not hesitate to call if you have any questions. Lavella Burkitt, DO, [facility], [address], [address], [address] [facility]  [facility]. [facility]  [phone] footwear with nonskid soles. Check the heels and soles of your shoes for wear. Repair or replace worn heels or soles. · Do not wear socks without shoes on wood floors. · Walk on the grass when the sidewalks are slippery. If you live in an area that gets snow and ice in the winter, sprinkle salt on slippery steps and sidewalks. Preventing falls in the bath · Install grab bars and nonskid mats inside and outside your shower or tub and near the toilet and sinks. · Use shower chairs and bath benches. · Use a hand-held shower head that will allow you to sit while showering. · Get into a tub or shower by putting the weaker leg in first. Get out of a tub or shower with your strong side first. 
· Repair loose toilet seats and consider installing a raised toilet seat to make getting on and off the toilet easier. · Keep your bathroom door unlocked while you are in the shower. Where can you learn more? Go to http://zanderValence Healtheden.info/. Enter [id number] [id number] in the search box to learn more about \"Preventing Falls: Care Instructions. \" Current as of: May 12, 2017 Content Version: 11.4 © 7683-8132 [employer]. Care instructions adapted under license by [employer] (which disclaims liability or warranty for this information). If you have questions about a medical condition or this instruction, always ask your healthcare professional. [address] [address] any warranty or liability for your use of this information. How to Get Up Safely After a Fall: Care Instructions Your Care Instructions If you have injuries, health problems, or other reasons that may make it easy for you to fall at home, it is a good idea to learn how to get up safely after a fall. Learning how to get up correctly can help you avoid making an injury worse. Also, knowing what to do if you cannot get up can help you stay safe until help arrives. Follow-up care is a key part of your treatment and safety. Be sure to make and go to all appointments, and call your doctor if you are having problems. It's also a good idea to know your test results and keep a list of the medicines you take. How can you care for yourself after a fall? If you think you can get up First lie still for a few minutes and think about how you feel. If your body feels okay and you think you can get up safely, follow the rest of the steps below: 1. Look for a chair or other piece of furniture that is close to you. 2. Roll onto your side and rest. Roll by turning your head in the direction you want to roll, move your shoulder and arm, then hip and leg in the same direction. 3. Lie still for a moment to let your blood pressure adjust. 
4. Slowly push your upper body up, lift your head, and take a moment to rest. 
5. Slowly get up on your hands and knees, and crawl to the chair or other stable piece of furniture. 6. Put your hands on the chair. 7. Move one foot forward, and place it flat on the floor. Your other leg should be bent with the knee on the floor. 8. Rise slowly, turn your body, and sit in the chair. Stay seated for a bit and think about how you feel. Call for help. Even if you feel okay, let someone know what happened to you. You might not know that you have a serious injury. If you cannot get up 1. If you think you are injured after a fall or you cannot get up, try not to panic. 2. Call out for help. 3. If you have a phone within reach or you have an emergency call device, use it to call for help. 4. If you do not have a phone within reach, try to slide yourself toward it. If you cannot get to the phone, try to slide toward a door or window or a place where you think you can be heard. 5. [place] or use an object to make noise so someone might hear you.  
6. If you can reach something that you can use for a pillow, place it under your head. Try to stay warm by covering yourself with a blanket or clothing while you wait for help. When should you call for help? Call 911 anytime you think you may need emergency care. For example, call if: 
? · You passed out (lost consciousness). ? · You cannot get up after a fall. ? · You have severe pain. ?Call your doctor now or seek immediate medical care if: 
? · You have new or worse pain. ? · You are dizzy or lightheaded. ? · You hit your head. ? Watch closely for changes in your health, and be sure to contact your doctor if: 
? · You do not get better as expected. Where can you learn more? Go to http://zander-eden.info/. Enter Q821 in the search box to learn more about \"How to Get Up Safely After a Fall: Care Instructions. \" Current as of: May 12, 2017 Content Version: 11.4 © 9350-3632 [employer]. Care instructions adapted under license by [employer] (which disclaims liability or warranty for this information). If you have questions about a medical condition or this instruction, always ask your healthcare professional. [address] any warranty or liability for your use of this information. Fainting: Care Instructions Your Care Instructions When you faint, or pass out, you lose consciousness for a short time. A brief drop in blood flow to the brain often causes it. When you fall or lie down, more blood flows to your brain and you regain consciousness. Emotional stress, pain, or overheating-especially if you have been standing-can make you faint. In these cases, fainting is usually not serious. But fainting can be a sign of a more serious problem. Your doctor may want you to have more tests to rule out other causes. The treatment you need depends on the reason why you fainted. The doctor has checked you carefully, but problems can develop later.  If you notice any problems or new symptoms, get medical treatment right away. Follow-up care is a key part of your treatment and safety. Be sure to make and go to all appointments, and call your doctor if you are having problems. It's also a good idea to know your test results and keep a list of the medicines you take. How can you care for yourself at home? · Drink plenty of fluids to prevent dehydration. If you have kidney, heart, or liver disease and have to limit fluids, talk with your doctor before you increase your fluid intake. When should you call for help? Call 911 anytime you think you may need emergency care. For example, call if: 
? · You have symptoms of a heart problem. These may include: ¨ Chest pain or pressure. ¨ Severe trouble breathing. ¨ A fast or irregular heartbeat. ¨ Lightheadedness or sudden weakness. ¨ Coughing up pink, foamy mucus. ¨ Passing out. After you call 911, the  may tell you to chew 1 adult-strength or 2 to 4 low-dose aspirin. Wait for an ambulance. Do not try to drive yourself. ? · You have symptoms of a stroke. These may include: 
¨ Sudden numbness, tingling, weakness, or loss of movement in your face, arm, or leg, especially on only one side of your body. ¨ Sudden vision changes. ¨ Sudden trouble speaking. ¨ Sudden confusion or trouble understanding simple statements. ¨ Sudden problems with walking or balance. ¨ A sudden, severe headache that is different from past headaches. ? · You passed out (lost consciousness) again. ? Watch closely for changes in your health, and be sure to contact your doctor if: 
? · You do not get better as expected. Where can you learn more? Go to http://zander-eden.info/. Enter U928 in the search box to learn more about \"Fainting: Care Instructions. \" Current as of: March 20, 2017 Content Version: 11.4 © 6732-6075 Healthwise, Incorporated.  Care instructions adapted under license by [address] (which disclaims liability or warranty for this information). If you have questions about a medical condition or this instruction, always ask your healthcare professional. [address] any warranty or liability for your use of this information. Introducing [facility] [place] [facility]! [facility] introduces [employer] patient portal. Now you can access parts of your medical record, email your doctor's office, and request medication refills online. 1. In your internet browser, go to [email or web address] 2. Click on the First Time User? Click Here link in the Sign In box. You will see the New Member Sign Up page. 3. Enter your TradeGig Access Code exactly as it appears below. You will not need to use this code after youve completed the sign-up process. If you do not sign up before the expiration date, you must request a new code. · TradeG[employer] Access Code: [email or web address] Expires: 5/28/2018  1:38 PM 
 
4. Enter the last four digits of your Social Security Number (xxxx) and Date of Birth (mm/dd/yyyy) as indicated and click Submit. You will be taken to the next sign-up page. 5. Create a TradeGig ID. This will be your TradeGig login ID and cannot be changed, so think of one that is secure and easy to remember. 6. Create a TradeGig password. You can change your password at any time. 7. Enter your Password Reset Question and Answer. This can be used at a later time if you forget your password. 8. Enter your e-mail address. You will receive e-mail notification when new information is available in [address] 9. Click Sign Up. You can now view and download portions of your medical record. 10. Click the Download Summary menu link to download a portable copy of your medical information. If you have questions, please visit the Frequently Asked Questions section of the TradeGig website.  Remember, TradeGig is NOT to be used for urgent needs. For medical emergencies, dial 911. Now available from your iPhone and Android! Please provide this summary of care documentation to your next provider. Your primary care clinician is listed as Nicolás Flores. If you have any questions after today's visit, please call [phone].

## 2021-11-24 ENCOUNTER — HOSPITAL ENCOUNTER (OUTPATIENT)
Dept: MRI IMAGING | Age: 66
Discharge: HOME | End: 2021-11-26
Payer: MEDICARE

## 2021-11-24 DIAGNOSIS — M54.41 CHRONIC BILATERAL LOW BACK PAIN WITH RIGHT-SIDED SCIATICA: ICD-10-CM

## 2021-11-24 DIAGNOSIS — G89.29 CHRONIC BILATERAL LOW BACK PAIN WITH RIGHT-SIDED SCIATICA: ICD-10-CM

## 2021-11-24 PROCEDURE — 72148 MRI LUMBAR SPINE W/O DYE: CPT

## 2021-11-27 DIAGNOSIS — G89.29 CHRONIC MIDLINE LOW BACK PAIN WITH SCIATICA, SCIATICA LATERALITY UNSPECIFIED: Primary | ICD-10-CM

## 2021-11-27 DIAGNOSIS — M54.40 CHRONIC MIDLINE LOW BACK PAIN WITH SCIATICA, SCIATICA LATERALITY UNSPECIFIED: Primary | ICD-10-CM

## 2021-12-10 ENCOUNTER — TELEPHONE (OUTPATIENT)
Dept: PAIN MANAGEMENT | Age: 66
End: 2021-12-10

## 2021-12-10 NOTE — TELEPHONE ENCOUNTER
Patient was referred by Mary Foreman MD for evaluation and treatment of chronic midline low back pain with sciatica. MRI of Lumbar spine on file 11/24/2021. OAR[place] for [place], [place], and [place]:No prescriptions found. Will call and schedule NP visit.

## 2021-12-14 NOTE — DISCHARGE SUMMARY
[facility]    [x] [place]  Phone: [phone]  Fax: [phone]      [] [place]  Phone: [phone]  Fax: [phone]    Physical Therapy Discharge Note  Date: 2021        Patient Name:  Fidel Hamilton    :  1955  MRN: [id number]  Restrictions/Precautions:      Medical/Treatment Diagnosis Information:  ·   Diagnosis: M54.41 LBP, R sciatica  · Treatment Diagnosis: M54.41 LBP & R sciatica  ·   ·    Insurance/Certification information:    Medicare  Physician Information:     Gus Loza of care signed (Y/N): y  Visit# / total visits:  4  Pain level: 7/10       Time Period for Report:   Cancels/No-shows to date:      Plan of Care/Treatment to date:  [x] Therapeutic Exercise    [] Modalities:  [] Therapeutic Activity     [] Ultrasound  [] Electrical Stimulation  [] Gait Training      [] Cervical Traction    [] Lumbar Traction  [] Neuromuscular Re-education  [] Cold/hotpack [] Iontophoresis  [x] Instruction in HEP      Other:  [x] Manual Therapy       []    [] Aquatic Therapy       []                              Subjective:    Still having the same back pain that has been there for the past year         Objective:       Observation:   Habitually does a lot of flexion position with gardening  Test measurements:   Lumbar extension deficient 5% after repeated EIL  Needs to concentrate on full range motion with press ups  All pain central          Assessment:   Needs to change his habits of working in the garden.    Self preferred ergonomics is bringing on this problem    Plan:    d/c       Goals:    Short term goals  Time Frame for Short term goals: 1 week  Short term goal 1: Start HEP- met     Long term goals  Time Frame for Long term goals : 4 weeks  Long term goal 1: LBP and R sciatica controlled 2/10   Long term goal 2: Able to carry  laundry without pain rise   Long term goal 3: Continue part-time job at 2/10 pain   Long term goal 4: Able to stand 1 hr          Percentage of Goals Met: 80            Discharge Prognosis: [] Excellent [] Good [] Fair  [] Poor     Goal Status:  [] Achieved [x] Partially Achieved  [] Not Achieved       Electronically signed by:  Nica Salvador PT

## 2021-12-23 ENCOUNTER — HOSPITAL ENCOUNTER (OUTPATIENT)
Dept: LAB | Age: 66
Discharge: HOME | End: 2021-12-23
Payer: MEDICARE

## 2021-12-23 LAB
CREAT SERPL-MCNC: 0.94 MG/DL (ref 0.7–1.2)
GFR AFRICAN AMERICAN: >60 ML/MIN
GFR NON-AFRICAN AMERICAN: >60 ML/MIN
GFR SERPL CREATININE-BSD FRML MDRD: NORMAL ML/MIN/{1.73_M2}
GFR SERPL CREATININE-BSD FRML MDRD: NORMAL ML/MIN/{1.73_M2}

## 2021-12-23 PROCEDURE — 36415 COLL VENOUS BLD VENIPUNCTURE: CPT

## 2021-12-23 PROCEDURE — 82565 ASSAY OF CREATININE: CPT

## 2021-12-27 ENCOUNTER — HOSPITAL ENCOUNTER (OUTPATIENT)
Dept: MRI IMAGING | Age: 66
Discharge: HOME | End: 2021-12-29

## 2021-12-27 DIAGNOSIS — R60.0 PERIORBITAL EDEMA: ICD-10-CM

## 2021-12-28 ENCOUNTER — OFFICE VISIT (OUTPATIENT)
Dept: PAIN MANAGEMENT | Age: 66
End: 2021-12-28
Payer: MEDICARE

## 2021-12-28 VITALS
SYSTOLIC BLOOD PRESSURE: 138 MMHG | BODY MASS INDEX: 35.22 KG/M2 | HEIGHT: 71 IN | WEIGHT: 251.6 LBS | HEART RATE: 64 BPM | DIASTOLIC BLOOD PRESSURE: 88 MMHG | OXYGEN SATURATION: 96 %

## 2021-12-28 DIAGNOSIS — M48.07 SPINAL STENOSIS OF LUMBOSACRAL REGION: ICD-10-CM

## 2021-12-28 DIAGNOSIS — M48.07 NEUROFORAMINAL STENOSIS OF LUMBOSACRAL SPINE: ICD-10-CM

## 2021-12-28 DIAGNOSIS — M47.817 LUMBOSACRAL SPONDYLOSIS WITHOUT MYELOPATHY: Primary | ICD-10-CM

## 2021-12-28 PROCEDURE — 99214 OFFICE O/P EST MOD 30 MIN: CPT

## 2021-12-28 PROCEDURE — G8417 CALC BMI ABV UP PARAM F/U: HCPCS | Performed by: PHYSICAL MEDICINE & REHABILITATION

## 2021-12-28 PROCEDURE — 4040F PNEUMOC VAC/ADMIN/RCVD: CPT | Performed by: PHYSICAL MEDICINE & REHABILITATION

## 2021-12-28 PROCEDURE — G8427 DOCREV CUR MEDS BY ELIG CLIN: HCPCS | Performed by: PHYSICAL MEDICINE & REHABILITATION

## 2021-12-28 PROCEDURE — 99204 OFFICE O/P NEW MOD 45 MIN: CPT | Performed by: PHYSICAL MEDICINE & REHABILITATION

## 2021-12-28 PROCEDURE — 3017F COLORECTAL CA SCREEN DOC REV: CPT | Performed by: PHYSICAL MEDICINE & REHABILITATION

## 2021-12-28 PROCEDURE — 1123F ACP DISCUSS/DSCN MKR DOCD: CPT | Performed by: PHYSICAL MEDICINE & REHABILITATION

## 2021-12-28 PROCEDURE — 1036F TOBACCO NON-USER: CPT | Performed by: PHYSICAL MEDICINE & REHABILITATION

## 2021-12-28 PROCEDURE — G8484 FLU IMMUNIZE NO ADMIN: HCPCS | Performed by: PHYSICAL MEDICINE & REHABILITATION

## 2021-12-28 ASSESSMENT — ENCOUNTER SYMPTOMS
EYES NEGATIVE: 1
BACK PAIN: 1
ALLERGIC/IMMUNOLOGIC NEGATIVE: 1
NAUSEA: 0
RESPIRATORY NEGATIVE: 1
CONSTIPATION: 0

## 2021-12-28 NOTE — LETTER
[address] Pain Management A department of [place] [address]  Phone: [phone]  Fax: [phone]    Rubina Marin MD    December 28, 2021     Delta Gutierrez, [facility]  [address]    Patient: Karyle Duke   MR Number: [id number]   YOB: 1955   Date of Visit: 12/28/2021       Dear Delta Gutierrez: Thank you for referring Elmer Randall to me for evaluation/treatment. Below are the relevant portions of my assessment and plan of care. If you have questions, please do not hesitate to call me. I look forward to following Sabrina Avendaño along with you.     Sincerely,      Rubina Marin MD

## 2021-12-28 NOTE — PROGRESS NOTES
PAIN MANAGEMENTNEW PATIENT CONSULTATION  12/28/21    Marta Hudson  1955    ReferringProvider:  José Luis Rust MD  [id number],  [address] [facility] [place]    Primary Care Physician:  MD Bob Canada. 47    HPIinformation obtained from the patient as well as the Comprehensive Pain ManagementHealth Questionnaire filled out by the patient. The questionnaire will be scannedinto the electronic chart and PMH, PSH, meds, and allergies will be updates accordingly. Subjective:       CHIEF COMPLAINT:  This is a79 y.o. male patientwho presents with a complaint of New Patient (refer- Dr Sarah Reyes)      PAIN HPI:    Two year worsening  History of B lumbar  Pain and  Aching   Keeps him from exercise    PT  Not helping  Monthly  Chiropractor not helping  Hot shower helps   Goes down R  Leg very rarely     Location: -  Location Modifier: -  Severity of Pain: 3  Duration of Pain: Intermittent  Frequency of Pain: Intermittent  Aggravating Factors: Stretching, Bending, Straightening, Standing, Walking, Stairs, Exercise, Kneeling and Squatting  Limiting Behavior: no  Relieving Factors: Heat, Cold and Medication -  Result of Injury: no  Work Related Injury: no  Sandoval of Worker Compensation Case: no      Prior evaluation:    Previous lower back problems:No    Previous workup: No   History of surgery in painful area:No    Previous pain medication trials have included:       Opioids: -     NSAID's:-     Muscle relaxants: -     Neuropathicpain meds: -    Previous Pain Management Physician: No   Nerve blocks, spinal injections:No   Typeof Pain Intervention -. Date of last Pain Intervention . Was there pain relief from Pain Intervention: No.    How long was your pain relief from the Pain Intervention . Sleep:       Difficultyfalling asleep:  No   Takes sleepingmedication: No    Mental health:    Patient feels - secondary to their current pain problems as described above.    H/O depression and anxiety: No   Patient is not seeing psychologist orpsychiatrist   Abuse history? -    Employed? No  Alcohol use?: No  Tobacco use?: No  Marijuana use?: No  Illicit drug use?: No    Imaging: Reviewed available imagingin our system with the patient. No results found. Referring physician records reviewed. Review of Systems   Constitutional: Positive for fatigue. HENT: Negative. Eyes: Negative. Respiratory: Negative. Cardiovascular: Negative. Gastrointestinal: Negative for constipation and nausea. Endocrine: Negative. Genitourinary: Negative for difficulty urinating. Musculoskeletal: Positive for arthralgias, back pain and myalgias. Skin: Negative. Allergic/Immunologic: Negative. Neurological: Positive for weakness and numbness. Hematological: Negative. Psychiatric/Behavioral: Positive for sleep disturbance. All other systems reviewed and are negative.       Allergies   Allergen Reactions    Codeine      Severe Abdominal pain    Sulfa Antibiotics      Patient unsure of reaction       Outpatient Medications Prior to Visit   Medication Sig Dispense Refill    pioglitazone (ACTOS) 15 MG tablet Take 1 tablet by mouth daily 90 tablet 1    cetirizine (ZYRTEC) 10 MG tablet Take 1 tablet by mouth daily 30 tablet 5    furosemide (LASIX) 20 MG tablet Take 1 tablet by mouth daily 90 tablet 1    busPIRone (BUSPAR) 7.5 MG tablet Take 1 tablet by mouth 3 times daily as needed (anxiety) 90 tablet 1    montelukast (SINGULAIR) 10 MG tablet Take 1 tablet by mouth nightly 90 tablet 1    Handicap Placard MISC by Does not apply route Exp 7/1/2026 1 each 0    rosuvastatin (CRESTOR) 5 MG tablet Take 1 tablet by mouth daily 90 tablet 1    losartan-hydroCHLOROthiazide (HYZAAR) 100-25 MG per tablet Take 1 tablet by mouth daily 90 tablet 1    carvedilol (COREG) 25 MG tablet Take 1.5 tablets by mouth 2 times daily 270 tablet 3    prednisoLONE acetate (PRED FORTE) 1 % ophthalmic suspension Place 1 drop into the right eye 4 times daily 1 Bottle 0    meloxicam (MOBIC) 7.5 MG tablet Take 1 tablet by mouth 2 times daily (with meals) 180 tablet 1    sildenafil (REVATIO) 20 MG tablet Take 1 tablet by mouth as needed (Erectile dysfunction) 25 tablet 1    amLODIPine (NORVASC) 10 MG tablet Take 1 tablet by mouth daily 90 tablet 1    metFORMIN (GLUCOPHAGE) 500 MG tablet Take 1 tablet by mouth 2 times daily (with meals) 180 tablet 2    fluticasone (FLONASE) 50 MCG/ACT nasal spray 1 spray by Nasal route 2 times daily 1 Bottle 5    vitamin D3 (CHOLECALCIFEROL) 400 UNITS TABS tablet Take 400 Units by mouth every other day       Omeprazole (PRILOSEC PO) Take 1 capsule by mouth daily as needed       baclofen (LIORESAL) 10 MG tablet Take 1 tablet by mouth nightly as needed (muscle spasm) (Patient not taking: Reported on 12/28/2021) 30 tablet 1     No facility-administered medications prior to visit. Past Medical History:   Diagnosis Date    Anxiety     Aortic regurgitation     Bicuspid aortic valve     Diabetes mellitus ([address] since March 2018    on Rx.     GERD (gastroesophageal reflux disease)     Hypertension     Left renal mass 10/2018    Osteoarthritis of left knee     Wears glasses        Past Surgical History:   Procedure Laterality Date    CYST REMOVAL      tail bone    MOUTH SURGERY  2015    PARTIAL NEPHRECTOMY Left 11/30/2018    ROBOTIC PARTIAL NEPHRECTOMY,     [place] COLONOSCOPY FLX DX W/COLLJ SPEC WHEN PFRMD N/A 5/14/2018    normal colon, Dr. Morton Delay Left 11/30/2018    XI ROBOTIC PARTIAL NEPHRECTOMY, INTRA-OP LAP ULTRASOUND performed by Valentino Wetzel MD at [address] [place] [address]         Family History   Problem Relation Age of Onset    High Blood Pressure Mother     Diabetes Mother         impaired fasting glucose    Other Mother         short term memory loss after MVA    Glaucoma Mother     Stroke Father 66 Violence:     Fear of Current or Ex-Partner: Not on file    Emotionally Abused: Not on file    Physically Abused: Not on file    Sexually Abused: Not on file   Housing Stability:     Unable to Pay for Housing in the Last Year: Not on file    Number of Places Lived in the Last Year: Not on file    Unstable Housing in the Last Year: Not on file         Objective:     Physical Exam:  Vitals:    12/28/21 0919   BP: 138/88   Pulse: 64   SpO2: 96%   Weight: 251 lb 9.6 oz (114.1 kg)   Height: 5' 11\" (1.803 m)          Physical Exam  Constitutional:       Appearance: He is well-developed. HENT:      Head: Normocephalic and atraumatic. Cardiovascular:      Pulses: Normal pulses. Comments: Warm extremities. Normal capillary refill. Pulmonary:      Effort: Pulmonary effort is normal.   Abdominal:      General: Abdomen is flat. Palpations: Abdomen is soft. Musculoskeletal:      Lumbar back: Negative right straight leg raise test and negative left straight leg raise test.   Skin:     General: Skin is warm and dry. Neurological:      General: No focal deficit present. Mental Status: He is alert and oriented to person, place, and time. Cranial Nerves: No cranial nerve deficit. Sensory: No sensory deficit. Motor: No atrophy or abnormal muscle tone. Deep Tendon Reflexes: Reflexes are normal and symmetric.    Psychiatric:         Mood and Affect: Mood normal.         Speech: Speech normal.         Behavior: Behavior normal.         Back Exam     Range of Motion   Extension: normal   Flexion: normal   Lateral bend right: normal   Lateral bend left: normal   Rotation right: normal   Rotation left: normal     Muscle Strength   Right Quadriceps:  5/5   Left Quadriceps:  5/5   Right Hamstrings:  5/5   Left Hamstrings:  5/5     Tests   Straight leg raise right: negative  Straight leg raise left: negative    Other   Toe walk: normal  Heel walk: normal  Sensation: normal  Gait: normal Labs:   Lab Results   Component Value Date    WBC 6.2 10/25/2021    HGB 13.0 10/25/2021    HCT 38.1 (L) 10/25/2021    PLT See Reflexed IPF Result 10/25/2021    CHOL 102 04/26/2021    TRIG 81 04/26/2021    HDL 36 (L) 04/26/2021    ALT 18 10/25/2021    AST 16 10/25/2021     10/25/2021    K 4.2 10/25/2021     10/25/2021    CREATININE 0.94 12/23/2021    BUN 27 (H) 10/25/2021    CO2 29 10/25/2021    TSH 2.37 10/20/2020    PSA 0.77 01/26/2016    INR 1.1 11/16/2018    GLUF 187 (H) 10/25/2021    LABA1C 6.9 (H) 04/26/2021    LABMICR 12 04/26/2021       Assessment: This is a 77 y.o. male with the following diagnosis:     Pain Diagnoses:  No diagnosis found. Medical/ Psychological Comorbidities:  As listed in the past medical and surgical history    Functional Limitations secondary to the above problems:  Chronic painlimits function and quality of life    Plan:   Wants to try   Osteobiflex and Acetic acid tablets   Follow 6 weeks  1. Meds:   New Prescriptions    No medications on file      No orders of the defined types were placed in this encounter. Controlled Substances Monitoring:    OARRS report was reviewed for [place], [place]. Pt educated about the risks of taking opiates, including increasedsedation, constipation, slowed breathing, tolerance, dependence, and addiction. No orders of the defined types were placed in this encounter. No follow-ups on file. The patient expressed understanding of the above assessment and plan. Totaltime spent face to face with patient was 25 minutes inwhich  50% or more of the time was spent in counseling, education about risk andbenefits of the above plan, and coordination of care.

## 2021-12-28 NOTE — LETTER
[facility] Pain Management A department of [facility]  Phone: [phone]  Fax: [phone]           Nhi Horta MD      December 28, 2021     Patient: Magdy Yancey   MR Number: [id number]   YOB: 1955   Date of Visit: 12/28/2021       Dear Dr. Rebeca Navarro: Thank you for referring Jenniffer Scanlon to me for evaluation/treatment. Below are the relevant portions of my assessment and plan of care. If you have questions, please do not hesitate to call me. I look forward to following Niki Morin along with you.     Sincerely,        Nhi Horta MD    CC providers:  Jed Langston MD  [address] [place] [address] In [facility]

## 2022-01-02 DIAGNOSIS — I10 ESSENTIAL HYPERTENSION: ICD-10-CM

## 2022-01-02 DIAGNOSIS — N52.9 ERECTILE DYSFUNCTION, UNSPECIFIED ERECTILE DYSFUNCTION TYPE: ICD-10-CM

## 2022-01-02 DIAGNOSIS — Q23.1 AORTIC REGURGITATION DUE TO BICUSPID AORTIC VALVE: ICD-10-CM

## 2022-01-02 DIAGNOSIS — E11.9 TYPE 2 DIABETES MELLITUS WITHOUT COMPLICATION, WITHOUT LONG-TERM CURRENT USE OF INSULIN (HCC): ICD-10-CM

## 2022-01-03 RX ORDER — LOSARTAN POTASSIUM AND HYDROCHLOROTHIAZIDE 25; 100 MG/1; MG/1
1 TABLET ORAL DAILY
Qty: 90 TABLET | Refills: 1 | Status: SHIPPED | OUTPATIENT
Start: 2022-01-03 | End: 2022-06-20 | Stop reason: SDUPTHER

## 2022-01-03 RX ORDER — SILDENAFIL CITRATE 20 MG/1
20 TABLET ORAL PRN
Qty: 25 TABLET | Refills: 1 | Status: SHIPPED | OUTPATIENT
Start: 2022-01-03 | End: 2022-01-20 | Stop reason: SDUPTHER

## 2022-01-03 RX ORDER — CARVEDILOL 25 MG/1
37.5 TABLET ORAL 2 TIMES DAILY
Qty: 270 TABLET | Refills: 3 | Status: SHIPPED | OUTPATIENT
Start: 2022-01-03

## 2022-01-03 RX ORDER — ROSUVASTATIN CALCIUM 5 MG/1
5 TABLET, COATED ORAL DAILY
Qty: 90 TABLET | Refills: 1 | Status: SHIPPED | OUTPATIENT
Start: 2022-01-03 | End: 2022-07-05 | Stop reason: SDUPTHER

## 2022-01-03 NOTE — TELEPHONE ENCOUNTER
Toney Oconnell called requesting a refill of the below medication which has been pended for you:     Requested Prescriptions     Pending Prescriptions Disp Refills    sildenafil (REVATIO) 20 MG tablet 25 tablet 1     Sig: Take 1 tablet by mouth as needed (Erectile dysfunction)    losartan-hydroCHLOROthiazide (HYZAAR) 100-25 MG per tablet 90 tablet 1     Sig: Take 1 tablet by mouth daily       Last Appointment Date: 9/28/2021  Next Appointment Date: 1/20/2022    Allergies   Allergen Reactions    Codeine      Severe Abdominal pain    Sulfa Antibiotics      Patient unsure of reaction

## 2022-01-03 NOTE — TELEPHONE ENCOUNTER
Sissy Lehman called requesting a refill of the below medication which has been pended for you:     Requested Prescriptions     Pending Prescriptions Disp Refills    rosuvastatin (CRESTOR) 5 MG tablet 90 tablet 1     Sig: Take 1 tablet by mouth daily       Last Appointment Date: 9/28/2021  Next Appointment Date: 1/20/2022    Allergies   Allergen Reactions    Codeine      Severe Abdominal pain    Sulfa Antibiotics      Patient unsure of reaction

## 2022-01-05 ENCOUNTER — HOSPITAL ENCOUNTER (OUTPATIENT)
Dept: CT IMAGING | Age: 67
Discharge: HOME | End: 2022-01-07
Payer: MEDICARE

## 2022-01-05 DIAGNOSIS — R60.0 PERIORBITAL EDEMA: ICD-10-CM

## 2022-01-05 PROCEDURE — 2709999900 CT ORBITS W CONTRAST

## 2022-01-05 PROCEDURE — 6360000004 HC RX CONTRAST MEDICATION: Performed by: OPHTHALMOLOGY

## 2022-01-05 RX ADMIN — IOPAMIDOL 75 ML: 755 INJECTION, SOLUTION INTRAVENOUS at 12:34

## 2022-01-09 DIAGNOSIS — I10 ESSENTIAL HYPERTENSION: ICD-10-CM

## 2022-01-10 RX ORDER — AMLODIPINE BESYLATE 10 MG/1
10 TABLET ORAL DAILY
Qty: 90 TABLET | Refills: 1 | Status: SHIPPED | OUTPATIENT
Start: 2022-01-10 | End: 2022-07-19 | Stop reason: SDUPTHER

## 2022-01-10 NOTE — TELEPHONE ENCOUNTER
Snehal Jimenez called requesting a refill of the below medication which has been pended for you:     Requested Prescriptions     Pending Prescriptions Disp Refills    amLODIPine (NORVASC) 10 MG tablet 90 tablet 1     Sig: Take 1 tablet by mouth daily       Last Appointment Date: 9/28/2021  Next Appointment Date: 1/20/2022    Allergies   Allergen Reactions    Codeine      Severe Abdominal pain    Sulfa Antibiotics      Patient unsure of reaction

## 2022-01-20 ENCOUNTER — OFFICE VISIT (OUTPATIENT)
Dept: FAMILY MEDICINE CLINIC | Age: 67
End: 2022-01-20
Payer: MEDICARE

## 2022-01-20 VITALS
DIASTOLIC BLOOD PRESSURE: 68 MMHG | HEART RATE: 68 BPM | SYSTOLIC BLOOD PRESSURE: 138 MMHG | WEIGHT: 250 LBS | OXYGEN SATURATION: 98 % | TEMPERATURE: 96.8 F | RESPIRATION RATE: 14 BRPM | BODY MASS INDEX: 35 KG/M2 | HEIGHT: 71 IN

## 2022-01-20 DIAGNOSIS — N52.9 ERECTILE DYSFUNCTION, UNSPECIFIED ERECTILE DYSFUNCTION TYPE: ICD-10-CM

## 2022-01-20 DIAGNOSIS — E11.59 TYPE 2 DIABETES MELLITUS WITH OTHER CIRCULATORY COMPLICATION, WITHOUT LONG-TERM CURRENT USE OF INSULIN (HCC): Primary | ICD-10-CM

## 2022-01-20 DIAGNOSIS — C64.9 RENAL CELL CARCINOMA, UNSPECIFIED LATERALITY (HCC): ICD-10-CM

## 2022-01-20 DIAGNOSIS — M54.50 CHRONIC MIDLINE LOW BACK PAIN WITHOUT SCIATICA: ICD-10-CM

## 2022-01-20 DIAGNOSIS — G89.29 CHRONIC MIDLINE LOW BACK PAIN WITHOUT SCIATICA: ICD-10-CM

## 2022-01-20 DIAGNOSIS — D69.6 THROMBOCYTOPENIA (HCC): ICD-10-CM

## 2022-01-20 DIAGNOSIS — I77.810 DILATED AORTIC ROOT (HCC): ICD-10-CM

## 2022-01-20 PROCEDURE — G8427 DOCREV CUR MEDS BY ELIG CLIN: HCPCS | Performed by: FAMILY MEDICINE

## 2022-01-20 PROCEDURE — G8417 CALC BMI ABV UP PARAM F/U: HCPCS | Performed by: FAMILY MEDICINE

## 2022-01-20 PROCEDURE — 3046F HEMOGLOBIN A1C LEVEL >9.0%: CPT | Performed by: FAMILY MEDICINE

## 2022-01-20 PROCEDURE — 99212 OFFICE O/P EST SF 10 MIN: CPT | Performed by: FAMILY MEDICINE

## 2022-01-20 PROCEDURE — 1036F TOBACCO NON-USER: CPT | Performed by: FAMILY MEDICINE

## 2022-01-20 PROCEDURE — 2022F DILAT RTA XM EVC RTNOPTHY: CPT | Performed by: FAMILY MEDICINE

## 2022-01-20 PROCEDURE — 1123F ACP DISCUSS/DSCN MKR DOCD: CPT | Performed by: FAMILY MEDICINE

## 2022-01-20 PROCEDURE — 99214 OFFICE O/P EST MOD 30 MIN: CPT | Performed by: FAMILY MEDICINE

## 2022-01-20 PROCEDURE — G8484 FLU IMMUNIZE NO ADMIN: HCPCS | Performed by: FAMILY MEDICINE

## 2022-01-20 PROCEDURE — 3017F COLORECTAL CA SCREEN DOC REV: CPT | Performed by: FAMILY MEDICINE

## 2022-01-20 PROCEDURE — 4040F PNEUMOC VAC/ADMIN/RCVD: CPT | Performed by: FAMILY MEDICINE

## 2022-01-20 RX ORDER — CETIRIZINE HYDROCHLORIDE 10 MG/1
10 TABLET ORAL DAILY
Qty: 90 TABLET | Refills: 3 | Status: SHIPPED | OUTPATIENT
Start: 2022-01-20

## 2022-01-20 RX ORDER — SILDENAFIL CITRATE 20 MG/1
20 TABLET ORAL PRN
Qty: 30 TABLET | Refills: 5 | Status: SHIPPED | OUTPATIENT
Start: 2022-01-20

## 2022-01-20 RX ORDER — TETRACYCLINE HCL 500 MG
1 CAPSULE ORAL 2 TIMES DAILY
COMMUNITY

## 2022-01-20 ASSESSMENT — ENCOUNTER SYMPTOMS
BACK PAIN: 1
SHORTNESS OF BREATH: 0
CHEST TIGHTNESS: 0
COUGH: 0
WHEEZING: 0

## 2022-01-20 NOTE — PROGRESS NOTES
[facility]  [address] [place] [address]  Dept: [phone]  Dept Fax: [phone]  Loc: [phone]    Mario Harris is a 77 y.o. male who presents today for his medical conditions/complaints as noted below. Mario Harris is c/o of   Chief Complaint   Patient presents with    Diabetes     Patient is here for a 6 month follow.  Hypertension    Other     Patient has a list of things to talk about today. HPI:     HPI Here today for a follow up of his DM, HTN, and his back pain. Back pain: he went to PT it didn't help. His MRI showed some spinal canal stenosis so I recommended pain management. He saw pain management and Dr Le Preciado wanted to do shots and Phylicia Mcfarland decided he wanted to try glucosamine chondroitin. He is also taking acetic acid tablets (apple cider vinegar). It is making him feel significantly better. He was getting depressed over this situation but now that he is taking his supplements he is more flexible which has really helped his mood. Because his back is feeling better the right foot is feeling better as well. He has been having some issues with his right eye and has been watering a lot. He has been seeing optho and he will be seeing a specialist. He had a CT that showed nothing concerning. He has been having some issues with congestion in his ears so he would like me to check them. Earlier this week he was sick with chills, nausea, and diarrhea. He did not loose his sense of taste or smell. He felt much better after 24 hours. He feels like he does not sleep well because he has very vivid and exhausting dreams. They tend not to be good dreams. He typically gets a fair amount of sleep. DM: stable; He has not felt like his blood sugars are low. He has been eating a lot of sugary foods over [place].  He also had gained weight because he was not able to exercise because of his back. He has been taking his metformin and his actos. HTN: he has not had any issues with chest pain or shortness of breath. He has not had any issues with leg swelling. He has not had any issues with lightheadedness or dizziness. Past Medical History:   Diagnosis Date    Anxiety     Aortic regurgitation     Bicuspid aortic valve     Diabetes mellitus ([address] since March 2018    on Rx.     GERD (gastroesophageal reflux disease)     Hypertension     Left renal mass 10/2018    Osteoarthritis of left knee     Wears glasses           Social History     Tobacco Use    Smoking status: Never Smoker    Smokeless tobacco: Never Used   Substance Use Topics    Alcohol use: Not Currently     Comment: very rare     Current Outpatient Medications   Medication Sig Dispense Refill    Misc Natural Products (GLUCOS-CHONDROIT-MSM COMPLEX PO) Take 1 tablet by mouth 2 times daily      Apple Cider Vinegar 500 MG TABS Take 1 tablet by mouth 2 times daily      cetirizine (ZYRTEC) 10 MG tablet Take 1 tablet by mouth daily 90 tablet 3    sildenafil (REVATIO) 20 MG tablet Take 1 tablet by mouth as needed (Erectile dysfunction) 30 tablet 5    amLODIPine (NORVASC) 10 MG tablet Take 1 tablet by mouth daily 90 tablet 1    carvedilol (COREG) 25 MG tablet Take 1.5 tablets by mouth 2 times daily 270 tablet 3    rosuvastatin (CRESTOR) 5 MG tablet Take 1 tablet by mouth daily 90 tablet 1    losartan-hydroCHLOROthiazide (HYZAAR) 100-25 MG per tablet Take 1 tablet by mouth daily 90 tablet 1    pioglitazone (ACTOS) 15 MG tablet Take 1 tablet by mouth daily 90 tablet 1    furosemide (LASIX) 20 MG tablet Take 1 tablet by mouth daily 90 tablet 1    busPIRone (BUSPAR) 7.5 MG tablet Take 1 tablet by mouth 3 times daily as needed (anxiety) 90 tablet 1    montelukast (SINGULAIR) 10 MG tablet Take 1 tablet by mouth nightly 90 tablet 1    Handicap Placard MISC by Does not apply route Exp 7/1/2026 1 each 0    meloxicam (MOBIC) 7.5 MG tablet Take 1 tablet by mouth 2 times daily (with meals) 180 tablet 1    metFORMIN (GLUCOPHAGE) 500 MG tablet Take 1 tablet by mouth 2 times daily (with meals) 180 tablet 2    fluticasone (FLONASE) 50 MCG/ACT nasal spray 1 spray by Nasal route 2 times daily 1 Bottle 5    vitamin D3 (CHOLECALCIFEROL) 400 UNITS TABS tablet Take 400 Units by mouth every other day       Omeprazole (PRILOSEC PO) Take 1 capsule by mouth daily as needed       prednisoLONE acetate (PRED FORTE) 1 % ophthalmic suspension Place 1 drop into the right eye 4 times daily (Patient not taking: Reported on 1/20/2022) 1 Bottle 0    baclofen (LIORESAL) 10 MG tablet Take 1 tablet by mouth nightly as needed (muscle spasm) (Patient not taking: Reported on 12/28/2021) 30 tablet 1     No current facility-administered medications for this visit. Allergies   Allergen Reactions    Codeine      Severe Abdominal pain    Sulfa Antibiotics      Patient unsure of reaction       Subjective:     Review of Systems   Constitutional: Negative for activity change, appetite change, chills, fatigue and fever. Eyes: Negative for visual disturbance. Respiratory: Negative for cough, chest tightness, shortness of breath and wheezing. Cardiovascular: Negative for chest pain, palpitations and leg swelling. Genitourinary: Negative for difficulty urinating. Musculoskeletal: Positive for back pain. Skin: Negative for rash. Neurological: Negative for dizziness, syncope, weakness, light-headedness and headaches. Psychiatric/Behavioral: Negative for decreased concentration, dysphoric mood and sleep disturbance. The patient is not nervous/anxious. Objective:      Physical Exam  Vitals and nursing note reviewed. Constitutional:       General: He is not in acute distress. Appearance: He is well-developed.    HENT:      Right Ear: Tympanic membrane, ear canal and external ear normal.      Left Ear: Tympanic membrane, ear canal and external ear normal.   Eyes:      Conjunctiva/sclera: Conjunctivae normal.   Cardiovascular:      Rate and Rhythm: Normal rate and regular rhythm. Heart sounds: Normal heart sounds. No murmur heard. Pulmonary:      Effort: Pulmonary effort is normal. No respiratory distress. Breath sounds: Normal breath sounds. No wheezing or rales. Musculoskeletal:         General: Normal range of motion. Cervical back: Normal range of motion and neck supple. Lymphadenopathy:      Cervical: No cervical adenopathy. Skin:     General: Skin is warm and dry. Findings: No rash. Neurological:      Mental Status: He is alert and oriented to person, place, and time. Psychiatric:         Mood and Affect: Mood normal.         Behavior: Behavior normal.         Thought Content: Thought content normal.         Judgment: Judgment normal.       Monofilament Exam Reveals:  Pulses: normal  Edema:absent  Skin Lesions:siginificant calluses on both feet    Right Foot:      Normal sensation at 1, 2, 3, 4, 5, 6, 7, 8 and 10  Diminished sensation at 0  No sensation at 9 Due to callus      Left Foot:  Normal sensation at 2, 3, 4, 5, 6, 7, 8, and 10  Diminished sensation at 1  No sensation at 9 due callus            /68 (Site: Left Upper Arm, Position: Sitting, Cuff Size: Large Adult)   Pulse 68   Temp 96.8 °F (36 °C) (Temporal)   Resp 14   Ht 5' 11\" (1.803 m)   Wt 250 lb (113.4 kg)   SpO2 98%   BMI 34.87 kg/m²     Assessment:       Diagnosis Orders   1. Type 2 diabetes mellitus with other circulatory complication, without long-term current use of insulin ([facility])  Hemoglobin Z9L    Basic Metabolic Panel    Hemoglobin A1C     DIABETES FOOT EXAM   2. Renal cell carcinoma, unspecified laterality ([address]     3. Dilated aortic root ([address]     4. Thrombocytopenia ([address]     5. Erectile dysfunction, unspecified erectile dysfunction type  sildenafil (REVATIO) 20 MG tablet   6.  Chronic midline low back pain without

## 2022-01-24 ENCOUNTER — HOSPITAL ENCOUNTER (OUTPATIENT)
Age: 67
Setting detail: SPECIMEN
Discharge: HOME | End: 2022-01-24

## 2022-01-24 ENCOUNTER — HOSPITAL ENCOUNTER (OUTPATIENT)
Dept: LAB | Age: 67
Discharge: HOME | End: 2022-01-24
Payer: MEDICARE

## 2022-01-24 ENCOUNTER — OFFICE VISIT (OUTPATIENT)
Dept: DERMATOLOGY | Age: 67
End: 2022-01-24
Payer: MEDICARE

## 2022-01-24 VITALS
SYSTOLIC BLOOD PRESSURE: 128 MMHG | WEIGHT: 250.2 LBS | HEIGHT: 71 IN | DIASTOLIC BLOOD PRESSURE: 68 MMHG | HEART RATE: 68 BPM | BODY MASS INDEX: 35.03 KG/M2 | OXYGEN SATURATION: 97 %

## 2022-01-24 DIAGNOSIS — L21.9 SEBORRHEIC DERMATITIS: ICD-10-CM

## 2022-01-24 DIAGNOSIS — E11.59 TYPE 2 DIABETES MELLITUS WITH OTHER CIRCULATORY COMPLICATION, WITHOUT LONG-TERM CURRENT USE OF INSULIN (HCC): ICD-10-CM

## 2022-01-24 DIAGNOSIS — L57.0 ACTINIC KERATOSES: ICD-10-CM

## 2022-01-24 DIAGNOSIS — L57.8 ACTINIC SKIN DAMAGE: ICD-10-CM

## 2022-01-24 DIAGNOSIS — D48.5 NEOPLASM OF UNCERTAIN BEHAVIOR OF SKIN: ICD-10-CM

## 2022-01-24 DIAGNOSIS — L73.9 FOLLICULITIS: Primary | ICD-10-CM

## 2022-01-24 DIAGNOSIS — L82.1 SEBORRHEIC KERATOSES: ICD-10-CM

## 2022-01-24 LAB
ANION GAP SERPL CALCULATED.3IONS-SCNC: 8 MMOL/L (ref 9–17)
BUN BLDV-MCNC: 26 MG/DL (ref 8–23)
BUN/CREAT BLD: 28 (ref 9–20)
CALCIUM SERPL-MCNC: 9.9 MG/DL (ref 8.6–10.4)
CHLORIDE BLD-SCNC: 99 MMOL/L (ref 98–107)
CHOLESTEROL/HDL RATIO: 3
CHOLESTEROL: 123 MG/DL
CO2: 28 MMOL/L (ref 20–31)
CREAT SERPL-MCNC: 0.93 MG/DL (ref 0.7–1.2)
GFR AFRICAN AMERICAN: >60 ML/MIN
GFR NON-AFRICAN AMERICAN: >60 ML/MIN
GFR SERPL CREATININE-BSD FRML MDRD: ABNORMAL ML/MIN/{1.73_M2}
GFR SERPL CREATININE-BSD FRML MDRD: ABNORMAL ML/MIN/{1.73_M2}
GLUCOSE BLD-MCNC: 260 MG/DL (ref 70–99)
HDLC SERPL-MCNC: 41 MG/DL
LDL CHOLESTEROL: 58 MG/DL (ref 0–130)
POTASSIUM SERPL-SCNC: 4.3 MMOL/L (ref 3.7–5.3)
SODIUM BLD-SCNC: 135 MMOL/L (ref 135–144)
TRIGL SERPL-MCNC: 120 MG/DL
VLDLC SERPL CALC-MCNC: NORMAL MG/DL (ref 1–30)

## 2022-01-24 PROCEDURE — 3017F COLORECTAL CA SCREEN DOC REV: CPT | Performed by: DERMATOLOGY

## 2022-01-24 PROCEDURE — 17003 DESTRUCT PREMALG LES 2-14: CPT | Performed by: DERMATOLOGY

## 2022-01-24 PROCEDURE — 17000 DESTRUCT PREMALG LESION: CPT | Performed by: DERMATOLOGY

## 2022-01-24 PROCEDURE — G8427 DOCREV CUR MEDS BY ELIG CLIN: HCPCS | Performed by: DERMATOLOGY

## 2022-01-24 PROCEDURE — 83036 HEMOGLOBIN GLYCOSYLATED A1C: CPT

## 2022-01-24 PROCEDURE — 99214 OFFICE O/P EST MOD 30 MIN: CPT | Performed by: DERMATOLOGY

## 2022-01-24 PROCEDURE — 36415 COLL VENOUS BLD VENIPUNCTURE: CPT

## 2022-01-24 PROCEDURE — 1036F TOBACCO NON-USER: CPT | Performed by: DERMATOLOGY

## 2022-01-24 PROCEDURE — 11102 TANGNTL BX SKIN SINGLE LES: CPT | Performed by: DERMATOLOGY

## 2022-01-24 PROCEDURE — G8417 CALC BMI ABV UP PARAM F/U: HCPCS | Performed by: DERMATOLOGY

## 2022-01-24 PROCEDURE — 80061 LIPID PANEL: CPT

## 2022-01-24 PROCEDURE — 80048 BASIC METABOLIC PNL TOTAL CA: CPT

## 2022-01-24 PROCEDURE — G8484 FLU IMMUNIZE NO ADMIN: HCPCS | Performed by: DERMATOLOGY

## 2022-01-24 PROCEDURE — 4040F PNEUMOC VAC/ADMIN/RCVD: CPT | Performed by: DERMATOLOGY

## 2022-01-24 PROCEDURE — 1123F ACP DISCUSS/DSCN MKR DOCD: CPT | Performed by: DERMATOLOGY

## 2022-01-24 PROCEDURE — 11103 TANGNTL BX SKIN EA SEP/ADDL: CPT | Performed by: DERMATOLOGY

## 2022-01-24 RX ORDER — CLINDAMYCIN PHOSPHATE 11.9 MG/ML
SOLUTION TOPICAL
Qty: 30 ML | Refills: 3 | Status: SHIPPED | OUTPATIENT
Start: 2022-01-24 | End: 2022-08-22 | Stop reason: SDUPTHER

## 2022-01-24 RX ORDER — KETOCONAZOLE 20 MG/ML
SHAMPOO TOPICAL
Qty: 120 ML | Refills: 2 | Status: SHIPPED | OUTPATIENT
Start: 2022-01-24

## 2022-01-24 NOTE — PROGRESS NOTES
 clindamycin (CLEOCIN-T) 1 % external solution Apply to scalp daily for folliculitis 30 mL 3    Misc Natural Products (GLUCOS-CHONDROIT-MSM COMPLEX PO) Take 1 tablet by mouth 2 times daily      Apple Cider Vinegar 500 MG TABS Take 1 tablet by mouth 2 times daily      cetirizine (ZYRTEC) 10 MG tablet Take 1 tablet by mouth daily 90 tablet 3    sildenafil (REVATIO) 20 MG tablet Take 1 tablet by mouth as needed (Erectile dysfunction) 30 tablet 5    amLODIPine (NORVASC) 10 MG tablet Take 1 tablet by mouth daily 90 tablet 1    carvedilol (COREG) 25 MG tablet Take 1.5 tablets by mouth 2 times daily 270 tablet 3    rosuvastatin (CRESTOR) 5 MG tablet Take 1 tablet by mouth daily 90 tablet 1    losartan-hydroCHLOROthiazide (HYZAAR) 100-25 MG per tablet Take 1 tablet by mouth daily 90 tablet 1    pioglitazone (ACTOS) 15 MG tablet Take 1 tablet by mouth daily 90 tablet 1    furosemide (LASIX) 20 MG tablet Take 1 tablet by mouth daily 90 tablet 1    busPIRone (BUSPAR) 7.5 MG tablet Take 1 tablet by mouth 3 times daily as needed (anxiety) 90 tablet 1    montelukast (SINGULAIR) 10 MG tablet Take 1 tablet by mouth nightly 90 tablet 1    Handicap Placard MISC by Does not apply route Exp 7/1/2026 1 each 0    prednisoLONE acetate (PRED FORTE) 1 % ophthalmic suspension Place 1 drop into the right eye 4 times daily 1 Bottle 0    meloxicam (MOBIC) 7.5 MG tablet Take 1 tablet by mouth 2 times daily (with meals) 180 tablet 1    metFORMIN (GLUCOPHAGE) 500 MG tablet Take 1 tablet by mouth 2 times daily (with meals) 180 tablet 2    fluticasone (FLONASE) 50 MCG/ACT nasal spray 1 spray by Nasal route 2 times daily 1 Bottle 5    baclofen (LIORESAL) 10 MG tablet Take 1 tablet by mouth nightly as needed (muscle spasm) 30 tablet 1    vitamin D3 (CHOLECALCIFEROL) 400 UNITS TABS tablet Take 400 Units by mouth every other day       Omeprazole (PRILOSEC PO) Take 1 capsule by mouth daily as needed        No current facility-administered medications for this visit. ALLERGIES:   Allergies   Allergen Reactions    Codeine      Severe Abdominal pain    Sulfa Antibiotics      Patient unsure of reaction       SOCIAL HISTORY:  Social History     Tobacco Use    Smoking status: Never Smoker    Smokeless tobacco: Never Used   Substance Use Topics    Alcohol use: Not Currently     Comment: very rare       Pertinent ROS:  Review of Systems  Skin: Denies any new changing, growing or bleeding lesions or rashes except as described in the HPI   Constitutional: Denies fevers, chills, and malaise. PHYSICAL EXAM:   /68 (Site: Left Upper Arm, Position: Sitting, Cuff Size: Large Adult)   Pulse 68   Ht 5' 11\" (1.803 m)   Wt 250 lb 3.2 oz (113.5 kg)   SpO2 97%   BMI 34.90 kg/m²     The patient is generally well appearing, well nourished, alert and conversational. Affect is normal.    Cutaneous Exam:  Physical Exam  Total body skin exam excluding external genitalia: head/face, neck, both arms, chest, back, abdomen, both legs, buttocks, digits and/or nails, was examined. Genital exam was deferred as patient denied having any lesions in this area. Complete visualization of scalp may be limited by hair density, length, and/or style    Facial covering was removed during examination. Diagnoses/exam findings/medical history pertinent to this visit are listed below:    Assessment:   Diagnosis Orders   1. Folliculitis  clindamycin (CLEOCIN-T) 1 % external solution   2. Seborrheic dermatitis  ketoconazole (NIZORAL) 2 % shampoo   3. Actinic keratoses     4. Neoplasm of uncertain behavior of skin     5. Actinic skin damage     6. Seborrheic keratoses          Plan:  1. Folliculitis of scalp  - discussed diagnosis, etiology, natural course, and treatment options  - chronic illness with progression and/or exacerbation  - clindamycin (CLEOCIN-T) 1 % external solution;  Apply to scalp daily for folliculitis  Dispense: 30 mL; Refill: ULTRASOUND RM 1 MD VASCLAB STV [place]   12/7/2022 11:00 AM MD [facility]         Patient Instructions   BIOPSY WOUND CARE    A biopsy is where a small piece of skin tissue is removed and examined by a pathologist.  When a biopsy is done, there is a small wound site that requires proper care to prevent infection and scarring. Some biopsies require sutures and their removal.    How to Care for Biopsy Wound    A.  Leave band-aid or dressing on for 24 hours. B. Wash two times a day with soap and water. C.  Let the wound air dry, then apply Vaseline ointment and cover with a Band-Aid       unless otherwise instructed by your provider. D. If there is slight discomfort, you may give acetaminophen or ibuprofen. When To Call the Doctor    Call the Dermatology Clinic or your doctor if any of the following occur:    A. Redness and swelling  B. Tenderness and warm to touch  C.  Drainage from wound  D. Fever    Biopsy Results    Biopsy results are usually available in 1-2 weeks. We provide biopsy results in letters or via [employer] for benign results or we will call for any concerning results. If you have not heard from our staff please call the office within 2 weeks. Please call our office with any concerns at [phone]. Cryotherapy    Liquid Nitrogen - \"freeze\" (Cryotherapy)  Your doctor has treated your skin lesions with a very cold substance. The liquid nitrogen is so cold that it may feel like the skin is burning during application. A clear blister or blood blister may form after treatment and may later form a scab. Leave the area alone. Usually this scab will fall of within 1-2 weeks. The area should be kept clean and can be covered with Vaseline and a Band-Aid if needed. If a large blister develops it is ok to use a clean needle to gently pop the blister. Please call our office with any concerns at [phone].       This note was created with the assistance of lori speech-recognition program.  Although the intention is to generate a document that actually reflects the content of the visit, no guarantees can be provided that every mistake has been identified and corrected by editing.     Electronically signed by Carrie Lo MD on 1/24/22 at 11:39 AM EST

## 2022-01-24 NOTE — PATIENT INSTRUCTIONS
BIOPSY WOUND CARE    A biopsy is where a small piece of skin tissue is removed and examined by a pathologist.  When a biopsy is done, there is a small wound site that requires proper care to prevent infection and scarring. Some biopsies require sutures and their removal.    How to Care for Biopsy Wound    A.  Leave band-aid or dressing on for 24 hours. B. Wash two times a day with soap and water. C.  Let the wound air dry, then apply Vaseline ointment and cover with a Band-Aid       unless otherwise instructed by your provider. D. If there is slight discomfort, you may give acetaminophen or ibuprofen. When To Call the Doctor    Call the Dermatology Clinic or your doctor if any of the following occur:    A. Redness and swelling  B. Tenderness and warm to touch  C.  Drainage from wound  D. Fever    Biopsy Results    Biopsy results are usually available in 1-2 weeks. We provide biopsy results in letters or via [employer] for benign results or we will call for any concerning results. If you have not heard from our staff please call the office within 2 weeks. Please call our office with any concerns at [phone]. Cryotherapy    Liquid Nitrogen - \"freeze\" (Cryotherapy)  Your doctor has treated your skin lesions with a very cold substance. The liquid nitrogen is so cold that it may feel like the skin is burning during application. A clear blister or blood blister may form after treatment and may later form a scab. Leave the area alone. Usually this scab will fall of within 1-2 weeks. The area should be kept clean and can be covered with Vaseline and a Band-Aid if needed. If a large blister develops it is ok to use a clean needle to gently pop the blister. Please call our office with any concerns at [phone].

## 2022-01-25 DIAGNOSIS — C44.319 BASAL CELL CARCINOMA (BCC) OF JAWLINE: ICD-10-CM

## 2022-01-25 DIAGNOSIS — C44.319 BASAL CELL CARCINOMA (BCC) OF RIGHT TEMPLE REGION: ICD-10-CM

## 2022-01-25 LAB
ESTIMATED AVERAGE GLUCOSE: 183 MG/DL
HBA1C MFR BLD: 8 % (ref 4–6)

## 2022-01-25 RX ORDER — ORAL SEMAGLUTIDE 3 MG/1
1 TABLET ORAL DAILY
Qty: 30 TABLET | Refills: 1 | Status: SHIPPED
Start: 2022-01-25 | End: 2022-05-09 | Stop reason: DRUGHIGH

## 2022-01-26 LAB — DERMATOLOGY PATHOLOGY REPORT: NORMAL

## 2022-01-27 NOTE — RESULT ENCOUNTER NOTE
The lesion we biopsied are the most common and least serious form of skin cancer, basal cell carcinoma. Due to location, they should be removed with mohs surgery, which gets the highest cure rate (>99%) while removing the least amount of skin. Referral will be placed to Dr. Anne Marie Hamilton. Please schedule follow-up with me in 6 months if not already scheduled.

## 2022-02-07 DIAGNOSIS — J30.2 OTHER SEASONAL ALLERGIC RHINITIS: Primary | ICD-10-CM

## 2022-02-07 NOTE — TELEPHONE ENCOUNTER
KB notes, LK pt.       Snehal Jimenez called requesting a refill of the below medication which has been pended for you:     Requested Prescriptions     Pending Prescriptions Disp Refills    montelukast (SINGULAIR) 10 MG tablet 30 tablet 0     Sig: Take 1 tablet by mouth nightly       Last Appointment Date: 5/17/2021  Next Appointment Date: 7/21/2022    Allergies   Allergen Reactions    Codeine      Severe Abdominal pain    Sulfa Antibiotics      Patient unsure of reaction

## 2022-02-08 RX ORDER — MONTELUKAST SODIUM 10 MG/1
10 TABLET ORAL NIGHTLY
Qty: 90 TABLET | Refills: 0 | Status: SHIPPED | OUTPATIENT
Start: 2022-02-08 | End: 2022-05-02 | Stop reason: SDUPTHER

## 2022-03-03 ENCOUNTER — OFFICE VISIT (OUTPATIENT)
Dept: ORTHOPEDIC SURGERY | Age: 67
End: 2022-03-03

## 2022-03-03 VITALS — RESPIRATION RATE: 16 BRPM | WEIGHT: 250 LBS | BODY MASS INDEX: 35 KG/M2 | HEIGHT: 71 IN

## 2022-03-03 DIAGNOSIS — M19.079 ARTHRITIS OF MIDFOOT: Primary | ICD-10-CM

## 2022-03-03 DIAGNOSIS — M21.869 GASTROCNEMIUS EQUINUS, UNSPECIFIED LATERALITY: ICD-10-CM

## 2022-03-03 PROCEDURE — 3017F COLORECTAL CA SCREEN DOC REV: CPT | Performed by: ORTHOPAEDIC SURGERY

## 2022-03-03 PROCEDURE — G8484 FLU IMMUNIZE NO ADMIN: HCPCS | Performed by: ORTHOPAEDIC SURGERY

## 2022-03-03 PROCEDURE — G8417 CALC BMI ABV UP PARAM F/U: HCPCS | Performed by: ORTHOPAEDIC SURGERY

## 2022-03-03 PROCEDURE — G8427 DOCREV CUR MEDS BY ELIG CLIN: HCPCS | Performed by: ORTHOPAEDIC SURGERY

## 2022-03-03 PROCEDURE — 1123F ACP DISCUSS/DSCN MKR DOCD: CPT | Performed by: ORTHOPAEDIC SURGERY

## 2022-03-03 PROCEDURE — 99214 OFFICE O/P EST MOD 30 MIN: CPT | Performed by: ORTHOPAEDIC SURGERY

## 2022-03-03 PROCEDURE — 4040F PNEUMOC VAC/ADMIN/RCVD: CPT | Performed by: ORTHOPAEDIC SURGERY

## 2022-03-03 PROCEDURE — 1036F TOBACCO NON-USER: CPT | Performed by: ORTHOPAEDIC SURGERY

## 2022-03-03 NOTE — LETTER
[address] [place] [address] and Sports Medicine  [address] [address]  Phone: [phone]  Fax: [phone]    Sherlyn Maya MD    March 7, 2022     [address]    Patient: Ayush Patel   MR Number: [id number]   YOB: 1955   Date of Visit: 3/3/2022       Dear James Garcia: Thank you for referring Yun Ventura to me for evaluation/treatment. Below are the relevant portions of my assessment and plan of care. He has right foot pain secondary to lateral column midfoot arthritis, most pronounced at his 4-5-cuboid joint and somewhat at his third TMT joint, with an underlying gastroc equinus contracture. Notably, he has a history of aortic regurgitation, aortic aneurysm, kidney cancer status post mass resection, and non-insulin-dependent diabetes. We had a discussion today about the likely diagnosis and its natural history, physical exam and imaging findings, as well as treatment options in detail. Surgically, we again discussed a possible right foot fourth and fifth TMT joint resection arthroplasties, possible third TMT fusion, and gastroc recession. We discussed the expected postoperative course, including the relevant weightbearing restrictions/immobilization. At today's visit, we did decide to continue with conservative management. Orders/referrals were placed as below at today's visit. The patient was referred to prosthetics and orthotics obtain a custom rocker-bottom shoe. I provided a prescription for Voltaren (4g TOPL q QID PRN pain). I recommend this over the use of oral NSAIDs because of his history of diabetes and subsequent increased risk of kidney injury. All questions were answered and the patient agrees with the above plan. The patient will return to clinic in the future as needed with repeat right foot x-rays.  At his next visit, we may consider surgery as above.              If you have questions, please do not hesitate to call me. I look forward to following Mustapha Cameron along with you.     Sincerely,      Milton Small MD

## 2022-03-03 NOTE — PROGRESS NOTES
[address] [place] [address] AND SPORTS MEDICINE  [facility] Jorge Rollins  [address] [address]  Dept: [phone]    Ambulatory Orthopedic Consult      CHIEF COMPLAINT:    Chief Complaint   Patient presents with    Foot Pain     Right       HISTORY OF PRESENT ILLNESS:      The patient is a 77 y.o. male who is being seen for consultation and evaluation of pain at the right lateral midfoot/hindfoot, which began atraumatically about 15 to 20 years ago. The pain is described mainly with mechanical terms (dull/sharp/throbbing). The pain is worse with activity and better with rest. The patient reports a progressive course. The patient has tried:      [x]  rest/activity modification          [x]  NSAIDs      []  opiates      [x]  orthotics        []  change in shoes   []  home exercises  []  physical therapy      []  CAM boot     []  brace:    []  injection:       []  surgery:       The patient has previously seen Dr. Emmett Salvador in the past for this problem. INTERVAL HISTORY 8/27/2020:  He is seen again today in the office for follow up of a previous issue (as above). Since being seen last, the patient is doing better. He is ambulating today without a brace or assistive device. The location and quality of the pain have not significantly changed since the last visit. INTERVAL HISTORY 3/3/2022:  He is seen again today in the office for follow up of a previous issue (as above). Since being seen last, the patient is doing worse. At today's visit, he is not using a brace or assistive device.     History is obtained today from:   [x]  the patient     []  EMR     []  one family member/friend    []  multiple family members/friends    []  other:          REVIEW OF SYSTEMS:  Musculoskeletal: See HPI for pertinent positives     Past Medical History:    He  has a past medical history of Anxiety, Aortic regurgitation, Bicuspid aortic valve, Diabetes mellitus ([facility] [address] (since March 2018), GERD (gastroesophageal reflux disease), Hypertension, Left renal mass (10/2018), Osteoarthritis of left knee, and Wears glasses. Past Surgical History:    He  has a past surgical history that includes cyst removal; [place] tooth extraction; Mouth surgery (2015); pr colonoscopy flx dx w/collj spec when pfrmd (N/A, 5/14/2018); Tonsillectomy and adenoidectomy (1960); partial nephrectomy (Left, 11/30/2018); and pr lap,partial nephrectomy (Left, 11/30/2018).      Current Medications:     Current Outpatient Medications:     diclofenac sodium (VOLTAREN) 1 % GEL, Apply 2 g topically 4 times daily as needed for Pain, Disp: 200 g, Rfl: 1    montelukast (SINGULAIR) 10 MG tablet, Take 1 tablet by mouth nightly, Disp: 90 tablet, Rfl: 0    Semaglutide (RYBELSUS) 3 MG TABS, Take 1 tablet by mouth daily, Disp: 30 tablet, Rfl: 1    ketoconazole (NIZORAL) 2 % shampoo, Apply 3-4 times weekly to scalp, temples, and ears, leave on for five minutes prior to washing off, Disp: 120 mL, Rfl: 2    Misc Natural Products (GLUCOS-CHONDROIT-MSM COMPLEX PO), Take 1 tablet by mouth 2 times daily, Disp: , Rfl:     Apple Cider Vinegar 500 MG TABS, Take 1 tablet by mouth 2 times daily, Disp: , Rfl:     cetirizine (ZYRTEC) 10 MG tablet, Take 1 tablet by mouth daily, Disp: 90 tablet, Rfl: 3    sildenafil (REVATIO) 20 MG tablet, Take 1 tablet by mouth as needed (Erectile dysfunction), Disp: 30 tablet, Rfl: 5    amLODIPine (NORVASC) 10 MG tablet, Take 1 tablet by mouth daily, Disp: 90 tablet, Rfl: 1    carvedilol (COREG) 25 MG tablet, Take 1.5 tablets by mouth 2 times daily, Disp: 270 tablet, Rfl: 3    rosuvastatin (CRESTOR) 5 MG tablet, Take 1 tablet by mouth daily, Disp: 90 tablet, Rfl: 1    losartan-hydroCHLOROthiazide (HYZAAR) 100-25 MG per tablet, Take 1 tablet by mouth daily, Disp: 90 tablet, Rfl: 1    pioglitazone (ACTOS) 15 MG tablet, Take 1 tablet by mouth daily, Disp: 90 tablet, Rfl: 1    furosemide (LASIX) 20 MG tablet, Take 1 tablet by mouth daily, Disp: 90 tablet, Rfl: 1    busPIRone (BUSPAR) 7.5 MG tablet, Take 1 tablet by mouth 3 times daily as needed (anxiety), Disp: 90 tablet, Rfl: 1    Handicap Placard MISC, by Does not apply route Exp 7/1/2026, Disp: 1 each, Rfl: 0    prednisoLONE acetate (PRED FORTE) 1 % ophthalmic suspension, Place 1 drop into the right eye 4 times daily, Disp: 1 Bottle, Rfl: 0    meloxicam (MOBIC) 7.5 MG tablet, Take 1 tablet by mouth 2 times daily (with meals), Disp: 180 tablet, Rfl: 1    metFORMIN (GLUCOPHAGE) 500 MG tablet, Take 1 tablet by mouth 2 times daily (with meals), Disp: 180 tablet, Rfl: 2    fluticasone (FLONASE) 50 MCG/ACT nasal spray, 1 spray by Nasal route 2 times daily, Disp: 1 Bottle, Rfl: 5    baclofen (LIORESAL) 10 MG tablet, Take 1 tablet by mouth nightly as needed (muscle spasm), Disp: 30 tablet, Rfl: 1    vitamin D3 (CHOLECALCIFEROL) 400 UNITS TABS tablet, Take 400 Units by mouth every other day , Disp: , Rfl:     Omeprazole (PRILOSEC PO), Take 1 capsule by mouth daily as needed , Disp: , Rfl:      Allergies:    Codeine and Sulfa antibiotics    Family History:  family history includes Cancer in his father; Diabetes in his father, maternal grandmother, mother, and paternal grandfather; Glaucoma in his brother, father, and mother; Heart Attack (age of onset: 66) in his father; High Blood Pressure in his father and mother; No Known Problems in his brother; Other in his mother, paternal grandfather, and paternal grandmother; Stroke in his paternal grandfather; Stroke (age of onset: 66) in his father.     Social History:   Social History     Occupational History    Not on file   Tobacco Use    Smoking status: Never Smoker    Smokeless tobacco: Never Used   Vaping Use    Vaping Use: Never used   Substance and Sexual Activity    Alcohol use: Not Currently     Comment: very rare    Drug use: No    Sexual activity: Yes     Partners: Male     Occupation: [employer] part-time, reports that he is on his feet all day     OBJECTIVE:  Resp 16   Ht 5' 11\" (1.803 m)   Wt 250 lb (113.4 kg)   BMI 34.87 kg/m²    Psych: alert and oriented to person, time, and place  Cardio:  well perfused extremities  Resp:  normal respiratory effort    Neuro:  sensation to light touch grossly intact throughout all nerve distributions in the foot   Musculoskeletal:    RLE:  Alignment:  Heel neutral subtle cavus  Vascular: Toes warm and well perfused, compartments soft/compressible. No significant swelling of foot. Skin: Intact without rash/lesions/AV malformations. Strength: Able to fire/perform the following with appropriate strength:    [x]  Tib Ant:     [x]  Gastroc-Soleus:         [x]  Inversion:    [x]  Eversion:         [x]  FHL:     [x]  EHL:      Motion:  Normal for the following joints:    [x]  Ankle:     [x]  Subtalar:        [x]  1st MTP:      []  1st TMT:            Tenderness to Palpation:    Tenderness to palpation: Lateral midfoot/hindfoot at the 4-5-cuboid joint greater than calcaneocuboid joint  -Gastroc equinus, contracture peroneus longus      LLE:  Alignment:  Heel neutral subtle cavus  Vascular: Toes warm and well perfused, compartments soft/compressible. No significant swelling of foot. Skin: Intact without rash/lesions/AV malformations. Strength: Able to fire/perform the following with appropriate strength:    [x]  Tib Ant:     [x]  Gastroc-Soleus:         [x]  Inversion:    [x]  Eversion:         [x]  FHL:     [x]  EHL:      Motion:  Normal for the following joints:    [x]  Ankle:     [x]  Subtalar:        [x]  1st MTP:      []  1st TMT:            Tenderness to Palpation:    Tenderness to palpation: None  -Gastroc equinus, contracture peroneus longus with callus at the plantar medial aspect of the first metatarsal head      RADIOLOGY:   3/3/2022 No new radiology images today. Prior images reviewed for reference.         CT images and radiology report reviewed, as below: 1. Moderate degenerative changes of the 3rd through 5th TMT joints primarily    at the 5th TMT joint. 2. Mild hallux valgus/metatarsus varus deformity of the 1st MTP joint. 3. No acute osseous abnormality. 4. Mild edema in the soft tissues about the foot.  Moderate edema in the soft    tissues about the right ankle. 5. No acute osseous abnormality. 6. Subcortical cystic changes around the medial aspect of the subtalar    joint/talocalcaneal articulation which could be related to a nonosseous    tarsal coalition. X-rays reviewed, both images and report as below. Limited studies. There are degenerative changes diffusely throughout the midfoot, most pronounced at the 4 5 cuboid joint. Xr Ankle Right (min 3 Views)    Result Date: 6/25/2020  Moderate degenerative changes about the ankle and foot. Xr Foot Right (min 3 Views)    Result Date: 6/25/2020  Moderate degenerative changes about the ankle and foot. ASSESSMENT AND PLAN:  Body mass index is 34.87 kg/m². He has right foot pain secondary to lateral column midfoot arthritis, most pronounced at his 4-5-cuboid joint and somewhat at his third TMT joint, with an underlying gastroc equinus contracture. Notably, he has a history of aortic regurgitation, aortic aneurysm, kidney cancer status post mass resection, and non-insulin-dependent diabetes. We had a discussion today about the likely diagnosis and its natural history, physical exam and imaging findings, as well as treatment options in detail. Surgically, we again discussed a possible right foot fourth and fifth TMT joint resection arthroplasties, possible third TMT fusion, and gastroc recession. We discussed the expected postoperative course, including the relevant weightbearing restrictions/immobilization. At today's visit, we did decide to continue with conservative management. Orders/referrals were placed as below at today's visit.      The patient was referred to prosthetics and orthotics obtain a custom rocker-bottom shoe. I provided a prescription for Voltaren (4g TOPL q QID PRN pain). I recommend this over the use of oral NSAIDs because of his history of diabetes and subsequent increased risk of kidney injury. All questions were answered and the patient agrees with the above plan. The patient will return to clinic in the future as needed with repeat right foot x-rays. At his next visit, we may consider surgery as above. No follow-ups on file. No orders of the defined types were placed in this encounter. Orders Placed This Encounter   Procedures    DME Order for Orthosis as OP     Eval and treat. Please provide:  Stiff soled shoes with rocker bottom     Jami Lucero MD  Orthopedic Surgery, Foot and Ankle         Jami Lucero MD  Orthopedic Surgery, Foot and Ankle        Please excuse any typos/errors, as this note was created with the assistance of voice recognition software. While intending to generate a document that actually reflects the content of the visit, the document can still have some errors including those of syntax and sound-a-like substitutions which may escape proof reading. In such instances, actual meaning can be extrapolated by context.

## 2022-03-13 ENCOUNTER — PATIENT MESSAGE (OUTPATIENT)
Dept: FAMILY MEDICINE CLINIC | Age: 67
End: 2022-03-13

## 2022-03-13 DIAGNOSIS — E11.9 TYPE 2 DIABETES MELLITUS WITHOUT COMPLICATION, WITHOUT LONG-TERM CURRENT USE OF INSULIN (HCC): ICD-10-CM

## 2022-03-13 DIAGNOSIS — M79.89 LEG SWELLING: ICD-10-CM

## 2022-03-13 DIAGNOSIS — M79.671 RIGHT FOOT PAIN: ICD-10-CM

## 2022-03-14 RX ORDER — MELOXICAM 7.5 MG/1
7.5 TABLET ORAL 2 TIMES DAILY WITH MEALS
Qty: 180 TABLET | Refills: 1 | Status: SHIPPED | OUTPATIENT
Start: 2022-03-14 | End: 2022-09-22 | Stop reason: SDUPTHER

## 2022-03-14 RX ORDER — FUROSEMIDE 20 MG/1
20 TABLET ORAL DAILY
Qty: 90 TABLET | Refills: 1 | Status: SHIPPED | OUTPATIENT
Start: 2022-03-14 | End: 2022-10-17 | Stop reason: SDUPTHER

## 2022-03-14 NOTE — TELEPHONE ENCOUNTER
From: Alex Ellis  To: Dr. Renan Lopez: 3/13/2022 10:19 PM EDT  Subject: Onel Dupree. .. My eye Dr sent me to an eye specialist in [facility]. He is trying to figure out whats going on. He asked me to check with you about any Thyroid problems, heart issues, and allergies. I told him about my heart murmur and aneurism on my heart, that I take 2 different pills for allergies/nasal drainage. . but I knew nothing about any Thyroid issues. Did he message you?

## 2022-03-14 NOTE — TELEPHONE ENCOUNTER
Per Opthamology notes, 3/1/2022:    Assessment/Plan:  1. Periorbital edema of right eye  2. Chemosis of right conjunctiva  Uncertain etiology. No relative proptosis. Will review recent CT scan. This was performed at the [address] [place] [address], and I do not have immediate access to at this time. I recommend that he see his PCP to investigate for possible systemic cause, including cardiac disease, renal disease and thyroid disease. He does have known allergies and takes oral medication for treatment of this, but his eye is not itching at this time.

## 2022-03-14 NOTE — TELEPHONE ENCOUNTER
Cuca Hernandez called requesting a refill of the below medication which has been pended for you:     Requested Prescriptions     Pending Prescriptions Disp Refills    meloxicam (MOBIC) 7.5 MG tablet 180 tablet 1     Sig: Take 1 tablet by mouth 2 times daily (with meals)    metFORMIN (GLUCOPHAGE) 500 MG tablet 180 tablet 1     Sig: Take 1 tablet by mouth 2 times daily (with meals)    furosemide (LASIX) 20 MG tablet 90 tablet 1     Sig: Take 1 tablet by mouth daily       Last Appointment Date: 1/20/2022  Next Appointment Date: 7/21/2022    Allergies   Allergen Reactions    Codeine      Severe Abdominal pain    Sulfa Antibiotics      Patient unsure of reaction

## 2022-03-18 ENCOUNTER — HOSPITAL ENCOUNTER (OUTPATIENT)
Dept: LAB | Age: 67
Discharge: HOME | End: 2022-03-18
Payer: MEDICARE

## 2022-03-18 LAB
T3 FREE: 3.11 PG/ML (ref 2.02–4.43)
THYROXINE, FREE: 0.83 NG/DL (ref 0.93–1.7)
TSH SERPL DL<=0.05 MIU/L-ACNC: 26.38 MIU/L (ref 0.3–5)
VITAMIN D 25-HYDROXY: 52.4 NG/ML

## 2022-03-18 PROCEDURE — 84481 FREE ASSAY (FT-3): CPT

## 2022-03-18 PROCEDURE — 84443 ASSAY THYROID STIM HORMONE: CPT

## 2022-03-18 PROCEDURE — 86800 THYROGLOBULIN ANTIBODY: CPT

## 2022-03-18 PROCEDURE — 84439 ASSAY OF FREE THYROXINE: CPT

## 2022-03-18 PROCEDURE — 84445 ASSAY OF TSI GLOBULIN: CPT

## 2022-03-18 PROCEDURE — 86376 MICROSOMAL ANTIBODY EACH: CPT

## 2022-03-18 PROCEDURE — 36415 COLL VENOUS BLD VENIPUNCTURE: CPT

## 2022-03-18 PROCEDURE — 82306 VITAMIN D 25 HYDROXY: CPT

## 2022-03-20 LAB
THYROGLOBULIN AB: >4794 IU/ML (ref 0–40)
THYROID PEROXIDASE (TPO) AB: 536 IU/ML (ref 0–25)

## 2022-03-24 LAB — THYROID STIMULATING IMMUNOGLOB: <0.1 IU/L

## 2022-04-19 ENCOUNTER — PATIENT MESSAGE (OUTPATIENT)
Dept: FAMILY MEDICINE CLINIC | Age: 67
End: 2022-04-19

## 2022-04-19 DIAGNOSIS — E03.8 HYPOTHYROIDISM DUE TO HASHIMOTO'S THYROIDITIS: Primary | ICD-10-CM

## 2022-04-19 DIAGNOSIS — E06.3 HYPOTHYROIDISM DUE TO HASHIMOTO'S THYROIDITIS: Primary | ICD-10-CM

## 2022-04-19 NOTE — TELEPHONE ENCOUNTER
From: Cristobal Hogan  To: Dr. Sharyle Dalton: 4/19/2022 10:59 AM EDT  Subject: Elsa Ryan    I had my follow up appointment with the eye specialist in [facility]. He thinks I have some Thyroid swelling disease and putting me on steroids for a week or 2 and wanted me to tell you that. I think he sent you a follow up email too. He also wants me to see an Endocrinologist... Hopefully there is one in the [facility].   Elsa Ryan

## 2022-04-21 RX ORDER — LEVOTHYROXINE SODIUM 0.05 MG/1
50 TABLET ORAL DAILY
Qty: 30 TABLET | Refills: 1 | Status: SHIPPED | OUTPATIENT
Start: 2022-04-21 | End: 2022-04-22 | Stop reason: SDUPTHER

## 2022-04-22 RX ORDER — LEVOTHYROXINE SODIUM 0.05 MG/1
50 TABLET ORAL DAILY
Qty: 30 TABLET | Refills: 1 | Status: SHIPPED | OUTPATIENT
Start: 2022-04-22 | End: 2022-08-15 | Stop reason: SDUPTHER

## 2022-04-25 DIAGNOSIS — E11.59 TYPE 2 DIABETES MELLITUS WITH OTHER CIRCULATORY COMPLICATION, WITHOUT LONG-TERM CURRENT USE OF INSULIN (HCC): ICD-10-CM

## 2022-04-25 RX ORDER — PIOGLITAZONEHYDROCHLORIDE 15 MG/1
15 TABLET ORAL DAILY
Qty: 90 TABLET | Refills: 1 | Status: SHIPPED | OUTPATIENT
Start: 2022-04-25

## 2022-04-25 NOTE — TELEPHONE ENCOUNTER
Americo Arthur called requesting a refill of the below medication which has been pended for you:     Requested Prescriptions     Pending Prescriptions Disp Refills    pioglitazone (ACTOS) 15 MG tablet 90 tablet 1     Sig: Take 1 tablet by mouth daily       Last Appointment Date: 1/20/2022  Next Appointment Date: 7/21/2022    Allergies   Allergen Reactions    Codeine      Severe Abdominal pain    Sulfa Antibiotics      Patient unsure of reaction

## 2022-05-01 ENCOUNTER — PATIENT MESSAGE (OUTPATIENT)
Dept: FAMILY MEDICINE CLINIC | Age: 67
End: 2022-05-01

## 2022-05-01 DIAGNOSIS — J30.2 OTHER SEASONAL ALLERGIC RHINITIS: ICD-10-CM

## 2022-05-02 RX ORDER — MONTELUKAST SODIUM 10 MG/1
10 TABLET ORAL NIGHTLY
Qty: 90 TABLET | Refills: 1 | Status: SHIPPED | OUTPATIENT
Start: 2022-05-02 | End: 2022-10-31 | Stop reason: SDUPTHER

## 2022-05-02 NOTE — TELEPHONE ENCOUNTER
López Davenport called requesting a refill of the below medication which has been pended for you:     Requested Prescriptions     Pending Prescriptions Disp Refills    montelukast (SINGULAIR) 10 MG tablet 90 tablet 1     Sig: Take 1 tablet by mouth nightly       Last Appointment Date: 1/20/2022  Next Appointment Date: 7/21/2022    Allergies   Allergen Reactions    Codeine      Severe Abdominal pain    Sulfa Antibiotics      Patient unsure of reaction

## 2022-05-02 NOTE — TELEPHONE ENCOUNTER
From: [facility]  To: Dr. Marianna Miles: 5/1/2022 9:02 PM EDT  Subject: prescription refill    I am out of refills for my Montelukast SOD 10mg tablets and I only have about 6 left

## 2022-05-09 ENCOUNTER — PATIENT MESSAGE (OUTPATIENT)
Dept: FAMILY MEDICINE CLINIC | Age: 67
End: 2022-05-09

## 2022-05-09 RX ORDER — ORAL SEMAGLUTIDE 7 MG/1
1 TABLET ORAL DAILY
Qty: 30 TABLET | Refills: 5 | Status: SHIPPED | OUTPATIENT
Start: 2022-05-09

## 2022-05-09 NOTE — TELEPHONE ENCOUNTER
From: Baron Mccall  To: Dr. Laura Castro: 5/9/2022 11:15 AM EDT  Subject: Lewanda Memory put me on 1 bottle - 30 tablets with no refills of Rebelsus. .. I took my last pill 2-3 weeks ago. .. the pharmacy keeps asking about renewing it. Do you want to renew this or was this a one time deal to lower my counts? ?   Pie Digitalers

## 2022-05-23 ENCOUNTER — OFFICE VISIT (OUTPATIENT)
Dept: CARDIOLOGY | Age: 67
End: 2022-05-23
Payer: MEDICARE

## 2022-05-23 VITALS
SYSTOLIC BLOOD PRESSURE: 120 MMHG | BODY MASS INDEX: 33.65 KG/M2 | DIASTOLIC BLOOD PRESSURE: 58 MMHG | HEIGHT: 71 IN | HEART RATE: 69 BPM | WEIGHT: 240.4 LBS

## 2022-05-23 DIAGNOSIS — R94.31 ABNORMAL ELECTROCARDIOGRAM (ECG) (EKG): ICD-10-CM

## 2022-05-23 DIAGNOSIS — I38 MURMUR, DIASTOLIC: ICD-10-CM

## 2022-05-23 DIAGNOSIS — Q23.1 AORTIC REGURGITATION DUE TO BICUSPID AORTIC VALVE: ICD-10-CM

## 2022-05-23 DIAGNOSIS — I77.810 DILATED AORTIC ROOT (HCC): ICD-10-CM

## 2022-05-23 DIAGNOSIS — R53.83 FATIGUE, UNSPECIFIED TYPE: ICD-10-CM

## 2022-05-23 DIAGNOSIS — Q23.1 BICUSPID AORTIC VALVE: ICD-10-CM

## 2022-05-23 DIAGNOSIS — R68.89 EXERCISE INTOLERANCE: ICD-10-CM

## 2022-05-23 DIAGNOSIS — I71.20 THORACIC AORTIC ANEURYSM WITHOUT RUPTURE: ICD-10-CM

## 2022-05-23 DIAGNOSIS — I10 ESSENTIAL HYPERTENSION: Primary | ICD-10-CM

## 2022-05-23 PROCEDURE — G8417 CALC BMI ABV UP PARAM F/U: HCPCS | Performed by: INTERNAL MEDICINE

## 2022-05-23 PROCEDURE — 93010 ELECTROCARDIOGRAM REPORT: CPT | Performed by: INTERNAL MEDICINE

## 2022-05-23 PROCEDURE — 1036F TOBACCO NON-USER: CPT | Performed by: INTERNAL MEDICINE

## 2022-05-23 PROCEDURE — 99214 OFFICE O/P EST MOD 30 MIN: CPT | Performed by: INTERNAL MEDICINE

## 2022-05-23 PROCEDURE — 1123F ACP DISCUSS/DSCN MKR DOCD: CPT | Performed by: INTERNAL MEDICINE

## 2022-05-23 PROCEDURE — G8427 DOCREV CUR MEDS BY ELIG CLIN: HCPCS | Performed by: INTERNAL MEDICINE

## 2022-05-23 PROCEDURE — 93005 ELECTROCARDIOGRAM TRACING: CPT | Performed by: INTERNAL MEDICINE

## 2022-05-23 PROCEDURE — 3017F COLORECTAL CA SCREEN DOC REV: CPT | Performed by: INTERNAL MEDICINE

## 2022-05-23 NOTE — PROGRESS NOTES
[facility]  1955  [id number]    CC: Follow up for thoracic aortic aneurysm, Bicuspid AV with AI    HPI:  Patient is here for follow up. Has been feeling lack of energy. More than previous. Admits to falling asleep more frequently. No cp, no sob, no DUTTON. Snores. Past Medical History:   Diagnosis Date    Anxiety     Aortic regurgitation     Bicuspid aortic valve     Diabetes mellitus ([address] since March 2018    on Rx.  GERD (gastroesophageal reflux disease)     Hypertension     Left renal mass 10/2018    Osteoarthritis of left knee     Wears glasses        Past Surgical History:   Procedure Laterality Date    CYST REMOVAL      tail bone    MOUTH SURGERY  2015    PARTIAL NEPHRECTOMY Left 11/30/2018    ROBOTIC PARTIAL NEPHRECTOMY,     OH COLONOSCOPY FLX DX W/COLLJ SPEC WHEN PFRMD N/A 5/14/2018    normal colon, Dr. Davis Wiley Left 11/30/2018    XI ROBOTIC PARTIAL NEPHRECTOMY, INTRA-OP LAP ULTRASOUND performed by Robinson Perdomo MD at [address] [place] [address] Drive EXTRACTION         Family History   Problem Relation Age of Onset    High Blood Pressure Mother     Diabetes Mother         impaired fasting glucose    Other Mother         short term memory loss after MVA    Glaucoma Mother     Stroke Father 66    High Blood Pressure Father     Diabetes Father         impaired fasting glucose    Glaucoma Father     Cancer Father         bladder     Heart Attack Father 66    Glaucoma Brother     Diabetes Maternal Grandmother     Other Paternal Grandmother         gallbladder disease    Stroke Paternal Grandfather     Other Paternal Grandfather         gallbladder disease    Diabetes Paternal Grandfather     No Known Problems Brother        REVIEW OF SYSTEMS:    · Constitutional: there has been no unanticipated weight loss.  There's been No change in energy level, No change in activity level.     · Eyes: No visual changes or diplopia. No scleral icterus. · ENT: No Headaches, hearing loss or vertigo. No mouth sores or sore throat. · Cardiovascular: as hpi  · Respiratory: as hpi  · Gastrointestinal: No abdominal pain, appetite loss, blood in stools. No change in bowel or bladder habits. · Genitourinary: No dysuria, trouble voiding, or hematuria. · Musculoskeletal:  No gait disturbance, No weakness or joint complaints. · Integumentary: No rash or pruritis. · Neurological: No headache, diplopia, change in muscle strength, numbness or tingling. No change in gait, balance, coordination, mood, affect, memory, mentation, behavior. · Psychiatric: No new anxiety or depression. · Endocrine: No temperature intolerance. No excessive thirst, fluid intake, or urination. No tremor. · Hematologic/Lymphatic: No abnormal bruising or bleeding, blood clots or swollen lymph nodes. · Allergic/Immunologic: No nasal congestion or hives. Physical Exam:  BP (!) 120/58   Pulse 69   Ht 5' 11\" (1.803 m)   Wt 240 lb 6.4 oz (109 kg)   BMI 33.53 kg/m²   General appearance: alert and cooperative with exam  HEENT: Head: Normocephalic, no lesions, without obvious abnormality.   Eyes: PERRL, EOMI  Ears: Not obvious deformations or lack of hearing  Neck: no carotid bruit, no JVD  Lungs: clear to auscultation bilaterally  Heart: regular rate and rhythm, S1, S2 normal, no murmur, click, rub or gallop  Abdomen: soft, non-tender; bowel sounds normal; no masses,  no organomegaly  Extremities: extremities normal, atraumatic, no cyanosis or edema  Neurologic: Mental status: Alert, oriented, thought content appropriate  Skin: WNL for age and condition, no obvious rashes or leasions    LABS  Lab Results   Component Value Date    CHOL 123 01/24/2022    TRIG 120 01/24/2022    HDL 41 01/24/2022    LDLCHOLESTEROL 58 01/24/2022    VLDL NOT REPORTED 01/24/2022    CHOLHDLRATIO 3.0 01/24/2022       Lab Results   Component Value Date  01/24/2022    K 4.3 01/24/2022    CL 99 01/24/2022    CO2 28 01/24/2022    BUN 26 (H) 01/24/2022    CREATININE 0.93 01/24/2022    GLUCOSE 260 (H) 01/24/2022    CALCIUM 9.9 01/24/2022    PROT 7.8 10/25/2021    LABALBU 4.9 10/25/2021    BILITOT 0.58 10/25/2021    ALKPHOS 31 (L) 10/25/2021    AST 16 10/25/2021    ALT 18 10/25/2021    LABGLOM >60 01/24/2022    GFRAA >60 01/24/2022       EKG:   Sinus  Rhythm   Voltage criteria for LVH  (R(I)+S(III) exceeds 2.50 mV)  -Voltage criteria w/o ST/T abnormality may be normal.     TTE 8/27/18  Left ventricle is normal in size Global left ventricular systolic function is normal   Estimated ejection fraction is 60 % . Mild left ventricular hypertrophy. Grade II (moderate) left ventricular diastolic dysfunction. Left atrium is mildly dilated. Bicuspid aortic valve. Moderate eccentric jet of aortic insufficiency. Normal tricuspid valve leaflets. Trivial tricuspid regurgitation. Estimated right ventricular systolic pressure is 33 mmHg. No pulmonary hypertension. Aortic root is mildly dilated at 4.4 cm. CTA 9/19/2018  *Aneurysmal dilatation of the aortic root measuring 4.7 cm.  Normal caliber   remaining thoracic aorta.  No dissection or rupture. *Questionable enhancing mass involving the left kidney measuring 2.5 x 2.4   cm.  Renal cell carcinoma cannot be excluded and complete evaluation with CT   or MRI utilizing renal mass protocol is recommended. *Cholelithiasis. *Cardiomegaly. Echo 8/19  Normal left ventricular diameter. Mild left ventricular hypertrophy. Left ventricular systolic function is normal.  Left ventricular ejection fraction 55 %. Left atrium is mildly dilated. Bicuspid aortic valve (fusion of Right and Left Coronary Cusps). Moderate aortic insufficiency, with highly eccentric jet, that maybe be  underestimating the degree of AI.   Aortic root is mildly dilated at 4.5 m.    CT chest abd pelvis: 10/11/19  *Stable aneurysmal dilatation of the ascending thoracic aorta measuring 4.7   cm.  No dissection or rupture. *Stable 3 mm solid noncalcified pulmonary nodule right lower lobe.  Attention   on follow-up is suggested given the history of renal cell carcinoma. *No CT evidence of tumor recurrence or metastatic disease seen within the   abdomen or pelvis. *Cholelithiasis. *Cardiomegaly. Echo 9/20:  Left ventricle is mildly dilated. Moderate left ventricular hypertrophy. Hyperdynamic left ventricular function. Left ventricular ejection fraction 70 %. Diastolic dysfunction. Left atrium is moderately dilated. Right atrium is mildly dilated . Right ventricular dilatation with normal systolic function. Functionally bicuspid aortic valve with mild aortic regurgitation. Trivial tricuspid regurgitation. Estimated right ventricular systolic pressure is 39 mmHg. Aortic root is mildly to moderately dilated at 4.5 cm. CTA chest 9/20:  1. The thoracic aorta is upper limits of normal in size as measured    perpendicular to the direction of flow as detailed above.  No aneurysm- 4 cm max diameter of Ascending aorta.   Mild    atherosclerotic vascular disease. 2. 3 mm nodule in the right lower lobe unchanged from at least 2018.  No    follow-up recommended. Echo 9/21:  Left ventricular systolic function is normal.  Left ventricular ejection fraction 60 %. Left atrium is moderately dilated. Functionally bicuspid aortic valve with moderate eccentric regurgitation. No pericardial effusion. Aortic root is mildly dilated at 4.2 cm. CTA chest on 9/21:  Impression   Stable mild aneurysmal enlargement of the ascending thoracic aorta, measuring   4.3 cm.  No acute aortic abnormality.  No central pulmonary embolism. Assessment and Plan:    1. Fatigue, lack of energy, exercise intolerance. Will check Lexiscan stress test to rule out ischemia/further risk stratify. 2. Bicuspid AV- Moderate AI- on 9/21.  Continue aggressive BP control/Afterload reduction. On Norvasc, Coreg, Hyzaar. Will repeat Echo 9/22. 3. Ascending aortic aneurysm- 4.3 cm max diameter on CTA 9/21- (previously larger 4.7 cm on 10/19). Continue aggressive BP control/Afterload reduction. On Norvasc, Coreg, Hyzaar. Will repeat CTA 9/22. 4. Renal CA- following with urology/oncology  5. HTN- well controlled. Continue current meds. 6. Overweight: encouraged diet, exercise, and discussed weight loss extensively. 7. Hyperlipidemia- continue statin. LDL 58 on 1/22    The patient is to continue heart healthy diet, weight loss and exercise as tolerated. Patient's medications and side effects were discussed. Medication refills were provided if needed. Follow up appointment timing was discussed. All questions and concerns were addressed to patient's satisfaction. The patient is to follow up in 6 months or sooner if necessary. Thank you for allowing me to participate in the care of this patient, please do not hesitate to call if you have any questions. Shiela Garcia DO, [address], [address], [address], [place] [address] [facility]  [facility]. [facility]  [phone]

## 2022-05-26 ENCOUNTER — PATIENT MESSAGE (OUTPATIENT)
Dept: FAMILY MEDICINE CLINIC | Age: 67
End: 2022-05-26

## 2022-05-26 NOTE — TELEPHONE ENCOUNTER
From: Efrain Gallegos  To: Dr. Koch Fuel: 5/26/2022 12:40 PM EDT  Subject: Stress Test    I saw my Cardiologist last week and I had a good check up, but he wants me to have a Stress Test since I have never had one. He wanted me to ask you if I can take all my morning pills/meds or should I wait till after the Stress Test. Does any have caffeine in them as I am not to have any caffeine for 24 hours prior.   The stress test is scheduled a week from today on Thursday June 2  Thanks  Grace Leo

## 2022-06-02 ENCOUNTER — HOSPITAL ENCOUNTER (OUTPATIENT)
Dept: NUCLEAR MEDICINE | Age: 67
Discharge: HOME | End: 2022-06-04
Payer: MEDICARE

## 2022-06-02 ENCOUNTER — HOSPITAL ENCOUNTER (OUTPATIENT)
Dept: NON INVASIVE DIAGNOSTICS | Age: 67
Discharge: HOME | End: 2022-06-02
Payer: MEDICARE

## 2022-06-02 DIAGNOSIS — Q23.1 BICUSPID AORTIC VALVE: ICD-10-CM

## 2022-06-02 DIAGNOSIS — I71.20 THORACIC AORTIC ANEURYSM WITHOUT RUPTURE: ICD-10-CM

## 2022-06-02 DIAGNOSIS — I10 ESSENTIAL HYPERTENSION: ICD-10-CM

## 2022-06-02 DIAGNOSIS — R94.31 ABNORMAL ELECTROCARDIOGRAM (ECG) (EKG): ICD-10-CM

## 2022-06-02 DIAGNOSIS — Q23.1 AORTIC REGURGITATION DUE TO BICUSPID AORTIC VALVE: ICD-10-CM

## 2022-06-02 DIAGNOSIS — I38 MURMUR, DIASTOLIC: ICD-10-CM

## 2022-06-02 DIAGNOSIS — I77.810 DILATED AORTIC ROOT (HCC): ICD-10-CM

## 2022-06-02 DIAGNOSIS — R53.83 FATIGUE, UNSPECIFIED TYPE: ICD-10-CM

## 2022-06-02 DIAGNOSIS — R68.89 EXERCISE INTOLERANCE: ICD-10-CM

## 2022-06-02 PROCEDURE — 3430000000 HC RX DIAGNOSTIC RADIOPHARMACEUTICAL: Performed by: INTERNAL MEDICINE

## 2022-06-02 PROCEDURE — A9500 TC99M SESTAMIBI: HCPCS | Performed by: INTERNAL MEDICINE

## 2022-06-02 PROCEDURE — 78452 HT MUSCLE IMAGE SPECT MULT: CPT

## 2022-06-02 PROCEDURE — 93017 CV STRESS TEST TRACING ONLY: CPT

## 2022-06-02 PROCEDURE — 6360000002 HC RX W HCPCS: Performed by: INTERNAL MEDICINE

## 2022-06-02 RX ADMIN — TETRAKIS(2-METHOXYISOBUTYLISOCYANIDE)COPPER(I) TETRAFLUOROBORATE 30 MILLICURIE: 1 INJECTION, POWDER, LYOPHILIZED, FOR SOLUTION INTRAVENOUS at 13:30

## 2022-06-02 RX ADMIN — TETRAKIS(2-METHOXYISOBUTYLISOCYANIDE)COPPER(I) TETRAFLUOROBORATE 10 MILLICURIE: 1 INJECTION, POWDER, LYOPHILIZED, FOR SOLUTION INTRAVENOUS at 13:29

## 2022-06-02 RX ADMIN — REGADENOSON 0.4 MG: 0.08 INJECTION, SOLUTION INTRAVENOUS at 14:00

## 2022-06-02 NOTE — PROGRESS NOTES
Patient Name:  Jesus Manuel Ramírez MRN:  [id number]   :  1955  Age:  79 y.o. Sex: male   Ordering Provider: Liz Broussard DO  Primary Care Provider:  Henrietta Adkins MD     Indications: Abnormal EKG;  Fatigue     Medications:     Current Outpatient Medications:     Semaglutide (RYBELSUS) 7 MG TABS, Take 1 tablet by mouth daily, Disp: 30 tablet, Rfl: 5    montelukast (SINGULAIR) 10 MG tablet, Take 1 tablet by mouth nightly, Disp: 90 tablet, Rfl: 1    pioglitazone (ACTOS) 15 MG tablet, Take 1 tablet by mouth daily, Disp: 90 tablet, Rfl: 1    levothyroxine (SYNTHROID) 50 MCG tablet, Take 1 tablet by mouth daily, Disp: 30 tablet, Rfl: 1    meloxicam (MOBIC) 7.5 MG tablet, Take 1 tablet by mouth 2 times daily (with meals), Disp: 180 tablet, Rfl: 1    metFORMIN (GLUCOPHAGE) 500 MG tablet, Take 1 tablet by mouth 2 times daily (with meals), Disp: 180 tablet, Rfl: 1    furosemide (LASIX) 20 MG tablet, Take 1 tablet by mouth daily, Disp: 90 tablet, Rfl: 1    diclofenac sodium (VOLTAREN) 1 % GEL, Apply 2 g topically 4 times daily as needed for Pain, Disp: 200 g, Rfl: 1    ketoconazole (NIZORAL) 2 % shampoo, Apply 3-4 times weekly to scalp, temples, and ears, leave on for five minutes prior to washing off, Disp: 120 mL, Rfl: 2    Misc Natural Products (GLUCOS-CHONDROIT-MSM COMPLEX PO), Take 1 tablet by mouth 2 times daily, Disp: , Rfl:     Apple Cider Vinegar 500 MG TABS, Take 1 tablet by mouth 2 times daily, Disp: , Rfl:     cetirizine (ZYRTEC) 10 MG tablet, Take 1 tablet by mouth daily, Disp: 90 tablet, Rfl: 3    sildenafil (REVATIO) 20 MG tablet, Take 1 tablet by mouth as needed (Erectile dysfunction), Disp: 30 tablet, Rfl: 5    amLODIPine (NORVASC) 10 MG tablet, Take 1 tablet by mouth daily, Disp: 90 tablet, Rfl: 1    carvedilol (COREG) 25 MG tablet, Take 1.5 tablets by mouth 2 times daily, Disp: 270 tablet, Rfl: 3    rosuvastatin (CRESTOR) 5 MG tablet, Take 1 tablet by mouth daily, Disp: 90 tablet, Rfl: 1   losartan-hydroCHLOROthiazide (HYZAAR) 100-25 MG per tablet, Take 1 tablet by mouth daily, Disp: 90 tablet, Rfl: 1    busPIRone (BUSPAR) 7.5 MG tablet, Take 1 tablet by mouth 3 times daily as needed (anxiety), Disp: 90 tablet, Rfl: 1    Handicap Placjoyce MISC, by Does not apply route Exp 7/1/2026, Disp: 1 each, Rfl: 0    prednisoLONE acetate (PRED FORTE) 1 % ophthalmic suspension, Place 1 drop into the right eye 4 times daily, Disp: 1 Bottle, Rfl: 0    fluticasone (FLONASE) 50 MCG/ACT nasal spray, 1 spray by Nasal route 2 times daily, Disp: 1 Bottle, Rfl: 5    baclofen (LIORESAL) 10 MG tablet, Take 1 tablet by mouth nightly as needed (muscle spasm), Disp: 30 tablet, Rfl: 1    vitamin D3 (CHOLECALCIFEROL) 400 UNITS TABS tablet, Take 400 Units by mouth every other day , Disp: , Rfl:     Omeprazole (PRILOSEC PO), Take 1 capsule by mouth daily as needed , Disp: , Rfl:     Current Facility-Administered Medications:     regadenoson (LEXISCAN) injection 0.4 mg, 0.4 mg, IntraVENous, Once, Zari Del Valle MD      ----------------------------------------------------------------------------------------------------------                Lexiscan 0.4 mg injected over 10 seconds. IV Cardiolite injected 20 seconds post Lexiscan injection.      Heart Rate:  67  Resting Blood Pressure:  147/99   ----------------------------------------------------------------------------------------------------------     HR BP   1 min 89 132/80   2 min     3 min 84 131/71   4 min     5 min 81 130/67   6 min     7 min 78 131/66   8 min     9 min 80 133/67   10 min       Symptoms:  Chest Pain:  Yes      Nausea:  No     Headache:  No    Shortness of Breath:  Yes     Other:  Yes  (Heaviness all over\"    Electronically signed by Gayle Yanez RN on 6/2/22 at 1:47 PM EDT    ----------------------------------------------------------------------------------------------------------  Resting EKG: Normal sinus rhythm, voltage continue for LVH    Arrhythmias: None    EKG Changes: No change from baseline to suggest ischemia    Maximum Changes: N/A    Leads with maximum changes: N/A    Interpretation:    Negative ECG portion of stress test for ischemia  Nuclear scan report follow      Supervising Physician:  Jose L Nayak MD

## 2022-06-03 ENCOUNTER — TELEPHONE (OUTPATIENT)
Dept: CARDIOLOGY | Age: 67
End: 2022-06-03

## 2022-06-03 DIAGNOSIS — R94.39 ABNORMAL CARDIOVASCULAR STRESS TEST: Primary | ICD-10-CM

## 2022-06-03 RX ORDER — METOPROLOL TARTRATE 50 MG/1
TABLET, FILM COATED ORAL
Qty: 2 TABLET | Refills: 3 | Status: SHIPPED | OUTPATIENT
Start: 2022-06-03 | End: 2022-09-12

## 2022-06-03 NOTE — TELEPHONE ENCOUNTER
I spoke to pt by phone. He would rather do coronary CTA at [facility], which is scheduled for 9/12/22. Should this be sooner? [Note to Nurses:  Pt says he has a CT chest in Sept due for Dr. Kelli Chau, watching spot(s) on his lungs. I only see the CTA chest Ref of Dr. Alissa Denney.   THIS MAY BE THE SAME TEST??  Pt wanted us to check if he can do both tests together, when scheduling.]

## 2022-06-03 NOTE — TELEPHONE ENCOUNTER
Coronary CTA is very different  I ordered it  Needs to take lopressor 25 night prior and morning of   In addition his other meds

## 2022-06-03 NOTE — TELEPHONE ENCOUNTER
Dr. Donell Gomez,  Did you want the lopressor 25 mg tab prior to CTA? [facility] rx from today says 50 mg tab. I spoke to Dr. Kaylah Richards. They do not have any CT ordered for him. Pt aware and agrees to move the coronary CTA from Sept to sooner (June). I transferred him to [address] [facility] scheduling at 1701.

## 2022-06-03 NOTE — PROCEDURES
[facility] [address]                 [address] [place] [id number]                              CARDIAC STRESS TEST    PATIENT NAME: Iam Wu                     :        1955  MED REC NO:   [id number]                             ROOM:  ACCOUNT NO:   [de-identified]                           ADMIT DATE: 2022  PROVIDER:     Grant Mccracken    DATE OF STUDY:  2022    STRESS TEST    Ordering Provider: Jackson Gutierrez DO    Primary Care Provider: Lenard Varner MD    Patient's Age: 79     Height: 5 feet 11 inches  Weight: 240 pounds    INDICATION:  Hypertension, murmur, fatigue, abnormal ECG. Lexiscan 0.4 mg injected over 10 seconds. IV Cardiolite injected 20 seconds post Lexiscan injection. Heart Rate: 67 Resting blood pressure:  147/99              HR   BP  1 min          89   132/80  2 min  3 min          84   131/71  4 min  5 min          81   130/67  6 min  7 min          78   131/66  8 min  9 min          80   133/67  10 min    Symptoms:  Chest Pain: Yes  Nausea: No  Headache:  No  Shortness of breath: Yes  Other: Yes, \"heaviness all over. \"    Resting EKG:  Normal sinus rhythm, voltage continue for left ventricular  hypertrophy. Arrhythmias: None. EKG changes:  No change from baseline to suggest ischemia. Maximum changes: N/A    Leads with maximum changes: N/A        INTERPRETATION:  1. Negative ECG portion of stress test for ischemia. 2. Nuclear scan report to follow.     Nuclear Myocardial Perfusion Imaging (SPECT)    TESTING METHOD  STRESS:   Lexiscan  AGENT:    Cardiolite  REST:          Injection Date:  2022  Time:  1300  Amount:  10.45  mCi  STRESS:   Injection Date:  2022  Time:  1354  Amount:  30.1 mCi  IMAGE TIME:    Rest:  1332  Stress:  1430    EF:  64%  TID:  0.98  LHR:  0.70    FINDINGS:  Ischemia (Reversible Defect):           No  Infarction (Irreversible Defect):       Yes, small apical  Normal ejection fraction: Yes  Segmental wall motion:                  Yes    Low risk study. IMPRESSION:  1. No definitive reversible perfusion defects to suggest ischemia. 2. Small apical fixed defect - infarction vs apical thinning. 3. Moderate-sized fixed inferior defect, likely infarction. 4. Normal left ventricular ejection fraction.         Luly [facility]    D: 06/02/2022 15:57:21       T: 06/02/2022 16:59:36     JESSE/VIKAS_EDIT  Job#: [id number]     Doc#: Unknown    CC:  Nataly Erickson DO

## 2022-06-19 ENCOUNTER — PATIENT MESSAGE (OUTPATIENT)
Dept: FAMILY MEDICINE CLINIC | Age: 67
End: 2022-06-19

## 2022-06-19 DIAGNOSIS — I10 ESSENTIAL HYPERTENSION: ICD-10-CM

## 2022-06-20 RX ORDER — LOSARTAN POTASSIUM AND HYDROCHLOROTHIAZIDE 25; 100 MG/1; MG/1
1 TABLET ORAL DAILY
Qty: 90 TABLET | Refills: 1 | Status: SHIPPED | OUTPATIENT
Start: 2022-06-20

## 2022-06-20 NOTE — TELEPHONE ENCOUNTER
From: Trisha Jean  To: Dr. Lisset Leal: 6/19/2022 9:32 PM EDT  Subject: prescription refill    I will soon be out of my Losartan HCTZ with no more refills. ... can you refill it?     Benedicto Wen

## 2022-06-20 NOTE — TELEPHONE ENCOUNTER
Rosa Bailey called requesting a refill of the below medication which has been pended for you:     Requested Prescriptions     Pending Prescriptions Disp Refills    losartan-hydroCHLOROthiazide (HYZAAR) 100-25 MG per tablet 90 tablet 1     Sig: Take 1 tablet by mouth daily       Last Appointment Date: 1/20/2022  Next Appointment Date: 7/21/2022    Allergies   Allergen Reactions    Codeine      Severe Abdominal pain    Sulfa Antibiotics      Patient unsure of reaction

## 2022-06-27 ENCOUNTER — TELEPHONE (OUTPATIENT)
Dept: CARDIOLOGY | Age: 67
End: 2022-06-27

## 2022-06-27 ENCOUNTER — HOSPITAL ENCOUNTER (OUTPATIENT)
Dept: CT IMAGING | Age: 67
Discharge: HOME | End: 2022-06-29
Payer: MEDICARE

## 2022-06-27 VITALS — SYSTOLIC BLOOD PRESSURE: 103 MMHG | HEART RATE: 58 BPM | RESPIRATION RATE: 16 BRPM | DIASTOLIC BLOOD PRESSURE: 60 MMHG

## 2022-06-27 DIAGNOSIS — I25.10 CORONARY ARTERY CALCIFICATION: Primary | ICD-10-CM

## 2022-06-27 DIAGNOSIS — R94.39 ABNORMAL CARDIOVASCULAR STRESS TEST: ICD-10-CM

## 2022-06-27 DIAGNOSIS — I25.84 CORONARY ARTERY CALCIFICATION: Primary | ICD-10-CM

## 2022-06-27 DIAGNOSIS — I25.10 CORONARY ARTERY DISEASE INVOLVING LEFT MAIN CORONARY ARTERY: ICD-10-CM

## 2022-06-27 LAB
GFR NON-AFRICAN AMERICAN: >60 ML/MIN
GFR SERPL CREATININE-BSD FRML MDRD: >60 ML/MIN
GFR SERPL CREATININE-BSD FRML MDRD: NORMAL ML/MIN/{1.73_M2}
POC CREATININE: 0.99 MG/DL (ref 0.51–1.19)

## 2022-06-27 PROCEDURE — 75574 CT ANGIO HRT W/3D IMAGE: CPT

## 2022-06-27 PROCEDURE — 2580000003 HC RX 258: Performed by: INTERNAL MEDICINE

## 2022-06-27 PROCEDURE — 6370000000 HC RX 637 (ALT 250 FOR IP): Performed by: INTERNAL MEDICINE

## 2022-06-27 PROCEDURE — 6360000004 HC RX CONTRAST MEDICATION: Performed by: INTERNAL MEDICINE

## 2022-06-27 PROCEDURE — 82565 ASSAY OF CREATININE: CPT

## 2022-06-27 RX ORDER — 0.9 % SODIUM CHLORIDE 0.9 %
90 INTRAVENOUS SOLUTION INTRAVENOUS ONCE
Status: COMPLETED | OUTPATIENT
Start: 2022-06-27 | End: 2022-06-27

## 2022-06-27 RX ORDER — NITROGLYCERIN 0.4 MG/1
0.4 TABLET SUBLINGUAL ONCE
Status: COMPLETED | OUTPATIENT
Start: 2022-06-27 | End: 2022-06-27

## 2022-06-27 RX ORDER — SODIUM CHLORIDE 0.9 % (FLUSH) 0.9 %
10 SYRINGE (ML) INJECTION PRN
Status: DISCONTINUED | OUTPATIENT
Start: 2022-06-27 | End: 2022-06-30 | Stop reason: HOSPADM

## 2022-06-27 RX ORDER — VERAPAMIL HYDROCHLORIDE 120 MG/1
240 TABLET, FILM COATED ORAL ONCE
Status: COMPLETED | OUTPATIENT
Start: 2022-06-27 | End: 2022-06-27

## 2022-06-27 RX ADMIN — SODIUM CHLORIDE, PRESERVATIVE FREE 10 ML: 5 INJECTION INTRAVENOUS at 10:12

## 2022-06-27 RX ADMIN — SODIUM CHLORIDE 90 ML: 9 INJECTION, SOLUTION INTRAVENOUS at 10:12

## 2022-06-27 RX ADMIN — VERAPAMIL HYDROCHLORIDE 240 MG: 120 TABLET, FILM COATED ORAL at 08:57

## 2022-06-27 RX ADMIN — IOPAMIDOL 100 ML: 755 INJECTION, SOLUTION INTRAVENOUS at 10:11

## 2022-06-27 RX ADMIN — NITROGLYCERIN 0.4 MG: 0.4 TABLET SUBLINGUAL at 09:50

## 2022-06-27 NOTE — TELEPHONE ENCOUNTER
Left message for patient to call   Alex Moses, [address] Cardiology Clinical Staff  He has calcium, which is in his Left Main which is concerning. I recommend cardiac cath, let me know if agreeable.                 CTA CORON EJECT [facility] WALL MOTION  Order: [id number]   Status: Final result     Visible to patient: Yes (not seen)     Dx: Abnormal cardiovascular stress test     1 Result Note    Details    Reading Physician Reading Date Result Priority   Monique Christensen MD  [phone] 6/27/2022      Narrative & Impression  EXAMINATION:  CTA OF THE CORONARY ARTERIES WITH CALCIUM SCORE     6/27/2022     TECHNIQUE:  Automated exposure control, iterative reconstruction, and/or weight based  adjustment of the mA/kV was utilized to reduce the radiation dose to as low  as reasonably achievable.     COMPARISON:  CT chest 09/17/2021     HISTORY:  ORDERING SYSTEM PROVIDED HISTORY: Abnormal cardiovascular stress test  TECHNOLOGIST PROVIDED HISTORY:  with CAC Score, for abnormal stress test  Reason for Exam: with CAC Score, for abnormal stress test, Abnormal  cardiovascular stress test  Additional signs and symptoms: Pt states hx of heart attacks. No complaints  of chest pains at the time of scan     FINDINGS:  CALCIUM SCORE:     Left main: 38     Right coronary artery 3     Left anterior descending: Zero (0).    Left circumflex: Zero (0).    Total Agatston calcium score: 41     CTA:     Left Main: Small calcific atherosclerotic plaque at the bifurcation. Less  than 50% luminal diameter narrowing.     LAD: No significant atherosclerosis or stenosis.     Left circumflex:  No significant atherosclerosis or stenosis.     RCA:  Tiny calcification in the proximal segment. No stenosis.     Cardiac findings: Borderline cardiomegaly. No pericardial effusion. Aortic  valve/annulus calcifications. Britni Escobar LVEF 55.7%     Non-cardiac findings: Visualized lungs show no nodule.   No mediastinal  lymphadenopathy.     CALCIUM SCORE INTERPRETATION:     0  No identifiable atherosclerotic plaque. Very low cardiovascular disease  risk. Less than 5% chance of presence of coronary artery disease. A  negative examination.     1-10 Minimal plaque burden. Significant coronary artery disease very  unlikely.      Mild plaque burden. Likely mild or minimal coronary stenosis.     101-400 Moderate plaque burden. Moderate non-obstructive coronary artery  disease highly likely.     Over 400 Extensive plaque burden. High likelihood of at least one  significant coronary artery stenosis (>50% diameter).     CALCIUM SCORING OVERVIEW:     Coronary calcium is a marker for plaque in a blood vessel or atherosclerosis  (hardening of the arteries). The presence and amount of calcium detected in  the coronary artery by the CT scan estimates the presence and amount of  atherosclerotic plaque. These calcium deposits can appear years before the  development of heart disease symptoms such as chest pain and shortness of  breath.     A calcium score is computed for each of the coronary arteries based upon the  volume and density of the calcium deposits. This can be referred to as your  calcified plaque burden. It does not correspond directly to the percentage  of narrowing in the artery, but does correlate with the severity of the  overall coronary atherosclerotic burden.     This score is then used to determine the calcium percentile which compares  your calcified plaque burden to that of other asymptomatic men and women of  the same age. The calcium score, in combination with the percentile, enables  your physician to determine your risk of developing symptomatic coronary  artery disease, and to measure the progression of disease as well as the  effectiveness of treatment.     A score of zero indicates that there is no calcified plaque burden.  This  implies that there is no significant coronary artery narrowing and very low  likelihood of a cardiac event over at least the next 3 years. It does not  absolutely rule out the presence of soft, noncalcified plaque or totally  eliminate the possibility of a cardiac event.     A score greater than zero indicates at least some coronary artery disease. As the score increases, so does the likelihood of a significant coronary  narrowing and the likelihood of a coronary event over the next 3 years. Similarly, the likelihood of a coronary event increases with increasing  calcium percentiles.     IMPRESSION:  Small atherosclerotic calcification at bifurcation of left main artery and  tiny calcification the proximal segment of the RCA.     Aortic valve/annulus calcifications.     Normal LVEF     Total calcium score of 41 places patient at 25th percentile for the patient's  age of 79years old. .

## 2022-07-05 ENCOUNTER — PATIENT MESSAGE (OUTPATIENT)
Dept: FAMILY MEDICINE CLINIC | Age: 67
End: 2022-07-05

## 2022-07-05 DIAGNOSIS — Q23.1 AORTIC REGURGITATION DUE TO BICUSPID AORTIC VALVE: ICD-10-CM

## 2022-07-05 DIAGNOSIS — I10 ESSENTIAL HYPERTENSION: ICD-10-CM

## 2022-07-05 DIAGNOSIS — E11.9 TYPE 2 DIABETES MELLITUS WITHOUT COMPLICATION, WITHOUT LONG-TERM CURRENT USE OF INSULIN (HCC): ICD-10-CM

## 2022-07-05 RX ORDER — ROSUVASTATIN CALCIUM 5 MG/1
5 TABLET, COATED ORAL DAILY
Qty: 90 TABLET | Refills: 1 | Status: SHIPPED | OUTPATIENT
Start: 2022-07-05

## 2022-07-05 NOTE — TELEPHONE ENCOUNTER
From: Donta Staples  To: Dr. Marc Xavier: 2022 12:03 PM EDT  Subject: prescription refill    I just realized that I only have a few Rosuvastatin Calcium 5mg left with no refills!   Christian Simmons

## 2022-07-05 NOTE — TELEPHONE ENCOUNTER
Sabrina Avendaño called requesting a refill of the below medication which has been pended for you:     Requested Prescriptions     Pending Prescriptions Disp Refills    rosuvastatin (CRESTOR) 5 MG tablet 90 tablet 1     Sig: Take 1 tablet by mouth daily       Last Appointment Date: 1/20/2022  Next Appointment Date: 7/21/2022    Allergies   Allergen Reactions    Codeine      Severe Abdominal pain    Sulfa Antibiotics      Patient unsure of reaction

## 2022-07-13 ENCOUNTER — PATIENT MESSAGE (OUTPATIENT)
Dept: FAMILY MEDICINE CLINIC | Age: 67
End: 2022-07-13

## 2022-07-13 NOTE — TELEPHONE ENCOUNTER
From: Олег Varner  To: Dr. Patel Keys: 7/13/2022 12:43 PM EDT  Subject: Reschedule appointment    I need to reschedule my appointment for next week. Can I do that here or is there someone specific I need to call? I need to be out of town Wednesday thru Álvaro Dang is on Thursday at 11 on the 21st.    Also, I am really stressed over this heart cath I have to do, wide awake. Will the the Buspirone you prescribed months ago, that I have not taken yet, help with this anxiety? ?   Camryn Velazquez

## 2022-07-16 ENCOUNTER — PATIENT MESSAGE (OUTPATIENT)
Dept: FAMILY MEDICINE CLINIC | Age: 67
End: 2022-07-16

## 2022-07-16 DIAGNOSIS — I10 ESSENTIAL HYPERTENSION: ICD-10-CM

## 2022-07-18 NOTE — TELEPHONE ENCOUNTER
From: Willie Calvo  To: Dr. Minerva Mortimer: 7/16/2022 9:54 AM EDT  Subject: Refill    I only have a few amlodipine besylate 10 MG left  Do you want to refill that please  Beverly Ospina

## 2022-07-18 NOTE — TELEPHONE ENCOUNTER
Robert Sierra called requesting a refill of the below medication which has been pended for you:     Requested Prescriptions     Pending Prescriptions Disp Refills    amLODIPine (NORVASC) 10 MG tablet 90 tablet 1     Sig: Take 1 tablet by mouth in the morning.        Last Appointment Date: 1/20/2022  Next Appointment Date: 8/26/2022    Allergies   Allergen Reactions    Codeine      Severe Abdominal pain    Sulfa Antibiotics      Patient unsure of reaction

## 2022-07-19 RX ORDER — AMLODIPINE BESYLATE 10 MG/1
10 TABLET ORAL DAILY
Qty: 90 TABLET | Refills: 1 | Status: SHIPPED | OUTPATIENT
Start: 2022-07-19

## 2022-08-12 ENCOUNTER — HOSPITAL ENCOUNTER (OUTPATIENT)
Dept: LAB | Age: 67
Discharge: HOME | End: 2022-08-12
Payer: MEDICARE

## 2022-08-12 ENCOUNTER — TELEPHONE (OUTPATIENT)
Dept: FAMILY MEDICINE CLINIC | Age: 67
End: 2022-08-12

## 2022-08-12 DIAGNOSIS — E06.3 HYPOTHYROIDISM DUE TO HASHIMOTO'S THYROIDITIS: ICD-10-CM

## 2022-08-12 DIAGNOSIS — E03.8 HYPOTHYROIDISM DUE TO HASHIMOTO'S THYROIDITIS: ICD-10-CM

## 2022-08-12 DIAGNOSIS — E11.59 TYPE 2 DIABETES MELLITUS WITH OTHER CIRCULATORY COMPLICATION, WITHOUT LONG-TERM CURRENT USE OF INSULIN (HCC): ICD-10-CM

## 2022-08-12 DIAGNOSIS — R73.09 ELEVATED HEMOGLOBIN A1C: ICD-10-CM

## 2022-08-12 LAB
ANION GAP SERPL CALCULATED.3IONS-SCNC: 9 MMOL/L (ref 9–17)
BUN BLDV-MCNC: 25 MG/DL (ref 8–23)
BUN/CREAT BLD: 29 (ref 9–20)
CALCIUM SERPL-MCNC: 9.7 MG/DL (ref 8.6–10.4)
CHLORIDE BLD-SCNC: 99 MMOL/L (ref 98–107)
CHOLESTEROL/HDL RATIO: 3.3
CHOLESTEROL: 126 MG/DL
CO2: 31 MMOL/L (ref 20–31)
CREAT SERPL-MCNC: 0.86 MG/DL (ref 0.7–1.2)
GFR AFRICAN AMERICAN: >60 ML/MIN
GFR NON-AFRICAN AMERICAN: >60 ML/MIN
GFR SERPL CREATININE-BSD FRML MDRD: ABNORMAL ML/MIN/{1.73_M2}
GLUCOSE BLD-MCNC: 159 MG/DL (ref 70–99)
HDLC SERPL-MCNC: 38 MG/DL
LDL CHOLESTEROL: 61 MG/DL (ref 0–130)
POTASSIUM SERPL-SCNC: 5.3 MMOL/L (ref 3.7–5.3)
SODIUM BLD-SCNC: 139 MMOL/L (ref 135–144)
THYROXINE, FREE: 0.43 NG/DL (ref 0.93–1.7)
TRIGL SERPL-MCNC: 134 MG/DL
TSH SERPL DL<=0.05 MIU/L-ACNC: 62.91 UIU/ML (ref 0.3–5)

## 2022-08-12 PROCEDURE — 84439 ASSAY OF FREE THYROXINE: CPT

## 2022-08-12 PROCEDURE — 80061 LIPID PANEL: CPT

## 2022-08-12 PROCEDURE — 36415 COLL VENOUS BLD VENIPUNCTURE: CPT

## 2022-08-12 PROCEDURE — 84443 ASSAY THYROID STIM HORMONE: CPT

## 2022-08-12 PROCEDURE — 80048 BASIC METABOLIC PNL TOTAL CA: CPT

## 2022-08-15 RX ORDER — LEVOTHYROXINE SODIUM 0.05 MG/1
50 TABLET ORAL DAILY
Qty: 30 TABLET | Refills: 3 | Status: SHIPPED | OUTPATIENT
Start: 2022-08-15

## 2022-08-22 ENCOUNTER — HOSPITAL ENCOUNTER (OUTPATIENT)
Dept: NON INVASIVE DIAGNOSTICS | Age: 67
Discharge: HOME | End: 2022-08-22
Payer: MEDICARE

## 2022-08-22 ENCOUNTER — OFFICE VISIT (OUTPATIENT)
Dept: DERMATOLOGY | Age: 67
End: 2022-08-22
Payer: MEDICARE

## 2022-08-22 VITALS
DIASTOLIC BLOOD PRESSURE: 70 MMHG | HEART RATE: 67 BPM | WEIGHT: 249 LBS | OXYGEN SATURATION: 98 % | SYSTOLIC BLOOD PRESSURE: 138 MMHG | BODY MASS INDEX: 34.86 KG/M2 | HEIGHT: 71 IN

## 2022-08-22 DIAGNOSIS — L73.9 FOLLICULITIS: ICD-10-CM

## 2022-08-22 DIAGNOSIS — L57.0 ACTINIC KERATOSES: ICD-10-CM

## 2022-08-22 DIAGNOSIS — Q23.1 AORTIC REGURGITATION DUE TO BICUSPID AORTIC VALVE: ICD-10-CM

## 2022-08-22 DIAGNOSIS — I77.810 DILATED AORTIC ROOT (HCC): ICD-10-CM

## 2022-08-22 DIAGNOSIS — Z85.828 HISTORY OF BASAL CELL CARCINOMA: ICD-10-CM

## 2022-08-22 DIAGNOSIS — I10 ESSENTIAL HYPERTENSION: ICD-10-CM

## 2022-08-22 DIAGNOSIS — Q23.1 BICUSPID AORTIC VALVE: ICD-10-CM

## 2022-08-22 DIAGNOSIS — L57.8 ACTINIC SKIN DAMAGE: Primary | ICD-10-CM

## 2022-08-22 LAB
LV EF: 60 %
LVEF MODALITY: NORMAL

## 2022-08-22 PROCEDURE — 17003 DESTRUCT PREMALG LES 2-14: CPT | Performed by: DERMATOLOGY

## 2022-08-22 PROCEDURE — 1123F ACP DISCUSS/DSCN MKR DOCD: CPT | Performed by: DERMATOLOGY

## 2022-08-22 PROCEDURE — 3017F COLORECTAL CA SCREEN DOC REV: CPT | Performed by: DERMATOLOGY

## 2022-08-22 PROCEDURE — G8417 CALC BMI ABV UP PARAM F/U: HCPCS | Performed by: DERMATOLOGY

## 2022-08-22 PROCEDURE — 93306 TTE W/DOPPLER COMPLETE: CPT

## 2022-08-22 PROCEDURE — 17000 DESTRUCT PREMALG LESION: CPT | Performed by: DERMATOLOGY

## 2022-08-22 PROCEDURE — G8427 DOCREV CUR MEDS BY ELIG CLIN: HCPCS | Performed by: DERMATOLOGY

## 2022-08-22 PROCEDURE — 99214 OFFICE O/P EST MOD 30 MIN: CPT | Performed by: DERMATOLOGY

## 2022-08-22 PROCEDURE — 99213 OFFICE O/P EST LOW 20 MIN: CPT | Performed by: DERMATOLOGY

## 2022-08-22 PROCEDURE — 1036F TOBACCO NON-USER: CPT | Performed by: DERMATOLOGY

## 2022-08-22 RX ORDER — FLUOROURACIL 50 MG/G
CREAM TOPICAL
Qty: 40 G | Refills: 0 | Status: SHIPPED | OUTPATIENT
Start: 2022-08-22

## 2022-08-22 RX ORDER — CLINDAMYCIN PHOSPHATE 11.9 MG/ML
SOLUTION TOPICAL
Qty: 30 ML | Refills: 5 | Status: SHIPPED | OUTPATIENT
Start: 2022-08-22 | End: 2022-09-21

## 2022-08-22 NOTE — PROGRESS NOTES
(CHOLECALCIFEROL) 400 UNITS TABS tablet Take 400 Units by mouth every other day       Omeprazole (PRILOSEC PO) Take 1 capsule by mouth daily as needed        No current facility-administered medications for this visit. ALLERGIES:   Allergies   Allergen Reactions    Codeine      Severe Abdominal pain  Severe stomach pain  Severe Abdominal pain    Sulfa Antibiotics      Patient unsure of reaction       SOCIAL HISTORY:  Social History     Tobacco Use    Smoking status: Never    Smokeless tobacco: Never   Substance Use Topics    Alcohol use: Not Currently     Comment: very rare       Pertinent ROS:  Review of Systems  Skin: Denies any new changing, growing or bleeding lesions or rashes except as described in the HPI   Constitutional: Denies fevers, chills, and malaise. PHYSICAL EXAM:   /70   Pulse 67   Ht 5' 11\" (1.803 m)   Wt 249 lb (112.9 kg)   SpO2 98%   BMI 34.73 kg/m²     The patient is generally well appearing, well nourished, alert and conversational. Affect is normal.    Cutaneous Exam:  Physical Exam  Total body skin exam excluding external genitalia: head/face, neck, both arms, chest, back, abdomen, both legs, buttocks, digits and/or nails, was examined. Genital exam was deferred as patient denied having any lesions in this area. Complete visualization of scalp may be limited by hair density, length, and/or style    Facial covering was removed during examination. Diagnoses/exam findings/medical history pertinent to this visit are listed below:    Assessment:   Diagnosis Orders   1. Actinic skin damage        2. Actinic keratoses        3. History of basal cell carcinoma        4.  Folliculitis             Plan:  History of BCC  - well healed scar with no nodularity  - no evidence of recurrence     Actinic skin damage  - patient was counseled that UV-damaged skin increases lifetime risk for skin cancer  - I recommended the patient apply broad spectrum spf 30+ sunscreen daily, reapplying every 2 hours. - In additional to regular use of sunscreen, I recommended broad-rimmed hats, long sleeves, and seeking the shade. Actinic keratoses  Cryotherapy: After verbal consent was obtained including discussion of the risks (lesion persistence, lesion recurrence and hypo/hyperpigmentation) and benefits (resolution of the lesion), and alternative therapies, 4 total Actinic Keratosis on the ear, nose, face were treated with liquid nitrogen in a spray gun for a single 6 second freeze-thaw cycle for each lesion. The patient tolerated the procedure well and there were no immediate complications. - Given the degree of actinic damage and confluence of actinic keratoses, patient and provider jointly agreed to perform field therapy. Prescribed 5-fluorouracil 5% cream to be applied BID for 3 weeks to the forehead and temples. Patient counseled on anticipated erythema, tenderness, pruritus, and photosensitivity with treatment, as well as the usual progression of cutaneous reactions. Mitigation of symptoms by use of photoprotection, cool compresses/soaks, acetaminophen/NSAIDs was suggested. Patient educated to stop therapy and call the office should they experience severe blistering, nausea, vomiting, or malaise with therapy. Folliculitis of scalp  - stable chronic illness   - continue clindamycin lotion as needed    RTC 6 months     Future Appointments   Date Time Provider Marissa Lopez   8/26/2022  2:00 PM [facility]   10/3/2022  3:40 PM CARMEN Durant [facility]   11/14/2022 10:00 AM SCHEDULE, [facility]   11/14/2022 10:30 AM Victorino [facility]oo [facility]   11/28/2022 11:00 AM DO NICHELLE Fraser [facility]   12/5/2022  8:00 AM MD VASCULAR ULTRASOUND RM 1 [facility]   12/7/2022 11:00 [facility]         Patient Instructions   Actinic Keratosis (AKs)   Actinic Keratosis are skin lesions caused by long-term exposure to the sun.   They are scaly, rough, to the touch, irregularly shaped, and skin-colored, reddish brown, or yellowish in color. Recent Studies suggest that some AK lesions actually may be a very early form of a type of skin cancer, squamous cell carcinoma (SCC), that has not spread beyond a small, confined area. It is not yet possible to tell which AK lesions will go on to become skin cancer. Experts from the [employer], the [address] [place] [address], and the [facility]at[facility] have recommended that all patients with AK lesions be evaluated and undergo some form of treatment. Your dermatologist can determine which type of treatment-either alone or in combination-is right for you. SUN PROTECTION AND OBSERVATION  Your dermatologist may recommend that you use a sun block, wear a hat and clothing to prevent sun exposure, and have regular skin examinations. Some AKs go away without further treatment, provided that the skin is not subjected to more sun damage. However, regular examinations will help catch the lesions that need to be treated. LESION-TARGETED THERAPIES   Liquid nitrogen (cryotherapy) destroys AKs by freezing them. This results in blistering and shedding of the AKs. Cryotherapy is the most common treatment when a patient has a few, small AK lesions. Topical chemotherapy is a cream that targets sun-damaged and pre-cancerous cells and destroys them. Topical Chemotherapy    What is topical chemotherapy? Topical chemotherapy is a cream that targets sun-damaged and pre-cancerous cells and destroys them. Name of the prescription: Efudex (Flurouracil)    How should it be used? Apply a small amount to affected area(s) forehead and bilateral temples twice daily for 3 weeks. Avoid contact with the eyes. Wash your hands after application. It may be less irritating to use the cream during the winter to avoid sweating and irritation that can occur in the warmer months.   Make sure you protect the affected areas from the sun during treatment; staying indoors and wearing sun protective clothing are strongly recommended. What should I expect from treatment? Treatment often results in a stinging or burning sensation. After about 3-7 days, the sun-damaged parts of the skin become red and irritated. With continued treatment, sores and crusts may appear. Any severely irritated or open skin can be covered with a thin layer of plain white petrolatum (Vaseline) or hydrocortisone 1% ointment (over the counter). You can stop using the petrolatum/hydrocortisone once the areas have healed. It takes 2-4 weeks for healthy new skin to replace the sun-damaged skin. The new skin can be slightly pink at first, but this usually resolves within a few weeks. To help with the healing process, you can use hydrocortisone 1% ointment twice a day. If you feel that you need something stronger, you can call the office for prescription strength. When should I call the office? If you notice any signs of infection such as excessive swelling, draining or oozing, or increased pain please contact the office. If you have any concerns about your treatment, or if you are experiencing difficulty with your recovery, please contact the office. Sun Protection     There are two types of sun rays that are harmful to the skin. UVA rays cause skin aging and skin cancer, such as melanoma. UVB rays cause sunburns, cataracts, and also contribute to skin cancer. The [employer] recommends that children and adults wear a broad spectrum, waterproof sunscreen with a Sun Protection Factor (SPF) of 30 or higher. It is important to check the ingredient label to be sure the sunscreen will protect the skin from both UVA and UVB sunrays. Your sunscreen should contain at least one of the following ingredients: titanium dioxide, zinc oxide, or avobenzone.     Sunscreen will not be effective unless it is applied to all exposed skin. Sunscreens work best if they are applied 30 minutes before sun exposure. They should be reapplied every 2 hours and after any water exposure. Sunscreen is not perfect. It is important to use other methods to protect the skin from sun exposure also. Wear hats, sunglasses and other sun protective clothing when outdoors. Stay in the shade during the peak hours of sun exposure between 10 AM and 4 PM.       I, Suzie Willis, personally scribed the services dictated to me by Dr. Angelique Soto in this documentation. I, Dr. Angelique Soto, personally performed the services described in this documentation, as scribed by [facility] in my presence, and it is both accurate and complete.     Electronically signed by Guy Delacruz MD on 8/22/2022 at 10:11 PM

## 2022-08-23 ENCOUNTER — TELEPHONE (OUTPATIENT)
Dept: CARDIOLOGY | Age: 67
End: 2022-08-23

## 2022-08-26 ENCOUNTER — TELEPHONE (OUTPATIENT)
Dept: FAMILY MEDICINE CLINIC | Age: 67
End: 2022-08-26

## 2022-09-01 ENCOUNTER — HOSPITAL ENCOUNTER (OUTPATIENT)
Dept: LAB | Age: 67
Discharge: HOME | End: 2022-09-01
Payer: MEDICARE

## 2022-09-01 ENCOUNTER — TELEPHONE (OUTPATIENT)
Dept: FAMILY MEDICINE CLINIC | Age: 67
End: 2022-09-01

## 2022-09-01 ENCOUNTER — OFFICE VISIT (OUTPATIENT)
Dept: PRIMARY CARE CLINIC | Age: 67
End: 2022-09-01
Payer: MEDICARE

## 2022-09-01 VITALS
BODY MASS INDEX: 34.03 KG/M2 | DIASTOLIC BLOOD PRESSURE: 68 MMHG | OXYGEN SATURATION: 98 % | SYSTOLIC BLOOD PRESSURE: 126 MMHG | WEIGHT: 244 LBS | TEMPERATURE: 97.2 F | HEART RATE: 74 BPM

## 2022-09-01 DIAGNOSIS — M25.562 CHRONIC PAIN OF LEFT KNEE: ICD-10-CM

## 2022-09-01 DIAGNOSIS — G89.29 CHRONIC PAIN OF LEFT KNEE: ICD-10-CM

## 2022-09-01 DIAGNOSIS — F41.1 GAD (GENERALIZED ANXIETY DISORDER): Primary | ICD-10-CM

## 2022-09-01 DIAGNOSIS — E11.65 TYPE 2 DIABETES MELLITUS WITH HYPERGLYCEMIA, WITHOUT LONG-TERM CURRENT USE OF INSULIN (HCC): ICD-10-CM

## 2022-09-01 PROCEDURE — PBSHW PBB SHADOW CHARGE: Performed by: FAMILY MEDICINE

## 2022-09-01 PROCEDURE — G8417 CALC BMI ABV UP PARAM F/U: HCPCS | Performed by: FAMILY MEDICINE

## 2022-09-01 PROCEDURE — 20610 DRAIN/INJ JOINT/BURSA W/O US: CPT | Performed by: FAMILY MEDICINE

## 2022-09-01 PROCEDURE — 3017F COLORECTAL CA SCREEN DOC REV: CPT | Performed by: FAMILY MEDICINE

## 2022-09-01 PROCEDURE — 1036F TOBACCO NON-USER: CPT | Performed by: FAMILY MEDICINE

## 2022-09-01 PROCEDURE — 82043 UR ALBUMIN QUANTITATIVE: CPT

## 2022-09-01 PROCEDURE — 99214 OFFICE O/P EST MOD 30 MIN: CPT | Performed by: FAMILY MEDICINE

## 2022-09-01 PROCEDURE — 36415 COLL VENOUS BLD VENIPUNCTURE: CPT

## 2022-09-01 PROCEDURE — 82570 ASSAY OF URINE CREATININE: CPT

## 2022-09-01 PROCEDURE — 83036 HEMOGLOBIN GLYCOSYLATED A1C: CPT

## 2022-09-01 PROCEDURE — 3052F HG A1C>EQUAL 8.0%<EQUAL 9.0%: CPT | Performed by: FAMILY MEDICINE

## 2022-09-01 PROCEDURE — G8427 DOCREV CUR MEDS BY ELIG CLIN: HCPCS | Performed by: FAMILY MEDICINE

## 2022-09-01 PROCEDURE — 1123F ACP DISCUSS/DSCN MKR DOCD: CPT | Performed by: FAMILY MEDICINE

## 2022-09-01 PROCEDURE — 99213 OFFICE O/P EST LOW 20 MIN: CPT | Performed by: FAMILY MEDICINE

## 2022-09-01 PROCEDURE — 2022F DILAT RTA XM EVC RTNOPTHY: CPT | Performed by: FAMILY MEDICINE

## 2022-09-01 RX ORDER — TRIAMCINOLONE ACETONIDE 40 MG/ML
40 INJECTION, SUSPENSION INTRA-ARTICULAR; INTRAMUSCULAR ONCE
Status: COMPLETED | OUTPATIENT
Start: 2022-09-01 | End: 2022-09-01

## 2022-09-01 RX ADMIN — TRIAMCINOLONE ACETONIDE 40 MG: 40 INJECTION, SUSPENSION INTRA-ARTICULAR; INTRAMUSCULAR at 12:36

## 2022-09-01 ASSESSMENT — ENCOUNTER SYMPTOMS
BACK PAIN: 1
SHORTNESS OF BREATH: 0
WHEEZING: 0
COUGH: 0
CHEST TIGHTNESS: 0

## 2022-09-01 ASSESSMENT — PATIENT HEALTH QUESTIONNAIRE - PHQ9
2. FEELING DOWN, DEPRESSED OR HOPELESS: 0
1. LITTLE INTEREST OR PLEASURE IN DOING THINGS: 0
SUM OF ALL RESPONSES TO PHQ QUESTIONS 1-9: 0
SUM OF ALL RESPONSES TO PHQ9 QUESTIONS 1 & 2: 0
SUM OF ALL RESPONSES TO PHQ QUESTIONS 1-9: 0

## 2022-09-01 NOTE — PROGRESS NOTES
[facility] URGENT CARE A DEPARTMENT OF [place] [address]  [address] [place] [address]  Dept: [phone]  Dept Fax: [phone]    Willie Calvo is a 79 y.o. male who presents today for his medical conditions/complaints as noted below. Willie Calvo is c/o of   Chief Complaint   Patient presents with    Anxiety     Sleep issues         HPI:     HPI Here today for a follow up of his anxiety and he is having knee pain. Anxiety: worsening; He has been struggling with his anxiety over the past 3 weeks. He is not sleeping. He is waking up multiple times a night and he is struggling to fall back asleep. He is worried about an upcoming heart cath. His lost his mom in June and he is arguing with his brothers about her estate. (He is not allowed in her house and some of his stuff is in there). A few weeks ago he started taking 2 buspar a day. He did that for a week or so and then he decided to take it just once a day. Starting 4 days ago he started sleeping through the night again. He tried one buspar at night before bed and it didn't seem to help. He wonders if there is a sleeping pill that would help. He used to take zquil with some improvement but it isn't helping this time. His anxiety during the day is better now that he is taking the buspar in the morning. He has not had any panic attacks. He is having trouble getting motivated a lot of the time. He will wake up and just not have the interest in doing anything. Once he gets started he is okay. Knee pain: he had an injection almost a year ago and he had great relief from it so he is wonders if it is something that can be done again. He is still seeing pain management for his back which has been helping. 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        Past Medical History:   Diagnosis Date    Anxiety     Aortic regurgitation     Bicuspid aortic valve     Diabetes mellitus ([facility] [address] since March 2018    on Rx. GERD (gastroesophageal reflux disease)     Hypertension     Left renal mass 10/2018    Osteoarthritis of left knee     Wears glasses           Social History     Tobacco Use    Smoking status: Never    Smokeless tobacco: Never   Substance Use Topics    Alcohol use: Not Currently     Comment: very rare     Current Outpatient Medications   Medication Sig Dispense Refill    fluorouracil (EFUDEX) 5 % cream Apply twice daily to actinic keratosis on forehead and scalp for 3-4 weeks. Expected redness and irritation 40 g 0    clindamycin (CLEOCIN-T) 1 % external solution Apply to scalp daily for folliculitis 30 mL 5    levothyroxine (SYNTHROID) 50 MCG tablet Take 1 tablet by mouth in the morning. 30 tablet 3    amLODIPine (NORVASC) 10 MG tablet Take 1 tablet by mouth in the morning.  90 tablet 1    rosuvastatin (CRESTOR) 5 MG tablet Take 1 tablet by mouth daily 90 tablet 1    losartan-hydroCHLOROthiazide (HYZAAR) 100-25 MG per tablet Take 1 tablet by mouth daily 90 tablet 1    metoprolol tartrate (LOPRESSOR) 50 MG tablet Take one tab night prior to Coronary CTA and one morning of Coronary CTA 2 tablet 3    Semaglutide (RYBELSUS) 7 MG TABS Take 1 tablet by mouth daily 30 tablet 5    montelukast (SINGULAIR) 10 MG tablet Take 1 tablet by mouth nightly 90 tablet 1    pioglitazone (ACTOS) 15 MG tablet Take 1 tablet by mouth daily 90 tablet 1    meloxicam (MOBIC) 7.5 MG tablet Take 1 tablet by mouth 2 times daily (with meals) 180 tablet 1    metFORMIN (GLUCOPHAGE) 500 MG tablet Take 1 tablet by mouth 2 times daily (with meals) 180 tablet 1    furosemide (LASIX) 20 MG tablet Take 1 tablet by mouth daily 90 tablet 1    diclofenac sodium (VOLTAREN) 1 % GEL Apply 2 g topically 4 times daily as needed for Pain 200 g 1    ketoconazole (NIZORAL) 2 % shampoo Apply 3-4 times weekly to scalp, temples, and ears, leave on for five minutes prior to washing off 120 mL 2    Misc Natural Products (GLUCOS-CHONDROIT-MSM COMPLEX PO) Take 1 tablet by mouth 2 times daily      Apple Cider Vinegar 500 MG TABS Take 1 tablet by mouth 2 times daily      cetirizine (ZYRTEC) 10 MG tablet Take 1 tablet by mouth daily 90 tablet 3    sildenafil (REVATIO) 20 MG tablet Take 1 tablet by mouth as needed (Erectile dysfunction) 30 tablet 5    carvedilol (COREG) 25 MG tablet Take 1.5 tablets by mouth 2 times daily 270 tablet 3    busPIRone (BUSPAR) 7.5 MG tablet Take 1 tablet by mouth 3 times daily as needed (anxiety) 90 tablet 1    Handicap Placard MISC by Does not apply route Exp 7/1/2026 1 each 0    prednisoLONE acetate (PRED FORTE) 1 % ophthalmic suspension Place 1 drop into the right eye 4 times daily 1 Bottle 0    fluticasone (FLONASE) 50 MCG/ACT nasal spray 1 spray by Nasal route 2 times daily 1 Bottle 5    baclofen (LIORESAL) 10 MG tablet Take 1 tablet by mouth nightly as needed (muscle spasm) 30 tablet 1    vitamin D3 (CHOLECALCIFEROL) 400 UNITS TABS tablet Take 400 Units by mouth every other day       Omeprazole (PRILOSEC PO) Take 1 capsule by mouth daily as needed        No current facility-administered medications for this visit. Allergies   Allergen Reactions    Codeine      Severe Abdominal pain  Severe stomach pain  Severe Abdominal pain    Sulfa Antibiotics      Patient unsure of reaction       Subjective:     Review of Systems   Constitutional:  Negative for activity change, appetite change, chills, fatigue and fever. Respiratory:  Negative for cough, chest tightness, shortness of breath and wheezing. Cardiovascular:  Negative for chest pain, palpitations and leg swelling. Musculoskeletal:  Positive for arthralgias (left knee) and back pain. Skin:  Negative for rash. Neurological:  Negative for dizziness, syncope, weakness, light-headedness and headaches. Psychiatric/Behavioral:  Negative for sleep disturbance (currently improved). The patient is nervous/anxious. sleeping well and he is not anxious during the day. I encouraged him to continue it and to increase to bid or tid as needed. If for some reason his sleep worsens I will start him on trazodone. Knee pain: worsening; his injection lasted a year so he would like another one so that was done today. Procedure Note    PREOP DIAGNOSIS:  [] Right [x]  Left    [] Bilateral Knee Pain    POSTOP DIAGNOSIS:  [] Right [x]  Left    [] Bilateral Knee Pain    OPERATION:  [] Right [x]  Left    [] Bilateral INTRA-ARTICULAR KNEE INJECTION    PROCEDURE:  After sterile prep, an anterolateral approach was used. [x] 40 mg Kenalog  [x]  2 ml of 2% lidocaine without epi injected intra-articularly without incident. Pre- and post neurovascular status verified. No complications noted. DM: he is following up for this in a few weeks, he needs an a1c and micorablumin. Return in about 1 month (around 10/1/2022) for DM follow up, Anxiety follow up. Orders Placed This Encounter   Procedures    Hemoglobin A1C     Standing Status:   Future     Number of Occurrences:   1     Standing Expiration Date:   9/1/2023    Microalbumin, Ur     Standing Status:   Future     Number of Occurrences:   1     Standing Expiration Date:   9/1/2023     Scheduling Instructions: To be done in 6 months    IA ARTHROCENTESIS ASPIR&/INJ MAJOR JT/BURSA W/O US     Orders Placed This Encounter   Medications    triamcinolone acetonide (KENALOG-40) injection 40 mg         Patientgiven educational materials - see patient instructions. Discussed use, benefit,and side effects of prescribed medications. All patient questions answered. Ptvoiced understanding. Reviewed health maintenance. Instructed to continue currentmedications, diet and exercise. Patient agreed with treatment plan. Follow up asdirected.      Electronically signed by Sayda Pardo MD on 9/1/2022 at 1:47 PM

## 2022-09-02 LAB
CREATININE URINE: 141.5 MG/DL (ref 39–259)
ESTIMATED AVERAGE GLUCOSE: 154 MG/DL
HBA1C MFR BLD: 7 % (ref 4–6)
MICROALBUMIN/CREAT 24H UR: <12 MG/L
MICROALBUMIN/CREAT UR-RTO: NORMAL MCG/MG CREAT

## 2022-09-12 ENCOUNTER — HOSPITAL ENCOUNTER (OUTPATIENT)
Dept: CARDIAC CATH/INVASIVE PROCEDURES | Age: 67
Discharge: HOME | End: 2022-09-12
Payer: MEDICARE

## 2022-09-12 VITALS
HEART RATE: 74 BPM | WEIGHT: 240 LBS | OXYGEN SATURATION: 96 % | BODY MASS INDEX: 33.6 KG/M2 | TEMPERATURE: 98.1 F | DIASTOLIC BLOOD PRESSURE: 48 MMHG | HEIGHT: 71 IN | SYSTOLIC BLOOD PRESSURE: 96 MMHG | RESPIRATION RATE: 19 BRPM

## 2022-09-12 LAB
GFR NON-AFRICAN AMERICAN: >60 ML/MIN
GFR SERPL CREATININE-BSD FRML MDRD: >60 ML/MIN
GFR SERPL CREATININE-BSD FRML MDRD: NORMAL ML/MIN/{1.73_M2}
GLUCOSE BLD-MCNC: 171 MG/DL (ref 74–100)
PLATELET # BLD: NORMAL K/UL (ref 138–453)
PLATELET, FLUORESCENCE: 130 K/UL (ref 138–453)
PLATELET, IMMATURE FRACTION: 3.5 % (ref 1.1–10.3)
POC BUN: 26 MG/DL (ref 8–26)
POC CHLORIDE: 97 MMOL/L (ref 98–107)
POC CREATININE: 0.96 MG/DL (ref 0.51–1.19)
POC HEMATOCRIT: 39 % (ref 41–53)
POC HEMOGLOBIN: 13.2 G/DL (ref 13.5–17.5)
POC POTASSIUM: 4.3 MMOL/L (ref 3.5–4.5)
POC SODIUM: 135 MMOL/L (ref 138–146)

## 2022-09-12 PROCEDURE — 85049 AUTOMATED PLATELET COUNT: CPT

## 2022-09-12 PROCEDURE — 93458 L HRT ARTERY/VENTRICLE ANGIO: CPT

## 2022-09-12 PROCEDURE — 84132 ASSAY OF SERUM POTASSIUM: CPT

## 2022-09-12 PROCEDURE — 85014 HEMATOCRIT: CPT

## 2022-09-12 PROCEDURE — C1894 INTRO/SHEATH, NON-LASER: HCPCS

## 2022-09-12 PROCEDURE — 99152 MOD SED SAME PHYS/QHP 5/>YRS: CPT

## 2022-09-12 PROCEDURE — 6360000004 HC RX CONTRAST MEDICATION

## 2022-09-12 PROCEDURE — 7100000001 HC PACU RECOVERY - ADDTL 15 MIN

## 2022-09-12 PROCEDURE — 82565 ASSAY OF CREATININE: CPT

## 2022-09-12 PROCEDURE — 99153 MOD SED SAME PHYS/QHP EA: CPT

## 2022-09-12 PROCEDURE — 84520 ASSAY OF UREA NITROGEN: CPT

## 2022-09-12 PROCEDURE — 2709999900 HC NON-CHARGEABLE SUPPLY

## 2022-09-12 PROCEDURE — 82947 ASSAY GLUCOSE BLOOD QUANT: CPT

## 2022-09-12 PROCEDURE — 7100000000 HC PACU RECOVERY - FIRST 15 MIN

## 2022-09-12 PROCEDURE — 82435 ASSAY OF BLOOD CHLORIDE: CPT

## 2022-09-12 PROCEDURE — 84295 ASSAY OF SERUM SODIUM: CPT

## 2022-09-12 PROCEDURE — 6360000002 HC RX W HCPCS

## 2022-09-12 PROCEDURE — 85055 RETICULATED PLATELET ASSAY: CPT

## 2022-09-12 PROCEDURE — 2580000003 HC RX 258: Performed by: INTERNAL MEDICINE

## 2022-09-12 RX ORDER — SODIUM CHLORIDE 9 MG/ML
INJECTION, SOLUTION INTRAVENOUS CONTINUOUS
Status: DISCONTINUED | OUTPATIENT
Start: 2022-09-12 | End: 2022-09-13 | Stop reason: HOSPADM

## 2022-09-12 RX ADMIN — SODIUM CHLORIDE: 9 INJECTION, SOLUTION INTRAVENOUS at 11:09

## 2022-09-12 NOTE — DISCHARGE INSTRUCTIONS
DISCHARGE INSTRUCTIONS / ARM CARE POST CATHERIZATION        ENCOURAGE FLUIDS    NO STRENUOUS LIFTING WITH AFFECTED ARM FOR 3 DAYS ANYTHING HEAVIER THAN 8 TO 10 POUNDS    REMOVE BAND-AID/PRESSURE DRESSING THE FOLLOWING DAY AND DO NOT APPLY ANY FURTHER BAND-AIDS    KEEP INCISION CLEAN DRY AND OPEN TO THE AIR / NO HAND LOTION NEAR PUNCTURE SITE    WATCH FOR SIGNS OF INFECTION /  REDNESS / SWELLING / DRAINAGE / Dora Tay / TEMPERATURE GREATER THAN 101    IF BLEEDING OCCURS HOLD MANUAL PRESSURE DIRECTLY OVER SITE  (YOU WILL FEEL PULSATION OF ARTERY) AND IF BLEEDING DOES NOT STOP AFTER 2 MINUTES CALL 911    OK TO SHOWER THE NEXT DAY, NO TUB BATHING OR HOT TUBS/SWIMMING FOR 7 DAYS    IF AREA BECOMES HARD AND SWOLLEN AND IF YOU ARE AT ALL CONCERNED SEEK HELP IMMEDIATELY    SEEK HELP IMMEDIATELY IF AFFECTED ARM BECOMES COLD / Starleen Devon / SEVERE PAIN / NAILBEDS TURN BLUE     IF ON METFORMIN / GLUCOPHAGE DO NOT RESTART MEDICATION FOR 48 HOURS    PLEASE PRACTICE GOOD HAND WASHING AND INCLUDE PUNCTURE SITE ESPECIALLY AFTER USING THE RESTROOM    AVOID USING ALCOHOL BASED HAND SANITIZERS FOR ONE WEEK      CALL 911 if you have symptoms including:   Drooping facial muscles   Changes in vision or speech   Difficulty walking or using your limbs   Change in sensation to affected leg, including numbness, feeling cold, or change in color   Extreme sweating, nausea or vomiting   Dizziness or lightheadedness   Chest pain   Rapid, irregular heartbeat   Palpitations   Cough, shortness of breath, or difficulty breathing   Weakness or fainting   If you think you have an emergency, CALL 911 . SEDATION / ANALGESIA INFORMATION / Vinod Schilling 85 have received the sedation/analgesia medication during your visit    Sedation/analgesia is used during short medical procedures under controlled supervision. The medication will produce a strong relaxation.  You will be able to hear, speak and follow instructions, but your memory and alertness will be decreased. You will be able to swallow and breathe on your own. During sedation/analgesia your blood pressure, heart and breathing will be watched closely. After the procedure, you may not remember what was said or done. You may have the following effects from the medication. \" Drowsiness, dizziness, sleepiness or confusion. \" Difficulty remembering or delayed reaction times. \" Loss of fine muscle control or difficulty with your balance especially while walking. \" Difficulty focusing or blurred vision. You may not be aware of slight changes in your behavior and/or your reaction time because of the medication used during the procedure. Therefore you should follow these instructions. \" Have someone responsible help you with your care. \" Do not drive for 24 hours. \" Do not operate equipment for 24 hours (lawnmowers, power tools, kitchen accessories, stove). \" Do not drink any alcoholic beverages for a minimum of 24 hours. \" Do not make important personal, legal or business decisions for 24 hours. \" You may experience dizziness or lightheadedness. Move slowly and carefully, do not make sudden position changes. \" Drink extra amounts of fluids today. \" Increase your diet as tolerated (unless you have received specific instructions from your doctor). \" If you feel nauseated, continue with liquids until the nausea is gone. \" Notify your physician if you have not urinated within 8 hours after the procedure. \" Resume your medications unless otherwise instructed. Coronary artery disease (CAD) occurs when plaque builds up in the arteries that bring oxygen-rich blood to your heart. Plaque is a fatty substance made of cholesterol, calcium, and other substances in the blood. This process is called hardening of the arteries, or atherosclerosis. What happens when you have coronary artery disease? Plaque may narrow the coronary arteries. Narrowed arteries cause poor blood flow.  This can lead to angina symptoms such as chest pain or discomfort. If blood flow is completely blocked, you could have a heart attack. You can slow CAD and reduce the risk of future problems by making changes in your lifestyle. These include quitting smoking and eating heart-healthy foods. Treatments for CAD, along with changes in your lifestyle, can help you live a longer and healthier life. How can you prevent coronary artery disease? Do not smoke. It may be the best thing you can do to prevent heart disease. If you need help quitting, talk to your doctor about stop-smoking programs and medicines. These can increase your chances of quitting for good. Be active. Get at least 30 minutes of exercise on most days of the week. Walking is a good choice. You also may want to do other activities, such as running, swimming, cycling, or playing tennis or team sports. Eat heart-healthy foods. Eat more fruits and vegetables and less foods that contain saturated and trans fats. Limit alcohol, sodium, and sweets. Stay at a healthy weight. Lose weight if you need to. Manage other health problems such as diabetes, high blood pressure, and high cholesterol. Talk to your doctor about taking a daily aspirin. Manage stress. Stress can hurt your heart. To keep stress low, talk about your problems and feelings. Don't keep your feelings hidden. How is coronary artery disease treated? Your doctor will suggest that you make lifestyle changes. For example, your doctor may ask you to eat healthy foods, quit smoking, lose extra weight, and be more active. You will have to take medicines. Your doctor may suggest a procedure to open narrowed or blocked arteries. This is called angioplasty. Or your doctor may suggest using healthy blood vessels to create detours around narrowed or blocked arteries. This is called bypass surgery. Follow-up care is a key part of your treatment and safety.  Be sure to make and go to all appointments, and call your doctor if you are having problems. It's also a good idea to know your test results and keep a list of the medicines you take. Where can you learn more? Go to [email or web address] org and sign in to your [employer] account. Enter [phone] in the [employer] box to learn more about Learning About Coronary Artery Disease (CAD).     If you do not have an account, please click on the Sign Up Now link.

## 2022-09-12 NOTE — OP NOTE
[facility]    CARDIAC CATHETERIZATION    Date:   9/12/2022  Patient name:  Tae Tang  Date of admission:  9/12/2022 10:23 AM  MRN:   [id number]  YOB: 1955    Operators:  Primary:   Nan Gómez MD (Attending Physician)    Assistant/CV fellow:  Liam Man MD      Procedure performed:    [x] Left Heart Catheterization. [] Graft Angiography. [x] Left Ventriculography. [] Right Heart Catheterization. [] Coronary Angiography. [] Aortic Valve Studies. [] PCI:      [] Other:       Pre Procedure Conscious Sedation Data:  ASA Class:    [] I [] II [x] III [] IV    Mallampati Class:  [x] I [] II [] III [] IV      Indication:  - DUTTON   - Positive CTA   - HTN       Procedure:  Access:  [] Femoral  [x] Radial  artery       [x] Right  [] Left    Procedure: After informed consent was obtained with explanation of the risks and benefits, patient was brought to the cath lab. The access area was prepped and draped in sterile fashion. 1% lidocaine was used for local block. The artery was cannulated with 6  Fr sheath with brisk arterial blood return. The side port was frequently flushed and aspirated with normal saline. Cardiac Arteries and Lesion Findings       LMCA: Normal 0% stenosis. LAD: Normal 0% stenosis. LCx: Normal 0% stenosis. RCA: Normal 0% stenosis. Coronary Tree        Dominance: Right              Procedure Summary        Non obstructive minimal CAD    Preserved LV function        Recommendations        Medical treatments    Risk factor modification. Estimated Blood Loss: 10  mL      ____________________________________________________________________    History and Risk Factors    [x] Hypertension     [] Family history of CAD  [x] Hyperlipidemia     [] Cerebrovascular Disease   [] Prior MI       [] Peripheral Vascular disease   [] Prior PCI              [] Diabetes Mellitus    [] Left Main PCI. [] Currently on Dialysis. [] Prior CABG. [] Currently smoker. [] Cardiac Arrest outside of healthcare facility. [] Yes    [x] No        Witnessed     [] Yes   [] No     Arrest after arrival of EMS  [] Yes   [] No     [] Cardiac Arrest at other Facility. [] Yes   [x] No    Pre-Procedure Information. Heart Failure       [] Yes    [x] No        Class  [] I      [] II  [] III    [] IV. New Diagnosis    [] Yes  [] No    HF Type      [] Systolic   [] Diastolic          [] Unknown. Diagnostic Test:   EKG       [x] Normal   [] Abnormal    New antiarrhythmia medications:    [] Yes   [] No   New onset atrial fibrillation / Flutter     [] Yes   [] No   ECG Abnormalities:      [] V. Fib   [] Eleanor V. Tach           [] NS V. T   [] New LBBB           [] T. Inv  []  ST dev > 0.5 mm         [] PVC's freq  [] PVC's infrequent    Stress Test Performed:      [x] Yes    [] No     Type:     [] Stress Echo   [] Exercise Stress Test (no imaging)      [x] Stress Nuclear  [] Stress Imaging     Results   [] Negative   [x] Positive        [] Indeterminate  [] Unavailable     If Positive/ Risk / Extent of Ischemia:       [] Low  [x] Intermediate         [] High  [] Unavailable      Cardiac CTA Performed:     [x] Yes    [] No      Results   [x] CAD   [] Non obstructive CAD      [] No CAD   [] Uncertain      [] Unknown   [] Structural Disease. Pre Procedure Medications:   [x] Yes    [] No         [x] ASA   [] Beta Blockers      [] Nitrate   [] Ca Channel Blockers      [] Ranolazine   [] Statin       [] Plavix/Others antiplatelets      Electronically signed on 9/12/2022 at 1:14 PM by:    Tal Bone MD  Fellow, [address]    I have reviewed the case / procedure with resident / fellow  I have examined the patient personally  Patient agree with treatment plan as discussed before, final arrangement based on my evaluation and exam.    Risk and benefit of procedure planned were explained in details.     Procedure was performed by me personally, with all aspect of the procedure being done using standard protocol. Note was modified based on my own assessment and treatment.     Mervat Hernandez MD  [facility]

## 2022-09-12 NOTE — H&P
[facility]    Consult / H&P               Today's Date: 9/12/2022  Patient Name: Hawa Broussard  Date of admission: No admission date for patient encounter. Patient's age: 79 y.o., 1955  Admission Dx: No admission diagnoses are documented for this encounter. Reason for Consult:  Cardiac evaluation    Requesting Physician: No admitting provider for patient encounter. CHIEF COMPLAINT:  Elective cath     History Obtained From:  patient    HISTORY OF PRESENT ILLNESS:      The patient is a 79 y.o. is here for elective cardiac cath   Patient was c/o SOB and had CTA done which was abnormal as below. Patient has risk factors for CAD. Past Medical History:   has a past medical history of Anxiety, Aortic regurgitation, Bicuspid aortic valve, Diabetes mellitus ([address] GERD (gastroesophageal reflux disease), Hypertension, Left renal mass, Osteoarthritis of left knee, and Wears glasses. Past Surgical History:   has a past surgical history that includes cyst removal; [place] tooth extraction; Mouth surgery (2015); pr colonoscopy flx dx w/collj spec when pfrmd (N/A, 5/14/2018); Tonsillectomy and adenoidectomy (1960); partial nephrectomy (Left, 11/30/2018); and pr lap,partial nephrectomy (Left, 11/30/2018). Home Medications:    Prior to Admission medications    Medication Sig Start Date End Date Taking? Authorizing Provider   fluorouracil (EFUDEX) 5 % cream Apply twice daily to actinic keratosis on forehead and scalp for 3-4 weeks. Expected redness and irritation 8/22/22   Jose Culp MD   clindamycin (CLEOCIN-T) 1 % external solution Apply to scalp daily for folliculitis 3/33/17 5/55/31  Martha Valienet MD   levothyroxine (SYNTHROID) 50 MCG tablet Take 1 tablet by mouth in the morning. 8/15/22   Petra Garcia MD   amLODIPine (NORVASC) 10 MG tablet Take 1 tablet by mouth in the morning.  7/19/22   Petra Garcia MD   rosuvastatin (CRESTOR) 5 MG tablet Take 1 tablet by mouth daily 7/5/22   Chan Nieves MD   losartan-hydroCHLOROthiazide [facility]) 100-25 MG per tablet Take 1 tablet by mouth daily 6/20/22   Chan Nieves MD   metoprolol tartrate (LOPRESSOR) 50 MG tablet Take one tab night prior to Coronary CTA and one morning of Coronary CTA 6/3/22   Usman Erickson DO   Semaglutide (RYBELSUS) 7 MG TABS Take 1 tablet by mouth daily 5/9/22   Chan Nieves MD   montelukast (SINGULAIR) 10 MG tablet Take 1 tablet by mouth nightly 5/2/22   Chan Nieves MD   pioglitazone (ACTOS) 15 MG tablet Take 1 tablet by mouth daily 4/25/22   Chan Nieves MD   meloxicam (MOBIC) 7.5 MG tablet Take 1 tablet by mouth 2 times daily (with meals) 3/14/22   Chan Nieves MD   metFORMIN (GLUCOPHAGE) 500 MG tablet Take 1 tablet by mouth 2 times daily (with meals) 3/14/22   Chan Nieves MD   furosemide (LASIX) 20 MG tablet Take 1 tablet by mouth daily 3/14/22   Chan Nieves MD   diclofenac sodium (VOLTAREN) 1 % GEL Apply 2 g topically 4 times daily as needed for Pain 3/3/22   Joanie Fry MD   ketoconazole (NIZORAL) 2 % shampoo Apply 3-4 times weekly to scalp, temples, and ears, leave on for five minutes prior to washing off 1/24/22   Cindy Culp MD   [facility] Natural Products (GLUCOS-CHONDROIT-MSM COMPLEX PO) Take 1 tablet by mouth 2 times daily    Historical Provider, MD   Apple Cider Vinegar 500 MG TABS Take 1 tablet by mouth 2 times daily    Historical Provider, MD   cetirizine (ZYRTEC) 10 MG tablet Take 1 tablet by mouth daily 1/20/22   Chan Nieves MD   sildenafil (REVATIO) 20 MG tablet Take 1 tablet by mouth as needed (Erectile dysfunction) 1/20/22   Chan Nieves MD   carvedilol (COREG) 25 MG tablet Take 1.5 tablets by mouth 2 times daily 1/3/22   Usman Dre, DO   busPIRone (BUSPAR) 7.5 MG tablet Take 1 tablet by mouth 3 times daily as needed (anxiety) 9/28/21   Chan Nieves MD   Handicap Placard MISC by Does not apply route Exp 7/1/2026 7/19/21   Chan Nieves MD   prednisoLONE acetate (PRED FORTE) 1 % ophthalmic suspension Place 1 drop into the right eye 4 times daily 6/9/21   Lian Knight, OD   fluticasone (FLONASE) 50 MCG/ACT nasal spray 1 spray by Nasal route 2 times daily 3/25/21   David Thornton MD   baclofen (LIORESAL) 10 MG tablet Take 1 tablet by mouth nightly as needed (muscle spasm) 3/25/21   David Thornton MD   vitamin D3 (CHOLECALCIFEROL) 400 UNITS TABS tablet Take 400 Units by mouth every other day     Historical Provider, MD   Omeprazole (PRILOSEC PO) Take 1 capsule by mouth daily as needed     Historical Provider, MD      No current facility-administered medications for this encounter. Allergies:  Codeine and Sulfa antibiotics    Social History:   reports that he has never smoked. He has never used smokeless tobacco. He reports that he does not currently use alcohol. He reports that he does not use drugs. Family History: family history includes Cancer in his father; Diabetes in his father, maternal grandmother, mother, and paternal grandfather; Glaucoma in his brother, father, and mother; Heart Attack (age of onset: 66) in his father; High Blood Pressure in his father and mother; No Known Problems in his brother; Other in his mother, paternal grandfather, and paternal grandmother; Stroke in his paternal grandfather; Stroke (age of onset: 66) in his father. No h/o sudden cardiac death. No for premature CAD    REVIEW OF SYSTEMS:    Constitutional: there has been no unanticipated weight loss. There's been No change in energy level, No change in activity level. Eyes: No visual changes or diplopia. No scleral icterus. ENT: No Headaches  Cardiovascular: No cardiac history  Respiratory: No previous pulmonary problems, No cough  Gastrointestinal: No abdominal pain. No change in bowel or bladder habits. Genitourinary: No dysuria, trouble voiding, or hematuria. Musculoskeletal:  No gait disturbance, No weakness or joint al congestion or hives.       PHYSICAL EXAM:      There were no vitals taken for this visit. Constitutional and General Appearance: alert, cooperative, no distress and appears stated age  [de-identified]: PERRL, no cervical lymphadenopathy. No masses palpable. Normal oral mucosa  Respiratory:  Normal excursion and expansion without use of accessory muscles  Resp Auscultation: Good respiratory effort. No for increased work of breathing. On auscultation: clear to auscultation bilaterally  Cardiovascular: The apical impulse is not displaced  Heart tones are crisp and normal. regular S1 and S2.  Jugular venous pulsation Normal  The carotid upstroke is normal in amplitude and contour without delay or bruit  Peripheral pulses are symmetrical and full   Abdomen:   No masses or tenderness  Bowel sounds present  Extremities:   No Cyanosis or Clubbing   Lower extremity edema: No   Skin: Warm and dry  Neurological:  Alert and oriented. Moves all extremities well  No abnormalities of mood, affect, memory, mentation, or behavior are noted    DATA:      Labs:     CBC: No results for input(s): WBC, HGB, HCT, PLT in the last 72 hours. BMP: No results for input(s): NA, K, CO2, BUN, CREATININE, LABGLOM, GLUCOSE in the last 72 hours. BNP: No results for input(s): BNP in the last 72 hours. PT/INR: No results for input(s): PROTIME, INR in the last 72 hours. APTT:No results for input(s): APTT in the last 72 hours. CARDIAC ENZYMES:No results for input(s): CKTOTAL, CKMB, CKMBINDEX, TROPONINI in the last 72 hours. FASTING LIPID PANEL:  Lab Results   Component Value Date/Time    HDL 38 08/12/2022 11:49 AM    TRIG 134 08/12/2022 11:49 AM     LIVER PROFILE:No results for input(s): AST, ALT, LABALBU in the last 72 hours.     IMPRESSION:    Patient Active Problem List   Diagnosis    Aortic regurgitation due to bicuspid aortic valve    Dental abscess    Murmur, diastolic    HTN (hypertension)    Hallux valgus, acquired    Capsulitis of foot    Left foot pain    Left knee pain    Type 2 diabetes mellitus without complication (HCC)    Bicuspid aortic valve    Abnormality of gait    Renal mass    Type 2 diabetes mellitus with other circulatory complication, without long-term current use of insulin (HCC)    Carpal tunnel syndrome of left wrist    Ulnar neuropathy of left upper extremity    Elevated hemoglobin A1c    Renal cell carcinoma (HCC)    Dilated aortic root (HCC)    Thrombocytopenia (HCC)    Chronic midline low back pain without sciatica    Erectile dysfunction        CTA:     Left Main: Small calcific atherosclerotic plaque at the bifurcation. Less  than 50% luminal diameter narrowing. LAD: No significant atherosclerosis or stenosis. Left circumflex:  No significant atherosclerosis or stenosis. RCA:  Tiny calcification in the proximal segment. No stenosis. Cardiac findings: Borderline cardiomegaly. No pericardial effusion. Aortic  valve/annulus calcifications. Mala Holland LVEF 55.7%     Non-cardiac findings: Visualized lungs show no nodule. No mediastinal  lymphadenopathy. 08/22/2022   Normal left ventricular diameter. Mild left ventricular hypertrophy. Left ventricular systolic function is normal. Estimated Left ventricular  ejection fraction 60 %. Dilated right ventricle with normal function. Aortic valve is not well visualized, possibly functionally bicuspid with  mild to moderate eccentric regurgitation. Peak instantaneous gradient 7 mmHg  and mean gradient 4 mmHg. Normal function of other valves. No pericardial effusion. Aortic root is mildly dilated at 4.2 cm. Fatigue, lack of energy, exercise intolerance. -Had abnormal CTA   Bicuspid AV- Moderate AI- on 9/21. Continue aggressive BP control/Afterload reduction. On Norvasc, Coreg, Hyzaar. Will repeat Echo 9/22. Ascending aortic aneurysm- 4.3 cm max diameter on CTA 9/21- (previously larger 4.7 cm on 10/19). Continue aggressive BP control/Afterload reduction. On Norvasc, Coreg, Hyzaar. Will repeat CTA 9/22.   Renal CA- following with urology/oncology    RECOMMENDATIONS:  Proceed with cardiac cath   Follow post cath orders. Discussed with patient and Nurse. Electronically signed by Liam Man MD on 9/12/2022 at 8:01 AM    [facility]      [phone]      I reviewed the patient history personally, and examined by me during the visit. I have reviewed the H&P/Consult / Progress note as completed, and made appropriate changes to the patient exam and treatment plans.       I have reviewed the case in details including physical exam and treatment plan with resident / fellow / NP    Patient treatment plan was explained to patient, correction in notes was made as appropriate, and discussed final arrangement based on  my evaluation and exam.    Additional Recommendations:      Nan Gómez MD  [facility]

## 2022-09-12 NOTE — PROGRESS NOTES
Patient received post cath to [address] [place] [address] room 7. Assessment obtained. Post cath pathway initiated. Right radial site with Vasband intact. No hematoma noted. Restrictions reviewed with patient. Patient without complaints.

## 2022-09-12 NOTE — PROGRESS NOTES
Patient admitted, consent signed and questions answered. Patient ready for procedure. Call light to reach with side rails up 2 of 2. Bilateral groin and bilateral groin clipped with writer and Lakesha Perez present. Family  at bedside with patient. History and physical needs to be completed. Tricia Monaco

## 2022-09-12 NOTE — PROGRESS NOTES
Remaining air removed from TR band, no bleeding or hematoma noted. Radial pulse palpable. Pressure dressing applied.

## 2022-09-12 NOTE — PROGRESS NOTES
All discharge instructions reviewed, questions answered, paper signed and given copy. Patient discharged   with family and belongings. Alert and interactive, no focal deficits

## 2022-09-21 ENCOUNTER — PATIENT MESSAGE (OUTPATIENT)
Dept: FAMILY MEDICINE CLINIC | Age: 67
End: 2022-09-21

## 2022-09-21 DIAGNOSIS — M79.671 RIGHT FOOT PAIN: ICD-10-CM

## 2022-09-21 NOTE — TELEPHONE ENCOUNTER
Lyly Velze called requesting a refill of the below medication which has been pended for you:     Requested Prescriptions     Pending Prescriptions Disp Refills    meloxicam (MOBIC) 7.5 MG tablet 180 tablet 1     Sig: Take 1 tablet by mouth 2 times daily (with meals)       Last Appointment Date: 9/1/2022  Next Appointment Date: Visit date not found    Allergies   Allergen Reactions    Codeine      Severe Abdominal pain  Severe stomach pain  Severe Abdominal pain    Sulfa Antibiotics      Patient unsure of reaction

## 2022-09-21 NOTE — TELEPHONE ENCOUNTER
From: Natalia Ruvalcaba  To: Dr. Bello Quiroz: 9/21/2022 10:59 AM EDT  Subject: prescription refill    Hello, this is Ami Everts  I have no refills for my Meloxicam 7.5mg and have enough for 1/2 a week since I take 2 a day  Michelle Stark

## 2022-09-22 RX ORDER — MELOXICAM 7.5 MG/1
7.5 TABLET ORAL 2 TIMES DAILY WITH MEALS
Qty: 180 TABLET | Refills: 1 | Status: SHIPPED | OUTPATIENT
Start: 2022-09-22

## 2022-09-26 DIAGNOSIS — E11.9 TYPE 2 DIABETES MELLITUS WITHOUT COMPLICATION, WITHOUT LONG-TERM CURRENT USE OF INSULIN (HCC): ICD-10-CM

## 2022-09-26 NOTE — TELEPHONE ENCOUNTER
Ngozi Wong called requesting a refill of the below medication which has been pended for you:     Requested Prescriptions     Pending Prescriptions Disp Refills    metFORMIN (GLUCOPHAGE) 500 MG tablet 180 tablet 1     Sig: Take 1 tablet by mouth 2 times daily (with meals)       Last Appointment Date: 9/1/2022  Next Appointment Date: Visit date not found    Allergies   Allergen Reactions    Codeine      Severe Abdominal pain  Severe stomach pain  Severe Abdominal pain    Sulfa Antibiotics      Patient unsure of reaction

## 2022-10-03 ENCOUNTER — OFFICE VISIT (OUTPATIENT)
Dept: OPTOMETRY | Age: 67
End: 2022-10-03
Payer: MEDICARE

## 2022-10-03 DIAGNOSIS — H52.03 HYPEROPIA OF BOTH EYES WITH ASTIGMATISM AND PRESBYOPIA: ICD-10-CM

## 2022-10-03 DIAGNOSIS — H53.8 BLURRED VISION, BILATERAL: Primary | ICD-10-CM

## 2022-10-03 DIAGNOSIS — E11.9 NON-INSULIN DEPENDENT TYPE 2 DIABETES MELLITUS (HCC): ICD-10-CM

## 2022-10-03 DIAGNOSIS — H52.203 HYPEROPIA OF BOTH EYES WITH ASTIGMATISM AND PRESBYOPIA: ICD-10-CM

## 2022-10-03 DIAGNOSIS — H52.4 HYPEROPIA OF BOTH EYES WITH ASTIGMATISM AND PRESBYOPIA: ICD-10-CM

## 2022-10-03 PROCEDURE — 92015 DETERMINE REFRACTIVE STATE: CPT | Performed by: OPTOMETRIST

## 2022-10-03 PROCEDURE — 1036F TOBACCO NON-USER: CPT | Performed by: OPTOMETRIST

## 2022-10-03 PROCEDURE — G8484 FLU IMMUNIZE NO ADMIN: HCPCS | Performed by: OPTOMETRIST

## 2022-10-03 PROCEDURE — 92250 FUNDUS PHOTOGRAPHY W/I&R: CPT | Performed by: OPTOMETRIST

## 2022-10-03 PROCEDURE — 99214 OFFICE O/P EST MOD 30 MIN: CPT | Performed by: OPTOMETRIST

## 2022-10-03 PROCEDURE — 99211 OFF/OP EST MAY X REQ PHY/QHP: CPT

## 2022-10-03 PROCEDURE — 1123F ACP DISCUSS/DSCN MKR DOCD: CPT | Performed by: OPTOMETRIST

## 2022-10-03 PROCEDURE — G8417 CALC BMI ABV UP PARAM F/U: HCPCS | Performed by: OPTOMETRIST

## 2022-10-03 PROCEDURE — 3051F HG A1C>EQUAL 7.0%<8.0%: CPT | Performed by: OPTOMETRIST

## 2022-10-03 PROCEDURE — 2022F DILAT RTA XM EVC RTNOPTHY: CPT | Performed by: OPTOMETRIST

## 2022-10-03 PROCEDURE — G8427 DOCREV CUR MEDS BY ELIG CLIN: HCPCS | Performed by: OPTOMETRIST

## 2022-10-03 PROCEDURE — 3017F COLORECTAL CA SCREEN DOC REV: CPT | Performed by: OPTOMETRIST

## 2022-10-03 RX ORDER — TROPICAMIDE 10 MG/ML
1 SOLUTION/ DROPS OPHTHALMIC ONCE
Status: COMPLETED | OUTPATIENT
Start: 2022-10-03 | End: 2022-10-03

## 2022-10-03 RX ADMIN — TROPICAMIDE 1 DROP: 10 SOLUTION/ DROPS OPHTHALMIC at 16:00

## 2022-10-03 ASSESSMENT — KERATOMETRY
METHOD_AUTO_MANUAL: AUTOMATED
OD_K2POWER_DIOPTERS: 43.75
OD_AXISANGLE2_DEGREES: 000
OS_AXISANGLE2_DEGREES: 000
OD_K1POWER_DIOPTERS: 43.75
OS_K2POWER_DIOPTERS: 43.75
OS_AXISANGLE_DEGREES: 090
OD_AXISANGLE_DEGREES: 090
OS_K1POWER_DIOPTERS: 43.75

## 2022-10-03 ASSESSMENT — REFRACTION_MANIFEST
OS_CYLINDER: -0.75
OD_CYLINDER: -1.00
OD_ADD: +2.25
OS_SPHERE: +1.25
OD_SPHERE: +1.00
OD_AXIS: 087
OS_AXIS: 076
OS_ADD: +2.25

## 2022-10-03 ASSESSMENT — REFRACTION_WEARINGRX
OS_AXIS: 076
OS_ADD: +2.25
OD_ADD: +2.25
OD_SPHERE: +1.00
SPECS_TYPE: BIFOCAL
OD_CYLINDER: -0.75
OD_AXIS: 076
OS_CYLINDER: -0.75
OS_SPHERE: +1.00

## 2022-10-03 ASSESSMENT — TONOMETRY
OS_IOP_MMHG: 21
OD_IOP_MMHG: 19
IOP_METHOD: TONOPEN

## 2022-10-03 ASSESSMENT — VISUAL ACUITY
CORRECTION_TYPE: GLASSES
OS_CC: 20/20
METHOD: SNELLEN - LINEAR
OD_CC: 20/20

## 2022-10-03 ASSESSMENT — SLIT LAMP EXAM - LIDS: COMMENTS: NORMAL

## 2022-10-03 NOTE — PROGRESS NOTES
Tisha Headley presents today for   Chief Complaint   Patient presents with    Ophth Diabetic Exam   .    HPI    Last Vision Exam: 7/07/21 conjuntivis OD   Last Ophthalmology Exam: cellulitis Imtiaz Mccurdys 12/22/21 then sent to [facility] 7/2022  Last Filled Glasses Rx: 4 years   Insurance: Medicare     Update: DM Exam, update glasses   Diabetic:  Sugars: doesn't check at home  HmgA1C: 7.0 Sept  Glasses today and dilation for diabetic eye exam  SOHA           Current Outpatient Medications   Medication Sig Dispense Refill    metFORMIN (GLUCOPHAGE) 500 MG tablet Take 1 tablet by mouth 2 times daily (with meals) 180 tablet 1    meloxicam (MOBIC) 7.5 MG tablet Take 1 tablet by mouth 2 times daily (with meals) 180 tablet 1    fluorouracil (EFUDEX) 5 % cream Apply twice daily to actinic keratosis on forehead and scalp for 3-4 weeks. Expected redness and irritation 40 g 0    levothyroxine (SYNTHROID) 50 MCG tablet Take 1 tablet by mouth in the morning. 30 tablet 3    amLODIPine (NORVASC) 10 MG tablet Take 1 tablet by mouth in the morning.  90 tablet 1    rosuvastatin (CRESTOR) 5 MG tablet Take 1 tablet by mouth daily 90 tablet 1    losartan-hydroCHLOROthiazide (HYZAAR) 100-25 MG per tablet Take 1 tablet by mouth daily 90 tablet 1    Semaglutide (RYBELSUS) 7 MG TABS Take 1 tablet by mouth daily 30 tablet 5    montelukast (SINGULAIR) 10 MG tablet Take 1 tablet by mouth nightly 90 tablet 1    pioglitazone (ACTOS) 15 MG tablet Take 1 tablet by mouth daily 90 tablet 1    furosemide (LASIX) 20 MG tablet Take 1 tablet by mouth daily 90 tablet 1    diclofenac sodium (VOLTAREN) 1 % GEL Apply 2 g topically 4 times daily as needed for Pain 200 g 1    ketoconazole (NIZORAL) 2 % shampoo Apply 3-4 times weekly to scalp, temples, and ears, leave on for five minutes prior to washing off 120 mL 2    Misc Natural Products (GLUCOS-CHONDROIT-MSM COMPLEX PO) Take 1 tablet by mouth 2 times daily      Apple Cider Vinegar 500 MG TABS Take 1 tablet by mouth 2 times daily      cetirizine (ZYRTEC) 10 MG tablet Take 1 tablet by mouth daily 90 tablet 3    sildenafil (REVATIO) 20 MG tablet Take 1 tablet by mouth as needed (Erectile dysfunction) 30 tablet 5    carvedilol (COREG) 25 MG tablet Take 1.5 tablets by mouth 2 times daily 270 tablet 3    busPIRone (BUSPAR) 7.5 MG tablet Take 1 tablet by mouth 3 times daily as needed (anxiety) 90 tablet 1    Handicap Placard MISC by Does not apply route Exp 7/1/2026 1 each 0    fluticasone (FLONASE) 50 MCG/ACT nasal spray 1 spray by Nasal route 2 times daily 1 Bottle 5    baclofen (LIORESAL) 10 MG tablet Take 1 tablet by mouth nightly as needed (muscle spasm) 30 tablet 1    vitamin D3 (CHOLECALCIFEROL) 400 UNITS TABS tablet Take 400 Units by mouth every other day       Omeprazole (PRILOSEC PO) Take 1 capsule by mouth daily as needed        No current facility-administered medications for this visit. Family History   Problem Relation Age of Onset    High Blood Pressure Mother     Diabetes Mother         impaired fasting glucose    Other Mother         short term memory loss after MVA    Glaucoma Mother     Stroke Father 66    High Blood Pressure Father     Diabetes Father         impaired fasting glucose    Glaucoma Father     Cancer Father         bladder     Heart Attack Father 66    Glaucoma Brother     Diabetes Maternal Grandmother     Other Paternal Grandmother         gallbladder disease    Stroke Paternal Grandfather     Other Paternal Grandfather         gallbladder disease    Diabetes Paternal Grandfather     No Known Problems Brother      Social History     Socioeconomic History    Marital status: Single     Spouse name: None    Number of children: 0    Years of education: None    Highest education level: None   Tobacco Use    Smoking status: Never    Smokeless tobacco: Never   Vaping Use    Vaping Use: Never used   Substance and Sexual Activity    Alcohol use: Not Currently     Comment: very rare    Drug use:  No Sexual activity: Yes     Partners: Male       Past Medical History:   Diagnosis Date    Anxiety     Aortic regurgitation     Bicuspid aortic valve     Diabetes mellitus ([address] since March 2018    on Rx. GERD (gastroesophageal reflux disease)     Hypertension     Left renal mass 10/2018    Osteoarthritis of left knee     Wears glasses          Main Ophthalmology Exam       External Exam         Right Left    External Normal Normal              Slit Lamp Exam         Right Left    Lids/Lashes Normal     Conjunctiva/Sclera Pinguecula     Cornea clear cornea      Anterior Chamber Deep and quiet     Iris Round and reactive               Fundus Exam         Right Left    Disc Normal Normal    C/D Ratio . 35-.45 .25-.3    Macula Normal Normal    Vessels Normal Normal                   <div id=\"MAIN_EXAM_REVIEWED\"></div>     Tonometry       Tonometry (Tonopen, 3:51 PM)         Right Left    Pressure 19 21                    Visual Acuity (Snellen - Linear)         Right Left    Dist cc 20/20 20/20    Near cc 20/20       Correction: Glasses          Keratometry       Keratometry (Automated)         K1 Axis K2 Axis    Right 43.75 000 43.75 090    Left 43.75 000 43.75 090                  Pupils       Pupils         Pupils    Right PERRL    Left PERRL                  Not recorded       Not recorded         Ophthalmology Exam       Wearing Rx         Sphere Cylinder Axis Add    Right +1.00 -0.75 076 +2.25    Left +1.00 -0.75 076 +2.25      Age: 4yrs    Type: Bifocal                    Manifest Refraction       Manifest Refraction         Sphere Cylinder Axis Dist VA Add    Right +1.00 -1.00 087 20/20 +2.25    Left +1.25 -0.75 076 20/20 +2.25              Manifest Refraction #2 (Auto)         Sphere Cylinder Axis Dist VA Add    Right +1.00 -1.00 088      Left +1.00 -0.75 076                     Final Rx         Sphere Cylinder Axis Add    Right +1.00 -1.00 087 +2.25    Left +1.25 -0.75 076 +2.25      Type: Bifocal Expiration Date: 10/3/2024            Neuro/Psych       Neuro/Psych       Oriented x3: Yes    Mood/Affect: Normal                    Orders Placed This Encounter   Procedures    HM DIABETES EYE EXAM    Color Fundus Photography-OU-Both Eyes       IMPRESSION:  1. Blurred vision, bilateral    2. Hyperopia of both eyes with astigmatism and presbyopia    3. Non-insulin dependent type 2 diabetes mellitus ([address]        PLAN:    New glasses recommended  \"  Discussed the patient's diagnosis of diabetes and the impact this can have on their ocular health, potentially even leading to permanent blindness. I discussed with the patient the importance of continued follow-up and management with their primary care physician to control their glycemic, blood pressure, and lipid levels. The patient verbalized understanding. Glycemic control as per PCP   There are no Patient Instructions on file for this visit.    Return in about 1 year (around 10/3/2023) for complete eye exam.

## 2022-10-17 ENCOUNTER — PATIENT MESSAGE (OUTPATIENT)
Dept: PRIMARY CARE CLINIC | Age: 67
End: 2022-10-17

## 2022-10-17 DIAGNOSIS — M79.89 LEG SWELLING: ICD-10-CM

## 2022-10-17 RX ORDER — FUROSEMIDE 20 MG/1
20 TABLET ORAL DAILY
Qty: 90 TABLET | Refills: 1 | Status: SHIPPED | OUTPATIENT
Start: 2022-10-17

## 2022-10-17 NOTE — TELEPHONE ENCOUNTER
Cortes Loo called requesting a refill of the below medication which has been pended for you:     Requested Prescriptions     Pending Prescriptions Disp Refills    furosemide (LASIX) 20 MG tablet 90 tablet 1     Sig: Take 1 tablet by mouth daily       Last Appointment Date: 9/1/2022  Next Appointment Date: Visit date not found    Allergies   Allergen Reactions    Codeine      Severe Abdominal pain  Severe stomach pain  Severe Abdominal pain    Sulfa Antibiotics      Patient unsure of reaction

## 2022-10-17 NOTE — TELEPHONE ENCOUNTER
Halley Mccarthy, JANNA 46/70/3034 47:29 AM EDT      ----- Message -----  From: Sharri Shipley  Sent: 10/17/2022 10:59 AM EDT  To: [facility] Clinical Staff  Subject: Refill     Hello,  I am out of refills for my Furosemide 20 mg  I have enough for this week  Thanks  Con Mendieta

## 2022-10-30 ENCOUNTER — PATIENT MESSAGE (OUTPATIENT)
Dept: FAMILY MEDICINE CLINIC | Age: 67
End: 2022-10-30

## 2022-10-30 DIAGNOSIS — J30.2 OTHER SEASONAL ALLERGIC RHINITIS: ICD-10-CM

## 2022-10-31 ENCOUNTER — OFFICE VISIT (OUTPATIENT)
Dept: CARDIOLOGY | Age: 67
End: 2022-10-31
Payer: MEDICARE

## 2022-10-31 VITALS
HEART RATE: 76 BPM | BODY MASS INDEX: 33.29 KG/M2 | DIASTOLIC BLOOD PRESSURE: 68 MMHG | WEIGHT: 237.8 LBS | HEIGHT: 71 IN | SYSTOLIC BLOOD PRESSURE: 128 MMHG

## 2022-10-31 DIAGNOSIS — I77.810 DILATED AORTIC ROOT (HCC): ICD-10-CM

## 2022-10-31 DIAGNOSIS — Q23.1 BICUSPID AORTIC VALVE: ICD-10-CM

## 2022-10-31 DIAGNOSIS — R94.39 ABNORMAL CARDIOVASCULAR STRESS TEST: ICD-10-CM

## 2022-10-31 DIAGNOSIS — I10 ESSENTIAL HYPERTENSION: ICD-10-CM

## 2022-10-31 DIAGNOSIS — Q23.1 AORTIC REGURGITATION DUE TO BICUSPID AORTIC VALVE: ICD-10-CM

## 2022-10-31 DIAGNOSIS — I25.10 CORONARY ARTERY CALCIFICATION: Primary | ICD-10-CM

## 2022-10-31 DIAGNOSIS — I25.84 CORONARY ARTERY CALCIFICATION: Primary | ICD-10-CM

## 2022-10-31 PROCEDURE — G8484 FLU IMMUNIZE NO ADMIN: HCPCS | Performed by: INTERNAL MEDICINE

## 2022-10-31 PROCEDURE — G8427 DOCREV CUR MEDS BY ELIG CLIN: HCPCS | Performed by: INTERNAL MEDICINE

## 2022-10-31 PROCEDURE — 99214 OFFICE O/P EST MOD 30 MIN: CPT | Performed by: INTERNAL MEDICINE

## 2022-10-31 PROCEDURE — 3017F COLORECTAL CA SCREEN DOC REV: CPT | Performed by: INTERNAL MEDICINE

## 2022-10-31 PROCEDURE — G8417 CALC BMI ABV UP PARAM F/U: HCPCS | Performed by: INTERNAL MEDICINE

## 2022-10-31 PROCEDURE — 3078F DIAST BP <80 MM HG: CPT | Performed by: INTERNAL MEDICINE

## 2022-10-31 PROCEDURE — 1123F ACP DISCUSS/DSCN MKR DOCD: CPT | Performed by: INTERNAL MEDICINE

## 2022-10-31 PROCEDURE — 1036F TOBACCO NON-USER: CPT | Performed by: INTERNAL MEDICINE

## 2022-10-31 PROCEDURE — 3074F SYST BP LT 130 MM HG: CPT | Performed by: INTERNAL MEDICINE

## 2022-10-31 RX ORDER — MONTELUKAST SODIUM 10 MG/1
10 TABLET ORAL NIGHTLY
Qty: 90 TABLET | Refills: 0 | Status: SHIPPED | OUTPATIENT
Start: 2022-10-31

## 2022-10-31 NOTE — TELEPHONE ENCOUNTER
From: Lev Li  To: Dr. Jang Hallmark: 10/30/2022 9:55 PM EDT  Subject: Refill    I will be out of my Montelukast Sod 10mg next week with no refills  Can this be refilled?   Jamir Serrato

## 2022-10-31 NOTE — PROGRESS NOTES
[address] [facility]  1955  [id number]    CC: Follow up for thoracic aortic aneurysm, Bicuspid AV with AI    HPI:  Patient is here for follow up. Since last visit, had stress test. Then a Cor CTA,   Then went for cardiac cath. He states he was very stressed prior to his cardiac cath. Cath showed normal Cors. Denies any cp, sob, orthopnea, pnd, le edema, palpitations. Has still some fatigue. Past Medical History:   Diagnosis Date    Anxiety     Aortic regurgitation     Bicuspid aortic valve     Diabetes mellitus ([address] since March 2018    on Rx.     GERD (gastroesophageal reflux disease)     Hypertension     Left renal mass 10/2018    Osteoarthritis of left knee     Wears glasses        Past Surgical History:   Procedure Laterality Date    CARDIAC CATHETERIZATION  09/12/2022    CYST REMOVAL      tail bone    MOUTH SURGERY  2015    PARTIAL NEPHRECTOMY Left 11/30/2018    ROBOTIC PARTIAL NEPHRECTOMY,     ND COLONOSCOPY FLX DX W/COLLJ SPEC WHEN PFRMD N/A 05/14/2018    normal colon, Dr. Nayana Cortes Left 11/30/2018    XI ROBOTIC PARTIAL NEPHRECTOMY, INTRA-OP LAP ULTRASOUND performed by Kathrin Jackson MD at [facility] [place] EXTRACTION         Family History   Problem Relation Age of Onset    High Blood Pressure Mother     Diabetes Mother         impaired fasting glucose    Other Mother         short term memory loss after MVA    Glaucoma Mother     Stroke Father 66    High Blood Pressure Father     Diabetes Father         impaired fasting glucose    Glaucoma Father     Cancer Father         bladder     Heart Attack Father 66    Glaucoma Brother     Diabetes Maternal Grandmother     Other Paternal Grandmother         gallbladder disease    Stroke Paternal Grandfather     Other Paternal Grandfather         gallbladder disease    Diabetes Paternal Grandfather     No Known Problems Brother        REVIEW OF SYSTEMS: Constitutional: there has been no unanticipated weight loss. There's been No change in energy level, No change in activity level. Eyes: No visual changes or diplopia. No scleral icterus. ENT: No Headaches, hearing loss or vertigo. No mouth sores or sore throat. Cardiovascular: as hpi  Respiratory: as hpi  Gastrointestinal: No abdominal pain, appetite loss, blood in stools. No change in bowel or bladder habits. Genitourinary: No dysuria, trouble voiding, or hematuria. Musculoskeletal:  No gait disturbance, No weakness or joint complaints. Integumentary: No rash or pruritis. Neurological: No headache, diplopia, change in muscle strength, numbness or tingling. No change in gait, balance, coordination, mood, affect, memory, mentation, behavior. Psychiatric: No new anxiety or depression. Endocrine: No temperature intolerance. No excessive thirst, fluid intake, or urination. No tremor. Hematologic/Lymphatic: No abnormal bruising or bleeding, blood clots or swollen lymph nodes. Allergic/Immunologic: No nasal congestion or hives. Physical Exam:  /68   Pulse 76   Ht 5' 11\" (1.803 m)   Wt 237 lb 12.8 oz (107.9 kg)   BMI 33.17 kg/m²   General appearance: alert and cooperative with exam  HEENT: Head: Normocephalic, no lesions, without obvious abnormality. Eyes: PERRL, EOMI  Ears: Not obvious deformations or lack of hearing  Neck: no carotid bruit, no JVD  Lungs: clear to auscultation bilaterally  Heart: regular rate and rhythm, S1, S2 normal, no murmur, click, rub or gallop  Abdomen: soft, non-tender; bowel sounds normal; no masses,  no organomegaly  Extremities: extremities normal, atraumatic, no cyanosis or edema. Normal radial pulse, no hematoma.    Neurologic: Mental status: Alert, oriented, thought content appropriate  Skin: WNL for age and condition, no obvious rashes or leasions    LABS  Lab Results   Component Value Date    CHOL 126 08/12/2022    TRIG 134 08/12/2022    HDL 38 (L) 08/12/2022 LDLCHOLESTEROL 61 08/12/2022    VLDL NOT REPORTED 01/24/2022    CHOLHDLRATIO 3.3 08/12/2022       Lab Results   Component Value Date     08/12/2022    K 5.3 08/12/2022    CL 99 08/12/2022    CO2 31 08/12/2022    BUN 25 (H) 08/12/2022    CREATININE 0.96 09/12/2022    GLUCOSE 159 (H) 08/12/2022    CALCIUM 9.7 08/12/2022    PROT 7.8 10/25/2021    LABALBU 4.9 10/25/2021    BILITOT 0.58 10/25/2021    ALKPHOS 31 (L) 10/25/2021    AST 16 10/25/2021    ALT 18 10/25/2021    LABGLOM >60 09/12/2022    GFRAA >60 08/12/2022       EKG:   Sinus  Rhythm   Voltage criteria for LVH  (R(I)+S(III) exceeds 2.50 mV)  -Voltage criteria w/o ST/T abnormality may be normal.     TTE 8/27/18  Left ventricle is normal in size Global left ventricular systolic function is normal   Estimated ejection fraction is 60 % . Mild left ventricular hypertrophy. Grade II (moderate) left ventricular diastolic dysfunction. Left atrium is mildly dilated. Bicuspid aortic valve. Moderate eccentric jet of aortic insufficiency. Normal tricuspid valve leaflets. Trivial tricuspid regurgitation. Estimated right ventricular systolic pressure is 33 mmHg. No pulmonary hypertension. Aortic root is mildly dilated at 4.4 cm. CTA 9/19/2018  *Aneurysmal dilatation of the aortic root measuring 4.7 cm. Normal caliber   remaining thoracic aorta. No dissection or rupture. *Questionable enhancing mass involving the left kidney measuring 2.5 x 2.4   cm. Renal cell carcinoma cannot be excluded and complete evaluation with CT   or MRI utilizing renal mass protocol is recommended. *Cholelithiasis. *Cardiomegaly. Echo 8/19  Normal left ventricular diameter. Mild left ventricular hypertrophy. Left ventricular systolic function is normal.  Left ventricular ejection fraction 55 %. Left atrium is mildly dilated. Bicuspid aortic valve (fusion of Right and Left Coronary Cusps).   Moderate aortic insufficiency, with highly eccentric jet, that maybe be  underestimating the degree of AI. Aortic root is mildly dilated at 4.5 m.    CT chest abd pelvis: 10/11/19  *Stable aneurysmal dilatation of the ascending thoracic aorta measuring 4.7   cm. No dissection or rupture. *Stable 3 mm solid noncalcified pulmonary nodule right lower lobe. Attention   on follow-up is suggested given the history of renal cell carcinoma. *No CT evidence of tumor recurrence or metastatic disease seen within the   abdomen or pelvis. *Cholelithiasis. *Cardiomegaly. Echo 9/20:  Left ventricle is mildly dilated. Moderate left ventricular hypertrophy. Hyperdynamic left ventricular function. Left ventricular ejection fraction 70 %. Diastolic dysfunction. Left atrium is moderately dilated. Right atrium is mildly dilated . Right ventricular dilatation with normal systolic function. Functionally bicuspid aortic valve with mild aortic regurgitation. Trivial tricuspid regurgitation. Estimated right ventricular systolic pressure is 39 mmHg. Aortic root is mildly to moderately dilated at 4.5 cm. CTA chest 9/20:  1. The thoracic aorta is upper limits of normal in size as measured    perpendicular to the direction of flow as detailed above. No aneurysm- 4 cm max diameter of Ascending aorta. Mild    atherosclerotic vascular disease. 2. 3 mm nodule in the right lower lobe unchanged from at least 2018. No    follow-up recommended. Echo 9/21:  Left ventricular systolic function is normal.  Left ventricular ejection fraction 60 %. Left atrium is moderately dilated. Functionally bicuspid aortic valve with moderate eccentric regurgitation. No pericardial effusion. Aortic root is mildly dilated at 4.2 cm. CTA chest on 9/21:  Impression   Stable mild aneurysmal enlargement of the ascending thoracic aorta, measuring   4.3 cm. No acute aortic abnormality. No central pulmonary embolism. Nuclear on 6/22:  1.  No definitive reversible perfusion defects to suggest ischemia. 2. Small apical fixed defect - infarction vs apical thinning. 3. Moderate-sized fixed inferior defect, likely infarction. 4. Normal left ventricular ejection fraction. Coronary CTA on 6/22:  Small atherosclerotic calcification at bifurcation of left main artery and   tiny calcification the proximal segment of the RCA. CAC score of 41. Echo 8/22:  Normal left ventricular diameter. Mild left ventricular hypertrophy. Left ventricular systolic function is normal. Estimated Left ventricular ejection fraction 60 %. Dilated right ventricle with normal function. Aortic valve is not well visualized, possibly functionally bicuspid with mild to moderate eccentric regurgitation. Peak instantaneous gradient 7 mmHg and mean gradient 4 mmHg. Aortic root is mildly dilated at 4.2 cm. Cardiac Cath 9/22:   No CAD   Preserved LV function    Assessment and Plan:    Abnormal stress on 6/22 with possible CAD on Cor CTA- but Normal Cors on Cath on 9/22. Bicuspid AV- Mild-Moderate AI- on Echo 8/22. Continue aggressive BP control/Afterload reduction. On Norvasc, Coreg, Hyzaar. Ascending aortic aneurysm- 4.3 cm max diameter on CTA 9/21- (previously larger 4.7 cm on 10/19). Continue aggressive BP control/Afterload reduction. On Norvasc, Coreg, Hyzaar. Update Echo in 1-2 months. Renal CA- following with urology/oncology  HTN- well controlled. Continue current meds. Overweight: encouraged diet, exercise, and discussed weight loss extensively. Hyperlipidemia- continue statin. LDL 61 on 8/22    The patient is to continue heart healthy diet, weight loss and exercise as tolerated. Patient's medications and side effects were discussed. Medication refills were provided if needed. Follow up appointment timing was discussed. All questions and concerns were addressed to patient's satisfaction. The patient is to follow up in 6 months or sooner if necessary.      Thank you for allowing me to participate in the care of this patient, please do not hesitate to call if you have any questions. Sergio Berger DO, [address], [address], [address] [facility]  [facility]  [phone]

## 2022-10-31 NOTE — TELEPHONE ENCOUNTER
Sandy Liu called requesting a refill of the below medication which has been pended for you:     Requested Prescriptions     Pending Prescriptions Disp Refills    montelukast (SINGULAIR) 10 MG tablet 90 tablet 1     Sig: Take 1 tablet by mouth nightly       Last Appointment Date: 9/1/2022  Next Appointment Date: 11/08/2022    Allergies   Allergen Reactions    Codeine      Severe Abdominal pain  Severe stomach pain  Severe Abdominal pain    Sulfa Antibiotics      Patient unsure of reaction

## 2022-11-08 ENCOUNTER — OFFICE VISIT (OUTPATIENT)
Dept: FAMILY MEDICINE CLINIC | Age: 67
End: 2022-11-08
Payer: MEDICARE

## 2022-11-08 VITALS
SYSTOLIC BLOOD PRESSURE: 130 MMHG | BODY MASS INDEX: 33.32 KG/M2 | WEIGHT: 238 LBS | HEART RATE: 76 BPM | OXYGEN SATURATION: 96 % | HEIGHT: 71 IN | RESPIRATION RATE: 16 BRPM | DIASTOLIC BLOOD PRESSURE: 72 MMHG

## 2022-11-08 DIAGNOSIS — E11.9 TYPE 2 DIABETES MELLITUS WITHOUT COMPLICATION, WITHOUT LONG-TERM CURRENT USE OF INSULIN (HCC): ICD-10-CM

## 2022-11-08 DIAGNOSIS — F41.1 GAD (GENERALIZED ANXIETY DISORDER): ICD-10-CM

## 2022-11-08 DIAGNOSIS — Z00.00 INITIAL MEDICARE ANNUAL WELLNESS VISIT: Primary | ICD-10-CM

## 2022-11-08 DIAGNOSIS — M17.0 PRIMARY OSTEOARTHRITIS OF BOTH KNEES: ICD-10-CM

## 2022-11-08 DIAGNOSIS — Z23 FLU VACCINE NEED: ICD-10-CM

## 2022-11-08 DIAGNOSIS — M67.442 GANGLION CYST OF FINGER OF LEFT HAND: ICD-10-CM

## 2022-11-08 PROCEDURE — G8417 CALC BMI ABV UP PARAM F/U: HCPCS | Performed by: FAMILY MEDICINE

## 2022-11-08 PROCEDURE — 3074F SYST BP LT 130 MM HG: CPT | Performed by: FAMILY MEDICINE

## 2022-11-08 PROCEDURE — 3051F HG A1C>EQUAL 7.0%<8.0%: CPT | Performed by: FAMILY MEDICINE

## 2022-11-08 PROCEDURE — PBSHW INFLUENZA, FLUAD, (AGE 65 Y+), IM, PF, 0.5 ML: Performed by: FAMILY MEDICINE

## 2022-11-08 PROCEDURE — 90471 IMMUNIZATION ADMIN: CPT | Performed by: FAMILY MEDICINE

## 2022-11-08 PROCEDURE — 1123F ACP DISCUSS/DSCN MKR DOCD: CPT | Performed by: FAMILY MEDICINE

## 2022-11-08 PROCEDURE — 2024F 7 FLD RTA PHOTO EVC RTNOPTHY: CPT | Performed by: FAMILY MEDICINE

## 2022-11-08 PROCEDURE — 3078F DIAST BP <80 MM HG: CPT | Performed by: FAMILY MEDICINE

## 2022-11-08 PROCEDURE — 1036F TOBACCO NON-USER: CPT | Performed by: FAMILY MEDICINE

## 2022-11-08 PROCEDURE — G8484 FLU IMMUNIZE NO ADMIN: HCPCS | Performed by: FAMILY MEDICINE

## 2022-11-08 PROCEDURE — 99214 OFFICE O/P EST MOD 30 MIN: CPT | Performed by: FAMILY MEDICINE

## 2022-11-08 PROCEDURE — 99213 OFFICE O/P EST LOW 20 MIN: CPT

## 2022-11-08 PROCEDURE — G8427 DOCREV CUR MEDS BY ELIG CLIN: HCPCS | Performed by: FAMILY MEDICINE

## 2022-11-08 PROCEDURE — G0438 PPPS, INITIAL VISIT: HCPCS | Performed by: FAMILY MEDICINE

## 2022-11-08 PROCEDURE — 3017F COLORECTAL CA SCREEN DOC REV: CPT | Performed by: FAMILY MEDICINE

## 2022-11-08 RX ORDER — ORAL SEMAGLUTIDE 14 MG/1
1 TABLET ORAL DAILY
Qty: 90 TABLET | Refills: 1 | Status: SHIPPED | OUTPATIENT
Start: 2022-11-08

## 2022-11-08 SDOH — ECONOMIC STABILITY: FOOD INSECURITY: WITHIN THE PAST 12 MONTHS, THE FOOD YOU BOUGHT JUST DIDN'T LAST AND YOU DIDN'T HAVE MONEY TO GET MORE.: NEVER TRUE

## 2022-11-08 SDOH — ECONOMIC STABILITY: FOOD INSECURITY: WITHIN THE PAST 12 MONTHS, YOU WORRIED THAT YOUR FOOD WOULD RUN OUT BEFORE YOU GOT MONEY TO BUY MORE.: NEVER TRUE

## 2022-11-08 ASSESSMENT — PATIENT HEALTH QUESTIONNAIRE - PHQ9
SUM OF ALL RESPONSES TO PHQ QUESTIONS 1-9: 0
SUM OF ALL RESPONSES TO PHQ9 QUESTIONS 1 & 2: 0
1. LITTLE INTEREST OR PLEASURE IN DOING THINGS: 0
SUM OF ALL RESPONSES TO PHQ QUESTIONS 1-9: 0
2. FEELING DOWN, DEPRESSED OR HOPELESS: 0

## 2022-11-08 ASSESSMENT — ENCOUNTER SYMPTOMS
COUGH: 0
ABDOMINAL PAIN: 0
CONSTIPATION: 0
NAUSEA: 0
BLOOD IN STOOL: 0
DIARRHEA: 0
SHORTNESS OF BREATH: 0
CHEST TIGHTNESS: 0
WHEEZING: 0

## 2022-11-08 ASSESSMENT — LIFESTYLE VARIABLES
HOW MANY STANDARD DRINKS CONTAINING ALCOHOL DO YOU HAVE ON A TYPICAL DAY: PATIENT DOES NOT DRINK
HOW OFTEN DO YOU HAVE A DRINK CONTAINING ALCOHOL: NEVER

## 2022-11-08 ASSESSMENT — SOCIAL DETERMINANTS OF HEALTH (SDOH): HOW HARD IS IT FOR YOU TO PAY FOR THE VERY BASICS LIKE FOOD, HOUSING, MEDICAL CARE, AND HEATING?: NOT HARD AT ALL

## 2022-11-08 NOTE — PROGRESS NOTES
PATRICIA [address]ellar [address]  [address] [address]  Dept: [phone]  Dept Fax: [phone]  Loc: [phone]    Hershall Bosworth is a 79 y.o. male who presents today for his medical conditions/complaints as noted below. Hershall Bosworth is c/o of   Chief Complaint   Patient presents with    Medicare AWV    Hand Pain     Left; hard callus like lump that has been there for around a year    Insomnia     1 month f/u; getting better    Anxiety     Much better, cardiac testing went okay and anxiety level has decreased       HPI:     HPI Here today for his MWV and a follow up of his anxiety, DM and arthritis. Anxiety: his anxiety just resolved since he has done the heart cath and it went well. He has not used the buspar at all since the cath. His sleep also instantly improved so he stopped the trazodone. He has a lump on his left hand that has been present for the past year or so. It has not grown. Sometimes they get a little painful. He would like his ears checked because he feels like they may be getting clogged. DM: stable; he is not checking his blood sugars at home. He tries to watch his diet and he has been eating a lot less recently (smaller portions) but he has not really lost weight. His stool has been more firm, no diarrhea like normal. He has not seen any blood in the stool. No issues with nausea. He does not feel like his legs are swollen or like he is retaining water. He has been having a lot of pain and weakness in his knee and ankles and legs. He has had good luck with the glucosamine and the tumeric. He just feels like his legs are weak and not able to get him out of a crouching position like they used to, and he feels limited going up and down stairs. 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      Past Medical History:   Diagnosis Date Anxiety     Aortic regurgitation     Bicuspid aortic valve     Diabetes mellitus ([address] since March 2018    on Rx. GERD (gastroesophageal reflux disease)     Hypertension     Left renal mass 10/2018    Osteoarthritis of left knee     Wears glasses           Social History     Tobacco Use    Smoking status: Never    Smokeless tobacco: Never   Substance Use Topics    Alcohol use: Yes     Comment: very rare     Current Outpatient Medications   Medication Sig Dispense Refill    Semaglutide (RYBELSUS) 14 MG TABS Take 1 tablet by mouth daily 90 tablet 1    montelukast (SINGULAIR) 10 MG tablet Take 1 tablet by mouth nightly 90 tablet 0    furosemide (LASIX) 20 MG tablet Take 1 tablet by mouth daily 90 tablet 1    metFORMIN (GLUCOPHAGE) 500 MG tablet Take 1 tablet by mouth 2 times daily (with meals) 180 tablet 1    meloxicam (MOBIC) 7.5 MG tablet Take 1 tablet by mouth 2 times daily (with meals) 180 tablet 1    levothyroxine (SYNTHROID) 50 MCG tablet Take 1 tablet by mouth in the morning. 30 tablet 3    amLODIPine (NORVASC) 10 MG tablet Take 1 tablet by mouth in the morning.  90 tablet 1    rosuvastatin (CRESTOR) 5 MG tablet Take 1 tablet by mouth daily 90 tablet 1    losartan-hydroCHLOROthiazide (HYZAAR) 100-25 MG per tablet Take 1 tablet by mouth daily 90 tablet 1    Misc Natural Products (GLUCOS-CHONDROIT-MSM COMPLEX PO) Take 1 tablet by mouth 2 times daily      Apple Cider Vinegar 500 MG TABS Take 1 tablet by mouth 2 times daily      cetirizine (ZYRTEC) 10 MG tablet Take 1 tablet by mouth daily 90 tablet 3    sildenafil (REVATIO) 20 MG tablet Take 1 tablet by mouth as needed (Erectile dysfunction) 30 tablet 5    carvedilol (COREG) 25 MG tablet Take 1.5 tablets by mouth 2 times daily 270 tablet 3    busPIRone (BUSPAR) 7.5 MG tablet Take 1 tablet by mouth 3 times daily as needed (anxiety) 90 tablet 1    fluticasone (FLONASE) 50 MCG/ACT nasal spray 1 spray by Nasal route 2 times daily 1 Bottle 5    baclofen (LIORESAL) 10 MG tablet Take 1 tablet by mouth nightly as needed (muscle spasm) 30 tablet 1    vitamin D3 (CHOLECALCIFEROL) 400 UNITS TABS tablet Take 400 Units by mouth every other day       Omeprazole (PRILOSEC PO) Take 1 capsule by mouth daily as needed        No current facility-administered medications for this visit. Allergies   Allergen Reactions    Codeine      Severe Abdominal pain  Severe stomach pain  Severe Abdominal pain    Sulfa Antibiotics      Patient unsure of reaction       Subjective:     Review of Systems   Constitutional:  Positive for activity change (decreased). Negative for appetite change, chills, fatigue and fever. Eyes:  Negative for visual disturbance. Respiratory:  Negative for cough, chest tightness, shortness of breath and wheezing. Cardiovascular:  Negative for chest pain, palpitations and leg swelling. Gastrointestinal:  Negative for abdominal pain, blood in stool, constipation, diarrhea and nausea. Genitourinary:  Negative for difficulty urinating. Musculoskeletal:  Positive for arthralgias (knees, ankles). Negative for gait problem and joint swelling. Skin:  Negative for rash. Neurological:  Negative for dizziness, syncope, weakness, light-headedness, numbness and headaches. Psychiatric/Behavioral:  Negative for dysphoric mood and sleep disturbance. The patient is nervous/anxious. Objective:      Physical Exam  Vitals and nursing note reviewed. Constitutional:       General: He is not in acute distress. Appearance: He is well-developed. HENT:      Right Ear: Tympanic membrane, ear canal and external ear normal.      Left Ear: Tympanic membrane, ear canal and external ear normal.   Eyes:      Conjunctiva/sclera: Conjunctivae normal.   Cardiovascular:      Rate and Rhythm: Regular rhythm. Heart sounds: Normal heart sounds. No murmur heard. Pulmonary:      Effort: Pulmonary effort is normal. No respiratory distress.       Breath sounds: Normal breath sounds. No wheezing or rales. Musculoskeletal:         General: Normal range of motion. Cervical back: Normal range of motion and neck supple. Lymphadenopathy:      Cervical: No cervical adenopathy. Skin:     General: Skin is warm and dry. Findings: No rash. Neurological:      Mental Status: He is alert and oriented to person, place, and time. Psychiatric:         Mood and Affect: Mood is anxious. Speech: Speech normal.         Behavior: Behavior normal.     /72 (Site: Left Upper Arm, Position: Sitting, Cuff Size: Large Adult)   Pulse 76   Resp 16   Ht 5' 11\" (1.803 m)   Wt 238 lb (108 kg)   SpO2 96%   BMI 33.19 kg/m²     Assessment:       Diagnosis Orders   1. Initial Medicare annual wellness visit        2. Type 2 diabetes mellitus without complication, without long-term current use of insulin ([facility])  Basic Metabolic Panel    Hemoglobin A1C      3. Ganglion cyst of finger of left hand        4. Primary osteoarthritis of both knees                  Plan:       MWV: see other note    DM: stable; his last a1c was 7 which is great so I don't need to change his meds, but he is having trouble with lack of weight loss despite the rybelsus so I will try stopping the actos to see if that has been causing water retention. In additon I increased the rybelsus to 14mg daily. Ganglion cyst of finger: new; he was reassured this is not concerning and if he wants it removed he can see ortho. Knee pain: worsening; he is having more weakness and limitation of his activities due to pain so I suggested seeing ortho, PT, home exercises and considering getting access to a stationary bike for the winter. Anxiety: improving; he has been doing great but he will be using the buspar and trazodone if needed if his anxiety worsens. Return in 6 months (on 5/8/2023) for DM follow up, Knee pain follow up.     Orders Placed This Encounter   Procedures    Basic Metabolic Panel     Standing Status:   Future     Standing Expiration Date:   11/8/2023    Hemoglobin A1C     Standing Status:   Future     Standing Expiration Date:   11/8/2023     Orders Placed This Encounter   Medications    Semaglutide (RYBELSUS) 14 MG TABS     Sig: Take 1 tablet by mouth daily     Dispense:  90 tablet     Refill:  1       Patientgiven educational materials - see patient instructions. Discussed use, benefit,and side effects of prescribed medications. All patient questions answered. Ptvoiced understanding. Reviewed health maintenance. Instructed to continue currentmedications, diet and exercise. Patient agreed with treatment plan. Follow up asdirected.      Electronically signed by Martha Vasquez MD on 11/8/2022 at 1:23 PM

## 2022-11-08 NOTE — PATIENT INSTRUCTIONS
Personalized Preventive Plan for [place] - 11/8/2022  Medicare offers a range of preventive health benefits. Some of the tests and screenings are paid in full while other may be subject to a deductible, co-insurance, and/or copay. Some of these benefits include a comprehensive review of your medical history including lifestyle, illnesses that may run in your family, and various assessments and screenings as appropriate. After reviewing your medical record and screening and assessments performed today your provider may have ordered immunizations, labs, imaging, and/or referrals for you. A list of these orders (if applicable) as well as your Preventive Care list are included within your After Visit Summary for your review. Other Preventive Recommendations:    A preventive eye exam performed by an eye specialist is recommended every 1-2 years to screen for glaucoma; cataracts, macular degeneration, and other eye disorders. A preventive dental visit is recommended every 6 months. Try to get at least 150 minutes of exercise per week or 10,000 steps per day on a pedometer . Order or download the FREE \"Exercise & Physical Activity: Your Everyday Guide\" from [employer]. Call 2-119.421.5824 or search [employer] online. You need 7614-3740 mg of calcium and 6842-5732 IU of vitamin D per day. It is possible to meet your calcium requirement with diet alone, but a vitamin D supplement is usually necessary to meet this goal.  When exposed to the sun, use a sunscreen that protects against both UVA and UVB radiation with an SPF of 30 or greater. Reapply every 2 to 3 hours or after sweating, drying off with a towel, or swimming. Always wear a seat belt when traveling in a car. Always wear a helmet when riding a bicycle or motorcycle.

## 2022-11-08 NOTE — PROGRESS NOTES
Medicare Annual Wellness Visit    Ayah Frazier is here for Medicare AWV, Hand Pain (Left; hard callus like lump that has been there for around a year), Insomnia (1 month f/u; getting better), and Anxiety (Much better, cardiac testing went okay and anxiety level has decreased)    Assessment & Plan   Initial Medicare annual wellness visit  Type 2 diabetes mellitus without complication, without long-term current use of insulin ([facility] [address]  -     Basic Metabolic Panel; Future  -     Hemoglobin A1C; Future  Ganglion cyst of finger of left hand  Primary osteoarthritis of both knees  TWAN (generalized anxiety disorder)    Recommendations for Preventive Services Due: see orders and patient instructions/AVS.  Recommended screening schedule for the next 5-10 years is provided to the patient in written form: see Patient Instructions/AVS.     Return in 6 months (on 5/8/2023) for DM follow up, Knee pain follow up. Subjective   The following acute and/or chronic problems were also addressed today:  DM, knee pain, anxiety, ganglion cyst    Patient's complete Health Risk Assessment and screening values have been reviewed and are found in Flowsheets. The following problems were reviewed today and where indicated follow up appointments were made and/or referrals ordered.     Positive Risk Factor Screenings with Interventions:             General Health and ACP:  General  In general, how would you say your health is?: Very Good  In the past 7 days, have you experienced any of the following: New or Increased Pain, New or Increased Fatigue, Loneliness, Social Isolation, Stress or Anger?: (!) Yes  Select all that apply: (!) New or Increased Fatigue  Do you get the social and emotional support that you need?: Yes  Do you have a Living Will?: Yes    Advance Directives       Power of  Living Will ACP-Advance Directive ACP-Power of     Not on File Not on File Not on File Not on [address] [facility] Interventions:  Fatigue: regular exercise recommended- 3-5 times per week, 30-45 minutes per session    Health Habits/Nutrition:  Physical Activity: Insufficiently Active    Days of Exercise per Week: 1 day    Minutes of Exercise per Session: 10 min     Have you lost any weight without trying in the past 3 months?: No  Body mass index: (!) 33.19  Have you seen the dentist within the past year?: (!) No  Health Habits/Nutrition Interventions:  Dental exam overdue:  patient encouraged to make appointment with his/her dentist    Hearing/Vision:  Do you or your family notice any trouble with your hearing that hasn't been managed with hearing aids?: (!) Yes  Do you have difficulty driving, watching TV, or doing any of your daily activities because of your eyesight?: No  Have you had an eye exam within the past year?: Yes  No results found. Hearing/Vision Interventions:  Hearing concerns:  patient declines any further evaluation/treatment for hearing issues            Objective   Vitals:    11/08/22 1128   BP: 130/72   Site: Left Upper Arm   Position: Sitting   Cuff Size: Large Adult   Pulse: 76   Resp: 16   SpO2: 96%   Weight: 238 lb (108 kg)   Height: 5' 11\" (1.803 m)      Body mass index is 33.19 kg/m². Allergies   Allergen Reactions    Codeine      Severe Abdominal pain  Severe stomach pain  Severe Abdominal pain    Sulfa Antibiotics      Patient unsure of reaction     Prior to Visit Medications    Medication Sig Taking?  Authorizing Provider   Semaglutide (RYBELSUS) 14 MG TABS Take 1 tablet by mouth daily Yes Peng Henry MD   montelukast (SINGULAIR) 10 MG tablet Take 1 tablet by mouth nightly Yes Peng Henry MD   furosemide (LASIX) 20 MG tablet Take 1 tablet by mouth daily Yes Peng Henry MD   metFORMIN (GLUCOPHAGE) 500 MG tablet Take 1 tablet by mouth 2 times daily (with meals) Yes Peng Henry MD   meloxicam (MOBIC) 7.5 MG tablet Take 1 tablet by mouth 2 times daily (with meals) Yes Buster Wiggins Ronaldo Santa MD   levothyroxine (SYNTHROID) 50 MCG tablet Take 1 tablet by mouth in the morning. Yes Tiago Samuels MD   amLODIPine (NORVASC) 10 MG tablet Take 1 tablet by mouth in the morning.  Yes Tiago Samuels MD   rosuvastatin (CRESTOR) 5 MG tablet Take 1 tablet by mouth daily Yes Tiago Samuels MD   losartan-hydroCHLOROthiazide [facility]) 100-25 MG per tablet Take 1 tablet by mouth daily Yes Tiago Samuels MD   Misc Natural Products (GLUCOS-CHONDROIT-MSM COMPLEX PO) Take 1 tablet by mouth 2 times daily Yes Historical Provider, MD   Apple Cider Vinegar 500 MG TABS Take 1 tablet by mouth 2 times daily Yes Historical Provider, MD   cetirizine (ZYRTEC) 10 MG tablet Take 1 tablet by mouth daily Yes Tiago Samuels MD   sildenafil (REVATIO) 20 MG tablet Take 1 tablet by mouth as needed (Erectile dysfunction) Yes Tiago Samuels MD   carvedilol (COREG) 25 MG tablet Take 1.5 tablets by mouth 2 times daily Yes Usman Erickson DO   busPIRone (BUSPAR) 7.5 MG tablet Take 1 tablet by mouth 3 times daily as needed (anxiety) Yes Tiago Samuels MD   fluticasone (FLONASE) 50 MCG/ACT nasal spray 1 spray by Nasal route 2 times daily Yes Tiago Samuels MD   baclofen (LIORESAL) 10 MG tablet Take 1 tablet by mouth nightly as needed (muscle spasm) Yes Tiago Samuels MD   vitamin D3 (CHOLECALCIFEROL) 400 UNITS TABS tablet Take 400 Units by mouth every other day  Yes Historical Provider, MD   Omeprazole (PRILOSEC PO) Take 1 capsule by mouth daily as needed  Yes Historical Provider, MD Gutierres (Including outside providers/suppliers regularly involved in providing care):   Patient Care Team:  Tiago Samuels MD as PCP - General (Family Medicine)  Tiago Samuels MD [facility] PCP [facility]     Reviewed and updated this visit:  Tobacco  Allergies  Meds  Problems  Med Hx  Surg Hx  Soc Hx  Fam Hx

## 2022-11-14 ENCOUNTER — OFFICE VISIT (OUTPATIENT)
Dept: ONCOLOGY | Age: 67
End: 2022-11-14
Payer: MEDICARE

## 2022-11-14 ENCOUNTER — HOSPITAL ENCOUNTER (OUTPATIENT)
Dept: LAB | Age: 67
Discharge: HOME | End: 2022-11-14
Payer: MEDICARE

## 2022-11-14 VITALS
TEMPERATURE: 97.6 F | WEIGHT: 246 LBS | BODY MASS INDEX: 34.44 KG/M2 | HEART RATE: 81 BPM | OXYGEN SATURATION: 97 % | RESPIRATION RATE: 16 BRPM | DIASTOLIC BLOOD PRESSURE: 72 MMHG | SYSTOLIC BLOOD PRESSURE: 134 MMHG | HEIGHT: 71 IN

## 2022-11-14 DIAGNOSIS — C64.9 RENAL CELL CARCINOMA, UNSPECIFIED LATERALITY (HCC): ICD-10-CM

## 2022-11-14 DIAGNOSIS — C64.9 RENAL CELL CARCINOMA, UNSPECIFIED LATERALITY (HCC): Primary | ICD-10-CM

## 2022-11-14 DIAGNOSIS — D69.6 THROMBOCYTOPENIA (HCC): ICD-10-CM

## 2022-11-14 LAB
ABSOLUTE EOS #: 0.12 K/UL (ref 0–0.44)
ABSOLUTE IMMATURE GRANULOCYTE: 0.03 K/UL (ref 0–0.3)
ABSOLUTE LYMPH #: 1.75 K/UL (ref 1.1–3.7)
ABSOLUTE MONO #: 0.55 K/UL (ref 0.1–1.2)
ALBUMIN SERPL-MCNC: 4.5 G/DL (ref 3.5–5.2)
ALBUMIN/GLOBULIN RATIO: 1.5 (ref 1–2.5)
ALP BLD-CCNC: 32 U/L (ref 40–129)
ALT SERPL-CCNC: 15 U/L (ref 5–41)
ANION GAP SERPL CALCULATED.3IONS-SCNC: 9 MMOL/L (ref 9–17)
AST SERPL-CCNC: 16 U/L
BASOPHILS # BLD: 1 % (ref 0–2)
BASOPHILS ABSOLUTE: 0.05 K/UL (ref 0–0.2)
BILIRUB SERPL-MCNC: 0.6 MG/DL (ref 0.3–1.2)
BUN BLDV-MCNC: 18 MG/DL (ref 8–23)
BUN/CREAT BLD: 23 (ref 9–20)
CALCIUM SERPL-MCNC: 9.5 MG/DL (ref 8.6–10.4)
CHLORIDE BLD-SCNC: 99 MMOL/L (ref 98–107)
CO2: 30 MMOL/L (ref 20–31)
CREAT SERPL-MCNC: 0.77 MG/DL (ref 0.7–1.2)
EOSINOPHILS RELATIVE PERCENT: 2 % (ref 1–4)
GFR SERPL CREATININE-BSD FRML MDRD: >60 ML/MIN/1.73M2
GLUCOSE BLD-MCNC: 174 MG/DL (ref 70–99)
HCT VFR BLD CALC: 34.6 % (ref 40.7–50.3)
HEMOGLOBIN: 12 G/DL (ref 13–17)
IMMATURE GRANULOCYTES: 1 %
LYMPHOCYTES # BLD: 33 % (ref 24–43)
MCH RBC QN AUTO: 32.1 PG (ref 25.2–33.5)
MCHC RBC AUTO-ENTMCNC: 34.7 G/DL (ref 25.2–33.5)
MCV RBC AUTO: 92.5 FL (ref 82.6–102.9)
MONOCYTES # BLD: 11 % (ref 3–12)
PDW BLD-RTO: 12.7 % (ref 11.8–14.4)
PLATELET # BLD: 146 K/UL (ref 138–453)
PMV BLD AUTO: 11.7 FL (ref 8.1–13.5)
POTASSIUM SERPL-SCNC: 3.9 MMOL/L (ref 3.7–5.3)
RBC # BLD: 3.74 M/UL (ref 4.21–5.77)
SEG NEUTROPHILS: 52 % (ref 36–65)
SEGMENTED NEUTROPHILS ABSOLUTE COUNT: 2.76 K/UL (ref 1.5–8.1)
SODIUM BLD-SCNC: 138 MMOL/L (ref 135–144)
TOTAL PROTEIN: 7.5 G/DL (ref 6.4–8.3)
WBC # BLD: 5.3 K/UL (ref 3.5–11.3)

## 2022-11-14 PROCEDURE — 3017F COLORECTAL CA SCREEN DOC REV: CPT | Performed by: INTERNAL MEDICINE

## 2022-11-14 PROCEDURE — 1036F TOBACCO NON-USER: CPT | Performed by: INTERNAL MEDICINE

## 2022-11-14 PROCEDURE — 36415 COLL VENOUS BLD VENIPUNCTURE: CPT

## 2022-11-14 PROCEDURE — 99214 OFFICE O/P EST MOD 30 MIN: CPT | Performed by: INTERNAL MEDICINE

## 2022-11-14 PROCEDURE — G8417 CALC BMI ABV UP PARAM F/U: HCPCS | Performed by: INTERNAL MEDICINE

## 2022-11-14 PROCEDURE — 85025 COMPLETE CBC W/AUTO DIFF WBC: CPT

## 2022-11-14 PROCEDURE — 3078F DIAST BP <80 MM HG: CPT | Performed by: INTERNAL MEDICINE

## 2022-11-14 PROCEDURE — G8427 DOCREV CUR MEDS BY ELIG CLIN: HCPCS | Performed by: INTERNAL MEDICINE

## 2022-11-14 PROCEDURE — 3074F SYST BP LT 130 MM HG: CPT | Performed by: INTERNAL MEDICINE

## 2022-11-14 PROCEDURE — G8484 FLU IMMUNIZE NO ADMIN: HCPCS | Performed by: INTERNAL MEDICINE

## 2022-11-14 PROCEDURE — 80053 COMPREHEN METABOLIC PANEL: CPT

## 2022-11-14 PROCEDURE — 1123F ACP DISCUSS/DSCN MKR DOCD: CPT | Performed by: INTERNAL MEDICINE

## 2022-11-14 NOTE — PROGRESS NOTES
Patient ID: Stephanie Quiles, 1955, [id number], 79 y.o. Diagnosis:   Immune related thrombocytopenia  Left RCC, stage I, T1aNx s/p partial nephrectomy 11/30/18  HISTORY OF PRESENT ILLNESS:    Hematologic History: This is a 59-year-old male with a history of immune related thrombocytopenia was being followed by Dr. Astrid Cunningham. He was noted to have thrombocytopenia since 2016. He has a very mild thrombocytopenia ranging around 120-130 K. His ITP screen was positive for platelet associated antibody IgM. He denied any symptoms of bleeding. INTERVAL HISTORY  Patient is returning for follow-up visit and to discuss lab results and further recommendations. He is following with cardiology and recently had a cardiac catheterization and reportedly was fine. He denies any abdominal pain nausea vomiting. He denies any unintentional weight loss, drenching night sweats fever chills. His lung nodule was stable on his previous CT chest and he is going to have another CTA chest in January   His most recent platelets are improved at 146 and he denies any easy bruising, bleeding symptoms. During this visit patient's allergy, social, medical, surgical history and medications were reviewed and updated.     Allergies   Allergen Reactions    Codeine      Severe Abdominal pain  Severe stomach pain  Severe Abdominal pain    Sulfa Antibiotics      Patient unsure of reaction     Current Outpatient Medications   Medication Sig Dispense Refill    Semaglutide (RYBELSUS) 14 MG TABS Take 1 tablet by mouth daily 90 tablet 1    montelukast (SINGULAIR) 10 MG tablet Take 1 tablet by mouth nightly 90 tablet 0    furosemide (LASIX) 20 MG tablet Take 1 tablet by mouth daily 90 tablet 1    metFORMIN (GLUCOPHAGE) 500 MG tablet Take 1 tablet by mouth 2 times daily (with meals) 180 tablet 1    meloxicam (MOBIC) 7.5 MG tablet Take 1 tablet by mouth 2 times daily (with meals) 180 tablet 1    levothyroxine (SYNTHROID) 50 MCG tablet Take 1 tablet by mouth in the morning. 30 tablet 3    amLODIPine (NORVASC) 10 MG tablet Take 1 tablet by mouth in the morning. 90 tablet 1    rosuvastatin (CRESTOR) 5 MG tablet Take 1 tablet by mouth daily 90 tablet 1    losartan-hydroCHLOROthiazide (HYZAAR) 100-25 MG per tablet Take 1 tablet by mouth daily 90 tablet 1    Misc Natural Products (GLUCOS-CHONDROIT-MSM COMPLEX PO) Take 1 tablet by mouth 2 times daily      Apple Cider Vinegar 500 MG TABS Take 1 tablet by mouth 2 times daily      cetirizine (ZYRTEC) 10 MG tablet Take 1 tablet by mouth daily 90 tablet 3    sildenafil (REVATIO) 20 MG tablet Take 1 tablet by mouth as needed (Erectile dysfunction) 30 tablet 5    carvedilol (COREG) 25 MG tablet Take 1.5 tablets by mouth 2 times daily 270 tablet 3    busPIRone (BUSPAR) 7.5 MG tablet Take 1 tablet by mouth 3 times daily as needed (anxiety) 90 tablet 1    fluticasone (FLONASE) 50 MCG/ACT nasal spray 1 spray by Nasal route 2 times daily 1 Bottle 5    baclofen (LIORESAL) 10 MG tablet Take 1 tablet by mouth nightly as needed (muscle spasm) 30 tablet 1    vitamin D3 (CHOLECALCIFEROL) 400 UNITS TABS tablet Take 400 Units by mouth every other day       Omeprazole (PRILOSEC PO) Take 1 capsule by mouth daily as needed        No current facility-administered medications for this visit. REVIEW OF SYSTEM:     Constitutional: No fever or chills.  No night sweats, no weight loss   Eyes: No eye discharge, double vision, or eye pain   HEENT: negative for sore mouth, sore throat, hoarseness and voice change   Respiratory: negative for cough , sputum, dyspnea, wheezing, hemoptysis, chest pain   Cardiovascular: negative for chest pain, dyspnea, palpitations, orthopnea, PND   Gastrointestinal: negative for nausea, vomiting, diarrhea, constipation, abdominal pain, Dysphagia, hematemesis and hematochezia   Genitourinary: negative for frequency, dysuria, nocturia, urinary incontinence, and hematuria   Integument: negative for rash, skin lesions, bruises.    Hematologic/Lymphatic: negative for easy bruising, bleeding, lymphadenopathy, petechiae and swelling/edema   Endocrine: negative for heat or cold intolerance, tremor, weight changes, change in bowel habits and hair loss   Musculoskeletal: negative for myalgias, arthralgias, pain, joint swelling,and bone pain   Neurological: negative for headaches, dizziness, seizures, weakness, numbness   OBJECTIVE:         Vitals:    11/14/22 1035   BP: 134/72   Pulse: 81   Resp: 16   Temp: 97.6 °F (36.4 °C)   SpO2: 97%       PHYSICAL EXAM:   General appearance - well appearing, no in pain or distress   Mental status - alert and cooperative   Eyes - pupils equal and reactive, extraocular eye movements intact   Ears - bilateral TM's and external ear canals normal   Mouth - mucous membranes moist, pharynx normal without lesions   Neck - supple, no significant adenopathy   Lymphatics - no palpable lymphadenopathy, no hepatosplenomegaly   Chest - clear to auscultation, no wheezes, rales or rhonchi, symmetric air entry   Heart - normal rate, regular rhythm, normal S1, S2, no murmurs, rubs, clicks or gallops   Abdomen - soft, nontender, nondistended, no masses or organomegaly   Neurological - alert, oriented, normal speech, no focal findings or movement disorder noted   Musculoskeletal - no joint tenderness, deformity or swelling   Extremities - peripheral pulses normal, no pedal edema, no clubbing or cyanosis   Skin - normal coloration and turgor, no rashes, no suspicious skin lesions noted   LABORATORY DATA:     Lab Results   Component Value Date    WBC 5.3 11/14/2022    HGB 12.0 (L) 11/14/2022    HCT 34.6 (L) 11/14/2022    MCV 92.5 11/14/2022     11/14/2022    LYMPHOPCT 33 11/14/2022    RBC 3.74 (L) 11/14/2022    MCH 32.1 11/14/2022    MCHC 34.7 (H) 11/14/2022    RDW 12.7 11/14/2022    MONOPCT 11 11/14/2022    BASOPCT 1 11/14/2022    NEUTROABS 2.76 11/14/2022    LYMPHSABS 1.75 11/14/2022    MONOSABS 0.55 11/14/2022 EOSABS 0.12 11/14/2022    BASOSABS 0.05 11/14/2022         Chemistry        Component Value Date/Time     11/14/2022 0900    K 3.9 11/14/2022 0900    CL 99 11/14/2022 0900    CO2 30 11/14/2022 0900    BUN 18 11/14/2022 0900    CREATININE 0.77 11/14/2022 0900        Component Value Date/Time    CALCIUM 9.5 11/14/2022 0900    ALKPHOS 32 (L) 11/14/2022 0900    AST 16 11/14/2022 0900    ALT 15 11/14/2022 0900    BILITOT 0.6 11/14/2022 0900      1/22/2017  5:23 PM - [place], Lab [facility] [employer] [facility]   Component Value Ref Range & Units Status   IgG Plt Ab Direct Negative Negative Final   (NOTE)   Platelet Associated Antibodies, Direct Assay was performed on a   suboptimal submission. The maximum 48 hour stability was exceeded   at the time of test analysis. Please interpret the results with   caution. IgM Plt Ab Direct Positive Negative Final   (NOTE)      PATHOLOGY DATA:   Collected: 11/30/2018   -- Diagnosis --   KIDNEY, PARTIAL NEPHRECTOMY (LEFT):       - RENAL CELL CARCINOMA, CLEAR CELL TYPE WITH PROMINENT CYSTIC        CHANGE, GRADE 2.       - PARENCHYMAL AND SOFT TISSUE SURGICAL MARGINS NEGATIVE FOR         NEOPLASM. PATHOLOGIC STAGE CLASSIFICATION:     pT1a pNX       IMAGING DATA:    Reviewed  CT chest abd pelvis 3/27/19  FINDINGS:   Mediastinum: The heart and great vessels are normal in size. No pericardial effusion. No enlarged or suspicious-appearing lymph nodes are identified. Lungs/pleura: 3 mm noncalcified nodule in the right lower lobe on image 71 is   unchanged compared to the prior chest CT 7 months ago. Minimal linear   opacities in the lung bases are noted compatible with subsegmental   atelectasis. The lungs are otherwise clear. No effusion. The airway is   patent. Upper Abdomen: Cholelithiasis. Findings suggestive of mild hepatic   steatosis. The visualized adrenal glands appear within normal limits. Subcentimeter cyst in the superior right kidney. Soft Tissues/Bones: Multilevel mild disc space narrowing and degenerative   endplate changes are noted without suspicious lytic or blastic lesion   identified. Impression   Stable 3 mm nodule in the right lower lobe compared to prior chest imaging 7   months ago. No other finding to suggest metastatic disease in the chest.   While this favors a benign process, attention to on routine oncologic   follow-up is recommended. CT chest abd pelvis 10/11/19  Impression   *Stable aneurysmal dilatation of the ascending thoracic aorta measuring 4.7   cm. No dissection or rupture. *Stable 3 mm solid noncalcified pulmonary nodule right lower lobe. Attention   on follow-up is suggested given the history of renal cell carcinoma. *No CT evidence of tumor recurrence or metastatic disease seen within the   abdomen or pelvis. *Cholelithiasis. *Cardiomegaly. CT abd pelvis 10/19/20  Impression    1. New since the prior CTA chest exam 09/11/2020 there is multifocal basilar    subcentimeter non solid pulmonary nodules. Given the acuity of the changes    this most likely represents an acute infectious/inflammatory process such as    multifocal pneumonia versus possible vasculitis or septic emboli. Metastatic    pulmonary disease is less likely. 2. No acute abdominal/pelvic process or evidence of metastatic disease. ASSESSMENT:    Mr. Sarabjit Kelley is a 51-year-old male with immune related thrombocytopenia with platelet ranging around 110-130 K And left-sided renal cell cancer. Thrombocytopenia: His platelet are stable. At this point there is no indication to initiate treatment. Renal cell cancer: He had left partial nephrectomy on 11/30/18 and showed showed stage I RCC. I discussed the pathology results and NCCN guidelines and recent data. NO adjuvant therapy indicated. Right lower lung nodule: Has been stable.     Back pain and right foot  Patient's questions were sought and answered to his satisfaction and he agreed to proceed with the plan. PLAN:   I reviewed his recent lab work, imaging studies, discussed diagnosis, prognosis treatment recommendations   Based on history, physical examination lab work and imaging studies no evidence of recurrence   His platelets are improved at 146 K  Return to clinic in 1 year with labs prior       I spent more than 35 minutes examining, evaluating, reviewing data and counseling the patient. Greater than 50% of that time was spent face-to-face with the patient in counseling and coordinating her care.       Michele Shepherd MD  Hematology/Oncology

## 2022-12-05 ENCOUNTER — HOSPITAL ENCOUNTER (OUTPATIENT)
Dept: INTERVENTIONAL RADIOLOGY/VASCULAR | Age: 67
Discharge: HOME | End: 2022-12-07
Payer: MEDICARE

## 2022-12-05 DIAGNOSIS — C64.2 RENAL CELL CARCINOMA OF LEFT KIDNEY (HCC): ICD-10-CM

## 2022-12-05 PROCEDURE — 76775 US EXAM ABDO BACK WALL LIM: CPT

## 2022-12-19 ENCOUNTER — HOSPITAL ENCOUNTER (OUTPATIENT)
Age: 67
Discharge: HOME | End: 2022-12-19
Payer: MEDICARE

## 2022-12-19 DIAGNOSIS — N40.1 BPH WITH OBSTRUCTION/LOWER URINARY TRACT SYMPTOMS: ICD-10-CM

## 2022-12-19 DIAGNOSIS — N13.8 BPH WITH OBSTRUCTION/LOWER URINARY TRACT SYMPTOMS: ICD-10-CM

## 2022-12-19 LAB — PROSTATE SPECIFIC ANTIGEN: 0.92 NG/ML

## 2022-12-19 PROCEDURE — 84153 ASSAY OF PSA TOTAL: CPT

## 2022-12-19 PROCEDURE — 36415 COLL VENOUS BLD VENIPUNCTURE: CPT

## 2022-12-21 ENCOUNTER — OFFICE VISIT (OUTPATIENT)
Dept: UROLOGY | Age: 67
End: 2022-12-21
Payer: MEDICARE

## 2022-12-21 ENCOUNTER — PATIENT MESSAGE (OUTPATIENT)
Dept: FAMILY MEDICINE CLINIC | Age: 67
End: 2022-12-21

## 2022-12-21 VITALS
SYSTOLIC BLOOD PRESSURE: 124 MMHG | WEIGHT: 246 LBS | OXYGEN SATURATION: 97 % | HEIGHT: 71 IN | DIASTOLIC BLOOD PRESSURE: 72 MMHG | HEART RATE: 82 BPM | RESPIRATION RATE: 16 BRPM | BODY MASS INDEX: 34.44 KG/M2

## 2022-12-21 DIAGNOSIS — N52.9 ERECTILE DYSFUNCTION, UNSPECIFIED ERECTILE DYSFUNCTION TYPE: ICD-10-CM

## 2022-12-21 DIAGNOSIS — C64.2 RENAL CELL CARCINOMA OF LEFT KIDNEY (HCC): Primary | ICD-10-CM

## 2022-12-21 DIAGNOSIS — N40.1 BPH WITH OBSTRUCTION/LOWER URINARY TRACT SYMPTOMS: ICD-10-CM

## 2022-12-21 DIAGNOSIS — N13.8 BPH WITH OBSTRUCTION/LOWER URINARY TRACT SYMPTOMS: ICD-10-CM

## 2022-12-21 PROCEDURE — 3074F SYST BP LT 130 MM HG: CPT | Performed by: UROLOGY

## 2022-12-21 PROCEDURE — 99213 OFFICE O/P EST LOW 20 MIN: CPT | Performed by: UROLOGY

## 2022-12-21 PROCEDURE — G8427 DOCREV CUR MEDS BY ELIG CLIN: HCPCS | Performed by: UROLOGY

## 2022-12-21 PROCEDURE — G8484 FLU IMMUNIZE NO ADMIN: HCPCS | Performed by: UROLOGY

## 2022-12-21 PROCEDURE — G8417 CALC BMI ABV UP PARAM F/U: HCPCS | Performed by: UROLOGY

## 2022-12-21 PROCEDURE — 3017F COLORECTAL CA SCREEN DOC REV: CPT | Performed by: UROLOGY

## 2022-12-21 PROCEDURE — 3078F DIAST BP <80 MM HG: CPT | Performed by: UROLOGY

## 2022-12-21 PROCEDURE — 99214 OFFICE O/P EST MOD 30 MIN: CPT | Performed by: UROLOGY

## 2022-12-21 PROCEDURE — 1123F ACP DISCUSS/DSCN MKR DOCD: CPT | Performed by: UROLOGY

## 2022-12-21 PROCEDURE — 1036F TOBACCO NON-USER: CPT | Performed by: UROLOGY

## 2022-12-21 RX ORDER — LEVOTHYROXINE SODIUM 0.05 MG/1
50 TABLET ORAL DAILY
Qty: 90 TABLET | Refills: 0 | Status: SHIPPED | OUTPATIENT
Start: 2022-12-21

## 2022-12-21 NOTE — PROGRESS NOTES
Jessica Rodriguez MD   Urology Clinic Progress Note      Patient:  Leatha Dunne  YOB: 1955  Date: 12/21/2022    HISTORY OF PRESENT ILLNESS:   The patient is a 79 y.o. male who presents today for follow-up for the following problem(s): left renal mass  Overall the problem(s) : show no change. Associated Symptoms: No dysuria, gross hematuria. Pain Severity:      Summary of old records:left renal mass noted on Chest CT for [facility]    11/30/2018, left partial nephrectomy  -- Diagnosis --   KIDNEY, PARTIAL NEPHRECTOMY (LEFT):       - RENAL CELL CARCINOMA, CLEAR CELL TYPE WITH PROMINENT CYSTIC        CHANGE, GRADE 2.       - PARENCHYMAL AND SOFT TISSUE SURGICAL MARGINS NEGATIVE FOR         NEOPLASM. Additional History:   Doing well. No recent issues. No gross hematuria. No flank pain. No new LUTS    Stable post partial left nephrectomy changes. No recurrent mass or acute abnormality. Known slightly larger right renal cyst.     I independently reviewed and verified the images and reports from:  12/5/2022 renal US  The right kidney measures 12.8 cm in length and the left kidney measures 9.1   cm in length. Kidneys demonstrate normal cortical echogenicity. No hydronephrosis or   intrarenal stones. No focal lesions. Last several PSA's:  Lab Results   Component Value Date    PSA 0.92 12/19/2022    PSA 0.77 01/26/2016       Last total testosterone:  Lab Results   Component Value Date    TESTOSTERONE 439 10/21/2019       Urinalysis today:  No results found for this visit on 12/21/22.     Last BUN and creatinine:  Lab Results   Component Value Date    BUN 18 11/14/2022     Lab Results   Component Value Date    CREATININE 0.77 11/14/2022       Imaging Reviewed during this Office Visit:   (results were independently reviewed by physician and radiology report verified)    PAST MEDICAL, FAMILY AND SOCIAL HISTORY UPDATE:  Past Medical History:   Diagnosis Date    Anxiety Aortic regurgitation     Bicuspid aortic valve     Diabetes mellitus ([facility] [address] since March 2018    on Rx.     GERD (gastroesophageal reflux disease)     Hypertension     Left renal mass 10/2018    Osteoarthritis of left knee     Wears glasses      Past Surgical History:   Procedure Laterality Date    CARDIAC CATHETERIZATION  09/12/2022    CYST REMOVAL      tail bone    MOUTH SURGERY  2015    PARTIAL NEPHRECTOMY Left 11/30/2018    ROBOTIC PARTIAL NEPHRECTOMY,     [place] COLONOSCOPY FLX DX W/COLLJ SPEC WHEN PFRMD N/A 05/14/2018    normal colon, Dr. Jorge Paz Left 11/30/2018    XI ROBOTIC PARTIAL NEPHRECTOMY, INTRA-OP LAP ULTRASOUND performed by John Arellano MD at [facility]       Family History   Problem Relation Age of Onset    High Blood Pressure Mother     Diabetes Mother         impaired fasting glucose    Other Mother         short term memory loss after MVA    Glaucoma Mother     Stroke Father 66    High Blood Pressure Father     Diabetes Father         impaired fasting glucose    Glaucoma Father     Cancer Father         bladder     Heart Attack Father 66    Glaucoma Brother     Diabetes Maternal Grandmother     Other Paternal Grandmother         gallbladder disease    Stroke Paternal Grandfather     Other Paternal Grandfather         gallbladder disease    Diabetes Paternal Grandfather     No Known Problems Brother      Outpatient Medications Marked as Taking for the 12/21/22 encounter (Office Visit) with John Arellano MD   Medication Sig Dispense Refill    Semaglutide (RYBELSUS) 14 MG TABS Take 1 tablet by mouth daily 90 tablet 1    montelukast (SINGULAIR) 10 MG tablet Take 1 tablet by mouth nightly 90 tablet 0    furosemide (LASIX) 20 MG tablet Take 1 tablet by mouth daily 90 tablet 1    metFORMIN (GLUCOPHAGE) 500 MG tablet Take 1 tablet by mouth 2 times daily (with meals) 180 tablet 1    meloxicam (MOBIC) 7.5 MG tablet Take 1 tablet by mouth 2 times daily (with meals) 180 tablet 1    levothyroxine (SYNTHROID) 50 MCG tablet Take 1 tablet by mouth in the morning. 30 tablet 3    amLODIPine (NORVASC) 10 MG tablet Take 1 tablet by mouth in the morning.  90 tablet 1    rosuvastatin (CRESTOR) 5 MG tablet Take 1 tablet by mouth daily 90 tablet 1    losartan-hydroCHLOROthiazide (HYZAAR) 100-25 MG per tablet Take 1 tablet by mouth daily 90 tablet 1    Misc Natural Products (GLUCOS-CHONDROIT-MSM COMPLEX PO) Take 1 tablet by mouth 2 times daily      Apple Cider Vinegar 500 MG TABS Take 1 tablet by mouth 2 times daily      cetirizine (ZYRTEC) 10 MG tablet Take 1 tablet by mouth daily 90 tablet 3    sildenafil (REVATIO) 20 MG tablet Take 1 tablet by mouth as needed (Erectile dysfunction) 30 tablet 5    carvedilol (COREG) 25 MG tablet Take 1.5 tablets by mouth 2 times daily 270 tablet 3    busPIRone (BUSPAR) 7.5 MG tablet Take 1 tablet by mouth 3 times daily as needed (anxiety) 90 tablet 1    fluticasone (FLONASE) 50 MCG/ACT nasal spray 1 spray by Nasal route 2 times daily 1 Bottle 5    baclofen (LIORESAL) 10 MG tablet Take 1 tablet by mouth nightly as needed (muscle spasm) 30 tablet 1    vitamin D3 (CHOLECALCIFEROL) 400 UNITS TABS tablet Take 400 Units by mouth every other day       Omeprazole (PRILOSEC PO) Take 1 capsule by mouth daily as needed          Codeine and Sulfa antibiotics  Social History     Tobacco Use   Smoking Status Never   Smokeless Tobacco Never       Social History     Substance and Sexual Activity   Alcohol Use Yes    Comment: very rare       REVIEW OF SYSTEMS:  Constitutional: negative  Eyes: negative  Respiratory: negative  Cardiovascular: negative  Gastrointestinal: negative  Musculoskeletal: negative  Genitourinary: negative except for what is in HPI  Skin: negative   Neurological: negative  Hematological/Lymphatic: negative  Psychological: negative    Physical Exam:      Vitals:    12/21/22 1043   BP: 124/72   Pulse: 82 Resp: 16   SpO2: 97%     Patient is a 79 y.o. male in no acute distress and alert and oriented to person, place and time. NAD, A/o      Assessment and Plan      1. Renal cell carcinoma of left kidney (HCC)    2. Erectile dysfunction, unspecified erectile dysfunction type    3. BPH with obstruction/lower urinary tract symptoms           Plan:       RCC:  no evidence of recurrence. Reviewed images with patient.   ED: continue sildenafil PRN  Follow-up in 12 months with renal ultrasound  Check PSA 1 year           Sheron Velazquez M.D  [facility]

## 2022-12-21 NOTE — TELEPHONE ENCOUNTER
VON notes, PAUL pt.        Emmanuelle Dickens called requesting a refill of the below medication which has been pended for you:     Requested Prescriptions     Pending Prescriptions Disp Refills    levothyroxine (SYNTHROID) 50 MCG tablet 90 tablet 0     Sig: Take 1 tablet by mouth daily       Last Appointment Date: 11/8/2022  Next Appointment Date: 5/8/2023    Allergies   Allergen Reactions    Codeine      Severe Abdominal pain  Severe stomach pain  Severe Abdominal pain    Sulfa Antibiotics      Patient unsure of reaction

## 2022-12-21 NOTE — TELEPHONE ENCOUNTER
From: Manoj Postal  To: Dr. Jo Ann Montez: 12/21/2022 11:51 AM EST  Subject: prescription refill    Hello, I am out of my Levothyroxine 50mcg And when you refill it can it be a 3 month refill? ? Thanks! [facility]!

## 2022-12-26 ENCOUNTER — PATIENT MESSAGE (OUTPATIENT)
Dept: FAMILY MEDICINE CLINIC | Age: 67
End: 2022-12-26

## 2022-12-26 DIAGNOSIS — I10 ESSENTIAL HYPERTENSION: ICD-10-CM

## 2022-12-27 RX ORDER — LOSARTAN POTASSIUM AND HYDROCHLOROTHIAZIDE 25; 100 MG/1; MG/1
1 TABLET ORAL DAILY
Qty: 90 TABLET | Refills: 3 | Status: SHIPPED | OUTPATIENT
Start: 2022-12-27

## 2022-12-27 NOTE — TELEPHONE ENCOUNTER
From: Diann Postin  To: Dr. Nory Simons: 12/26/2022 12:03 PM EST  Subject: prescription refill    hello, I am down to my last 5 Losartan-HCTZ 25 mg  Can you please order a 3 month refill    ThanksAngelito

## 2022-12-27 NOTE — TELEPHONE ENCOUNTER
Deb Hernandez called requesting a refill of the below medication which has been pended for you:     Requested Prescriptions     Pending Prescriptions Disp Refills    losartan-hydroCHLOROthiazide (HYZAAR) 100-25 MG per tablet 90 tablet 3     Sig: Take 1 tablet by mouth daily       Last Appointment Date: 11/8/2022  Next Appointment Date: 5/8/2023    Allergies   Allergen Reactions    Codeine      Severe Abdominal pain  Severe stomach pain  Severe Abdominal pain    Sulfa Antibiotics      Patient unsure of reaction

## 2022-12-30 ENCOUNTER — TELEPHONE (OUTPATIENT)
Dept: CARDIOLOGY | Age: 67
End: 2022-12-30

## 2022-12-30 DIAGNOSIS — Q23.1 AORTIC REGURGITATION DUE TO BICUSPID AORTIC VALVE: Primary | ICD-10-CM

## 2022-12-30 NOTE — TELEPHONE ENCOUNTER
Pt needs labs for CT scan being done Tues and she will need to r/s if lab is not completed before Tues. Please call pt to advise to come in  when order is placed.     Needs [facility]

## 2023-01-01 ENCOUNTER — PATIENT MESSAGE (OUTPATIENT)
Dept: FAMILY MEDICINE CLINIC | Age: 68
End: 2023-01-01

## 2023-01-01 DIAGNOSIS — I10 ESSENTIAL HYPERTENSION: ICD-10-CM

## 2023-01-03 ENCOUNTER — HOSPITAL ENCOUNTER (OUTPATIENT)
Dept: CT IMAGING | Age: 68
Discharge: HOME | End: 2023-01-05
Payer: MEDICARE

## 2023-01-03 ENCOUNTER — HOSPITAL ENCOUNTER (OUTPATIENT)
Age: 68
Discharge: HOME | End: 2023-01-03
Payer: MEDICARE

## 2023-01-03 DIAGNOSIS — Q23.1 AORTIC REGURGITATION DUE TO BICUSPID AORTIC VALVE: ICD-10-CM

## 2023-01-03 DIAGNOSIS — Q23.1 BICUSPID AORTIC VALVE: ICD-10-CM

## 2023-01-03 DIAGNOSIS — R94.39 ABNORMAL CARDIOVASCULAR STRESS TEST: ICD-10-CM

## 2023-01-03 DIAGNOSIS — I25.10 CORONARY ARTERY CALCIFICATION: ICD-10-CM

## 2023-01-03 DIAGNOSIS — I25.84 CORONARY ARTERY CALCIFICATION: ICD-10-CM

## 2023-01-03 DIAGNOSIS — I10 ESSENTIAL HYPERTENSION: ICD-10-CM

## 2023-01-03 DIAGNOSIS — I77.810 DILATED AORTIC ROOT (HCC): ICD-10-CM

## 2023-01-03 LAB
ANION GAP SERPL CALCULATED.3IONS-SCNC: 10 MMOL/L (ref 9–17)
BUN BLDV-MCNC: 20 MG/DL (ref 8–23)
BUN/CREAT BLD: 22 (ref 9–20)
CALCIUM SERPL-MCNC: 9.6 MG/DL (ref 8.6–10.4)
CHLORIDE BLD-SCNC: 100 MMOL/L (ref 98–107)
CO2: 30 MMOL/L (ref 20–31)
CREAT SERPL-MCNC: 0.89 MG/DL (ref 0.7–1.2)
GFR SERPL CREATININE-BSD FRML MDRD: >60 ML/MIN/1.73M2
GLUCOSE BLD-MCNC: 143 MG/DL (ref 70–99)
POTASSIUM SERPL-SCNC: 4 MMOL/L (ref 3.7–5.3)
SODIUM BLD-SCNC: 140 MMOL/L (ref 135–144)

## 2023-01-03 PROCEDURE — 36415 COLL VENOUS BLD VENIPUNCTURE: CPT

## 2023-01-03 PROCEDURE — 6360000004 HC RX CONTRAST MEDICATION: Performed by: INTERNAL MEDICINE

## 2023-01-03 PROCEDURE — 2709999900 CTA CHEST W CONTRAST

## 2023-01-03 PROCEDURE — 80048 BASIC METABOLIC PNL TOTAL CA: CPT

## 2023-01-03 RX ORDER — CARVEDILOL 25 MG/1
37.5 TABLET ORAL 2 TIMES DAILY
Qty: 270 TABLET | Refills: 3 | Status: SHIPPED | OUTPATIENT
Start: 2023-01-03 | End: 2023-01-04 | Stop reason: SDUPTHER

## 2023-01-03 RX ADMIN — IOPAMIDOL 100 ML: 755 INJECTION, SOLUTION INTRAVENOUS at 10:30

## 2023-01-03 NOTE — TELEPHONE ENCOUNTER
From: Violette Schofield  To: Dr. Bhupinder Cheng: 1/1/2023 9:02 PM EST  Subject: prescription refill    Hello. ... Happy New Year!   I am out of refills for my Carvedilol 25 MG  I have enough through thursday  Thanks

## 2023-01-04 ENCOUNTER — TELEPHONE (OUTPATIENT)
Dept: CARDIOLOGY | Age: 68
End: 2023-01-04

## 2023-01-04 RX ORDER — CARVEDILOL 25 MG/1
37.5 TABLET ORAL 2 TIMES DAILY
Qty: 270 TABLET | Refills: 3 | Status: SHIPPED | OUTPATIENT
Start: 2023-01-04

## 2023-01-04 NOTE — TELEPHONE ENCOUNTER
Left message for patient to call office.      Per Dr Huynh Lias: Dilated Aorta but stable in size at 4.3 cm     Last Appt:  10/31/2022  Next Appt:   5/8/2023  Med verified in [address] [place] [address] Rd

## 2023-01-10 ENCOUNTER — PATIENT MESSAGE (OUTPATIENT)
Dept: FAMILY MEDICINE CLINIC | Age: 68
End: 2023-01-10

## 2023-01-10 DIAGNOSIS — E11.9 TYPE 2 DIABETES MELLITUS WITHOUT COMPLICATION, WITHOUT LONG-TERM CURRENT USE OF INSULIN (HCC): ICD-10-CM

## 2023-01-10 DIAGNOSIS — J30.2 OTHER SEASONAL ALLERGIC RHINITIS: ICD-10-CM

## 2023-01-10 DIAGNOSIS — Q23.1 AORTIC REGURGITATION DUE TO BICUSPID AORTIC VALVE: ICD-10-CM

## 2023-01-10 DIAGNOSIS — I10 ESSENTIAL HYPERTENSION: ICD-10-CM

## 2023-01-10 RX ORDER — BUSPIRONE HYDROCHLORIDE 7.5 MG/1
7.5 TABLET ORAL 3 TIMES DAILY PRN
Qty: 90 TABLET | Refills: 3 | Status: SHIPPED | OUTPATIENT
Start: 2023-01-10

## 2023-01-10 RX ORDER — AMLODIPINE BESYLATE 10 MG/1
10 TABLET ORAL DAILY
Qty: 90 TABLET | Refills: 1 | Status: SHIPPED | OUTPATIENT
Start: 2023-01-10

## 2023-01-10 RX ORDER — CETIRIZINE HYDROCHLORIDE 10 MG/1
10 TABLET ORAL DAILY
Qty: 90 TABLET | Refills: 3 | Status: SHIPPED | OUTPATIENT
Start: 2023-01-10

## 2023-01-10 RX ORDER — MONTELUKAST SODIUM 10 MG/1
10 TABLET ORAL NIGHTLY
Qty: 90 TABLET | Refills: 1 | Status: SHIPPED | OUTPATIENT
Start: 2023-01-10

## 2023-01-10 RX ORDER — ROSUVASTATIN CALCIUM 5 MG/1
5 TABLET, COATED ORAL DAILY
Qty: 90 TABLET | Refills: 1 | Status: SHIPPED | OUTPATIENT
Start: 2023-01-10

## 2023-01-10 NOTE — TELEPHONE ENCOUNTER
From: Macario Lehman  To: Dr. Ting Espinosa: 1/10/2023 1:09 PM EST  Subject: prescription refill    Hello,  I am out of refills for my Rosuvastatin Calcium 5MG  Thanks, Paula Kenny

## 2023-02-27 ENCOUNTER — OFFICE VISIT (OUTPATIENT)
Dept: DERMATOLOGY | Age: 68
End: 2023-02-27
Payer: MEDICARE

## 2023-02-27 VITALS
OXYGEN SATURATION: 98 % | DIASTOLIC BLOOD PRESSURE: 70 MMHG | HEIGHT: 71 IN | SYSTOLIC BLOOD PRESSURE: 132 MMHG | HEART RATE: 84 BPM | WEIGHT: 237.38 LBS | BODY MASS INDEX: 33.23 KG/M2

## 2023-02-27 DIAGNOSIS — L72.0 MILIA: ICD-10-CM

## 2023-02-27 DIAGNOSIS — L91.0 HYPERTROPHIC SCAR OF SKIN: ICD-10-CM

## 2023-02-27 DIAGNOSIS — L82.1 SEBORRHEIC KERATOSES: ICD-10-CM

## 2023-02-27 DIAGNOSIS — L73.9 FOLLICULITIS: Primary | ICD-10-CM

## 2023-02-27 DIAGNOSIS — L57.0 ACTINIC KERATOSES: ICD-10-CM

## 2023-02-27 DIAGNOSIS — Z85.828 HISTORY OF BASAL CELL CARCINOMA: ICD-10-CM

## 2023-02-27 PROCEDURE — 99213 OFFICE O/P EST LOW 20 MIN: CPT | Performed by: PHYSICIAN ASSISTANT

## 2023-02-27 PROCEDURE — G8484 FLU IMMUNIZE NO ADMIN: HCPCS | Performed by: PHYSICIAN ASSISTANT

## 2023-02-27 PROCEDURE — G8427 DOCREV CUR MEDS BY ELIG CLIN: HCPCS | Performed by: PHYSICIAN ASSISTANT

## 2023-02-27 PROCEDURE — 99214 OFFICE O/P EST MOD 30 MIN: CPT | Performed by: PHYSICIAN ASSISTANT

## 2023-02-27 PROCEDURE — 3075F SYST BP GE 130 - 139MM HG: CPT | Performed by: PHYSICIAN ASSISTANT

## 2023-02-27 PROCEDURE — 17003 DESTRUCT PREMALG LES 2-14: CPT | Performed by: PHYSICIAN ASSISTANT

## 2023-02-27 PROCEDURE — 3078F DIAST BP <80 MM HG: CPT | Performed by: PHYSICIAN ASSISTANT

## 2023-02-27 PROCEDURE — 1123F ACP DISCUSS/DSCN MKR DOCD: CPT | Performed by: PHYSICIAN ASSISTANT

## 2023-02-27 PROCEDURE — G8417 CALC BMI ABV UP PARAM F/U: HCPCS | Performed by: PHYSICIAN ASSISTANT

## 2023-02-27 PROCEDURE — 17000 DESTRUCT PREMALG LESION: CPT | Performed by: PHYSICIAN ASSISTANT

## 2023-02-27 PROCEDURE — 3017F COLORECTAL CA SCREEN DOC REV: CPT | Performed by: PHYSICIAN ASSISTANT

## 2023-02-27 PROCEDURE — 11900 INJECT SKIN LESIONS </W 7: CPT | Performed by: PHYSICIAN ASSISTANT

## 2023-02-27 PROCEDURE — 1036F TOBACCO NON-USER: CPT | Performed by: PHYSICIAN ASSISTANT

## 2023-02-27 RX ORDER — TRIAMCINOLONE ACETONIDE 10 MG/ML
1 INJECTION, SUSPENSION INTRA-ARTICULAR; INTRALESIONAL ONCE
Qty: 0.1 ML | Refills: 0 | Status: SHIPPED | OUTPATIENT
Start: 2023-02-27 | End: 2023-02-27

## 2023-02-27 RX ORDER — CLINDAMYCIN PHOSPHATE 11.9 MG/ML
SOLUTION TOPICAL
Qty: 60 ML | Refills: 5 | Status: SHIPPED | OUTPATIENT
Start: 2023-02-27 | End: 2023-03-29

## 2023-02-27 RX ORDER — CLINDAMYCIN PHOSPHATE 10 UG/ML
LOTION TOPICAL
Qty: 60 ML | Refills: 3 | Status: SHIPPED | OUTPATIENT
Start: 2023-02-27 | End: 2023-02-27

## 2023-02-27 NOTE — PROGRESS NOTES
Dermatology Patient Note  [facility] [id number] [employer]  [address]  [address]  Dept: [phone]  Dept Fax: [phone]      VISITDATE: 2/27/2023   REFERRING PROVIDER: No ref. provider found      Dirk Awad is a 79 y.o. male  who presents today in the office for:    6 Month Follow-Up (Skin check )      HISTORY OF PRESENT ILLNESS:  Pt c/o multiple scaly lesions on scalp. He notes that clindamycin solution seems to help with the \"bumps\" that he was getting on his scalp. He c/o thick, firm scarring at one end of the Mohs scar on his left neck. Also c/o pruritic lesion on upper back. MEDICAL PROBLEMS:  Patient Active Problem List    Diagnosis Date Noted    Primary osteoarthritis of both knees 11/08/2022     Priority: Medium    Chronic midline low back pain without sciatica 01/20/2022    Erectile dysfunction 01/20/2022    Renal cell carcinoma ([address] 02/12/2020    Dilated aortic root ([address] 02/12/2020    Thrombocytopenia ([address] 02/12/2020    Type 2 diabetes mellitus with other circulatory complication, without long-term current use of insulin ([address] 10/14/2019     on Rx.       Carpal tunnel syndrome of left wrist 10/14/2019    Ulnar neuropathy of left upper extremity 10/14/2019    Elevated hemoglobin A1c 10/14/2019    Renal mass 11/30/2018    Abnormality of gait 07/10/2017    Bicuspid aortic valve 09/20/2016    Type 2 diabetes mellitus without complication ([address] 29/50/4330    Left knee pain 03/09/2016    Hallux valgus, acquired 01/26/2016     Replacing Inactive Diagnoses      Capsulitis of foot 01/26/2016    Left foot pain 01/26/2016    HTN (hypertension) 03/17/2015    Aortic regurgitation due to bicuspid aortic valve 02/23/2015    Dental abscess 02/23/2015    Murmur, diastolic 04/13/8186       CURRENT MEDICATIONS:   Current Outpatient Medications   Medication Sig Dispense Refill triamcinolone acetonide (KENALOG) 10 MG/ML injection 0.1 mLs by Intra-LESional route once for 1 dose 0.1 mL 0    benzoyl peroxide 5 % external liquid Wash affected areas once daily 227 g 3    clindamycin (CLEOCIN T) 1 % external solution Apply a small amount to scalp after showers.  60 mL 5    amLODIPine (NORVASC) 10 MG tablet Take 1 tablet by mouth daily 90 tablet 1    montelukast (SINGULAIR) 10 MG tablet Take 1 tablet by mouth nightly 90 tablet 1    metFORMIN (GLUCOPHAGE) 500 MG tablet Take 1 tablet by mouth 2 times daily (with meals) 180 tablet 1    rosuvastatin (CRESTOR) 5 MG tablet Take 1 tablet by mouth daily 90 tablet 1    cetirizine (ZYRTEC) 10 MG tablet Take 1 tablet by mouth daily 90 tablet 3    busPIRone (BUSPAR) 7.5 MG tablet Take 1 tablet by mouth 3 times daily as needed (anxiety) 90 tablet 3    carvedilol (COREG) 25 MG tablet Take 1.5 tablets by mouth 2 times daily 270 tablet 3    losartan-hydroCHLOROthiazide (HYZAAR) 100-25 MG per tablet Take 1 tablet by mouth daily 90 tablet 3    levothyroxine (SYNTHROID) 50 MCG tablet Take 1 tablet by mouth daily 90 tablet 0    Semaglutide (RYBELSUS) 14 MG TABS Take 1 tablet by mouth daily 90 tablet 1    furosemide (LASIX) 20 MG tablet Take 1 tablet by mouth daily 90 tablet 1    meloxicam (MOBIC) 7.5 MG tablet Take 1 tablet by mouth 2 times daily (with meals) 180 tablet 1    Misc Natural Products (GLUCOS-CHONDROIT-MSM COMPLEX PO) Take 1 tablet by mouth 2 times daily      Apple Cider Vinegar 500 MG TABS Take 1 tablet by mouth 2 times daily      sildenafil (REVATIO) 20 MG tablet Take 1 tablet by mouth as needed (Erectile dysfunction) 30 tablet 5    fluticasone (FLONASE) 50 MCG/ACT nasal spray 1 spray by Nasal route 2 times daily 1 Bottle 5    baclofen (LIORESAL) 10 MG tablet Take 1 tablet by mouth nightly as needed (muscle spasm) 30 tablet 1    vitamin D3 (CHOLECALCIFEROL) 400 UNITS TABS tablet Take 400 Units by mouth every other day       Omeprazole (PRILOSEC PO) Take 1 capsule by mouth daily as needed        No current facility-administered medications for this visit. ALLERGIES:   Allergies   Allergen Reactions    Codeine      Severe Abdominal pain  Severe stomach pain  Severe Abdominal pain    Sulfa Antibiotics      Patient unsure of reaction       SOCIAL HISTORY:  Social History     Tobacco Use    Smoking status: Never    Smokeless tobacco: Never   Substance Use Topics    Alcohol use: Yes     Comment: very rare       Pertinent ROS:  Review of Systems  Skin: Denies any new changing, growing or bleeding lesions or rashes except as described in the HPI   Constitutional: Denies fevers, chills, and malaise. PHYSICAL EXAM:   /70 (Site: Right Upper Arm, Position: Sitting)   Pulse 84   Ht 5' 11\" (1.803 m)   Wt 237 lb 6 oz (107.7 kg)   SpO2 98%   BMI 33.11 kg/m²     The patient is generally well appearing, well nourished, alert and conversational. Affect is normal.    Cutaneous Exam:  Physical Exam  Total body skin exam excluding external genitalia: head/face, neck, both arms, chest, back, abdomen, both legs, buttocks, digits and/or nails, was examined. Genital exam was deferred as patient denied having any lesions in this area. Complete visualization of scalp may be limited by hair density, length, and/or style    Facial covering was removed during examination. Diagnoses/exam findings/medical history pertinent to this visit are listed below:    Assessment:   Diagnosis Orders   1. Folliculitis  benzoyl peroxide 5 % external liquid    clindamycin (CLEOCIN T) 1 % external solution    DISCONTINUED: clindamycin (CLEOCIN T) 1 % lotion      2. History of basal cell carcinoma        3. Actinic keratoses  MD DESTRUCTION PREMALIGNANT LESION 1ST    MD DESTRUCTION PREMALIGNANT LESION 2-14 EA      4. Seborrheic keratoses        5. Hypertrophic scar of skin  52923 - INJECTION INTO SKIN LESIONS, UP TO 7    triamcinolone acetonide (KENALOG) 10 MG/ML injection      6.  Milia        Plan:  1. Folliculitis (scalp)  - stable chronic illness   - benzoyl peroxide 5 % external liquid; Wash affected areas once daily  Dispense: 227 g; Refill: 3  - clindamycin (CLEOCIN T) 1 % external solution; Apply a small amount to scalp after showers.  Dispense: 60 mL; Refill: 5    2. History of basal cell carcinoma  - patient was counseled that UV-damaged skin increases lifetime risk for skin cancer  - I recommended the patient apply broad spectrum spf 30+ sunscreen daily, reapplying every 2 hours.  - In additional to regular use of sunscreen, I recommended broad-rimmed hats, long sleeves, and seeking the shade.     3. Actinic keratoses  Cryotherapy: After verbal consent was obtained including discussion of the risks (lesion persistence, lesion recurrence and hypo/hyperpigmentation) and benefits (resolution of the lesion) 4 total Actinic Keratosis on the anterior scalp were treated with liquid nitrogen to achieve a 2-3 mm freeze border.   - [place] DESTRUCTION PREMALIGNANT LESION 1ST  - [place] DESTRUCTION PREMALIGNANT LESION 2-14 EA    4. Seborrheic keratoses  - reassurance and education     5. Hypertrophic scar of skin  Intralesional Injection: After discussion of risks including atrophy and dyspigmentation, patient gives verbal consent to proceed. After cleansing with alcohol the scar on the left anterior neck were injected with 0.1 ml of Kenalog 5 mg/mL   - 85677 - INJECTION INTO SKIN LESIONS, UP TO 7  - triamcinolone acetonide (KENALOG) 10 MG/ML injection; 0.1 mLs by Intra-LESional route once for 1 dose  Dispense: 0.1 mL; Refill: 0    6. Milia  - Extracted with an 18 gauge needle and a comedone extractor    RTC 6M    Future Appointments   Date Time Provider Department Center   5/8/2023 11:30 AM DO [facility]   5/8/2023  1:40 PM Caity Doyle MD [facility]   11/20/2023 10:30 AM Robb Shepherd MD [facility]   12/21/2023  1:00 PM [facility] RM 1 [facility] [place]   12/27/2023  11:00 AM [facility]   3/25/2024  2:00 PM MD Sherly Valerio [facility]         There are no Patient Instructions on file for this visit.       Electronically signed by Jayson Gomez PA-C on 2/27/23 at 2:35 PM EST

## 2023-03-16 ENCOUNTER — PATIENT MESSAGE (OUTPATIENT)
Dept: FAMILY MEDICINE CLINIC | Age: 68
End: 2023-03-16

## 2023-03-16 RX ORDER — LEVOTHYROXINE SODIUM 0.05 MG/1
50 TABLET ORAL DAILY
Qty: 90 TABLET | Refills: 1 | Status: SHIPPED | OUTPATIENT
Start: 2023-03-16

## 2023-03-16 NOTE — TELEPHONE ENCOUNTER
From: Lesa Rondon  To: Dr. Kenisha Zuniga: 3/16/2023 11:07 AM EDT  Subject: prescription refill    Good Morning. ...  I only have 5 pills left of my Levothroxine 48 MCG with no more refills  Can you please refill my prescription

## 2023-03-16 NOTE — TELEPHONE ENCOUNTER
David Galeano called requesting a refill of the below medication which has been pended for you:     Requested Prescriptions     Pending Prescriptions Disp Refills    levothyroxine (SYNTHROID) 50 MCG tablet 90 tablet 1     Sig: Take 1 tablet by mouth daily       Last Appointment Date: 11/8/2022  Next Appointment Date: 5/8/2023    Allergies   Allergen Reactions    Codeine      Severe Abdominal pain  Severe stomach pain  Severe Abdominal pain    Sulfa Antibiotics      Patient unsure of reaction

## 2023-04-02 ENCOUNTER — PATIENT MESSAGE (OUTPATIENT)
Dept: FAMILY MEDICINE CLINIC | Age: 68
End: 2023-04-02

## 2023-04-02 DIAGNOSIS — N52.9 ERECTILE DYSFUNCTION, UNSPECIFIED ERECTILE DYSFUNCTION TYPE: ICD-10-CM

## 2023-04-02 DIAGNOSIS — M79.671 RIGHT FOOT PAIN: ICD-10-CM

## 2023-04-02 DIAGNOSIS — M79.89 LEG SWELLING: ICD-10-CM

## 2023-04-03 RX ORDER — FUROSEMIDE 20 MG/1
20 TABLET ORAL DAILY
Qty: 90 TABLET | Refills: 1 | Status: SHIPPED | OUTPATIENT
Start: 2023-04-03

## 2023-04-03 RX ORDER — MELOXICAM 7.5 MG/1
7.5 TABLET ORAL 2 TIMES DAILY WITH MEALS
Qty: 180 TABLET | Refills: 1 | Status: SHIPPED | OUTPATIENT
Start: 2023-04-03

## 2023-04-03 RX ORDER — SILDENAFIL CITRATE 20 MG/1
20 TABLET ORAL PRN
Qty: 30 TABLET | Refills: 5 | Status: SHIPPED | OUTPATIENT
Start: 2023-04-03

## 2023-04-03 NOTE — TELEPHONE ENCOUNTER
Lovely Sherman called requesting a refill of the below medication which has been pended for you:     Requested Prescriptions     Pending Prescriptions Disp Refills    meloxicam (MOBIC) 7.5 MG tablet 180 tablet 1     Sig: Take 1 tablet by mouth 2 times daily (with meals)    furosemide (LASIX) 20 MG tablet 90 tablet 1     Sig: Take 1 tablet by mouth daily    sildenafil (REVATIO) 20 MG tablet 30 tablet 5     Sig: Take 1 tablet by mouth as needed (Erectile dysfunction)       Last Appointment Date: 11/8/2022  Next Appointment Date: 5/8/2023    Allergies   Allergen Reactions    Codeine      Severe Abdominal pain  Severe stomach pain  Severe Abdominal pain    Sulfa Antibiotics      Patient unsure of reaction

## 2023-04-03 NOTE — TELEPHONE ENCOUNTER
From: Mateo Overton  To: Dr. Cannon Robert: 4/2/2023 9:06 PM EDT  Subject: prescription refill    Hello, I am out or refills for my Meloxicam 7.5 mg , my Furosemide 20 mg, and I need a new prescription for my Sildenafil 20mg  Would you please refill them,   EAntonio Orosco

## 2023-05-05 ENCOUNTER — HOSPITAL ENCOUNTER (OUTPATIENT)
Age: 68
Discharge: HOME | End: 2023-05-05
Payer: MEDICARE

## 2023-05-05 DIAGNOSIS — E11.9 TYPE 2 DIABETES MELLITUS WITHOUT COMPLICATION, WITHOUT LONG-TERM CURRENT USE OF INSULIN (HCC): ICD-10-CM

## 2023-05-05 LAB
ANION GAP SERPL CALCULATED.3IONS-SCNC: 10 MMOL/L (ref 9–17)
BUN SERPL-MCNC: 25 MG/DL (ref 8–23)
BUN/CREAT BLD: 25 (ref 9–20)
CALCIUM SERPL-MCNC: 10.4 MG/DL (ref 8.6–10.4)
CHLORIDE SERPL-SCNC: 98 MMOL/L (ref 98–107)
CO2 SERPL-SCNC: 31 MMOL/L (ref 20–31)
CREAT SERPL-MCNC: 1.02 MG/DL (ref 0.7–1.2)
EST. AVERAGE GLUCOSE BLD GHB EST-MCNC: 137 MG/DL
GFR SERPL CREATININE-BSD FRML MDRD: >60 ML/MIN/1.73M2
GLUCOSE SERPL-MCNC: 148 MG/DL (ref 70–99)
HBA1C MFR BLD: 6.4 % (ref 4–6)
POTASSIUM SERPL-SCNC: 4.4 MMOL/L (ref 3.7–5.3)
SODIUM SERPL-SCNC: 139 MMOL/L (ref 135–144)

## 2023-05-05 PROCEDURE — 80048 BASIC METABOLIC PNL TOTAL CA: CPT

## 2023-05-05 PROCEDURE — 83036 HEMOGLOBIN GLYCOSYLATED A1C: CPT

## 2023-05-05 PROCEDURE — 36415 COLL VENOUS BLD VENIPUNCTURE: CPT

## 2023-05-08 ENCOUNTER — OFFICE VISIT (OUTPATIENT)
Dept: CARDIOLOGY | Age: 68
End: 2023-05-08
Payer: MEDICARE

## 2023-05-08 ENCOUNTER — OFFICE VISIT (OUTPATIENT)
Dept: FAMILY MEDICINE CLINIC | Age: 68
End: 2023-05-08
Payer: MEDICARE

## 2023-05-08 ENCOUNTER — HOSPITAL ENCOUNTER (OUTPATIENT)
Age: 68
Discharge: HOME | End: 2023-05-08
Payer: MEDICARE

## 2023-05-08 VITALS
SYSTOLIC BLOOD PRESSURE: 128 MMHG | DIASTOLIC BLOOD PRESSURE: 66 MMHG | WEIGHT: 232 LBS | BODY MASS INDEX: 32.48 KG/M2 | HEART RATE: 74 BPM | HEIGHT: 71 IN

## 2023-05-08 VITALS
HEART RATE: 69 BPM | OXYGEN SATURATION: 98 % | TEMPERATURE: 97.6 F | HEIGHT: 71 IN | DIASTOLIC BLOOD PRESSURE: 78 MMHG | WEIGHT: 231 LBS | SYSTOLIC BLOOD PRESSURE: 124 MMHG | BODY MASS INDEX: 32.34 KG/M2

## 2023-05-08 DIAGNOSIS — E03.8 HYPOTHYROIDISM DUE TO HASHIMOTO'S THYROIDITIS: ICD-10-CM

## 2023-05-08 DIAGNOSIS — I77.810 DILATED AORTIC ROOT (HCC): ICD-10-CM

## 2023-05-08 DIAGNOSIS — I25.84 CORONARY ARTERY CALCIFICATION: ICD-10-CM

## 2023-05-08 DIAGNOSIS — I38 MURMUR, DIASTOLIC: ICD-10-CM

## 2023-05-08 DIAGNOSIS — I71.21 ANEURYSM OF ASCENDING AORTA WITHOUT RUPTURE (HCC): ICD-10-CM

## 2023-05-08 DIAGNOSIS — I25.10 CORONARY ARTERY CALCIFICATION: ICD-10-CM

## 2023-05-08 DIAGNOSIS — Q23.1 BICUSPID AORTIC VALVE: ICD-10-CM

## 2023-05-08 DIAGNOSIS — Q23.1 AORTIC REGURGITATION DUE TO BICUSPID AORTIC VALVE: ICD-10-CM

## 2023-05-08 DIAGNOSIS — G89.29 CHRONIC PAIN OF LEFT KNEE: ICD-10-CM

## 2023-05-08 DIAGNOSIS — M25.511 ACUTE PAIN OF RIGHT SHOULDER: ICD-10-CM

## 2023-05-08 DIAGNOSIS — C64.2 RENAL CELL CARCINOMA OF LEFT KIDNEY (HCC): ICD-10-CM

## 2023-05-08 DIAGNOSIS — M54.50 CHRONIC MIDLINE LOW BACK PAIN WITHOUT SCIATICA: ICD-10-CM

## 2023-05-08 DIAGNOSIS — E06.3 HYPOTHYROIDISM DUE TO HASHIMOTO'S THYROIDITIS: Primary | ICD-10-CM

## 2023-05-08 DIAGNOSIS — E06.3 HYPOTHYROIDISM DUE TO HASHIMOTO'S THYROIDITIS: ICD-10-CM

## 2023-05-08 DIAGNOSIS — D69.6 THROMBOCYTOPENIA (HCC): ICD-10-CM

## 2023-05-08 DIAGNOSIS — E11.65 TYPE 2 DIABETES MELLITUS WITH HYPERGLYCEMIA, WITHOUT LONG-TERM CURRENT USE OF INSULIN (HCC): Primary | ICD-10-CM

## 2023-05-08 DIAGNOSIS — H61.23 BILATERAL IMPACTED CERUMEN: ICD-10-CM

## 2023-05-08 DIAGNOSIS — E03.8 HYPOTHYROIDISM DUE TO HASHIMOTO'S THYROIDITIS: Primary | ICD-10-CM

## 2023-05-08 DIAGNOSIS — I10 ESSENTIAL HYPERTENSION: Primary | ICD-10-CM

## 2023-05-08 DIAGNOSIS — M25.562 CHRONIC PAIN OF LEFT KNEE: ICD-10-CM

## 2023-05-08 DIAGNOSIS — R94.39 ABNORMAL CARDIOVASCULAR STRESS TEST: ICD-10-CM

## 2023-05-08 DIAGNOSIS — G89.29 CHRONIC MIDLINE LOW BACK PAIN WITHOUT SCIATICA: ICD-10-CM

## 2023-05-08 PROBLEM — H61.21 IMPACTED CERUMEN OF RIGHT EAR: Status: ACTIVE | Noted: 2023-05-08

## 2023-05-08 LAB
T4 FREE SERPL-MCNC: 1.4 NG/DL (ref 0.9–1.7)
TSH SERPL-ACNC: 13.61 UIU/ML (ref 0.3–5)

## 2023-05-08 PROCEDURE — 3074F SYST BP LT 130 MM HG: CPT | Performed by: INTERNAL MEDICINE

## 2023-05-08 PROCEDURE — 1036F TOBACCO NON-USER: CPT | Performed by: INTERNAL MEDICINE

## 2023-05-08 PROCEDURE — 36415 COLL VENOUS BLD VENIPUNCTURE: CPT

## 2023-05-08 PROCEDURE — 84443 ASSAY THYROID STIM HORMONE: CPT

## 2023-05-08 PROCEDURE — 3017F COLORECTAL CA SCREEN DOC REV: CPT | Performed by: INTERNAL MEDICINE

## 2023-05-08 PROCEDURE — 84439 ASSAY OF FREE THYROXINE: CPT

## 2023-05-08 PROCEDURE — 3078F DIAST BP <80 MM HG: CPT | Performed by: INTERNAL MEDICINE

## 2023-05-08 PROCEDURE — 99214 OFFICE O/P EST MOD 30 MIN: CPT | Performed by: INTERNAL MEDICINE

## 2023-05-08 PROCEDURE — 99214 OFFICE O/P EST MOD 30 MIN: CPT

## 2023-05-08 PROCEDURE — 93010 ELECTROCARDIOGRAM REPORT: CPT | Performed by: INTERNAL MEDICINE

## 2023-05-08 PROCEDURE — G8427 DOCREV CUR MEDS BY ELIG CLIN: HCPCS | Performed by: INTERNAL MEDICINE

## 2023-05-08 PROCEDURE — G8417 CALC BMI ABV UP PARAM F/U: HCPCS | Performed by: INTERNAL MEDICINE

## 2023-05-08 PROCEDURE — 1123F ACP DISCUSS/DSCN MKR DOCD: CPT | Performed by: INTERNAL MEDICINE

## 2023-05-08 PROCEDURE — 93005 ELECTROCARDIOGRAM TRACING: CPT | Performed by: INTERNAL MEDICINE

## 2023-05-08 PROCEDURE — 99213 OFFICE O/P EST LOW 20 MIN: CPT

## 2023-05-08 PROCEDURE — 20610 DRAIN/INJ JOINT/BURSA W/O US: CPT | Performed by: FAMILY MEDICINE

## 2023-05-08 PROCEDURE — 69210 REMOVE IMPACTED EAR WAX UNI: CPT | Performed by: FAMILY MEDICINE

## 2023-05-08 RX ORDER — TRIAMCINOLONE ACETONIDE 40 MG/ML
40 INJECTION, SUSPENSION INTRA-ARTICULAR; INTRAMUSCULAR ONCE
Status: COMPLETED | OUTPATIENT
Start: 2023-05-08 | End: 2023-05-08

## 2023-05-08 RX ORDER — OMEPRAZOLE 40 MG/1
40 CAPSULE, DELAYED RELEASE ORAL
Qty: 90 CAPSULE | Refills: 1 | Status: SHIPPED | OUTPATIENT
Start: 2023-05-08

## 2023-05-08 RX ORDER — LEVOTHYROXINE SODIUM 0.07 MG/1
75 TABLET ORAL DAILY
Qty: 30 TABLET | Refills: 2 | Status: SHIPPED
Start: 2023-05-08 | End: 2023-05-09 | Stop reason: CLARIF

## 2023-05-08 RX ADMIN — TRIAMCINOLONE ACETONIDE 40 MG: 40 INJECTION, SUSPENSION INTRA-ARTICULAR; INTRAMUSCULAR at 14:42

## 2023-05-08 SDOH — ECONOMIC STABILITY: INCOME INSECURITY: HOW HARD IS IT FOR YOU TO PAY FOR THE VERY BASICS LIKE FOOD, HOUSING, MEDICAL CARE, AND HEATING?: PATIENT DECLINED

## 2023-05-08 SDOH — ECONOMIC STABILITY: FOOD INSECURITY: WITHIN THE PAST 12 MONTHS, THE FOOD YOU BOUGHT JUST DIDN'T LAST AND YOU DIDN'T HAVE MONEY TO GET MORE.: PATIENT DECLINED

## 2023-05-08 SDOH — ECONOMIC STABILITY: HOUSING INSECURITY
IN THE LAST 12 MONTHS, WAS THERE A TIME WHEN YOU DID NOT HAVE A STEADY PLACE TO SLEEP OR SLEPT IN A SHELTER (INCLUDING NOW)?: PATIENT REFUSED

## 2023-05-08 SDOH — ECONOMIC STABILITY: FOOD INSECURITY: WITHIN THE PAST 12 MONTHS, YOU WORRIED THAT YOUR FOOD WOULD RUN OUT BEFORE YOU GOT MONEY TO BUY MORE.: PATIENT DECLINED

## 2023-05-08 ASSESSMENT — ENCOUNTER SYMPTOMS
SHORTNESS OF BREATH: 0
ABDOMINAL PAIN: 0
CHEST TIGHTNESS: 0
WHEEZING: 0
CONSTIPATION: 0
DIARRHEA: 0
COUGH: 0

## 2023-05-08 ASSESSMENT — PATIENT HEALTH QUESTIONNAIRE - PHQ9
SUM OF ALL RESPONSES TO PHQ QUESTIONS 1-9: 0
1. LITTLE INTEREST OR PLEASURE IN DOING THINGS: 0
SUM OF ALL RESPONSES TO PHQ QUESTIONS 1-9: 0
SUM OF ALL RESPONSES TO PHQ QUESTIONS 1-9: 0
SUM OF ALL RESPONSES TO PHQ9 QUESTIONS 1 & 2: 0
SUM OF ALL RESPONSES TO PHQ QUESTIONS 1-9: 0
2. FEELING DOWN, DEPRESSED OR HOPELESS: 0

## 2023-05-08 NOTE — PROGRESS NOTES
7.5 MG tablet Take 1 tablet by mouth 3 times daily as needed (anxiety) 90 tablet 3    carvedilol (COREG) 25 MG tablet Take 1.5 tablets by mouth 2 times daily 270 tablet 3    losartan-hydroCHLOROthiazide (HYZAAR) 100-25 MG per tablet Take 1 tablet by mouth daily 90 tablet 3    Semaglutide (RYBELSUS) 14 MG TABS Take 1 tablet by mouth daily 90 tablet 1    Misc Natural Products (GLUCOS-CHONDROIT-MSM COMPLEX PO) Take 1 tablet by mouth 2 times daily      Apple Cider Vinegar 500 MG TABS Take 1 tablet by mouth 2 times daily      fluticasone (FLONASE) 50 MCG/ACT nasal spray 1 spray by Nasal route 2 times daily 1 Bottle 5    baclofen (LIORESAL) 10 MG tablet Take 1 tablet by mouth nightly as needed (muscle spasm) 30 tablet 1    vitamin D3 (CHOLECALCIFEROL) 400 UNITS TABS tablet Take 1 tablet by mouth every other day       No current facility-administered medications for this visit. Allergies   Allergen Reactions    Codeine      Severe Abdominal pain  Severe stomach pain  Severe Abdominal pain    Sulfa Antibiotics      Patient unsure of reaction       Subjective:     Review of Systems   Constitutional:  Negative for activity change, appetite change, chills, fatigue and fever. Eyes:  Negative for visual disturbance. Respiratory:  Negative for cough, chest tightness, shortness of breath and wheezing. Cardiovascular:  Negative for chest pain, palpitations and leg swelling. Gastrointestinal:  Negative for abdominal pain, constipation and diarrhea (resolved with rybelsus). Genitourinary:  Negative for difficulty urinating. Musculoskeletal:  Positive for arthralgias. Skin:  Negative for rash. Neurological:  Negative for dizziness, syncope, weakness, light-headedness and headaches. Objective:      Physical Exam  Vitals and nursing note reviewed. Constitutional:       General: He is not in acute distress. Appearance: He is well-developed. HENT:      Right Ear: There is impacted cerumen.       Left

## 2023-05-08 NOTE — PROGRESS NOTES
change in energy level, No change in activity level. Eyes: No visual changes or diplopia. No scleral icterus. ENT: No Headaches, hearing loss or vertigo. No mouth sores or sore throat. Cardiovascular: as hpi  Respiratory: as hpi  Gastrointestinal: No abdominal pain, appetite loss, blood in stools. No change in bowel or bladder habits. Genitourinary: No dysuria, trouble voiding, or hematuria. Musculoskeletal:  No gait disturbance, No weakness or joint complaints. Integumentary: No rash or pruritis. Neurological: No headache, diplopia, change in muscle strength, numbness or tingling. No change in gait, balance, coordination, mood, affect, memory, mentation, behavior. Psychiatric: No new anxiety or depression. Endocrine: No temperature intolerance. No excessive thirst, fluid intake, or urination. No tremor. Hematologic/Lymphatic: No abnormal bruising or bleeding, blood clots or swollen lymph nodes. Allergic/Immunologic: No nasal congestion or hives. Physical Exam:  /66   Pulse 74   Ht 5' 11\" (1.803 m)   Wt 232 lb (105.2 kg)   BMI 32.36 kg/m²   General appearance: alert and cooperative with exam  HEENT: Head: Normocephalic, no lesions, without obvious abnormality. Eyes: PERRL, EOMI  Ears: Not obvious deformations or lack of hearing  Neck: no carotid bruit, no JVD  Lungs: clear to auscultation bilaterally  Heart: regular rate and rhythm, S1, S2 normal, no murmur, click, rub or gallop  Abdomen: soft, non-tender; bowel sounds normal; no masses,  no organomegaly  Extremities: extremities normal, atraumatic, no cyanosis or edema. Normal radial pulse, no hematoma.    Neurologic: Mental status: Alert, oriented, thought content appropriate  Skin: WNL for age and condition, no obvious rashes or leasions    LABS  Lab Results   Component Value Date    CHOL 126 08/12/2022    TRIG 134 08/12/2022    HDL 38 (L) 08/12/2022    LDLCHOLESTEROL 61 08/12/2022    VLDL NOT REPORTED 01/24/2022    CHOLHDLRATIO 3.3

## 2023-05-09 RX ORDER — LEVOTHYROXINE SODIUM 0.07 MG/1
75 TABLET ORAL DAILY
Qty: 90 TABLET | Refills: 0 | Status: SHIPPED | OUTPATIENT
Start: 2023-05-09

## 2023-05-09 NOTE — TELEPHONE ENCOUNTER
Diego Hogan called requesting a refill of the below medication which has been pended for you: please resend new dosage as patient wants a 90 day supply    Requested Prescriptions     Pending Prescriptions Disp Refills    levothyroxine (SYNTHROID) 75 MCG tablet 90 tablet 0     Sig: Take 1 tablet by mouth daily       Last Appointment Date: 5/8/2023  Next Appointment Date: 8/8/2023    Allergies   Allergen Reactions    Codeine      Severe Abdominal pain  Severe stomach pain  Severe Abdominal pain    Sulfa Antibiotics      Patient unsure of reaction

## 2023-05-16 ENCOUNTER — PATIENT MESSAGE (OUTPATIENT)
Dept: FAMILY MEDICINE CLINIC | Age: 68
End: 2023-05-16

## 2023-05-16 RX ORDER — ORAL SEMAGLUTIDE 14 MG/1
1 TABLET ORAL DAILY
Qty: 90 TABLET | Refills: 3 | Status: SHIPPED | OUTPATIENT
Start: 2023-05-16

## 2023-07-10 ENCOUNTER — PATIENT MESSAGE (OUTPATIENT)
Dept: FAMILY MEDICINE CLINIC | Age: 68
End: 2023-07-10

## 2023-07-10 DIAGNOSIS — E11.9 TYPE 2 DIABETES MELLITUS WITHOUT COMPLICATION, WITHOUT LONG-TERM CURRENT USE OF INSULIN (HCC): ICD-10-CM

## 2023-07-10 DIAGNOSIS — I10 ESSENTIAL HYPERTENSION: ICD-10-CM

## 2023-07-10 DIAGNOSIS — Q23.1 AORTIC REGURGITATION DUE TO BICUSPID AORTIC VALVE: ICD-10-CM

## 2023-07-11 RX ORDER — ROSUVASTATIN CALCIUM 5 MG/1
5 TABLET, COATED ORAL DAILY
Qty: 90 TABLET | Refills: 1 | Status: SHIPPED | OUTPATIENT
Start: 2023-07-11

## 2023-07-11 NOTE — TELEPHONE ENCOUNTER
From: Russell Home  To: Dr. Sebastian Robledo: 7/10/2023 9:15 PM EDT  Subject: Rosuvastatin Refill    I am almost out of my rosuvastatin calcium 5 mg pills  Do you want to refills this?   Bossman Nunez

## 2023-07-11 NOTE — TELEPHONE ENCOUNTER
Kim Yousif called requesting a refill of the below medication which has been pended for you:     Requested Prescriptions     Pending Prescriptions Disp Refills    rosuvastatin (CRESTOR) 5 MG tablet 90 tablet 1     Sig: Take 1 tablet by mouth daily       Last Appointment Date: 5/8/2023  Next Appointment Date: 8/8/2023    Allergies   Allergen Reactions    Codeine      Severe Abdominal pain  Severe stomach pain  Severe Abdominal pain    Sulfa Antibiotics      Patient unsure of reaction

## 2023-07-17 ENCOUNTER — PATIENT MESSAGE (OUTPATIENT)
Dept: FAMILY MEDICINE CLINIC | Age: 68
End: 2023-07-17

## 2023-07-17 DIAGNOSIS — I10 ESSENTIAL HYPERTENSION: ICD-10-CM

## 2023-07-17 RX ORDER — AMLODIPINE BESYLATE 10 MG/1
10 TABLET ORAL DAILY
Qty: 30 TABLET | Refills: 0 | Status: SHIPPED | OUTPATIENT
Start: 2023-07-17

## 2023-07-17 NOTE — TELEPHONE ENCOUNTER
From: Chanda Howard  To: Dr. Kenyon Mccord: 7/17/2023 10:08 AM EDT  Subject: amlodipine besylate 10 mg refill    I only have a few amlodipine besylate pills left.  Do you want to refill this for me  Thanks,  Paulie Gomes

## 2023-07-31 ENCOUNTER — PATIENT MESSAGE (OUTPATIENT)
Dept: FAMILY MEDICINE CLINIC | Age: 68
End: 2023-07-31

## 2023-07-31 DIAGNOSIS — J30.2 OTHER SEASONAL ALLERGIC RHINITIS: ICD-10-CM

## 2023-07-31 RX ORDER — MONTELUKAST SODIUM 10 MG/1
10 TABLET ORAL NIGHTLY
Qty: 90 TABLET | Refills: 3 | Status: SHIPPED | OUTPATIENT
Start: 2023-07-31

## 2023-07-31 NOTE — TELEPHONE ENCOUNTER
Chrissy Arredondo called requesting a refill of the below medication which has been pended for you:     Requested Prescriptions     Pending Prescriptions Disp Refills    montelukast (SINGULAIR) 10 MG tablet 90 tablet 3     Sig: Take 1 tablet by mouth nightly       Last Appointment Date: 5/8/2023  Next Appointment Date: 9/25/2023    Allergies   Allergen Reactions    Codeine      Severe Abdominal pain  Severe stomach pain  Severe Abdominal pain    Sulfa Antibiotics      Patient unsure of reaction

## 2023-07-31 NOTE — TELEPHONE ENCOUNTER
From: Emmanuel Britton  To: Dr. Cris Headley: 7/31/2023 1:09 PM EDT  Subject: Montelukast refill    Hello, I have 1 pill left for next week of my Montelukast sod 10 mg with no refills  Can you please send in a refill  Thanks  Jaja Lynch

## 2023-08-06 ENCOUNTER — PATIENT MESSAGE (OUTPATIENT)
Dept: FAMILY MEDICINE CLINIC | Age: 68
End: 2023-08-06

## 2023-08-07 RX ORDER — LEVOTHYROXINE SODIUM 0.07 MG/1
75 TABLET ORAL DAILY
Qty: 90 TABLET | Refills: 1 | Status: SHIPPED | OUTPATIENT
Start: 2023-08-07

## 2023-08-07 NOTE — TELEPHONE ENCOUNTER
Deepti Ross called requesting a refill of the below medication which has been pended for you:     Requested Prescriptions     Pending Prescriptions Disp Refills    levothyroxine (SYNTHROID) 75 MCG tablet 90 tablet 1     Sig: Take 1 tablet by mouth daily       Last Appointment Date: 5/8/2023  Next Appointment Date: 9/25/2023    Allergies   Allergen Reactions    Codeine      Severe Abdominal pain  Severe stomach pain  Severe Abdominal pain    Sulfa Antibiotics      Patient unsure of reaction

## 2023-08-07 NOTE — TELEPHONE ENCOUNTER
From: Hwoard Dewey  To: Dr. Rand Ramirez: 8/6/2023 1:35 PM EDT  Subject: Pills and my appointment    I was to have blood work done and a check up on Aug 8th, about my thyroid and my [id number] pills to see if it needs to be upped, but Dr Louis Bustamante is not in the office on that date and my new appointment is now the end of Sept. But I only have 3 Levothyroxine 75 MCG pills left. Can I get a refill for the month until my next appointment? ?  And will my blood work papers still be good if I wait until a few days before my appointment  Alessio Ray

## 2023-08-14 ENCOUNTER — PATIENT MESSAGE (OUTPATIENT)
Dept: FAMILY MEDICINE CLINIC | Age: 68
End: 2023-08-14

## 2023-08-14 DIAGNOSIS — I10 ESSENTIAL HYPERTENSION: ICD-10-CM

## 2023-08-14 RX ORDER — AMLODIPINE BESYLATE 10 MG/1
10 TABLET ORAL DAILY
Qty: 90 TABLET | Refills: 1 | Status: SHIPPED | OUTPATIENT
Start: 2023-08-14

## 2023-08-14 NOTE — TELEPHONE ENCOUNTER
From: Jose Montoya  To: Dr. George Glasgow: 8/14/2023 1:41 PM EDT  Subject: amlodipine besylate 10 mg refill    Hello, I only have a few pills left of my amlodipine 10mg can I get a refill, can I get a 90 day refill this time?   Wilkins Side

## 2023-08-14 NOTE — TELEPHONE ENCOUNTER
Radha Lr called requesting a refill of the below medication which has been pended for you:     Requested Prescriptions     Pending Prescriptions Disp Refills    amLODIPine (NORVASC) 10 MG tablet 90 tablet 1     Sig: Take 1 tablet by mouth daily       Last Appointment Date: 5/8/2023  Next Appointment Date: 9/25/2023    Allergies   Allergen Reactions    Codeine      Severe Abdominal pain  Severe stomach pain  Severe Abdominal pain    Sulfa Antibiotics      Patient unsure of reaction

## 2023-09-05 ENCOUNTER — PATIENT MESSAGE (OUTPATIENT)
Dept: FAMILY MEDICINE CLINIC | Age: 68
End: 2023-09-05

## 2023-09-05 DIAGNOSIS — E11.9 TYPE 2 DIABETES MELLITUS WITHOUT COMPLICATION, WITHOUT LONG-TERM CURRENT USE OF INSULIN (HCC): ICD-10-CM

## 2023-09-05 NOTE — TELEPHONE ENCOUNTER
From: Hiral Davila  To: Dr. Radha Mact: 9/5/2023 11:42 AM EDT  Subject: metformin refill    Hello, Im almost out of my metormin  MG pills with no refills. Can I get new refills?   Annette Mccall

## 2023-09-05 NOTE — TELEPHONE ENCOUNTER
Sharda Rajput called requesting a refill of the below medication which has been pended for you:     Requested Prescriptions     Pending Prescriptions Disp Refills    metFORMIN (GLUCOPHAGE) 500 MG tablet 180 tablet 1     Sig: Take 1 tablet by mouth 2 times daily (with meals)       Last Appointment Date: 5/8/2023  Next Appointment Date: 9/25/2023    Allergies   Allergen Reactions    Codeine      Severe Abdominal pain  Severe stomach pain  Severe Abdominal pain    Sulfa Antibiotics      Patient unsure of reaction

## 2023-09-07 ENCOUNTER — HOSPITAL ENCOUNTER (OUTPATIENT)
Age: 68
Discharge: HOME | End: 2023-09-07
Payer: MEDICARE

## 2023-09-07 DIAGNOSIS — Q23.1 AORTIC REGURGITATION DUE TO BICUSPID AORTIC VALVE: Primary | ICD-10-CM

## 2023-09-07 DIAGNOSIS — E03.8 HYPOTHYROIDISM DUE TO HASHIMOTO'S THYROIDITIS: ICD-10-CM

## 2023-09-07 DIAGNOSIS — Q23.1 AORTIC REGURGITATION DUE TO BICUSPID AORTIC VALVE: ICD-10-CM

## 2023-09-07 DIAGNOSIS — E06.3 HYPOTHYROIDISM DUE TO HASHIMOTO'S THYROIDITIS: ICD-10-CM

## 2023-09-07 LAB
ANION GAP SERPL CALCULATED.3IONS-SCNC: 9 MMOL/L (ref 9–17)
BUN SERPL-MCNC: 24 MG/DL (ref 8–23)
BUN/CREAT SERPL: 24 (ref 9–20)
CALCIUM SERPL-MCNC: 10 MG/DL (ref 8.6–10.4)
CHLORIDE SERPL-SCNC: 98 MMOL/L (ref 98–107)
CO2 SERPL-SCNC: 30 MMOL/L (ref 20–31)
CREAT SERPL-MCNC: 1 MG/DL (ref 0.7–1.2)
GFR SERPL CREATININE-BSD FRML MDRD: >60 ML/MIN/1.73M2
GLUCOSE SERPL-MCNC: 159 MG/DL (ref 70–99)
POTASSIUM SERPL-SCNC: 4 MMOL/L (ref 3.7–5.3)
SODIUM SERPL-SCNC: 137 MMOL/L (ref 135–144)
T4 FREE SERPL-MCNC: 1.5 NG/DL (ref 0.9–1.7)
TSH SERPL DL<=0.05 MIU/L-ACNC: 5.97 UIU/ML (ref 0.3–5)

## 2023-09-07 PROCEDURE — 84443 ASSAY THYROID STIM HORMONE: CPT

## 2023-09-07 PROCEDURE — 36415 COLL VENOUS BLD VENIPUNCTURE: CPT

## 2023-09-07 PROCEDURE — 84439 ASSAY OF FREE THYROXINE: CPT

## 2023-09-07 PROCEDURE — 80048 BASIC METABOLIC PNL TOTAL CA: CPT

## 2023-09-08 ENCOUNTER — HOSPITAL ENCOUNTER (OUTPATIENT)
Age: 68
End: 2023-09-08
Attending: INTERNAL MEDICINE
Payer: MEDICARE

## 2023-09-08 ENCOUNTER — TELEPHONE (OUTPATIENT)
Dept: CARDIOLOGY | Age: 68
End: 2023-09-08

## 2023-09-08 ENCOUNTER — HOSPITAL ENCOUNTER (OUTPATIENT)
Dept: CT IMAGING | Age: 68
End: 2023-09-08
Attending: INTERNAL MEDICINE
Payer: MEDICARE

## 2023-09-08 VITALS
DIASTOLIC BLOOD PRESSURE: 74 MMHG | HEART RATE: 70 BPM | SYSTOLIC BLOOD PRESSURE: 155 MMHG | WEIGHT: 231 LBS | HEIGHT: 71 IN | BODY MASS INDEX: 32.34 KG/M2

## 2023-09-08 DIAGNOSIS — R94.39 ABNORMAL CARDIOVASCULAR STRESS TEST: ICD-10-CM

## 2023-09-08 DIAGNOSIS — Q23.1 BICUSPID AORTIC VALVE: ICD-10-CM

## 2023-09-08 DIAGNOSIS — I25.10 CORONARY ARTERY CALCIFICATION: ICD-10-CM

## 2023-09-08 DIAGNOSIS — I77.810 DILATED AORTIC ROOT (HCC): ICD-10-CM

## 2023-09-08 DIAGNOSIS — I10 ESSENTIAL HYPERTENSION: ICD-10-CM

## 2023-09-08 DIAGNOSIS — I25.84 CORONARY ARTERY CALCIFICATION: ICD-10-CM

## 2023-09-08 DIAGNOSIS — I35.0 AORTIC STENOSIS: ICD-10-CM

## 2023-09-08 DIAGNOSIS — Q23.1 AORTIC REGURGITATION DUE TO BICUSPID AORTIC VALVE: ICD-10-CM

## 2023-09-08 DIAGNOSIS — I38 MURMUR, DIASTOLIC: ICD-10-CM

## 2023-09-08 DIAGNOSIS — I71.21 ANEURYSM OF ASCENDING AORTA WITHOUT RUPTURE (HCC): ICD-10-CM

## 2023-09-08 LAB
ECHO AO ROOT DIAM: 4.4 CM
ECHO AO ROOT INDEX: 1.96 CM/M2
ECHO AV PEAK GRADIENT: 10 MMHG
ECHO AV PEAK VELOCITY: 1.6 M/S
ECHO AV VELOCITY RATIO: 0.56
ECHO BSA: 2.29 M2
ECHO LA AREA 2C: 17.7 CM2
ECHO LA AREA 4C: 23.8 CM2
ECHO LA DIAMETER INDEX: 1.92 CM/M2
ECHO LA DIAMETER: 4.3 CM
ECHO LA MAJOR AXIS: 5.6 CM
ECHO LA MINOR AXIS: 5.3 CM
ECHO LA TO AORTIC ROOT RATIO: 0.98
ECHO LA VOL 2C: 46 ML (ref 18–58)
ECHO LA VOL 4C: 80 ML (ref 18–58)
ECHO LA VOL BP: 62 ML (ref 18–58)
ECHO LA VOL/BSA BIPLANE: 28 ML/M2 (ref 16–34)
ECHO LA VOLUME INDEX A2C: 21 ML/M2 (ref 16–34)
ECHO LA VOLUME INDEX A4C: 36 ML/M2 (ref 16–34)
ECHO LV E' LATERAL VELOCITY: 7 CM/S
ECHO LV E' SEPTAL VELOCITY: 6 CM/S
ECHO LV EJECTION FRACTION BIPLANE: 64 % (ref 55–100)
ECHO LV FRACTIONAL SHORTENING: 33 % (ref 28–44)
ECHO LV INTERNAL DIMENSION DIASTOLE INDEX: 2.05 CM/M2
ECHO LV INTERNAL DIMENSION DIASTOLIC: 4.6 CM (ref 4.2–5.9)
ECHO LV INTERNAL DIMENSION SYSTOLIC INDEX: 1.38 CM/M2
ECHO LV INTERNAL DIMENSION SYSTOLIC: 3.1 CM
ECHO LV IVSD: 1 CM (ref 0.6–1)
ECHO LV MASS 2D: 169.9 G (ref 88–224)
ECHO LV MASS INDEX 2D: 75.8 G/M2 (ref 49–115)
ECHO LV POSTERIOR WALL DIASTOLIC: 1.1 CM (ref 0.6–1)
ECHO LV RELATIVE WALL THICKNESS RATIO: 0.48
ECHO LVOT PEAK GRADIENT: 3 MMHG
ECHO LVOT PEAK VELOCITY: 0.9 M/S
ECHO MV A VELOCITY: 0.84 M/S
ECHO MV E DECELERATION TIME (DT): 356 MS
ECHO MV E VELOCITY: 0.69 M/S
ECHO MV E/A RATIO: 0.82
ECHO MV E/E' LATERAL: 9.86
ECHO MV E/E' RATIO (AVERAGED): 10.68
ECHO MV E/E' SEPTAL: 11.5
ECHO MV MAX VELOCITY: 0.8 M/S
ECHO MV PEAK GRADIENT: 3 MMHG
ECHO PV MAX VELOCITY: 1 M/S
ECHO PV PEAK GRADIENT: 4 MMHG
ECHO TV PEAK GRADIENT: 2 MMHG

## 2023-09-08 PROCEDURE — 93306 TTE W/DOPPLER COMPLETE: CPT

## 2023-09-08 PROCEDURE — 6360000004 HC RX CONTRAST MEDICATION: Performed by: INTERNAL MEDICINE

## 2023-09-08 PROCEDURE — 71275 CT ANGIOGRAPHY CHEST: CPT

## 2023-09-08 PROCEDURE — 93306 TTE W/DOPPLER COMPLETE: CPT | Performed by: INTERNAL MEDICINE

## 2023-09-08 RX ADMIN — IOPAMIDOL 80 ML: 755 INJECTION, SOLUTION INTRAVENOUS at 13:54

## 2023-09-08 NOTE — TELEPHONE ENCOUNTER
Please review echo and advise.      Last Appt:  5/8/2023  Next Appt:   11/13/2023  Med verified in Epic

## 2023-09-25 ENCOUNTER — OFFICE VISIT (OUTPATIENT)
Dept: FAMILY MEDICINE CLINIC | Age: 68
End: 2023-09-25
Payer: MEDICARE

## 2023-09-25 VITALS
DIASTOLIC BLOOD PRESSURE: 64 MMHG | TEMPERATURE: 98.1 F | SYSTOLIC BLOOD PRESSURE: 124 MMHG | BODY MASS INDEX: 32.65 KG/M2 | HEART RATE: 74 BPM | HEIGHT: 71 IN | WEIGHT: 233.2 LBS | OXYGEN SATURATION: 98 %

## 2023-09-25 DIAGNOSIS — Z23 FLU VACCINE NEED: ICD-10-CM

## 2023-09-25 DIAGNOSIS — E06.3 HYPOTHYROIDISM DUE TO HASHIMOTO'S THYROIDITIS: ICD-10-CM

## 2023-09-25 DIAGNOSIS — E11.59 TYPE 2 DIABETES MELLITUS WITH OTHER CIRCULATORY COMPLICATION, WITHOUT LONG-TERM CURRENT USE OF INSULIN (HCC): ICD-10-CM

## 2023-09-25 DIAGNOSIS — Z12.11 SCREENING FOR COLON CANCER: ICD-10-CM

## 2023-09-25 DIAGNOSIS — E78.2 MIXED HYPERLIPIDEMIA: ICD-10-CM

## 2023-09-25 DIAGNOSIS — M25.562 CHRONIC PAIN OF LEFT KNEE: ICD-10-CM

## 2023-09-25 DIAGNOSIS — E03.8 HYPOTHYROIDISM DUE TO HASHIMOTO'S THYROIDITIS: ICD-10-CM

## 2023-09-25 DIAGNOSIS — G89.29 CHRONIC PAIN OF LEFT KNEE: ICD-10-CM

## 2023-09-25 DIAGNOSIS — F33.0 MILD EPISODE OF RECURRENT MAJOR DEPRESSIVE DISORDER (HCC): Primary | ICD-10-CM

## 2023-09-25 PROBLEM — G56.02 CARPAL TUNNEL SYNDROME OF LEFT WRIST: Status: RESOLVED | Noted: 2019-10-14 | Resolved: 2023-09-25

## 2023-09-25 PROBLEM — H61.21 IMPACTED CERUMEN OF RIGHT EAR: Status: RESOLVED | Noted: 2023-05-08 | Resolved: 2023-09-25

## 2023-09-25 PROBLEM — R26.9 ABNORMALITY OF GAIT: Status: RESOLVED | Noted: 2017-07-10 | Resolved: 2023-09-25

## 2023-09-25 PROBLEM — G56.22 ULNAR NEUROPATHY OF LEFT UPPER EXTREMITY: Status: RESOLVED | Noted: 2019-10-14 | Resolved: 2023-09-25

## 2023-09-25 PROBLEM — D69.6 THROMBOCYTOPENIA (HCC): Status: RESOLVED | Noted: 2020-02-12 | Resolved: 2023-09-25

## 2023-09-25 PROBLEM — R73.09 ELEVATED HEMOGLOBIN A1C: Status: RESOLVED | Noted: 2019-10-14 | Resolved: 2023-09-25

## 2023-09-25 PROCEDURE — 1123F ACP DISCUSS/DSCN MKR DOCD: CPT | Performed by: FAMILY MEDICINE

## 2023-09-25 PROCEDURE — 3017F COLORECTAL CA SCREEN DOC REV: CPT | Performed by: FAMILY MEDICINE

## 2023-09-25 PROCEDURE — G8484 FLU IMMUNIZE NO ADMIN: HCPCS | Performed by: FAMILY MEDICINE

## 2023-09-25 PROCEDURE — 3078F DIAST BP <80 MM HG: CPT | Performed by: FAMILY MEDICINE

## 2023-09-25 PROCEDURE — G8417 CALC BMI ABV UP PARAM F/U: HCPCS | Performed by: FAMILY MEDICINE

## 2023-09-25 PROCEDURE — 1036F TOBACCO NON-USER: CPT | Performed by: FAMILY MEDICINE

## 2023-09-25 PROCEDURE — 99214 OFFICE O/P EST MOD 30 MIN: CPT | Performed by: FAMILY MEDICINE

## 2023-09-25 PROCEDURE — 3074F SYST BP LT 130 MM HG: CPT | Performed by: FAMILY MEDICINE

## 2023-09-25 PROCEDURE — 2022F DILAT RTA XM EVC RTNOPTHY: CPT | Performed by: FAMILY MEDICINE

## 2023-09-25 PROCEDURE — 3044F HG A1C LEVEL LT 7.0%: CPT | Performed by: FAMILY MEDICINE

## 2023-09-25 PROCEDURE — G8427 DOCREV CUR MEDS BY ELIG CLIN: HCPCS | Performed by: FAMILY MEDICINE

## 2023-09-25 RX ORDER — CELECOXIB 100 MG/1
100 CAPSULE ORAL 2 TIMES DAILY PRN
Qty: 180 CAPSULE | Refills: 1 | Status: SHIPPED | OUTPATIENT
Start: 2023-09-25

## 2023-09-25 RX ORDER — CETIRIZINE HYDROCHLORIDE 10 MG/1
10 TABLET ORAL DAILY
Qty: 90 TABLET | Refills: 3 | Status: SHIPPED | COMMUNITY
Start: 2023-09-25

## 2023-09-25 RX ORDER — DULOXETIN HYDROCHLORIDE 20 MG/1
20 CAPSULE, DELAYED RELEASE ORAL DAILY
Qty: 30 CAPSULE | Refills: 1 | Status: SHIPPED | OUTPATIENT
Start: 2023-09-25

## 2023-09-25 RX ORDER — VIT C/B6/B5/MAGNESIUM/HERB 173 50-5-6-5MG
500 CAPSULE ORAL DAILY
COMMUNITY

## 2023-09-25 ASSESSMENT — ENCOUNTER SYMPTOMS
COUGH: 0
SHORTNESS OF BREATH: 0
WHEEZING: 0
CHEST TIGHTNESS: 0

## 2023-09-25 NOTE — PROGRESS NOTES
[address]  [address] [address]  Dept: [phone]  Dept Fax: [phone]  Loc: [phone]    Frank Oh is a 76 y.o. male who presents today for his medical conditions/complaints as noted below. Frank Oh is c/o of   Chief Complaint   Patient presents with    Joint Pain     3 month follow up     Thyroid Problem     3 month follow up        HPI:     HPI Here today for a follow up of his DM and joint pain. DM: stable; he has not been checking his sugars much recently. he has been doing well. He is due for his diabetic eye exam and he used to see Mary Salgado and he would like to see her again. He has not had any hypoglycemia. No new issues with neuropathy    He is concerned about wax in his left ear because when he showers he noticed some build up on the outside of his ear. He has continued to have regular arthritis pain and he is still and sore in the morning and he feels better after he moves. He would like something he can take when he is hiking or going to [employer]. He is taking mobic, tumeric, glucosamine, and apple cider vinegar with decent relief of stiffness. He can now pick things up off of the floor. He is frustrated by joint pain and it is making him depressed and he feels like he is facing his mortality. He is anxious occasionally and the buspar is really helping when he takes it. He has times when he is just sitting in the house and he gets bored. He also currently doesn't have a car right now so he is stuck at home. Past Medical History:   Diagnosis Date    Anxiety     Aortic regurgitation     Bicuspid aortic valve     Diabetes mellitus ([address]) since March 2018    on Rx.     GERD (gastroesophageal reflux disease)     Hypertension     Left renal mass 10/2018    Osteoarthritis of left knee     Wears glasses           Social History     Tobacco Use    Smoking status:

## 2023-10-13 LAB — NONINV COLON CA DNA+OCC BLD SCRN STL QL: NEGATIVE

## 2023-10-16 ENCOUNTER — PATIENT MESSAGE (OUTPATIENT)
Dept: FAMILY MEDICINE CLINIC | Age: 68
End: 2023-10-16

## 2023-10-16 DIAGNOSIS — M79.89 LEG SWELLING: ICD-10-CM

## 2023-10-16 RX ORDER — FUROSEMIDE 20 MG/1
20 TABLET ORAL DAILY
Qty: 90 TABLET | Refills: 1 | Status: SHIPPED | OUTPATIENT
Start: 2023-10-16

## 2023-10-16 NOTE — TELEPHONE ENCOUNTER
From: Lizy Soriano  To: Dr. Mariely Tena: 10/16/2023 10:28 AM EDT  Subject: Refill    Good Morning, I have no more refills for my Furosemide 20 MG Water Pill. ... Can I get a refill please!   Nav Winslow

## 2023-10-29 NOTE — PROGRESS NOTES
Patient ID: Bradley Alcaraz, 1955, [id number], 72 y.o. Diagnosis:   Immune related thrombocytopenia  Left RCC, stage I, T1aNx s/p partial nephrectomy 11/30/18  HISTORY OF PRESENT ILLNESS:    Hematologic History: This is a 57-year-old male with a history of immune related thrombocytopenia was being followed by Dr. José Manuel Monreal. He was noted to have thrombocytopenia since 2016. He has a very mild thrombocytopenia ranging around 120-130 K. His ITP screen was positive for platelet associated antibody IgM. He denied any symptoms of bleeding. INTERVAL HISTORY  Patient is returning for follow-up visit and to discuss lab results and further recommendations. He has mild thrombocytopenia but counts have been stable. He denies any new bleeding symptoms. He is eating and drinking well. He is planning to have ultrasound of the kidney and follow-up with urology. He denies any blood in urine, constipation, blood in stool. During this visit patient's allergy, social, medical, surgical history and medications were reviewed and updated.     Allergies   Allergen Reactions    Codeine      Severe Abdominal pain    Sulfa Antibiotics      Patient unsure of reaction     Current Outpatient Medications   Medication Sig Dispense Refill    furosemide (LASIX) 20 MG tablet Take 1 tablet by mouth daily 90 tablet 1    fluticasone (FLONASE) 50 MCG/ACT nasal spray 1 spray by Nasal route 2 times daily 1 Bottle 5    montelukast (SINGULAIR) 10 MG tablet Take 1 tablet by mouth nightly 30 tablet 3    baclofen (LIORESAL) 10 MG tablet Take 1 tablet by mouth nightly as needed (muscle spasm) 30 tablet 1    cetirizine (ZYRTEC) 10 MG tablet Take 1 tablet by mouth daily 30 tablet 1    rosuvastatin (CRESTOR) 5 MG tablet Take 1 tablet by mouth daily 90 tablet 1    losartan-hydroCHLOROthiazide (HYZAAR) 100-25 MG per tablet Take 1 tablet by mouth daily 90 tablet 1    sildenafil (REVATIO) 20 MG tablet Take as directed prn up to 5 tabs daily 25 tablet 5    carvedilol (COREG) 25 MG tablet Take 1 tablet by mouth 2 times daily 180 tablet 2    metFORMIN (GLUCOPHAGE) 500 MG tablet Take 1 tablet by mouth 2 times daily (with meals) 180 tablet 2    amLODIPine (NORVASC) 10 MG tablet Take 1 tablet by mouth daily 90 tablet 2    meloxicam (MOBIC) 7.5 MG tablet Take 1 tablet by mouth 2 times daily (with meals) 180 tablet 1    pioglitazone (ACTOS) 15 MG tablet Take 1 tablet by mouth daily 30 tablet 5    Elastic Bandages & Supports (JOBST OPAQUE KNEE 15-20MMHG XL) MISC #2 pair knee highs  Varicose veins 2 each 0    vitamin D3 (CHOLECALCIFEROL) 400 UNITS TABS tablet Take 400 Units by mouth every other day       Omeprazole (PRILOSEC PO) Take 1 capsule by mouth daily as needed        No current facility-administered medications for this visit. REVIEW OF SYSTEM:     Constitutional: No fever or chills. No night sweats, no weight loss   Eyes: No eye discharge, double vision, or eye pain   HEENT: negative for sore mouth, sore throat, hoarseness and voice change   Respiratory: negative for cough , sputum, dyspnea, wheezing, hemoptysis, chest pain   Cardiovascular: negative for chest pain, dyspnea, palpitations, orthopnea, PND   Gastrointestinal: negative for nausea, vomiting, diarrhea, constipation, abdominal pain, Dysphagia, hematemesis and hematochezia   Genitourinary: negative for frequency, dysuria, nocturia, urinary incontinence, and hematuria   Integument: negative for rash, skin lesions, bruises.    Hematologic/Lymphatic: negative for easy bruising, bleeding, lymphadenopathy, petechiae and swelling/edema   Endocrine: negative for heat or cold intolerance, tremor, weight changes, change in bowel habits and hair loss   Musculoskeletal: negative for myalgias, arthralgias, pain, joint swelling,and bone pain   Neurological: negative for headaches, dizziness, seizures, weakness, numbness   OBJECTIVE:         Vitals:    05/03/21 1035   BP: 126/72   Pulse: 64 SpO2: 97%       PHYSICAL EXAM:   General appearance - well appearing, no in pain or distress   Mental status - alert and cooperative   Eyes - pupils equal and reactive, extraocular eye movements intact   Ears - bilateral TM's and external ear canals normal   Mouth - mucous membranes moist, pharynx normal without lesions   Neck - supple, no significant adenopathy   Lymphatics - no palpable lymphadenopathy, no hepatosplenomegaly   Chest - clear to auscultation, no wheezes, rales or rhonchi, symmetric air entry   Heart - normal rate, regular rhythm, normal S1, S2, no murmurs, rubs, clicks or gallops   Abdomen - soft, nontender, nondistended, no masses or organomegaly   Neurological - alert, oriented, normal speech, no focal findings or movement disorder noted   Musculoskeletal - no joint tenderness, deformity or swelling   Extremities - peripheral pulses normal, no pedal edema, no clubbing or cyanosis   Skin - normal coloration and turgor, no rashes, no suspicious skin lesions noted   LABORATORY DATA:     Lab Results   Component Value Date    WBC 4.7 04/26/2021    HGB 12.4 (L) 04/26/2021    HCT 36.8 (L) 04/26/2021    MCV 96.8 04/26/2021    PLT See Reflexed IPF Result 04/26/2021    LYMPHOPCT 35 04/26/2021    RBC 3.80 (L) 04/26/2021    MCH 32.6 04/26/2021    MCHC 33.7 (H) 04/26/2021    RDW 13.0 04/26/2021    MONOPCT 11 04/26/2021    BASOPCT 1 04/26/2021    NEUTROABS 2.37 04/26/2021    LYMPHSABS 1.65 04/26/2021    MONOSABS 0.54 04/26/2021    EOSABS 0.09 04/26/2021    BASOSABS 0.04 04/26/2021         Chemistry        Component Value Date/Time     04/26/2021 1042    K 4.0 04/26/2021 1042     04/26/2021 1042    CO2 29 04/26/2021 1042    BUN 22 04/26/2021 1042    CREATININE 0.78 04/26/2021 1042        Component Value Date/Time    CALCIUM 9.5 04/26/2021 1042    ALKPHOS 29 (L) 04/26/2021 1042    AST 13 04/26/2021 1042    ALT 14 04/26/2021 1042    BILITOT 0.51 04/26/2021 1042      1/22/2017  5:23 PM - Vasu, Lab spent face-to-face with the patient in counseling and coordinating her care.       Aminta Shepherd MD  [facility]

## 2023-11-02 PROCEDURE — 90694 VACC AIIV4 NO PRSRV 0.5ML IM: CPT | Performed by: FAMILY MEDICINE

## 2023-11-02 PROCEDURE — PBSHW INFLUENZA, FLUAD, (AGE 65 Y+), IM, PF, 0.5 ML: Performed by: FAMILY MEDICINE

## 2023-11-13 ENCOUNTER — OFFICE VISIT (OUTPATIENT)
Dept: CARDIOLOGY | Age: 68
End: 2023-11-13
Payer: MEDICARE

## 2023-11-13 VITALS
BODY MASS INDEX: 32.48 KG/M2 | WEIGHT: 232 LBS | SYSTOLIC BLOOD PRESSURE: 135 MMHG | HEART RATE: 80 BPM | HEIGHT: 71 IN | DIASTOLIC BLOOD PRESSURE: 63 MMHG

## 2023-11-13 DIAGNOSIS — R94.39 ABNORMAL NUCLEAR STRESS TEST: ICD-10-CM

## 2023-11-13 DIAGNOSIS — E66.09 CLASS 1 OBESITY DUE TO EXCESS CALORIES WITH SERIOUS COMORBIDITY IN ADULT, UNSPECIFIED BMI: ICD-10-CM

## 2023-11-13 DIAGNOSIS — F33.1 MAJOR DEPRESSIVE DISORDER, RECURRENT, MODERATE (HCC): ICD-10-CM

## 2023-11-13 DIAGNOSIS — F33.0 MAJOR DEPRESSIVE DISORDER, RECURRENT, MILD (HCC): ICD-10-CM

## 2023-11-13 DIAGNOSIS — E78.00 PURE HYPERCHOLESTEROLEMIA: ICD-10-CM

## 2023-11-13 DIAGNOSIS — Q23.1 AORTIC REGURGITATION DUE TO BICUSPID AORTIC VALVE: ICD-10-CM

## 2023-11-13 DIAGNOSIS — F33.0 MILD EPISODE OF RECURRENT MAJOR DEPRESSIVE DISORDER (HCC): ICD-10-CM

## 2023-11-13 DIAGNOSIS — Z98.890 S/P CARDIAC CATH: ICD-10-CM

## 2023-11-13 DIAGNOSIS — I10 ESSENTIAL HYPERTENSION: Primary | ICD-10-CM

## 2023-11-13 DIAGNOSIS — I71.21 ANEURYSM OF ASCENDING AORTA WITHOUT RUPTURE (HCC): ICD-10-CM

## 2023-11-13 DIAGNOSIS — I10 HYPERTENSION, ESSENTIAL: ICD-10-CM

## 2023-11-13 DIAGNOSIS — Q23.1 BICUSPID AORTIC VALVE: ICD-10-CM

## 2023-11-13 DIAGNOSIS — E78.9 DISEASE OF LIPID METABOLISM: ICD-10-CM

## 2023-11-13 DIAGNOSIS — R94.31 ABNORMAL ECG: ICD-10-CM

## 2023-11-13 DIAGNOSIS — E11.9 CONTROLLED TYPE 2 DIABETES MELLITUS WITHOUT COMPLICATION, UNSPECIFIED WHETHER LONG TERM INSULIN USE (HCC): ICD-10-CM

## 2023-11-13 PROBLEM — F33.9 MAJOR DEPRESSIVE DISORDER, RECURRENT, UNSPECIFIED (HCC): Status: ACTIVE | Noted: 2023-11-13

## 2023-11-13 PROCEDURE — 3044F HG A1C LEVEL LT 7.0%: CPT | Performed by: INTERNAL MEDICINE

## 2023-11-13 PROCEDURE — 93005 ELECTROCARDIOGRAM TRACING: CPT | Performed by: INTERNAL MEDICINE

## 2023-11-13 PROCEDURE — G8484 FLU IMMUNIZE NO ADMIN: HCPCS | Performed by: INTERNAL MEDICINE

## 2023-11-13 PROCEDURE — 3075F SYST BP GE 130 - 139MM HG: CPT | Performed by: INTERNAL MEDICINE

## 2023-11-13 PROCEDURE — 3017F COLORECTAL CA SCREEN DOC REV: CPT | Performed by: INTERNAL MEDICINE

## 2023-11-13 PROCEDURE — 1036F TOBACCO NON-USER: CPT | Performed by: INTERNAL MEDICINE

## 2023-11-13 PROCEDURE — 93010 ELECTROCARDIOGRAM REPORT: CPT | Performed by: INTERNAL MEDICINE

## 2023-11-13 PROCEDURE — G8427 DOCREV CUR MEDS BY ELIG CLIN: HCPCS | Performed by: INTERNAL MEDICINE

## 2023-11-13 PROCEDURE — 99212 OFFICE O/P EST SF 10 MIN: CPT | Performed by: INTERNAL MEDICINE

## 2023-11-13 PROCEDURE — 1123F ACP DISCUSS/DSCN MKR DOCD: CPT | Performed by: INTERNAL MEDICINE

## 2023-11-13 PROCEDURE — 99214 OFFICE O/P EST MOD 30 MIN: CPT | Performed by: INTERNAL MEDICINE

## 2023-11-13 PROCEDURE — G8417 CALC BMI ABV UP PARAM F/U: HCPCS | Performed by: INTERNAL MEDICINE

## 2023-11-13 PROCEDURE — 2022F DILAT RTA XM EVC RTNOPTHY: CPT | Performed by: INTERNAL MEDICINE

## 2023-11-13 PROCEDURE — 3078F DIAST BP <80 MM HG: CPT | Performed by: INTERNAL MEDICINE

## 2023-11-13 NOTE — PROGRESS NOTES
[address]  1955  [id number]    CC: Follow up for thoracic aortic aneurysm, Bicuspid AV with AI    HPI:  Patient is here for follow up. Denies any cp, sob, orthopnea, pnd, le edema, palpitations. Has been dealing with arthritis which is limiting him a bit. Has heart burn, better with omeprazole. Past Medical History:   Diagnosis Date    Anxiety     Aortic regurgitation     Bicuspid aortic valve     Diabetes mellitus ([address]) since March 2018    on Rx. GERD (gastroesophageal reflux disease)     Hypertension     Left renal mass 10/2018    Osteoarthritis of left knee     Wears glasses        Past Surgical History:   Procedure Laterality Date    CARDIAC CATHETERIZATION  09/12/2022    CYST REMOVAL      tail bone    MOUTH SURGERY  2015    PARTIAL NEPHRECTOMY Left 11/30/2018    ROBOTIC PARTIAL NEPHRECTOMY,     MI COLONOSCOPY FLX DX W/COLLJ SPEC WHEN PFRMD N/A 05/14/2018    normal colon, Dr. Ray North Left 11/30/2018    XI ROBOTIC PARTIAL NEPHRECTOMY, INTRA-OP LAP ULTRASOUND performed by Charla Wheeler MD at [address] [place] [address]         Family History   Problem Relation Age of Onset    High Blood Pressure Mother     Diabetes Mother         impaired fasting glucose    Other Mother         short term memory loss after MVA    Glaucoma Mother     Stroke Father 66    High Blood Pressure Father     Diabetes Father         impaired fasting glucose    Glaucoma Father     Cancer Father         bladder     Heart Attack Father 66    Glaucoma Brother     Diabetes Maternal Grandmother     Other Paternal Grandmother         gallbladder disease    Stroke Paternal Grandfather     Other Paternal Grandfather         gallbladder disease    Diabetes Paternal Grandfather     No Known Problems Brother        REVIEW OF SYSTEMS:    Constitutional: there has been no unanticipated weight loss.  There's been No

## 2023-11-15 DIAGNOSIS — D69.6 THROMBOCYTOPENIA (HCC): Primary | ICD-10-CM

## 2023-11-18 ENCOUNTER — PATIENT MESSAGE (OUTPATIENT)
Dept: FAMILY MEDICINE CLINIC | Age: 68
End: 2023-11-18

## 2023-11-18 DIAGNOSIS — F33.0 MILD EPISODE OF RECURRENT MAJOR DEPRESSIVE DISORDER (HCC): ICD-10-CM

## 2023-11-20 RX ORDER — DULOXETIN HYDROCHLORIDE 20 MG/1
20 CAPSULE, DELAYED RELEASE ORAL DAILY
Qty: 30 CAPSULE | Refills: 1 | Status: SHIPPED | OUTPATIENT
Start: 2023-11-20

## 2023-11-20 NOTE — TELEPHONE ENCOUNTER
Laury Baldwin called requesting a refill of the below medication which has been pended for you:     Requested Prescriptions     Pending Prescriptions Disp Refills    DULoxetine (CYMBALTA) 20 MG extended release capsule 30 capsule 1     Sig: Take 1 capsule by mouth daily       Last Appointment Date: 9/25/2023  Next Appointment Date: 12/28/2023    Allergies   Allergen Reactions    Codeine      Severe Abdominal pain  Severe stomach pain  Severe Abdominal pain    Sulfa Antibiotics      Patient unsure of reaction

## 2023-11-20 NOTE — TELEPHONE ENCOUNTER
From: Hannah Dumont  To: Dr. Herrmann Bias: 11/18/2023 1:27 PM EST  Subject: Refill    hello, can I get a refill on my new prescription Duloxetine HCL DR 20 mg It WORKS!!  Can I get a 3 month refill?   Thanks

## 2023-11-27 ENCOUNTER — OFFICE VISIT (OUTPATIENT)
Dept: ONCOLOGY | Age: 68
End: 2023-11-27
Payer: MEDICARE

## 2023-11-27 ENCOUNTER — HOSPITAL ENCOUNTER (OUTPATIENT)
Age: 68
Discharge: HOME | End: 2023-11-27
Payer: MEDICARE

## 2023-11-27 VITALS
HEIGHT: 71 IN | SYSTOLIC BLOOD PRESSURE: 118 MMHG | WEIGHT: 220.4 LBS | DIASTOLIC BLOOD PRESSURE: 62 MMHG | TEMPERATURE: 98.4 F | OXYGEN SATURATION: 97 % | HEART RATE: 72 BPM | BODY MASS INDEX: 30.85 KG/M2

## 2023-11-27 DIAGNOSIS — D69.6 THROMBOCYTOPENIA (HCC): ICD-10-CM

## 2023-11-27 DIAGNOSIS — C64.9 RENAL CELL CARCINOMA, UNSPECIFIED LATERALITY (HCC): Primary | ICD-10-CM

## 2023-11-27 LAB
BASOPHILS # BLD: 0.04 K/UL (ref 0–0.2)
BASOPHILS NFR BLD: 1 % (ref 0–2)
EOSINOPHIL # BLD: 0.07 K/UL (ref 0–0.44)
EOSINOPHILS RELATIVE PERCENT: 1 % (ref 1–4)
ERYTHROCYTE [DISTWIDTH] IN BLOOD BY AUTOMATED COUNT: 12.8 % (ref 11.8–14.4)
HCT VFR BLD AUTO: 37.1 % (ref 40.7–50.3)
HGB BLD-MCNC: 13.1 G/DL (ref 13–17)
IMM GRANULOCYTES # BLD AUTO: <0.03 K/UL (ref 0–0.3)
IMM GRANULOCYTES NFR BLD: 0 %
LYMPHOCYTES NFR BLD: 2.18 K/UL (ref 1.1–3.7)
LYMPHOCYTES RELATIVE PERCENT: 37 % (ref 24–43)
MCH RBC QN AUTO: 32.1 PG (ref 25.2–33.5)
MCHC RBC AUTO-ENTMCNC: 35.3 G/DL (ref 25.2–33.5)
MCV RBC AUTO: 90.9 FL (ref 82.6–102.9)
MONOCYTES NFR BLD: 0.68 K/UL (ref 0.1–1.2)
MONOCYTES NFR BLD: 12 % (ref 3–12)
NEUTROPHILS NFR BLD: 49 % (ref 36–65)
NEUTS SEG NFR BLD: 2.88 K/UL (ref 1.5–8.1)
NRBC BLD-RTO: 0 PER 100 WBC
PLATELET # BLD AUTO: 146 K/UL (ref 138–453)
PMV BLD AUTO: 10.2 FL (ref 8.1–13.5)
RBC # BLD AUTO: 4.08 M/UL (ref 4.21–5.77)
WBC OTHER # BLD: 5.9 K/UL (ref 3.5–11.3)

## 2023-11-27 PROCEDURE — 3017F COLORECTAL CA SCREEN DOC REV: CPT | Performed by: INTERNAL MEDICINE

## 2023-11-27 PROCEDURE — G8417 CALC BMI ABV UP PARAM F/U: HCPCS | Performed by: INTERNAL MEDICINE

## 2023-11-27 PROCEDURE — 99213 OFFICE O/P EST LOW 20 MIN: CPT | Performed by: INTERNAL MEDICINE

## 2023-11-27 PROCEDURE — 85025 COMPLETE CBC W/AUTO DIFF WBC: CPT

## 2023-11-27 PROCEDURE — 99214 OFFICE O/P EST MOD 30 MIN: CPT | Performed by: INTERNAL MEDICINE

## 2023-11-27 PROCEDURE — 3078F DIAST BP <80 MM HG: CPT | Performed by: INTERNAL MEDICINE

## 2023-11-27 PROCEDURE — 3074F SYST BP LT 130 MM HG: CPT | Performed by: INTERNAL MEDICINE

## 2023-11-27 PROCEDURE — 36415 COLL VENOUS BLD VENIPUNCTURE: CPT

## 2023-11-27 PROCEDURE — G8427 DOCREV CUR MEDS BY ELIG CLIN: HCPCS | Performed by: INTERNAL MEDICINE

## 2023-11-27 PROCEDURE — G8484 FLU IMMUNIZE NO ADMIN: HCPCS | Performed by: INTERNAL MEDICINE

## 2023-11-27 PROCEDURE — 1123F ACP DISCUSS/DSCN MKR DOCD: CPT | Performed by: INTERNAL MEDICINE

## 2023-11-27 PROCEDURE — 1036F TOBACCO NON-USER: CPT | Performed by: INTERNAL MEDICINE

## 2023-11-27 NOTE — PROGRESS NOTES
Patient ID: Lizy Soriano, 1955, [id number], 76 y.o. Diagnosis:   Immune related thrombocytopenia  Left RCC, stage I, T1aNx s/p partial nephrectomy 11/30/18  HISTORY OF PRESENT ILLNESS:    Hematologic History: This is a 70-year-old male with a history of immune related thrombocytopenia was being followed by Dr. Isha Jang. He was noted to have thrombocytopenia since 2016. He has a very mild thrombocytopenia ranging around 120-130 K. His ITP screen was positive for platelet associated antibody IgM. He denied any symptoms of bleeding. INTERVAL HISTORY  Patient is return for follow-up visit and to discuss lab results and further recommendations. He denies any blood in urine, blood in stool. Denies any abdominal pain nausea vomiting. Denies any nosebleeds. His recent platelets are normal at 146 K. He denies any abdominal pain nausea vomiting unintentional weight loss drenching night sweats fever chills. During this visit patient's allergy, social, medical, surgical history and medications were reviewed and updated.     Allergies   Allergen Reactions    Codeine      Severe Abdominal pain  Severe stomach pain  Severe Abdominal pain    Sulfa Antibiotics      Patient unsure of reaction     Current Outpatient Medications   Medication Sig Dispense Refill    DULoxetine (CYMBALTA) 20 MG extended release capsule Take 1 capsule by mouth daily 30 capsule 1    furosemide (LASIX) 20 MG tablet Take 1 tablet by mouth daily 90 tablet 1    cetirizine (ZYRTEC) 10 MG tablet Take 1 tablet by mouth daily 90 tablet 3    celecoxib (CELEBREX) 100 MG capsule Take 1 capsule by mouth 2 times daily as needed for Pain 180 capsule 1    turmeric 500 MG CAPS Take 1 capsule by mouth daily      metFORMIN (GLUCOPHAGE) 500 MG tablet Take 1 tablet by mouth 2 times daily (with meals) 180 tablet 1    amLODIPine (NORVASC) 10 MG tablet Take 1 tablet by mouth daily 90 tablet 1    levothyroxine (SYNTHROID) 75 MCG tablet Take 1 tablet by

## 2023-12-27 ENCOUNTER — HOSPITAL ENCOUNTER (OUTPATIENT)
Age: 68
Discharge: HOME | End: 2023-12-27
Payer: MEDICARE

## 2023-12-27 ENCOUNTER — TELEPHONE (OUTPATIENT)
Dept: UROLOGY | Age: 68
End: 2023-12-27

## 2023-12-27 ENCOUNTER — OFFICE VISIT (OUTPATIENT)
Dept: UROLOGY | Age: 68
End: 2023-12-27
Payer: MEDICARE

## 2023-12-27 VITALS
HEIGHT: 71 IN | BODY MASS INDEX: 30.52 KG/M2 | DIASTOLIC BLOOD PRESSURE: 72 MMHG | SYSTOLIC BLOOD PRESSURE: 118 MMHG | WEIGHT: 218 LBS

## 2023-12-27 DIAGNOSIS — N13.8 BPH WITH OBSTRUCTION/LOWER URINARY TRACT SYMPTOMS: ICD-10-CM

## 2023-12-27 DIAGNOSIS — N52.9 ERECTILE DYSFUNCTION, UNSPECIFIED ERECTILE DYSFUNCTION TYPE: ICD-10-CM

## 2023-12-27 DIAGNOSIS — R31.0 GROSS HEMATURIA: ICD-10-CM

## 2023-12-27 DIAGNOSIS — C64.2 RENAL CELL CARCINOMA OF LEFT KIDNEY (HCC): Primary | ICD-10-CM

## 2023-12-27 DIAGNOSIS — N40.1 BPH WITH OBSTRUCTION/LOWER URINARY TRACT SYMPTOMS: ICD-10-CM

## 2023-12-27 LAB
BILIRUB UR QL STRIP: NEGATIVE
CLARITY UR: CLEAR
COLOR UR: YELLOW
COMMENT: NORMAL
GLUCOSE UR STRIP-MCNC: NEGATIVE MG/DL
HGB UR QL STRIP.AUTO: NEGATIVE
KETONES UR STRIP-MCNC: NEGATIVE MG/DL
LEUKOCYTE ESTERASE UR QL STRIP: NEGATIVE
NITRITE UR QL STRIP: NEGATIVE
PH UR STRIP: 6 [PH] (ref 5–6)
PROT UR STRIP-MCNC: NEGATIVE MG/DL
SP GR UR STRIP: 1.02 (ref 1.01–1.02)
UROBILINOGEN UR STRIP-ACNC: NORMAL EU/DL (ref 0–1)

## 2023-12-27 PROCEDURE — 3074F SYST BP LT 130 MM HG: CPT | Performed by: UROLOGY

## 2023-12-27 PROCEDURE — G8427 DOCREV CUR MEDS BY ELIG CLIN: HCPCS | Performed by: UROLOGY

## 2023-12-27 PROCEDURE — 99215 OFFICE O/P EST HI 40 MIN: CPT | Performed by: UROLOGY

## 2023-12-27 PROCEDURE — G8417 CALC BMI ABV UP PARAM F/U: HCPCS | Performed by: UROLOGY

## 2023-12-27 PROCEDURE — 99214 OFFICE O/P EST MOD 30 MIN: CPT | Performed by: UROLOGY

## 2023-12-27 PROCEDURE — G8484 FLU IMMUNIZE NO ADMIN: HCPCS | Performed by: UROLOGY

## 2023-12-27 PROCEDURE — 81003 URINALYSIS AUTO W/O SCOPE: CPT

## 2023-12-27 PROCEDURE — 1123F ACP DISCUSS/DSCN MKR DOCD: CPT | Performed by: UROLOGY

## 2023-12-27 PROCEDURE — 1036F TOBACCO NON-USER: CPT | Performed by: UROLOGY

## 2023-12-27 PROCEDURE — 88112 CYTOPATH CELL ENHANCE TECH: CPT

## 2023-12-27 PROCEDURE — 3078F DIAST BP <80 MM HG: CPT | Performed by: UROLOGY

## 2023-12-27 PROCEDURE — 3017F COLORECTAL CA SCREEN DOC REV: CPT | Performed by: UROLOGY

## 2023-12-27 NOTE — PROGRESS NOTES
Results   Component Value Date    TESTOSTERONE 439 10/21/2019       Urinalysis today:  No results found for this visit on 12/27/23. Last BUN and creatinine:  Lab Results   Component Value Date    BUN 31 (H) 12/21/2023     Lab Results   Component Value Date    CREATININE 1.0 12/21/2023       Imaging Reviewed during this Office Visit:   (results were independently reviewed by physician and radiology report verified)    PAST MEDICAL, FAMILY AND SOCIAL HISTORY UPDATE:  Past Medical History:   Diagnosis Date    Anxiety     Aortic regurgitation     Bicuspid aortic valve     Diabetes mellitus ([address]) since March 2018    on Rx.     GERD (gastroesophageal reflux disease)     Hypertension     Left renal mass 10/2018    Osteoarthritis of left knee     Wears glasses      Past Surgical History:   Procedure Laterality Date    CARDIAC CATHETERIZATION  09/12/2022    CYST REMOVAL      tail bone    MOUTH SURGERY  2015    PARTIAL NEPHRECTOMY Left 11/30/2018    ROBOTIC PARTIAL NEPHRECTOMY,     MA COLONOSCOPY FLX DX W/COLLJ SPEC WHEN PFRMD N/A 05/14/2018    normal colon, Dr. Dex Hernandez Left 11/30/2018    XI ROBOTIC PARTIAL NEPHRECTOMY, INTRA-OP LAP ULTRASOUND performed by Miranda Ramirez MD at [address] [place] [address]       Family History   Problem Relation Age of Onset    High Blood Pressure Mother     Diabetes Mother         impaired fasting glucose    Other Mother         short term memory loss after MVA    Glaucoma Mother     Stroke Father 66    High Blood Pressure Father     Diabetes Father         impaired fasting glucose    Glaucoma Father     Cancer Father         bladder     Heart Attack Father 66    Glaucoma Brother     Diabetes Maternal Grandmother     Other Paternal Grandmother         gallbladder disease    Stroke Paternal Grandfather     Other Paternal Grandfather         gallbladder disease    Diabetes Paternal Grandfather     No Known

## 2023-12-28 ENCOUNTER — OFFICE VISIT (OUTPATIENT)
Dept: FAMILY MEDICINE CLINIC | Age: 68
End: 2023-12-28
Payer: MEDICARE

## 2023-12-28 VITALS
OXYGEN SATURATION: 97 % | DIASTOLIC BLOOD PRESSURE: 60 MMHG | SYSTOLIC BLOOD PRESSURE: 116 MMHG | BODY MASS INDEX: 30.39 KG/M2 | WEIGHT: 217.1 LBS | HEART RATE: 67 BPM | HEIGHT: 71 IN

## 2023-12-28 DIAGNOSIS — E11.59 TYPE 2 DIABETES MELLITUS WITH OTHER CIRCULATORY COMPLICATION, WITHOUT LONG-TERM CURRENT USE OF INSULIN (HCC): Primary | ICD-10-CM

## 2023-12-28 DIAGNOSIS — F33.0 MILD EPISODE OF RECURRENT MAJOR DEPRESSIVE DISORDER (HCC): ICD-10-CM

## 2023-12-28 DIAGNOSIS — E78.2 MIXED HYPERLIPIDEMIA: ICD-10-CM

## 2023-12-28 DIAGNOSIS — E06.3 HYPOTHYROIDISM DUE TO HASHIMOTO'S THYROIDITIS: ICD-10-CM

## 2023-12-28 DIAGNOSIS — E03.8 HYPOTHYROIDISM DUE TO HASHIMOTO'S THYROIDITIS: ICD-10-CM

## 2023-12-28 DIAGNOSIS — I10 ESSENTIAL HYPERTENSION: ICD-10-CM

## 2023-12-28 DIAGNOSIS — M17.0 PRIMARY OSTEOARTHRITIS OF BOTH KNEES: ICD-10-CM

## 2023-12-28 LAB
CASE NUMBER:: NORMAL
SPECIMEN DESCRIPTION: NORMAL
SURGICAL PATHOLOGY REPORT: NORMAL

## 2023-12-28 PROCEDURE — 99214 OFFICE O/P EST MOD 30 MIN: CPT | Performed by: FAMILY MEDICINE

## 2023-12-28 PROCEDURE — 3078F DIAST BP <80 MM HG: CPT | Performed by: FAMILY MEDICINE

## 2023-12-28 PROCEDURE — 1036F TOBACCO NON-USER: CPT | Performed by: FAMILY MEDICINE

## 2023-12-28 PROCEDURE — 2022F DILAT RTA XM EVC RTNOPTHY: CPT | Performed by: FAMILY MEDICINE

## 2023-12-28 PROCEDURE — G8484 FLU IMMUNIZE NO ADMIN: HCPCS | Performed by: FAMILY MEDICINE

## 2023-12-28 PROCEDURE — 3044F HG A1C LEVEL LT 7.0%: CPT | Performed by: FAMILY MEDICINE

## 2023-12-28 PROCEDURE — 3074F SYST BP LT 130 MM HG: CPT | Performed by: FAMILY MEDICINE

## 2023-12-28 PROCEDURE — 1123F ACP DISCUSS/DSCN MKR DOCD: CPT | Performed by: FAMILY MEDICINE

## 2023-12-28 PROCEDURE — G8417 CALC BMI ABV UP PARAM F/U: HCPCS | Performed by: FAMILY MEDICINE

## 2023-12-28 PROCEDURE — G8427 DOCREV CUR MEDS BY ELIG CLIN: HCPCS | Performed by: FAMILY MEDICINE

## 2023-12-28 PROCEDURE — 3017F COLORECTAL CA SCREEN DOC REV: CPT | Performed by: FAMILY MEDICINE

## 2023-12-28 RX ORDER — DULOXETIN HYDROCHLORIDE 20 MG/1
20 CAPSULE, DELAYED RELEASE ORAL DAILY
Qty: 90 CAPSULE | Refills: 3 | Status: SHIPPED | OUTPATIENT
Start: 2023-12-28

## 2023-12-28 RX ORDER — LOSARTAN POTASSIUM AND HYDROCHLOROTHIAZIDE 25; 100 MG/1; MG/1
1 TABLET ORAL DAILY
Qty: 90 TABLET | Refills: 3 | Status: SHIPPED | OUTPATIENT
Start: 2023-12-28

## 2023-12-28 NOTE — PROGRESS NOTES
[address]  [address] [address]  Dept: [phone]  Dept Fax: [phone]  Loc: [phone]    Jose Montoya is a 76 y.o. male who presents today for his medical conditions/complaints as noted below. Jose Montoya is c/o of   Chief Complaint   Patient presents with    Depression     3 month follow up    Diabetes     3 month follow up       HPI:     HPI Here today for a follow up of his DM, and joint pain. He is feeling much better since starting the celebrex. The pain in his feet is much better. He now can walk without feeling a sharp pain. He is also having a lot less pain in his back. He now feels great after his shower where previously it took several hours for him to loosen up in the morning. He feels like his anxiety is much better since starting the cymbalta. He is feeling much less on edge. He is using the buspar occasionally, but overall he does not feel like he needs it most of the time. He has lost weight since being on rybelesus and his blood sugars have been very well controlled on it. He is not having any issues with hypoglycemia. He has been trying to limit his sweets intake. He has not had any numbness or tingling in his feet. He has not had any sores on his feet. The numbness in his right big toe has resolved. He is having a cystoscopy in Jan that he is having due to intermittent hematuria. Past Medical History:   Diagnosis Date    Anxiety     Aortic regurgitation     Bicuspid aortic valve     Diabetes mellitus ([address]) since March 2018    on Rx.     GERD (gastroesophageal reflux disease)     Hypertension     Left renal mass 10/2018    Osteoarthritis of left knee     Wears glasses           Social History     Tobacco Use    Smoking status: Never    Smokeless tobacco: Never   Substance Use Topics    Alcohol use: Yes     Comment: very rare     Current Outpatient Medications

## 2024-01-04 NOTE — TELEPHONE ENCOUNTER
Spoke with patient, instructions for procedure given at this time.  
on 12/27/23: 98.9 kg (218 lb).  body mass index is unknown because there is no height or weight on file.    Cardiac Clearance: None   Medical Clearance:None   Appointment for surgery Clearance scheduled for:None     Preoperative Testing:  These are the current and completed labs:  CBC:   Lab Results   Component Value Date/Time    WBC 5.9 11/27/2023 02:00 PM    RBC 4.08 11/27/2023 02:00 PM    HGB 13.1 11/27/2023 02:00 PM    HCT 37.1 11/27/2023 02:00 PM    MCV 90.9 11/27/2023 02:00 PM    RDW 12.8 11/27/2023 02:00 PM     11/27/2023 02:00 PM     CMP:   Lab Results   Component Value Date/Time     12/21/2023 12:52 PM    K 4.3 12/21/2023 12:52 PM    CL 98 12/21/2023 12:52 PM    CO2 29 12/21/2023 12:52 PM    BUN 31 12/21/2023 12:52 PM    CREATININE 1.0 12/21/2023 12:52 PM    GFRAA >60 08/12/2022 11:49 AM    LABGLOM >60 12/21/2023 12:52 PM    GLUCOSE 114 12/21/2023 12:52 PM    PROT 7.5 11/14/2022 09:00 AM    CALCIUM 9.8 12/21/2023 12:52 PM    BILITOT 0.6 11/14/2022 09:00 AM    ALKPHOS 32 11/14/2022 09:00 AM    AST 16 11/14/2022 09:00 AM    ALT 15 11/14/2022 09:00 AM     POC Tests: No results for input(s): \"POCGLU\", \"POCNA\", \"POCK\", \"POCCL\", \"POCBUN\", \"POCHEMO\", \"POCHCT\" in the last 72 hours.  Crissy    Lab Results   Component Value Date/Time    PROTIME 11.5 11/16/2018 11:36 AM    INR 1.1 11/16/2018 11:36 AM     HCG (If Applicable) No results found for: \"PREGTESTUR\", \"PREGSERUM\", \"HCG\", \"HCGQUANT\"   ABGs No results found for: \"PHART\", \"PO2ART\", \"ZFQ0DVO\", \"UBC1XAD\", \"BEART\", \"W1PYYFCP\"   Type & Screen (If Applicable)  No results found for: \"LABABO\", \"LABRH\"    Additional ordered pre-operative testing:  []CBC    []ABG      [] BMP   []URINALYSIS   []CMP    []HCG   []COAGS PT/INR  []T&C  []LFTs   []TYPE AND SCREEN    [] EKG  [] Chest X-Ray  [] Other Radiology    [] Sent to Hospitalist None  [] Sent to Anesthesia for your review: None   [] Additional Orders: None     Comments:None   Requests: No Special

## 2024-01-17 ENCOUNTER — TELEPHONE (OUTPATIENT)
Dept: UROLOGY | Age: 69
End: 2024-01-17

## 2024-01-17 ENCOUNTER — ANESTHESIA (OUTPATIENT)
Dept: OPERATING ROOM | Age: 69
End: 2024-01-17
Payer: MEDICARE

## 2024-01-17 ENCOUNTER — ANESTHESIA EVENT (OUTPATIENT)
Dept: OPERATING ROOM | Age: 69
End: 2024-01-17
Payer: MEDICARE

## 2024-01-17 ENCOUNTER — HOSPITAL ENCOUNTER (OUTPATIENT)
Age: 69
Setting detail: OUTPATIENT SURGERY
Discharge: HOME | End: 2024-01-17
Attending: UROLOGY | Admitting: UROLOGY
Payer: MEDICARE

## 2024-01-17 VITALS
BODY MASS INDEX: 30.1 KG/M2 | RESPIRATION RATE: 16 BRPM | TEMPERATURE: 97.4 F | DIASTOLIC BLOOD PRESSURE: 67 MMHG | HEIGHT: 71 IN | HEART RATE: 70 BPM | WEIGHT: 215 LBS | OXYGEN SATURATION: 98 % | SYSTOLIC BLOOD PRESSURE: 115 MMHG

## 2024-01-17 LAB — GLUCOSE BLD-MCNC: 140 MG/DL (ref 75–110)

## 2024-01-17 PROCEDURE — 82947 ASSAY GLUCOSE BLOOD QUANT: CPT

## 2024-01-17 PROCEDURE — 6360000002 HC RX W HCPCS: Performed by: NURSE ANESTHETIST, CERTIFIED REGISTERED

## 2024-01-17 PROCEDURE — 3600000012 HC SURGERY LEVEL 2 ADDTL 15MIN: Performed by: UROLOGY

## 2024-01-17 PROCEDURE — 2709999900 HC NON-CHARGEABLE SUPPLY: Performed by: UROLOGY

## 2024-01-17 PROCEDURE — 7100000011 HC PHASE II RECOVERY - ADDTL 15 MIN: Performed by: UROLOGY

## 2024-01-17 PROCEDURE — 2580000003 HC RX 258: Performed by: UROLOGY

## 2024-01-17 PROCEDURE — 3600000002 HC SURGERY LEVEL 2 BASE: Performed by: UROLOGY

## 2024-01-17 PROCEDURE — 7100000010 HC PHASE II RECOVERY - FIRST 15 MIN: Performed by: UROLOGY

## 2024-01-17 PROCEDURE — 3700000000 HC ANESTHESIA ATTENDED CARE: Performed by: UROLOGY

## 2024-01-17 PROCEDURE — 2500000003 HC RX 250 WO HCPCS: Performed by: NURSE ANESTHETIST, CERTIFIED REGISTERED

## 2024-01-17 PROCEDURE — 3700000001 HC ADD 15 MINUTES (ANESTHESIA): Performed by: UROLOGY

## 2024-01-17 RX ORDER — CIPROFLOXACIN 500 MG/1
500 TABLET, FILM COATED ORAL 2 TIMES DAILY
Qty: 10 TABLET | Refills: 0 | Status: SHIPPED | OUTPATIENT
Start: 2024-01-17 | End: 2024-01-22

## 2024-01-17 RX ORDER — ONDANSETRON 2 MG/ML
INJECTION INTRAMUSCULAR; INTRAVENOUS PRN
Status: DISCONTINUED | OUTPATIENT
Start: 2024-01-17 | End: 2024-01-17 | Stop reason: SDUPTHER

## 2024-01-17 RX ORDER — FENTANYL CITRATE 50 UG/ML
INJECTION, SOLUTION INTRAMUSCULAR; INTRAVENOUS PRN
Status: DISCONTINUED | OUTPATIENT
Start: 2024-01-17 | End: 2024-01-17 | Stop reason: SDUPTHER

## 2024-01-17 RX ORDER — SODIUM CHLORIDE 0.9 % (FLUSH) 0.9 %
5-40 SYRINGE (ML) INJECTION EVERY 12 HOURS SCHEDULED
Status: DISCONTINUED | OUTPATIENT
Start: 2024-01-17 | End: 2024-01-17 | Stop reason: HOSPADM

## 2024-01-17 RX ORDER — SODIUM CHLORIDE 0.9 % (FLUSH) 0.9 %
5-40 SYRINGE (ML) INJECTION PRN
Status: DISCONTINUED | OUTPATIENT
Start: 2024-01-17 | End: 2024-01-17 | Stop reason: HOSPADM

## 2024-01-17 RX ORDER — DEXAMETHASONE SODIUM PHOSPHATE 4 MG/ML
INJECTION, SOLUTION INTRA-ARTICULAR; INTRALESIONAL; INTRAMUSCULAR; INTRAVENOUS; SOFT TISSUE PRN
Status: DISCONTINUED | OUTPATIENT
Start: 2024-01-17 | End: 2024-01-17 | Stop reason: SDUPTHER

## 2024-01-17 RX ORDER — SODIUM CHLORIDE 9 MG/ML
INJECTION, SOLUTION INTRAVENOUS PRN
Status: DISCONTINUED | OUTPATIENT
Start: 2024-01-17 | End: 2024-01-17 | Stop reason: HOSPADM

## 2024-01-17 RX ORDER — SODIUM CHLORIDE, SODIUM LACTATE, POTASSIUM CHLORIDE, CALCIUM CHLORIDE 600; 310; 30; 20 MG/100ML; MG/100ML; MG/100ML; MG/100ML
INJECTION, SOLUTION INTRAVENOUS CONTINUOUS
Status: DISCONTINUED | OUTPATIENT
Start: 2024-01-17 | End: 2024-01-17 | Stop reason: HOSPADM

## 2024-01-17 RX ORDER — PHENAZOPYRIDINE HYDROCHLORIDE 200 MG/1
200 TABLET, FILM COATED ORAL 3 TIMES DAILY PRN
Qty: 9 TABLET | Refills: 0 | Status: SHIPPED | OUTPATIENT
Start: 2024-01-17

## 2024-01-17 RX ORDER — LIDOCAINE HYDROCHLORIDE 10 MG/ML
INJECTION, SOLUTION EPIDURAL; INFILTRATION; INTRACAUDAL; PERINEURAL PRN
Status: DISCONTINUED | OUTPATIENT
Start: 2024-01-17 | End: 2024-01-17 | Stop reason: SDUPTHER

## 2024-01-17 RX ORDER — PROPOFOL 10 MG/ML
INJECTION, EMULSION INTRAVENOUS PRN
Status: DISCONTINUED | OUTPATIENT
Start: 2024-01-17 | End: 2024-01-17 | Stop reason: SDUPTHER

## 2024-01-17 RX ADMIN — FENTANYL CITRATE 50 MCG: 50 INJECTION, SOLUTION INTRAMUSCULAR; INTRAVENOUS at 12:44

## 2024-01-17 RX ADMIN — SODIUM CHLORIDE, POTASSIUM CHLORIDE, SODIUM LACTATE AND CALCIUM CHLORIDE: 600; 310; 30; 20 INJECTION, SOLUTION INTRAVENOUS at 12:08

## 2024-01-17 RX ADMIN — FENTANYL CITRATE 50 MCG: 50 INJECTION, SOLUTION INTRAMUSCULAR; INTRAVENOUS at 12:48

## 2024-01-17 RX ADMIN — PROPOFOL 80 MG: 10 INJECTION, EMULSION INTRAVENOUS at 12:44

## 2024-01-17 RX ADMIN — DEXAMETHASONE SODIUM PHOSPHATE 4 MG: 4 INJECTION, SOLUTION INTRAMUSCULAR; INTRAVENOUS at 12:44

## 2024-01-17 RX ADMIN — PROPOFOL 100 MCG/KG/MIN: 10 INJECTION, EMULSION INTRAVENOUS at 12:45

## 2024-01-17 RX ADMIN — LIDOCAINE HYDROCHLORIDE 50 MG: 10 INJECTION, SOLUTION EPIDURAL; INFILTRATION; INTRACAUDAL; PERINEURAL at 12:46

## 2024-01-17 RX ADMIN — ONDANSETRON 4 MG: 2 INJECTION INTRAMUSCULAR; INTRAVENOUS at 12:44

## 2024-01-17 ASSESSMENT — PAIN - FUNCTIONAL ASSESSMENT
PAIN_FUNCTIONAL_ASSESSMENT: 0-10

## 2024-01-17 NOTE — DISCHARGE INSTRUCTIONS
Call your doctor for the following:   Chills   Temperature greater than 101   Pain that is not tolerable despite taking pain medicine as ordered   Inability to urinate >12 hours/ or a non-draining vo         No alcoholic beverages, no driving or operating machinery, no making important decisions for 24 hours.     Take your prescribed medications (if any) as instructed.    You may have a normal diet but should eat lightly on the day of surgery.    Drink plenty of fluids.    You may notice some burning or blood with urination, this is normal and should improve over next few days.    You may also take motrin/ibuprofen for pain control, you can alternate this with your other pain medications.    Be sure to take over the counter stool softeners if you notice constipation or hard stools.    Follow up with Dr. Freeman, office will arrange.     Start taking Cipro 3 days before next procedure

## 2024-01-17 NOTE — PROGRESS NOTES
rounding on Surgery    Assessment: Patient is waiting to begin procedure and expressed his vahid and that he is praying for himsel and the doctor.    Intervention: Engaged in conversation. Patient readily accepted prayer when offered.Patient expressed appreciation for visit and offer of continued prayer.    Plan: Chaplains are available on site or on call 24/7 for spiritual and emotional support.   01/17/24 1344   Encounter Summary   Encounter Overview/Reason   Encounter;Spiritual/Emotional Needs   Service Provided For: Patient   Referral/Consult From: Hospice;Rounding   Support System Family members   Last Encounter  01/17/24   Complexity of Encounter Low   Begin Time 1222   End Time  1228   Total Time Calculated 6 min   Spiritual/Emotional needs   Type Spiritual Support   Assessment/Intervention/Outcome   Assessment Calm   Intervention Active listening;Prayer (assurance of)/[place]   Outcome Comfort;Engaged in conversation;Expressed Gratitude

## 2024-01-17 NOTE — ANESTHESIA PRE PROCEDURE
Department of Anesthesiology  Preprocedure Note       Name:  Binu Boggs   Age:  68 y.o.  :  1955                                          MRN:  [id number]         Date:  2024      Surgeon: Surgeon(s):  Germain Freeman MD    Procedure: Procedure(s):  CYSTOSCOPY    Medications prior to admission:   Prior to Admission medications    Medication Sig Start Date End Date Taking? Authorizing Provider   DULoxetine (CYMBALTA) 20 MG extended release capsule Take 1 capsule by mouth daily 23   Caity Doyle MD   losartan-hydroCHLOROthiazide (HYZAAR) 100-25 MG per tablet Take 1 tablet by mouth daily 23   Caity Doyle MD   furosemide (LASIX) 20 MG tablet Take 1 tablet by mouth daily 10/16/23   Caity Doyle MD   cetirizine (ZYRTEC) 10 MG tablet Take 1 tablet by mouth daily 23   Caity Doyle MD   celecoxib (CELEBREX) 100 MG capsule Take 1 capsule by mouth 2 times daily as needed for Pain 23   Caity Doyle MD   turmeric 500 MG CAPS Take 1 capsule by mouth daily    Provider, MD Shayla   metFORMIN (GLUCOPHAGE) 500 MG tablet Take 1 tablet by mouth 2 times daily (with meals) 23   Caity Doyle MD   amLODIPine (NORVASC) 10 MG tablet Take 1 tablet by mouth daily 23   Caity Doyle MD   levothyroxine (SYNTHROID) 75 MCG tablet Take 1 tablet by mouth daily 23   Caity Doyle MD   montelukast (SINGULAIR) 10 MG tablet Take 1 tablet by mouth nightly 23   Caity Doyle MD   amLODIPine (NORVASC) 10 MG tablet Take 1 tablet by mouth daily 23   Nydia Gallego PA   rosuvastatin (CRESTOR) 5 MG tablet Take 1 tablet by mouth daily 23   Caity Doyle MD   Semaglutide (RYBELSUS) 14 MG TABS Take 1 tablet by mouth daily 23   Caity Doyle MD   omeprazole (PRILOSEC) 40 MG delayed release capsule Take 1 capsule by mouth every morning (before breakfast) 23   Caity Doyle MD   sildenafil (REVATIO) 20 MG tablet Take 1 tablet by mouth as needed

## 2024-01-17 NOTE — ANESTHESIA POSTPROCEDURE EVALUATION
Department of Anesthesiology  Postprocedure Note    Patient: Binu Boggs  MRN: [id number]  YOB: 1955  Date of evaluation: 1/17/2024    Procedure Summary       Date: 01/17/24 Room / Location: [address]    Anesthesia Start: 1242 Anesthesia Stop: 1301    Procedure: CYSTOSCOPY (Bladder) Diagnosis:       Hematuria, unspecified type      (Hematuria, unspecified type [R31.9])    Surgeons: Germain Freeman MD Responsible Provider: Pepe Aaron II, APRN - CRNA    Anesthesia Type: MAC ASA Status: 3            Anesthesia Type: MAC    Hernandez Phase I: Hernandez Score: 10    Hernandez Phase II: Hernandez Score: 10    Anesthesia Post Evaluation    Patient location during evaluation: bedside  Patient participation: complete - patient participated  Level of consciousness: awake  Pain score: 0  Airway patency: patent  Nausea & Vomiting: no nausea and no vomiting  Cardiovascular status: hemodynamically stable  Respiratory status: spontaneous ventilation  Hydration status: stable  Pain management: satisfactory to patient    No notable events documented.

## 2024-01-17 NOTE — H&P
History and Physical    Patient:  Binu Boggs  MRN: [id number]  YOB: 1955    CHIEF COMPLAINT:  hematuria    HISTORY OF PRESENT ILLNESS:   The patient is a 68 y.o. male who presents with as above, here for surgery    Patient's old records, notes and chart reviewed and summarized above.     Past Medical History:    Past Medical History:   Diagnosis Date    Anxiety     Aortic regurgitation     Bicuspid aortic valve     Diabetes mellitus ([facility]) since March 2018    on Rx.    GERD (gastroesophageal reflux disease)     Hypertension     Left renal mass 10/2018    Osteoarthritis of left knee     Wears glasses        Past Surgical History:    Past Surgical History:   Procedure Laterality Date    CARDIAC CATHETERIZATION  09/12/2022    CYST REMOVAL      tail bone    MOUTH SURGERY  2015    PARTIAL NEPHRECTOMY Left 11/30/2018    ROBOTIC PARTIAL NEPHRECTOMY,     ME COLONOSCOPY FLX DX W/COLLJ SPEC WHEN PFRMD N/A 05/14/2018    normal colon, Dr. Noble    ME LAPAROSCOPY SURG PARTIAL NEPHRECTOMY Left 11/30/2018    XI ROBOTIC PARTIAL NEPHRECTOMY, INTRA-OP LAP ULTRASOUND performed by Germain Freeman MD at [facility] OR    TONSILLECTOMY AND ADENOIDECTOMY  1960    WISDOM TOOTH EXTRACTION       Medications Prior to Admission:    Prior to Admission medications    Medication Sig Start Date End Date Taking? Authorizing Provider   DULoxetine (CYMBALTA) 20 MG extended release capsule Take 1 capsule by mouth daily 12/28/23   Caity Doyle MD   losartan-hydroCHLOROthiazide (HYZAAR) 100-25 MG per tablet Take 1 tablet by mouth daily 12/28/23   Caity Doyle MD   furosemide (LASIX) 20 MG tablet Take 1 tablet by mouth daily 10/16/23   Caity Doyle MD   cetirizine (ZYRTEC) 10 MG tablet Take 1 tablet by mouth daily 9/25/23   Caity Doyle MD   celecoxib (CELEBREX) 100 MG capsule Take 1 capsule by mouth 2 times daily as needed for Pain 9/25/23   Caity Doyle MD   turmeric 500 MG CAPS Take 1 capsule by mouth daily    Provider,

## 2024-01-18 NOTE — TELEPHONE ENCOUNTER
Spoke with patient, instructions for procedure given  
   omeprazole (PRILOSEC) 40 MG delayed release capsule Take 1 capsule by mouth every morning (before breakfast) 90 capsule 1    sildenafil (REVATIO) 20 MG tablet Take 1 tablet by mouth as needed (Erectile dysfunction) 30 tablet 5    benzoyl peroxide 5 % external liquid Wash affected areas once daily 227 g 3    busPIRone (BUSPAR) 7.5 MG tablet Take 1 tablet by mouth 3 times daily as needed (anxiety) 90 tablet 3    carvedilol (COREG) 25 MG tablet Take 1.5 tablets by mouth 2 times daily 270 tablet 3    Misc Natural Products (GLUCOS-CHONDROIT-MSM COMPLEX PO) Take 1 tablet by mouth 2 times daily      Apple Cider Vinegar 500 MG TABS Take 1 tablet by mouth 2 times daily      fluticasone (FLONASE) 50 MCG/ACT nasal spray 1 spray by Nasal route 2 times daily 1 Bottle 5    baclofen (LIORESAL) 10 MG tablet Take 1 tablet by mouth nightly as needed (muscle spasm) (Patient not taking: Reported on 12/28/2023) 30 tablet 1    vitamin D3 (CHOLECALCIFEROL) 400 UNITS TABS tablet Take 1 tablet by mouth every other day       No current facility-administered medications for this visit.     Facility-Administered Medications Ordered in Other Visits   Medication Dose Route Frequency Provider Last Rate Last Admin    sodium chloride flush 0.9 % injection 5-40 mL  5-40 mL IntraVENous 2 times per day Germain Freeman MD        sodium chloride flush 0.9 % injection 5-40 mL  5-40 mL IntraVENous PRN Germain Freeman MD        0.9 % sodium chloride infusion   IntraVENous PRN Germain Freeman MD        lactated ringers IV soln infusion   IntraVENous Continuous Germain Freeman MD   Stopped at 01/17/24 1344     Scheduled Meds:  Continuous Infusions:  PRN Meds:.  Prior to Admission medications    Medication Sig Start Date End Date Taking? Authorizing Provider   phenazopyridine (PYRIDIUM) 200 MG tablet Take 1 tablet by mouth 3 times daily as needed for Pain 1/17/24   Germain Freeman MD   ciprofloxacin (CIPRO) 500 MG tablet Take 1 tablet by mouth 2 times

## 2024-01-21 ENCOUNTER — PATIENT MESSAGE (OUTPATIENT)
Dept: FAMILY MEDICINE CLINIC | Age: 69
End: 2024-01-21

## 2024-01-21 DIAGNOSIS — I10 ESSENTIAL HYPERTENSION: ICD-10-CM

## 2024-01-21 DIAGNOSIS — E11.9 TYPE 2 DIABETES MELLITUS WITHOUT COMPLICATION, WITHOUT LONG-TERM CURRENT USE OF INSULIN (HCC): ICD-10-CM

## 2024-01-21 DIAGNOSIS — Q23.1 AORTIC REGURGITATION DUE TO BICUSPID AORTIC VALVE: ICD-10-CM

## 2024-01-22 RX ORDER — ROSUVASTATIN CALCIUM 5 MG/1
5 TABLET, COATED ORAL DAILY
Qty: 90 TABLET | Refills: 1 | Status: SHIPPED | OUTPATIENT
Start: 2024-01-22

## 2024-01-22 NOTE — TELEPHONE ENCOUNTER
Binu called requesting a refill of the below medication which has been pended for you:     Requested Prescriptions     Pending Prescriptions Disp Refills    rosuvastatin (CRESTOR) 5 MG tablet 90 tablet 1     Sig: Take 1 tablet by mouth daily       Last Appointment Date: 12/28/2023  Next Appointment Date: 6/28/2024    Allergies   Allergen Reactions    Codeine      Severe Abdominal pain  Severe stomach pain  Severe Abdominal pain    Sulfa Antibiotics      Patient unsure of reaction

## 2024-01-22 NOTE — TELEPHONE ENCOUNTER
From: Binu Boggs  To: Dr. Caity Doyle  Sent: 1/21/2024 1:00 PM EST  Subject: Refill    Hello, I am out of refills of my Rosuvastatin Calcium 5mg pill. Can you please send in a new refill prescription?  ThanksChriss

## 2024-01-25 NOTE — OP NOTE
Operative Note      Patient: Binu Boggs  YOB: 1955  MRN: [id number]    Date of Procedure: 1/17/2024    Pre-Op Diagnosis Codes:     * Hematuria, unspecified type [R31.9]    Post-Op Diagnosis: Same       Procedure(s):  CYSTOSCOPY    Surgeon(s):  Germain Lizama MD    Assistant:   * No surgical staff found *    Anesthesia: Monitor Anesthesia Care    Estimated Blood Loss (mL): Minimal    Complications: None    Specimens:   * No specimens in log *    Implants:  * No implants in log *      Drains: * No LDAs found *        Indications:  Binu Boggs is a 68 y.o. male with history of hemauturia.  He is here for Cystoscopy.  Risks benefits alternatives goals and possible complications of the procedure were explained to the patient and informed consent was obtained he elected to proceed.    Details:   The patient was brought back to the operating room.  He was laid in the supine position.  His genitals were prepped and draped in usual sterile surgical fashion.  2 % lidocaine jelly was placed per the urethra for pain control.  Flexible cystoscope was assembled and passed per the urethra.  The anterior urethra was normal without any lesions; the posterior urethra and prostate were unremarkable.  The bladder was inspected thoroughly and systematically, right lateral wall bladder tumor was noted which did not show any lesions or tumors, stone, fistulous tracts, diverticula.  Both ureteral orifices were normal with clear efflux of urine. The patient tolerated the procedure well.  The scope was removed intact and without any issues.  This concluded the case.  He was taken to recovery period and discharged home in stable condition.      Plan  He will follow up for TURBT of tumor    Electronically signed by GERMAIN LIZAMA MD on 1/25/2024 at 3:30 PM

## 2024-01-28 ENCOUNTER — PATIENT MESSAGE (OUTPATIENT)
Dept: FAMILY MEDICINE CLINIC | Age: 69
End: 2024-01-28

## 2024-01-29 RX ORDER — LEVOTHYROXINE SODIUM 0.07 MG/1
75 TABLET ORAL DAILY
Qty: 90 TABLET | Refills: 1 | Status: SHIPPED | OUTPATIENT
Start: 2024-01-29

## 2024-01-29 NOTE — TELEPHONE ENCOUNTER
Binu called requesting a refill of the below medication which has been pended for you:     Requested Prescriptions     Pending Prescriptions Disp Refills    levothyroxine (SYNTHROID) 75 MCG tablet 90 tablet 1     Sig: Take 1 tablet by mouth daily       Last Appointment Date: 12/28/2023  Next Appointment Date: 6/28/2024    Allergies   Allergen Reactions    Codeine      Severe Abdominal pain  Severe stomach pain  Severe Abdominal pain    Sulfa Antibiotics      Patient unsure of reaction

## 2024-01-29 NOTE — TELEPHONE ENCOUNTER
From: Binu Boggs  To: Dr. Caity Doyle  Sent: 1/28/2024 10:55 AM EST  Subject: Refill    Hello,  I only have 3 pills left of my Levothyroxine 75 mg  Can I get a refill, please  Thanks, Chriss

## 2024-01-30 ENCOUNTER — ANESTHESIA EVENT (OUTPATIENT)
Dept: OPERATING ROOM | Age: 69
End: 2024-01-30
Payer: MEDICARE

## 2024-01-31 ENCOUNTER — ANESTHESIA (OUTPATIENT)
Dept: OPERATING ROOM | Age: 69
End: 2024-01-31
Payer: MEDICARE

## 2024-01-31 ENCOUNTER — HOSPITAL ENCOUNTER (OUTPATIENT)
Age: 69
Setting detail: OUTPATIENT SURGERY
Discharge: HOME | End: 2024-01-31
Attending: UROLOGY | Admitting: UROLOGY
Payer: MEDICARE

## 2024-01-31 VITALS
HEART RATE: 74 BPM | TEMPERATURE: 97.2 F | HEIGHT: 71 IN | WEIGHT: 218.8 LBS | SYSTOLIC BLOOD PRESSURE: 116 MMHG | DIASTOLIC BLOOD PRESSURE: 60 MMHG | RESPIRATION RATE: 16 BRPM | BODY MASS INDEX: 30.63 KG/M2 | OXYGEN SATURATION: 94 %

## 2024-01-31 DIAGNOSIS — N32.89 BLADDER MASS: ICD-10-CM

## 2024-01-31 DIAGNOSIS — G89.18 POST-OP PAIN: Primary | ICD-10-CM

## 2024-01-31 LAB — GLUCOSE BLD-MCNC: 150 MG/DL (ref 75–110)

## 2024-01-31 PROCEDURE — 2500000003 HC RX 250 WO HCPCS

## 2024-01-31 PROCEDURE — 2720000010 HC SURG SUPPLY STERILE: Performed by: UROLOGY

## 2024-01-31 PROCEDURE — 2709999900 HC NON-CHARGEABLE SUPPLY: Performed by: UROLOGY

## 2024-01-31 PROCEDURE — 2580000003 HC RX 258: Performed by: UROLOGY

## 2024-01-31 PROCEDURE — 7100000011 HC PHASE II RECOVERY - ADDTL 15 MIN: Performed by: UROLOGY

## 2024-01-31 PROCEDURE — 3700000001 HC ADD 15 MINUTES (ANESTHESIA): Performed by: UROLOGY

## 2024-01-31 PROCEDURE — 82947 ASSAY GLUCOSE BLOOD QUANT: CPT

## 2024-01-31 PROCEDURE — 7100000010 HC PHASE II RECOVERY - FIRST 15 MIN: Performed by: UROLOGY

## 2024-01-31 PROCEDURE — 6360000002 HC RX W HCPCS

## 2024-01-31 PROCEDURE — 6360000002 HC RX W HCPCS: Performed by: UROLOGY

## 2024-01-31 PROCEDURE — 3600000012 HC SURGERY LEVEL 2 ADDTL 15MIN: Performed by: UROLOGY

## 2024-01-31 PROCEDURE — 7100000000 HC PACU RECOVERY - FIRST 15 MIN: Performed by: UROLOGY

## 2024-01-31 PROCEDURE — 88307 TISSUE EXAM BY PATHOLOGIST: CPT

## 2024-01-31 PROCEDURE — 3700000000 HC ANESTHESIA ATTENDED CARE: Performed by: UROLOGY

## 2024-01-31 PROCEDURE — 7100000001 HC PACU RECOVERY - ADDTL 15 MIN: Performed by: UROLOGY

## 2024-01-31 PROCEDURE — 3600000002 HC SURGERY LEVEL 2 BASE: Performed by: UROLOGY

## 2024-01-31 RX ORDER — FENTANYL CITRATE 50 UG/ML
INJECTION, SOLUTION INTRAMUSCULAR; INTRAVENOUS PRN
Status: DISCONTINUED | OUTPATIENT
Start: 2024-01-31 | End: 2024-01-31 | Stop reason: SDUPTHER

## 2024-01-31 RX ORDER — PROPOFOL 10 MG/ML
INJECTION, EMULSION INTRAVENOUS PRN
Status: DISCONTINUED | OUTPATIENT
Start: 2024-01-31 | End: 2024-01-31 | Stop reason: SDUPTHER

## 2024-01-31 RX ORDER — SODIUM CHLORIDE 0.9 % (FLUSH) 0.9 %
5-40 SYRINGE (ML) INJECTION PRN
Status: DISCONTINUED | OUTPATIENT
Start: 2024-01-31 | End: 2024-01-31 | Stop reason: HOSPADM

## 2024-01-31 RX ORDER — ONDANSETRON 2 MG/ML
INJECTION INTRAMUSCULAR; INTRAVENOUS PRN
Status: DISCONTINUED | OUTPATIENT
Start: 2024-01-31 | End: 2024-01-31 | Stop reason: SDUPTHER

## 2024-01-31 RX ORDER — SODIUM CHLORIDE 0.9 % (FLUSH) 0.9 %
5-40 SYRINGE (ML) INJECTION EVERY 12 HOURS SCHEDULED
Status: DISCONTINUED | OUTPATIENT
Start: 2024-01-31 | End: 2024-01-31 | Stop reason: HOSPADM

## 2024-01-31 RX ORDER — SODIUM CHLORIDE, SODIUM LACTATE, POTASSIUM CHLORIDE, CALCIUM CHLORIDE 600; 310; 30; 20 MG/100ML; MG/100ML; MG/100ML; MG/100ML
INJECTION, SOLUTION INTRAVENOUS CONTINUOUS
Status: DISCONTINUED | OUTPATIENT
Start: 2024-01-31 | End: 2024-01-31 | Stop reason: HOSPADM

## 2024-01-31 RX ORDER — PHENAZOPYRIDINE HYDROCHLORIDE 200 MG/1
200 TABLET, FILM COATED ORAL 3 TIMES DAILY PRN
Qty: 9 TABLET | Refills: 0 | Status: SHIPPED | OUTPATIENT
Start: 2024-01-31

## 2024-01-31 RX ORDER — CIPROFLOXACIN 500 MG/1
500 TABLET, FILM COATED ORAL 2 TIMES DAILY
Qty: 6 TABLET | Refills: 0 | Status: SHIPPED | OUTPATIENT
Start: 2024-01-31 | End: 2024-02-03

## 2024-01-31 RX ORDER — DEXAMETHASONE SODIUM PHOSPHATE 10 MG/ML
INJECTION INTRAMUSCULAR; INTRAVENOUS PRN
Status: DISCONTINUED | OUTPATIENT
Start: 2024-01-31 | End: 2024-01-31 | Stop reason: SDUPTHER

## 2024-01-31 RX ORDER — LIDOCAINE HYDROCHLORIDE 10 MG/ML
INJECTION, SOLUTION EPIDURAL; INFILTRATION; INTRACAUDAL; PERINEURAL PRN
Status: DISCONTINUED | OUTPATIENT
Start: 2024-01-31 | End: 2024-01-31 | Stop reason: SDUPTHER

## 2024-01-31 RX ORDER — SODIUM CHLORIDE 9 MG/ML
INJECTION, SOLUTION INTRAVENOUS PRN
Status: DISCONTINUED | OUTPATIENT
Start: 2024-01-31 | End: 2024-01-31 | Stop reason: HOSPADM

## 2024-01-31 RX ORDER — HYDROCODONE BITARTRATE AND ACETAMINOPHEN 5; 325 MG/1; MG/1
1 TABLET ORAL EVERY 6 HOURS PRN
Qty: 12 TABLET | Refills: 0 | Status: SHIPPED | OUTPATIENT
Start: 2024-01-31 | End: 2024-02-03

## 2024-01-31 RX ADMIN — ONDANSETRON 4 MG: 2 INJECTION INTRAMUSCULAR; INTRAVENOUS at 07:48

## 2024-01-31 RX ADMIN — FENTANYL CITRATE 25 MCG: 50 INJECTION, SOLUTION INTRAMUSCULAR; INTRAVENOUS at 07:44

## 2024-01-31 RX ADMIN — WATER 40 MG: 1 INJECTION INTRAMUSCULAR; INTRAVENOUS; SUBCUTANEOUS at 10:02

## 2024-01-31 RX ADMIN — PHENYLEPHRINE HYDROCHLORIDE 100 MCG: 10 INJECTION INTRAVENOUS at 08:08

## 2024-01-31 RX ADMIN — PROPOFOL 150 MCG/KG/MIN: 10 INJECTION, EMULSION INTRAVENOUS at 07:47

## 2024-01-31 RX ADMIN — FENTANYL CITRATE 50 MCG: 50 INJECTION, SOLUTION INTRAMUSCULAR; INTRAVENOUS at 07:58

## 2024-01-31 RX ADMIN — PROPOFOL 40 MG: 10 INJECTION, EMULSION INTRAVENOUS at 08:02

## 2024-01-31 RX ADMIN — PHENYLEPHRINE HYDROCHLORIDE 100 MCG: 10 INJECTION INTRAVENOUS at 08:11

## 2024-01-31 RX ADMIN — SODIUM CHLORIDE, POTASSIUM CHLORIDE, SODIUM LACTATE AND CALCIUM CHLORIDE: 600; 310; 30; 20 INJECTION, SOLUTION INTRAVENOUS at 07:09

## 2024-01-31 RX ADMIN — DEXAMETHASONE SODIUM PHOSPHATE 6 MG: 10 INJECTION INTRAMUSCULAR; INTRAVENOUS at 07:48

## 2024-01-31 RX ADMIN — PHENYLEPHRINE HYDROCHLORIDE 100 MCG: 10 INJECTION INTRAVENOUS at 08:14

## 2024-01-31 RX ADMIN — LIDOCAINE HYDROCHLORIDE 50 MG: 10 SOLUTION INTRAVENOUS at 07:44

## 2024-01-31 RX ADMIN — PROPOFOL 160 MG: 10 INJECTION, EMULSION INTRAVENOUS at 07:44

## 2024-01-31 RX ADMIN — FENTANYL CITRATE 25 MCG: 50 INJECTION, SOLUTION INTRAMUSCULAR; INTRAVENOUS at 07:47

## 2024-01-31 RX ADMIN — Medication 2000 MG: at 07:54

## 2024-01-31 ASSESSMENT — PAIN - FUNCTIONAL ASSESSMENT
PAIN_FUNCTIONAL_ASSESSMENT: 0-10
PAIN_FUNCTIONAL_ASSESSMENT: 0-10

## 2024-01-31 ASSESSMENT — PAIN SCALES - GENERAL
PAINLEVEL_OUTOF10: 0

## 2024-01-31 NOTE — DISCHARGE INSTRUCTIONS
Call your doctor for the following:   Chills   Temperature greater than 101   Pain that is not tolerable despite taking pain medicine as ordered   Inability to urinate >12 hours/ or a non-draining vo         No alcoholic beverages, no driving or operating machinery, no making important decisions for 24 hours.     Take your prescribed medications (if any) as instructed.    You may have a normal diet but should eat lightly on the day of surgery.    Drink plenty of fluids.    You may notice some burning or blood with urination, this is normal and should improve over next few days.    You may also take motrin/ibuprofen for pain control, you can alternate this with your other pain medications.    Be sure to take over the counter stool softeners if you notice constipation or hard stools.    Follow up with Dr. Freeman, office will arrange.     Resume blood thinners in 5 days    If you have questions or concerns after office is closed, contact the ER of walk-in clinic to speak with the surgeon on-call. [phone] or [phone].    Intravesicular Mitomycin (Mutamycin®, Mitomycin-C - Given into the bladder)     Pronounced: MY-toe-MY-sin   Classification: Antitumor Antibiotic     About: Intravesicular Mitomycin (Mutamycin®, Mitomycin-C - Given into the bladder)   Mitomycin is an antitumor antibiotic that inhibits DNA synthesis by producing DNA cross-links which halt cell replication and eventually cause cell death. Since cancer cells, in general, divide faster and with less error correcting than healthy cells, they are more sensitive to this damage. This cell damage slows or stops the growth of cancer cells in your body.  How to Take Mitomycin  Mitomycin is given directly into the bladder (called intravesicular), through a catheter, and left in the bladder for 1-2 hours. The dosage and schedule is determined by your healthcare provider.  This drug is blue in color and may make your urine blue-green in color. This  incontinence pad, place it in plastic bag and discard with trash.  Possible Side Effects of Mitomycin Given Into the Bladder  Mitomycin can cause skin irritation if it comes into contact with the skin. Washing the area with soap and water after urinating can reduce this risk.   Some patients will develop a bladder infection after this procedure. If you experience urgency to urinate, burning or pain with urination, blood in the urine or fever, notify your healthcare team right away.   Intravesicular chemotherapy may cause burning with urination, cramps and diarrhea.   In some rare cases, mitomycin can cause a decrease in your blood counts. This can include white blood cells (important for fighting infection), platelets (for blood clotting) and red blood cells. This is more likely to occur when the medication is given after surgery and there is injury in the bladder (cuts, tears), which can allow the medication to be absorbed into the blood stream. However, it can occur with any instillation.

## 2024-01-31 NOTE — H&P
History and Physical    Patient:  Binu Boggs  MRN: [id number]  YOB: 1955    CHIEF COMPLAINT:  bladder tumor    HISTORY OF PRESENT ILLNESS:   The patient is a 68 y.o. male who presents with as above, here for surgery    Patient's old records, notes and chart reviewed and summarized above.     Past Medical History:    Past Medical History:   Diagnosis Date    Anxiety     Aortic regurgitation     Bicuspid aortic valve     Diabetes mellitus (HCC) since March 2018    on Rx.    GERD (gastroesophageal reflux disease)     Hypertension     Left renal mass 10/2018    Osteoarthritis of left knee     Wears glasses        Past Surgical History:    Past Surgical History:   Procedure Laterality Date    CARDIAC CATHETERIZATION  09/12/2022    CYST REMOVAL      tail bone    CYSTOSCOPY N/A 1/17/2024    CYSTOSCOPY performed by Germain Freeman MD at [facility] OR    MOUTH SURGERY  2015    PARTIAL NEPHRECTOMY Left 11/30/2018    ROBOTIC PARTIAL NEPHRECTOMY,     [facility] COLONOSCOPY FLX DX W/COLLJ SPEC WHEN PFRMD N/A 05/14/2018    normal colon, Dr. Noble    VA LAPAROSCOPY SURG PARTIAL NEPHRECTOMY Left 11/30/2018    XI ROBOTIC PARTIAL NEPHRECTOMY, INTRA-OP LAP ULTRASOUND performed by Germain Freeman MD at [facility] OR    TONSILLECTOMY AND ADENOIDECTOMY  1960    WISDOM TOOTH EXTRACTION       Medications Prior to Admission:    Prior to Admission medications    Medication Sig Start Date End Date Taking? Authorizing Provider   levothyroxine (SYNTHROID) 75 MCG tablet Take 1 tablet by mouth daily 1/29/24   Caity Doyle MD   rosuvastatin (CRESTOR) 5 MG tablet Take 1 tablet by mouth daily 1/22/24   Caity Doyle MD   phenazopyridine (PYRIDIUM) 200 MG tablet Take 1 tablet by mouth 3 times daily as needed for Pain  Patient not taking: Reported on 1/31/2024 1/17/24   Germain Freeman MD   DULoxetine (CYMBALTA) 20 MG extended release capsule Take 1 capsule by mouth daily 12/28/23   Caity Doyle MD   losartan-hydroCHLOROthiazide (HYZAAR)  Caity Doyle MD   vitamin D3 (CHOLECALCIFEROL) 400 UNITS TABS tablet Take 1 tablet by mouth every other day    Provider, MD Shayla       Allergies:  Codeine and Sulfa antibiotics    Social History:    Social History     Socioeconomic History    Marital status: Life Partner     Spouse name: Not on file    Number of children: 0    Years of education: Not on file    Highest education level: Not on file   Occupational History    Not on file   Tobacco Use    Smoking status: Never    Smokeless tobacco: Never   Vaping Use    Vaping Use: Never used   Substance and Sexual Activity    Alcohol use: Yes     Comment: very rare    Drug use: No    Sexual activity: Yes     Partners: Male   Other Topics Concern    Not on file   Social History Narrative    Not on file     Social Determinants of Health     Financial Resource Strain: Patient Declined (5/8/2023)    Overall Financial Resource Strain (CARDIA)     Difficulty of Paying Living Expenses: Patient declined   Food Insecurity: Not on file (5/8/2023)   Transportation Needs: Unknown (5/8/2023)    PRAPARE - Transportation     Lack of Transportation (Medical): Not on file     Lack of Transportation (Non-Medical): Patient declined   Physical Activity: Insufficiently Active (11/8/2022)    Exercise Vital Sign     Days of Exercise per Week: 1 day     Minutes of Exercise per Session: 10 min   Stress: Not on file   Social Connections: Not on file   Intimate Partner Violence: Not on file   Housing Stability: Unknown (5/8/2023)    Housing Stability Vital Sign     Unable to Pay for Housing in the Last Year: Not on file     Number of Places Lived in the Last Year: Not on file     Unstable Housing in the Last Year: Patient refused       Family History:    Family History   Problem Relation Age of Onset    High Blood Pressure Mother     Diabetes Mother         impaired fasting glucose    Other Mother         short term memory loss after MVA    Glaucoma Mother     Stroke Father 78    High

## 2024-01-31 NOTE — ANESTHESIA POSTPROCEDURE EVALUATION
Department of Anesthesiology  Postprocedure Note    Patient: Binu Boggs  MRN: [id number]  YOB: 1955  Date of evaluation: 1/31/2024    Procedure Summary       Date: 01/31/24 Room / Location: [address]    Anesthesia Start: 0739 Anesthesia Stop: 0829    Procedure: CYSTOSCOPY Transuretheral Resection Bladder (TURBT) (Penis) Diagnosis:       Bladder mass      (Bladder mass [N32.89])    Surgeons: Germain Freeman MD Responsible Provider: Angelito Arias APRN - CRNA    Anesthesia Type: General ASA Status: 3            Anesthesia Type: General    Heranndez Phase I: Hernandez Score: 7    Hernandez Phase II:      Anesthesia Post Evaluation    Patient location during evaluation: PACU  Patient participation: complete - patient cannot participate  Level of consciousness: responsive to light touch  Pain score: 0  Airway patency: patent  Nausea & Vomiting: no nausea  Cardiovascular status: blood pressure returned to baseline  Respiratory status: nasal cannula  Hydration status: euvolemic  Pain management: adequate    No notable events documented.

## 2024-01-31 NOTE — ANESTHESIA PRE PROCEDURE
Department of Anesthesiology  Preprocedure Note       Name:  Binu Boggs   Age:  68 y.o.  :  1955                                          MRN:  [id number]         Date:  2024      Surgeon: Surgeon(s):  Germain Freeman MD    Procedure: Procedure(s):  CYSTOSCOPY Transuretheral Resection Bladder (TURBT)    Medications prior to admission:   Prior to Admission medications    Medication Sig Start Date End Date Taking? Authorizing Provider   levothyroxine (SYNTHROID) 75 MCG tablet Take 1 tablet by mouth daily 24   Caity Doyle MD   rosuvastatin (CRESTOR) 5 MG tablet Take 1 tablet by mouth daily 24   Caity Doyle MD   phenazopyridine (PYRIDIUM) 200 MG tablet Take 1 tablet by mouth 3 times daily as needed for Pain  Patient not taking: Reported on 2024   Germain Freeman MD   DULoxetine (CYMBALTA) 20 MG extended release capsule Take 1 capsule by mouth daily 23   Caity Doyle MD   losartan-hydroCHLOROthiazide (HYZAAR) 100-25 MG per tablet Take 1 tablet by mouth daily 23   Caity Doyle MD   furosemide (LASIX) 20 MG tablet Take 1 tablet by mouth daily 10/16/23   Caity Doyle MD   cetirizine (ZYRTEC) 10 MG tablet Take 1 tablet by mouth daily 23   Caity Doyle MD   celecoxib (CELEBREX) 100 MG capsule Take 1 capsule by mouth 2 times daily as needed for Pain 23   Caity Doyle MD   turmeric 500 MG CAPS Take 1 capsule by mouth daily    Provider, MD Shayla   metFORMIN (GLUCOPHAGE) 500 MG tablet Take 1 tablet by mouth 2 times daily (with meals) 23   Caity Doyle MD   montelukast (SINGULAIR) 10 MG tablet Take 1 tablet by mouth nightly 23   Caity Doyle MD   amLODIPine (NORVASC) 10 MG tablet Take 1 tablet by mouth daily 23   Nydia Gallego PA   Semaglutide (RYBELSUS) 14 MG TABS Take 1 tablet by mouth daily 23   Caity Doyle MD   omeprazole (PRILOSEC) 40 MG delayed release capsule Take 1 capsule by mouth every

## 2024-02-01 LAB — SURGICAL PATHOLOGY REPORT: NORMAL

## 2024-02-07 ENCOUNTER — OFFICE VISIT (OUTPATIENT)
Dept: UROLOGY | Age: 69
End: 2024-02-07
Payer: MEDICARE

## 2024-02-07 VITALS
HEIGHT: 71 IN | RESPIRATION RATE: 16 BRPM | SYSTOLIC BLOOD PRESSURE: 122 MMHG | HEART RATE: 84 BPM | BODY MASS INDEX: 30.46 KG/M2 | OXYGEN SATURATION: 96 % | WEIGHT: 217.6 LBS | DIASTOLIC BLOOD PRESSURE: 62 MMHG

## 2024-02-07 DIAGNOSIS — N13.8 BPH WITH OBSTRUCTION/LOWER URINARY TRACT SYMPTOMS: ICD-10-CM

## 2024-02-07 DIAGNOSIS — C64.2 RENAL CELL CARCINOMA OF LEFT KIDNEY (HCC): Primary | ICD-10-CM

## 2024-02-07 DIAGNOSIS — N40.1 BPH WITH OBSTRUCTION/LOWER URINARY TRACT SYMPTOMS: ICD-10-CM

## 2024-02-07 DIAGNOSIS — C67.2 MALIGNANT NEOPLASM OF LATERAL WALL OF URINARY BLADDER (HCC): ICD-10-CM

## 2024-02-07 DIAGNOSIS — N52.9 ERECTILE DYSFUNCTION, UNSPECIFIED ERECTILE DYSFUNCTION TYPE: ICD-10-CM

## 2024-02-07 DIAGNOSIS — R31.0 GROSS HEMATURIA: ICD-10-CM

## 2024-02-07 PROCEDURE — 1036F TOBACCO NON-USER: CPT | Performed by: UROLOGY

## 2024-02-07 PROCEDURE — 3078F DIAST BP <80 MM HG: CPT | Performed by: UROLOGY

## 2024-02-07 PROCEDURE — 3017F COLORECTAL CA SCREEN DOC REV: CPT | Performed by: UROLOGY

## 2024-02-07 PROCEDURE — 1123F ACP DISCUSS/DSCN MKR DOCD: CPT | Performed by: UROLOGY

## 2024-02-07 PROCEDURE — 3074F SYST BP LT 130 MM HG: CPT | Performed by: UROLOGY

## 2024-02-07 PROCEDURE — G8417 CALC BMI ABV UP PARAM F/U: HCPCS | Performed by: UROLOGY

## 2024-02-07 PROCEDURE — 99214 OFFICE O/P EST MOD 30 MIN: CPT | Performed by: UROLOGY

## 2024-02-07 PROCEDURE — 99205 OFFICE O/P NEW HI 60 MIN: CPT

## 2024-02-07 PROCEDURE — G8484 FLU IMMUNIZE NO ADMIN: HCPCS | Performed by: UROLOGY

## 2024-02-07 PROCEDURE — G8427 DOCREV CUR MEDS BY ELIG CLIN: HCPCS | Performed by: UROLOGY

## 2024-02-07 NOTE — PROGRESS NOTES
Germain Freeman MD   Urology Clinic Progress Note      Patient:  Binu Boggs  YOB: 1955  Date: 2/7/2024    HISTORY OF PRESENT ILLNESS:   The patient is a 68 y.o. male who presents today for follow-up for the following problem(s): left renal mass  Overall the problem(s) : show no change.  Associated Symptoms: No dysuria, gross hematuria.  Pain Severity:      Summary of old records:left renal mass noted on Chest CT for [facility]    11/30/2018, left partial nephrectomy  -- Diagnosis --   KIDNEY, PARTIAL NEPHRECTOMY (LEFT):       - RENAL CELL CARCINOMA, CLEAR CELL TYPE WITH PROMINENT CYSTIC        CHANGE, GRADE 2.       - PARENCHYMAL AND SOFT TISSUE SURGICAL MARGINS NEGATIVE FOR         NEOPLASM.      Additional History:   Doing well.  No recent issues.  No gross hematuria.  No flank pain. No new LUTS    Stable post partial left nephrectomy changes.  No recurrent mass or acute abnormality.  Known slightly larger right renal cyst.       Had painless gross hematuria- one episode over the summer  No inciting event as he recalls    I independently reviewed and verified the images and reports from:    US RENAL COMPLETE    Result Date: 12/21/2023  EXAMINATION: RETROPERITONEAL ULTRASOUND OF THE KIDNEYS AND URINARY BLADDER 12/21/2023 COMPARISON: None HISTORY: ORDERING SYSTEM PROVIDED HISTORY: Renal cell carcinoma of left kidney (HCC) FINDINGS: Kidneys: The right kidney measures 12.3 cm in length and the left kidney measures 11.1 cm in length. Kidneys demonstrate normal cortical echogenicity.  No evidence of hydronephrosis or intrarenal stones.  Right renal cyst 23 mm. Bladder: Urinary bladder not distended and not evaluated.     Right renal cyst.  Otherwise unremarkable kidneys Urinary bladder not evaluated due to nondistention.             Last several PSA's:  Lab Results   Component Value Date    PSA 1.15 12/21/2023    PSA 0.92 12/19/2022    PSA 0.77 01/26/2016       Last total testosterone:  Lab

## 2024-02-07 NOTE — OP NOTE
GETA was administered. Patient was positioned in dorsolithotomy position and genitals were prepped and draped in sterile fashion. Time out was performed. We placed visual obturator scope within the urethra and into the bladder. A pan-cystoscopy was performed and showed tumor at the: right lateral wall mass. We switched to a resectoscope. We then resected the tumor systematically until we reached the stalk. We resected al visible tumor and deep enough to incorporate muscle into the specimen. Resections size 4-5 cm.  We then obtained adequate hemostasis. We used PanX evacuator to remove all specimen. This concluded the case. He tolerated the procedure well. We decided  to  a vo catheter in place. We decided  to instill mitomycin    Vo removed in PACU after 1 hour    Plan/Follow up: 1 week for path review     Electronically signed by NITHIN LIZAMA MD on 2/7/2024 at 4:06 PM

## 2024-02-11 ENCOUNTER — PATIENT MESSAGE (OUTPATIENT)
Dept: FAMILY MEDICINE CLINIC | Age: 69
End: 2024-02-11

## 2024-02-11 DIAGNOSIS — I10 ESSENTIAL HYPERTENSION: ICD-10-CM

## 2024-02-12 RX ORDER — AMLODIPINE BESYLATE 10 MG/1
10 TABLET ORAL DAILY
Qty: 90 TABLET | Refills: 1 | Status: SHIPPED | OUTPATIENT
Start: 2024-02-12

## 2024-02-12 NOTE — TELEPHONE ENCOUNTER
From: Binu Boggs  To: Dr. Caity Doyle  Sent: 2/11/2024 10:14 AM EST  Subject: Refill    Hello,  I need a refill of my amlodipine Besylate 10mg  No more refills and I have enough for 1 more week  ThanksChriss

## 2024-02-12 NOTE — TELEPHONE ENCOUNTER
Binu called requesting a refill of the below medication which has been pended for you:     Requested Prescriptions     Pending Prescriptions Disp Refills    amLODIPine (NORVASC) 10 MG tablet 30 tablet 0     Sig: Take 1 tablet by mouth daily       Last Appointment Date: 12/28/2023  Next Appointment Date: 6/28/2024    Allergies   Allergen Reactions    Codeine      Severe Abdominal pain  Severe stomach pain  Severe Abdominal pain    Sulfa Antibiotics      Patient unsure of reaction

## 2024-03-04 ENCOUNTER — PATIENT MESSAGE (OUTPATIENT)
Dept: FAMILY MEDICINE CLINIC | Age: 69
End: 2024-03-04

## 2024-03-04 DIAGNOSIS — E11.9 TYPE 2 DIABETES MELLITUS WITHOUT COMPLICATION, WITHOUT LONG-TERM CURRENT USE OF INSULIN (HCC): ICD-10-CM

## 2024-03-04 NOTE — TELEPHONE ENCOUNTER
From: Binu Boggs  To: Dr. Caity Doyle  Sent: 3/4/2024 8:06 AM EST  Subject: refill and another questions    I am almost out of my Metformin  pills with no refill, can you refill my perscription?  Also Malgorzata had a cold the last few day with coughing, congestion, drainage, runny nose.... can I take DayQuil/NyQuil with the other meds I am taking or do you have some thing better/different to take?  Thanks,  Chriss

## 2024-03-24 ENCOUNTER — PATIENT MESSAGE (OUTPATIENT)
Dept: FAMILY MEDICINE CLINIC | Age: 69
End: 2024-03-24

## 2024-03-25 ENCOUNTER — OFFICE VISIT (OUTPATIENT)
Dept: DERMATOLOGY | Age: 69
End: 2024-03-25
Payer: MEDICARE

## 2024-03-25 VITALS
DIASTOLIC BLOOD PRESSURE: 62 MMHG | HEIGHT: 71 IN | WEIGHT: 217 LBS | BODY MASS INDEX: 30.38 KG/M2 | SYSTOLIC BLOOD PRESSURE: 132 MMHG | HEART RATE: 72 BPM | RESPIRATION RATE: 14 BRPM

## 2024-03-25 DIAGNOSIS — L72.0 MILIAL CYST: ICD-10-CM

## 2024-03-25 DIAGNOSIS — Z85.828 HISTORY OF BASAL CELL CARCINOMA: ICD-10-CM

## 2024-03-25 DIAGNOSIS — L82.1 SEBORRHEIC KERATOSES: ICD-10-CM

## 2024-03-25 DIAGNOSIS — L30.8 ASTEATOTIC ECZEMA: ICD-10-CM

## 2024-03-25 DIAGNOSIS — L57.0 ACTINIC KERATOSES: Primary | ICD-10-CM

## 2024-03-25 DIAGNOSIS — L73.9 FOLLICULITIS: ICD-10-CM

## 2024-03-25 DIAGNOSIS — L73.2 HIDRADENITIS SUPPURATIVA: ICD-10-CM

## 2024-03-25 PROCEDURE — 99213 OFFICE O/P EST LOW 20 MIN: CPT

## 2024-03-25 PROCEDURE — 17003 DESTRUCT PREMALG LES 2-14: CPT | Performed by: PHYSICIAN ASSISTANT

## 2024-03-25 PROCEDURE — 17000 DESTRUCT PREMALG LESION: CPT | Performed by: PHYSICIAN ASSISTANT

## 2024-03-25 RX ORDER — TRIAMCINOLONE ACETONIDE 1 MG/G
OINTMENT TOPICAL
Qty: 454 G | Refills: 2 | Status: SHIPPED | OUTPATIENT
Start: 2024-03-25

## 2024-03-25 RX ORDER — CLINDAMYCIN PHOSPHATE 10 UG/ML
LOTION TOPICAL
Qty: 60 G | Refills: 5 | Status: SHIPPED | OUTPATIENT
Start: 2024-03-25 | End: 2024-04-24

## 2024-03-25 RX ORDER — CELECOXIB 100 MG/1
100 CAPSULE ORAL 2 TIMES DAILY PRN
Qty: 180 CAPSULE | Refills: 1 | Status: SHIPPED | OUTPATIENT
Start: 2024-03-25

## 2024-03-25 RX ORDER — OMEPRAZOLE 40 MG/1
40 CAPSULE, DELAYED RELEASE ORAL
Qty: 90 CAPSULE | Refills: 1 | Status: SHIPPED | OUTPATIENT
Start: 2024-03-25

## 2024-03-25 NOTE — PATIENT INSTRUCTIONS
- continue BPO wash (benzoyl peroxide 5 % external liquid) daily on the scalp, and use on the right axilla  - continue clindamycin 1% lotion on the scalp, and on the right axilla  - triamcinolone cream for the lower legs for itching. Can use on the right ear if needed  - Discussed dry skin care with patient, which includes the following:  A)  Warm, short showers  B)  No scrubbing devices in shower  C)  Dove Sensitive Skin soap  D)  CeraVe cream immediately after showering, while skin is still damp

## 2024-03-25 NOTE — TELEPHONE ENCOUNTER
From: Binu Boggs  To: Dr. Caity Doyle  Sent: 3/24/2024 2:19 PM EDT  Subject: refills    Hello... I have 2 refills needed   Omeprazole Dr 40mg   Celecoxib 100mg  Thanks   General

## 2024-03-25 NOTE — TELEPHONE ENCOUNTER
Binu called requesting a refill of the below medication which has been pended for you:     Requested Prescriptions     Pending Prescriptions Disp Refills    celecoxib (CELEBREX) 100 MG capsule 180 capsule 1     Sig: Take 1 capsule by mouth 2 times daily as needed for Pain    omeprazole (PRILOSEC) 40 MG delayed release capsule 90 capsule 1     Sig: Take 1 capsule by mouth every morning (before breakfast)       Last Appointment Date: 12/28/2023  Next Appointment Date: 6/28/2024    Allergies   Allergen Reactions    Codeine      Severe Abdominal pain  Severe stomach pain  Severe Abdominal pain    Sulfa Antibiotics      Patient unsure of reaction

## 2024-03-25 NOTE — PROGRESS NOTES
Dermatology Patient Note  [facility], [facility]  [facility] A DEPARTMENT OF [place]  [address] [facility] [address]  Dept: [phone]  Dept Fax: [phone]      VISITDATE: 3/25/2024   REFERRING PROVIDER: No ref. provider found      Binu Boggs is a 68 y.o. male  who presents today in the office for:    Other (FBSE)      HISTORY OF PRESENT ILLNESS:  Patient presents for one year FBSE. He reports two areas of concern on his right and left ears. He denies any itching, but that they are bothersome. Triamcinolone was sent in , however it was the injectable and not cream. He additionally notes spot on the right lower lip that is bothersome. He denies any changes in size of these lesions. He does note a lesion on the nose that has been growing. It is hard, and he frequently picks at is.     History of folliculitis of the scalp for which he was using BPO wash and clindamycin 1% solution. He reports improvement since LV, but has not been using products recently. He states that he has not been washing his hair daily, and that has provided benefit. He notes scab-like area of concern on the scalp. He has a history of AK's.    He reports intermittent \"pimple-like\" lesions in the bilateral axilla. He denies issues with it today, but that it is firm and red.     He has a history of BCC.     MEDICAL PROBLEMS:  Patient Active Problem List    Diagnosis Date Noted    Primary osteoarthritis of both knees 11/08/2022     Priority: Medium    Major depressive disorder, recurrent, mild 11/13/2023    Major depressive disorder, recurrent, moderate 11/13/2023    Major depressive disorder, recurrent, unspecified 11/13/2023    Mixed hyperlipidemia 09/25/2023    Hypothyroidism due to Hashimoto's thyroiditis 09/25/2023    Chronic midline low back pain without sciatica 01/20/2022    Erectile dysfunction 01/20/2022    Renal cell carcinoma (HCC) 02/12/2020

## 2024-03-26 ENCOUNTER — TELEPHONE (OUTPATIENT)
Dept: UROLOGY | Age: 69
End: 2024-03-26

## 2024-03-26 NOTE — TELEPHONE ENCOUNTER
[facility]         Patient:Binu Boggs           :1955           Surgical/Procedure Planned: Dr Freeman    Date & Location: 5/15/24 [facility]       Outpatient   Planned Length of OR: 1 hour    Sedation: MAC    This is notification of the scheduled procedure only:  Cardiac clearance    Estimated Cardiac Risk for Non-Cardiac Surgery/Procedure     Low___X___ Moderate______ High______    Medication Instructions - Clarification needed by this date:     Resume medications:    Aspirin      Lovenox indicated: _____Yes __X___NO    Provider: Dr. Freeman      Signature of Provider Giving Orders for Medication holds:    ____Alcira Erickson DO  _________________________________________

## 2024-03-28 NOTE — TELEPHONE ENCOUNTER
[facility]     Pre-Operative Evaluation/Consultation    Name:  Binu Boggs                                         Age:  68 y.o.  MRN:  [id number]       :  1955   Date:  3/28/2024         Sex: male    There were no encounter diagnoses.    Surgeon:  Dr. Freeman  Procedure (Planned):  cystoscopy  Date Scheduled surgery: 5/15/24    Attending : No att. providers found    Primary Physician:   Cardiologist:     Type of Anesthesia Requested: Monitored Anesthesia Care    Patient Medical history:  Allergies   Allergen Reactions    Codeine      Severe Abdominal pain  Severe stomach pain  Severe Abdominal pain    Sulfa Antibiotics      Patient unsure of reaction     Social History     Tobacco Use    Smoking status: Never    Smokeless tobacco: Never   Vaping Use    Vaping Use: Never used   Substance Use Topics    Alcohol use: Yes     Comment: very rare    Drug use: No         Additional ordered pre-operative testing:  []CBC    []ABG      [] BMP   []URINALYSIS   []CMP    []HCG   []COAGS PT/INR  []T&C  []LFTs   []TYPE AND SCREEN    [] EKG  [] Chest X-Ray  [] Other Radiology    [] Sent to Hospitalist None  [] Sent to Anesthesia for your review: None   [] Additional Orders: None     Comments:None   Requests: No Special requests    Signed: Albina Caldwell LPN 3/28/2024 8:26 AM

## 2024-03-29 NOTE — TELEPHONE ENCOUNTER
Spoke with patient all instructions given, patient verbalized understanding and will call with any questions

## 2024-04-01 DIAGNOSIS — I10 ESSENTIAL HYPERTENSION: ICD-10-CM

## 2024-04-01 NOTE — TELEPHONE ENCOUNTER
Problem: Adult Inpatient Plan of Care  Goal: Plan of Care Review  Outcome: Progressing  Flowsheets (Taken 11/24/2020 6064)  Progress: improving  Plan of Care Reviewed With: patient  Outcome Summary: PT session completed.  Pt presents to PT w/ impaired mob and gait.  R hip fx, WBAT, incr'd pain.  Deficits in posture, balance, strength.  Incr'd falls risk.  Min A all mobility including transitional postures and dynamic sitting balance outside [facility].  He will benefit from continued PT care to maximize funct mob and safety.      View All Conversations on this Encounter  Binu Boggs \"Chriss\"  P [facility] Clinical Staff (supporting Usman Erickson DO)2 hours ago (1:58 PM)     RS  Dr Erickson  my refills for my Carvedilol 25mg has   I have enough for this week, can you please send a refill prescription to [facility] for me!!  Thanks  Chriss Boggs  May 6, 1955

## 2024-04-04 RX ORDER — CARVEDILOL 25 MG/1
37.5 TABLET ORAL 2 TIMES DAILY
Qty: 270 TABLET | Refills: 3 | Status: SHIPPED | OUTPATIENT
Start: 2024-04-04

## 2024-04-15 ENCOUNTER — PATIENT MESSAGE (OUTPATIENT)
Dept: FAMILY MEDICINE CLINIC | Age: 69
End: 2024-04-15

## 2024-04-15 DIAGNOSIS — M79.89 LEG SWELLING: ICD-10-CM

## 2024-04-15 RX ORDER — FUROSEMIDE 20 MG/1
20 TABLET ORAL DAILY
Qty: 90 TABLET | Refills: 1 | Status: SHIPPED | OUTPATIENT
Start: 2024-04-15

## 2024-04-15 NOTE — TELEPHONE ENCOUNTER
Binu called requesting a refill of the below medication which has been pended for you:     Requested Prescriptions     Pending Prescriptions Disp Refills    furosemide (LASIX) 20 MG tablet 90 tablet 1     Sig: Take 1 tablet by mouth daily       Last Appointment Date: 12/28/2023  Next Appointment Date: 6/28/2024    Allergies   Allergen Reactions    Codeine      Severe Abdominal pain  Severe stomach pain  Severe Abdominal pain    Sulfa Antibiotics      Patient unsure of reaction

## 2024-04-15 NOTE — TELEPHONE ENCOUNTER
From: Binu Boggs  To: Dr. Caity Doyle  Sent: 4/15/2024 12:18 PM EDT  Subject: refills    Hello, I only have a few more pill and I am out of my Furosemide 20 mg tablets.  Can I get a refill please  Chriss

## 2024-05-01 ENCOUNTER — PATIENT MESSAGE (OUTPATIENT)
Dept: FAMILY MEDICINE CLINIC | Age: 69
End: 2024-05-01

## 2024-05-01 RX ORDER — ORAL SEMAGLUTIDE 14 MG/1
1 TABLET ORAL DAILY
Qty: 90 TABLET | Refills: 3 | Status: SHIPPED | OUTPATIENT
Start: 2024-05-01

## 2024-05-01 NOTE — TELEPHONE ENCOUNTER
From: Binu Boggs  To: Dr. Caity Doyle  Sent: 5/1/2024 9:44 AM EDT  Subject: refills    Morning, Can I get my Rybelsus refilled (90 days)  Thanks, Chriss

## 2024-05-01 NOTE — TELEPHONE ENCOUNTER
Binu called requesting a refill of the below medication which has been pended for you:     Requested Prescriptions     Pending Prescriptions Disp Refills    Semaglutide (RYBELSUS) 14 MG TABS 90 tablet 3     Sig: Take 1 tablet by mouth daily       Last Appointment Date: 12/28/2023  Next Appointment Date: 6/28/2024    Allergies   Allergen Reactions    Codeine      Severe Abdominal pain  Severe stomach pain  Severe Abdominal pain    Sulfa Antibiotics      Patient unsure of reaction

## 2024-05-09 NOTE — PROCEDURES
[facility] [address]                 [address] [place] [id number]                              CARDIAC STRESS TEST    PATIENT NAME: Lorrie Gonzalez                     :        1955  MED REC NO:   [id number]                             ROOM:  ACCOUNT NO:   [de-identified]                           ADMIT DATE: 2022  PROVIDER:     Phong Piper    DATE OF STUDY:  2022    STRESS TEST    Ordering Provider: Rolo Joseph DO    Primary Care Provider: Yimi Kline MD    Patient's Age: 79     Height: 5 feet 11 inches  Weight: 240 pounds    INDICATION:  Hypertension, murmur, fatigue, abnormal ECG. Lexiscan 0.4 mg injected over 10 seconds. IV Cardiolite injected 20 seconds post Lexiscan injection. Heart Rate: 67 Resting blood pressure:  147/99              HR   BP  1 min          89   132/80  2 min  3 min          84   131/71  4 min  5 min          81   130/67  6 min  7 min          78   131/66  8 min  9 min          80   133/67  10 min    Symptoms:  Chest Pain: Yes  Nausea: No  Headache:  No  Shortness of breath: Yes  Other: Yes, \"heaviness all over. \"    Resting EKG:  Normal sinus rhythm, voltage continue for left ventricular  hypertrophy. Arrhythmias: None. EKG changes:  No change from baseline to suggest ischemia. Maximum changes: N/A    Leads with maximum changes: N/A        INTERPRETATION:  1. Negative ECG portion of stress test for ischemia. 2. Nuclear scan report to follow.     Nuclear Myocardial Perfusion Imaging (SPECT)    TESTING METHOD  STRESS:   Lexiscan  AGENT:    Cardiolite  REST:          Injection Date:  2022  Time:  1300  Amount:  10.45  mCi  STRESS:   Injection Date:  2022  Time:  1354  Amount:  30.1 mCi  IMAGE TIME:    Rest:  1332  Stress:  1430    EF:  64%  TID:  0.98  LHR:  0.70    FINDINGS:  Ischemia (Reversible Defect):           No  Infarction (Irreversible Defect):       Yes, small apical  Normal ejection fraction: Yes  Segmental wall motion:                  Yes    Low study. IMPRESSION:  1. No definitive reversible perfusion defects to suggest ischemia. 2. Small apical fixed defect - infarction vs apical thinning. 3. Moderate-sized fixed inferior defect, likely infarction. 4. Normal left ventricular ejection fraction.         [facility]    D: 06/02/2022 15:57:21       T: 06/02/2022 16:59:36     JESSE/VIKAS_EDIT  Job#: [id number]     Doc#: Unknown    CC:  Gilles Erickson DO left breast left breast Sx

## 2024-05-13 ENCOUNTER — OFFICE VISIT (OUTPATIENT)
Dept: CARDIOLOGY | Age: 69
End: 2024-05-13
Payer: MEDICARE

## 2024-05-13 VITALS
HEIGHT: 71 IN | DIASTOLIC BLOOD PRESSURE: 74 MMHG | SYSTOLIC BLOOD PRESSURE: 120 MMHG | BODY MASS INDEX: 30.38 KG/M2 | OXYGEN SATURATION: 98 % | HEART RATE: 68 BPM | WEIGHT: 217 LBS

## 2024-05-13 DIAGNOSIS — I25.84 CORONARY ARTERY CALCIFICATION: ICD-10-CM

## 2024-05-13 DIAGNOSIS — R94.39 ABNORMAL NUCLEAR STRESS TEST: ICD-10-CM

## 2024-05-13 DIAGNOSIS — Q23.1 AORTIC REGURGITATION DUE TO BICUSPID AORTIC VALVE: ICD-10-CM

## 2024-05-13 DIAGNOSIS — I25.10 CORONARY ARTERY CALCIFICATION: ICD-10-CM

## 2024-05-13 DIAGNOSIS — Q23.1 BICUSPID AORTIC VALVE: ICD-10-CM

## 2024-05-13 DIAGNOSIS — Z98.890 S/P CARDIAC CATH: ICD-10-CM

## 2024-05-13 DIAGNOSIS — I71.21 ANEURYSM OF ASCENDING AORTA WITHOUT RUPTURE (HCC): ICD-10-CM

## 2024-05-13 DIAGNOSIS — E78.00 PURE HYPERCHOLESTEROLEMIA: ICD-10-CM

## 2024-05-13 DIAGNOSIS — I10 ESSENTIAL HYPERTENSION: Primary | ICD-10-CM

## 2024-05-13 PROCEDURE — 99214 OFFICE O/P EST MOD 30 MIN: CPT | Performed by: INTERNAL MEDICINE

## 2024-05-13 PROCEDURE — 3078F DIAST BP <80 MM HG: CPT | Performed by: INTERNAL MEDICINE

## 2024-05-13 PROCEDURE — 93005 ELECTROCARDIOGRAM TRACING: CPT | Performed by: INTERNAL MEDICINE

## 2024-05-13 PROCEDURE — 3017F COLORECTAL CA SCREEN DOC REV: CPT | Performed by: INTERNAL MEDICINE

## 2024-05-13 PROCEDURE — 1036F TOBACCO NON-USER: CPT | Performed by: INTERNAL MEDICINE

## 2024-05-13 PROCEDURE — G8427 DOCREV CUR MEDS BY ELIG CLIN: HCPCS | Performed by: INTERNAL MEDICINE

## 2024-05-13 PROCEDURE — 3074F SYST BP LT 130 MM HG: CPT | Performed by: INTERNAL MEDICINE

## 2024-05-13 PROCEDURE — G8417 CALC BMI ABV UP PARAM F/U: HCPCS | Performed by: INTERNAL MEDICINE

## 2024-05-13 PROCEDURE — 1123F ACP DISCUSS/DSCN MKR DOCD: CPT | Performed by: INTERNAL MEDICINE

## 2024-05-13 PROCEDURE — 93010 ELECTROCARDIOGRAM REPORT: CPT | Performed by: INTERNAL MEDICINE

## 2024-05-13 NOTE — PATIENT INSTRUCTIONS
Central Scheduling will call you to book your testing appointment. If you do not receive a call within 48 hours, please call their number at [phone] and push option 1. If you have any questions or concerns, call Cardiology at [phone].  The Cardiopulmonary Testing Facility is located in the basement of [facility]. Please check in for your testing appointment [facility] RADIOLOGY

## 2024-05-13 NOTE — PROGRESS NOTES
around 9/24  Ascending aortic aneurysm- 4.3 cm max diameter on CTA 9/21- (previously larger 4.7 cm on 10/19). Improved to 4.1 on CTA 9/23.  Continue aggressive BP control/Afterload reduction. On Norvasc, Coreg, Hyzaar. CTA to be done around 9/24(okay to do it in [place].   Renal and bladder CA- following with urology/oncology  HTN- well controlled. Continue current meds.  Overweight: encouraged diet, exercise, and discussed weight loss extensively.  Hyperlipidemia- continue statin.     The patient was consulted on signs and symptoms of CVD/CHF/ACS and notified when to call office. The patient is to continue heart healthy diet, weight loss and exercise as tolerated. Patient's medications and side effects were discussed. Medication refills were provided if needed. Follow up appointment timing was discussed. All questions and concerns were addressed to patient's satisfaction.     The patient is to follow up in 6 months or sooner if necessary.     Thank you for allowing me to participate in the care of this patient, please do not hesitate to call if you have any questions.    Usman Erickson DO, FACC, RPVI, SHARI, GUDELIA  [facility]  [facility]  [phone]    This note was created with the assistance of a speech-recognition program.  Although the intention is to generate a document that actually reflects the content of the visit, no guarantees can be provided that every mistake has been identified and corrected by editing.

## 2024-05-15 ENCOUNTER — ANESTHESIA (OUTPATIENT)
Dept: OPERATING ROOM | Age: 69
End: 2024-05-15
Payer: MEDICARE

## 2024-05-15 ENCOUNTER — HOSPITAL ENCOUNTER (OUTPATIENT)
Age: 69
Setting detail: OUTPATIENT SURGERY
Discharge: HOME | End: 2024-05-15
Attending: UROLOGY | Admitting: UROLOGY
Payer: MEDICARE

## 2024-05-15 ENCOUNTER — ANESTHESIA EVENT (OUTPATIENT)
Dept: OPERATING ROOM | Age: 69
End: 2024-05-15
Payer: MEDICARE

## 2024-05-15 VITALS
BODY MASS INDEX: 30.21 KG/M2 | HEIGHT: 71 IN | SYSTOLIC BLOOD PRESSURE: 111 MMHG | TEMPERATURE: 98.4 F | RESPIRATION RATE: 16 BRPM | WEIGHT: 215.8 LBS | HEART RATE: 69 BPM | DIASTOLIC BLOOD PRESSURE: 64 MMHG | OXYGEN SATURATION: 96 %

## 2024-05-15 LAB — GLUCOSE BLD-MCNC: 126 MG/DL (ref 75–110)

## 2024-05-15 PROCEDURE — 2580000003 HC RX 258: Performed by: UROLOGY

## 2024-05-15 PROCEDURE — 2580000003 HC RX 258: Performed by: NURSE ANESTHETIST, CERTIFIED REGISTERED

## 2024-05-15 PROCEDURE — 7100000011 HC PHASE II RECOVERY - ADDTL 15 MIN: Performed by: UROLOGY

## 2024-05-15 PROCEDURE — 2709999900 HC NON-CHARGEABLE SUPPLY: Performed by: UROLOGY

## 2024-05-15 PROCEDURE — 6360000002 HC RX W HCPCS: Performed by: UROLOGY

## 2024-05-15 PROCEDURE — 82947 ASSAY GLUCOSE BLOOD QUANT: CPT

## 2024-05-15 PROCEDURE — 6360000002 HC RX W HCPCS: Performed by: NURSE ANESTHETIST, CERTIFIED REGISTERED

## 2024-05-15 PROCEDURE — 3600000002 HC SURGERY LEVEL 2 BASE: Performed by: UROLOGY

## 2024-05-15 PROCEDURE — 3700000000 HC ANESTHESIA ATTENDED CARE: Performed by: UROLOGY

## 2024-05-15 PROCEDURE — 3600000012 HC SURGERY LEVEL 2 ADDTL 15MIN: Performed by: UROLOGY

## 2024-05-15 PROCEDURE — 2500000003 HC RX 250 WO HCPCS: Performed by: NURSE ANESTHETIST, CERTIFIED REGISTERED

## 2024-05-15 PROCEDURE — 7100000010 HC PHASE II RECOVERY - FIRST 15 MIN: Performed by: UROLOGY

## 2024-05-15 PROCEDURE — 3700000001 HC ADD 15 MINUTES (ANESTHESIA): Performed by: UROLOGY

## 2024-05-15 RX ORDER — KETOROLAC TROMETHAMINE 30 MG/ML
INJECTION, SOLUTION INTRAMUSCULAR; INTRAVENOUS PRN
Status: DISCONTINUED | OUTPATIENT
Start: 2024-05-15 | End: 2024-05-15 | Stop reason: SDUPTHER

## 2024-05-15 RX ORDER — SODIUM CHLORIDE, SODIUM LACTATE, POTASSIUM CHLORIDE, CALCIUM CHLORIDE 600; 310; 30; 20 MG/100ML; MG/100ML; MG/100ML; MG/100ML
INJECTION, SOLUTION INTRAVENOUS CONTINUOUS PRN
Status: DISCONTINUED | OUTPATIENT
Start: 2024-05-15 | End: 2024-05-15 | Stop reason: SDUPTHER

## 2024-05-15 RX ORDER — SODIUM CHLORIDE 0.9 % (FLUSH) 0.9 %
5-40 SYRINGE (ML) INJECTION PRN
Status: DISCONTINUED | OUTPATIENT
Start: 2024-05-15 | End: 2024-05-15 | Stop reason: HOSPADM

## 2024-05-15 RX ORDER — PROPOFOL 10 MG/ML
INJECTION, EMULSION INTRAVENOUS PRN
Status: DISCONTINUED | OUTPATIENT
Start: 2024-05-15 | End: 2024-05-15 | Stop reason: SDUPTHER

## 2024-05-15 RX ORDER — SODIUM CHLORIDE, SODIUM LACTATE, POTASSIUM CHLORIDE, CALCIUM CHLORIDE 600; 310; 30; 20 MG/100ML; MG/100ML; MG/100ML; MG/100ML
INJECTION, SOLUTION INTRAVENOUS CONTINUOUS
Status: DISCONTINUED | OUTPATIENT
Start: 2024-05-15 | End: 2024-05-15 | Stop reason: HOSPADM

## 2024-05-15 RX ORDER — SODIUM CHLORIDE 9 MG/ML
INJECTION, SOLUTION INTRAVENOUS PRN
Status: DISCONTINUED | OUTPATIENT
Start: 2024-05-15 | End: 2024-05-15 | Stop reason: HOSPADM

## 2024-05-15 RX ORDER — SODIUM CHLORIDE 0.9 % (FLUSH) 0.9 %
5-40 SYRINGE (ML) INJECTION EVERY 12 HOURS SCHEDULED
Status: DISCONTINUED | OUTPATIENT
Start: 2024-05-15 | End: 2024-05-15 | Stop reason: HOSPADM

## 2024-05-15 RX ORDER — DEXMEDETOMIDINE HYDROCHLORIDE 100 UG/ML
INJECTION, SOLUTION INTRAVENOUS PRN
Status: DISCONTINUED | OUTPATIENT
Start: 2024-05-15 | End: 2024-05-15 | Stop reason: SDUPTHER

## 2024-05-15 RX ORDER — DEXAMETHASONE SODIUM PHOSPHATE 4 MG/ML
INJECTION, SOLUTION INTRA-ARTICULAR; INTRALESIONAL; INTRAMUSCULAR; INTRAVENOUS; SOFT TISSUE PRN
Status: DISCONTINUED | OUTPATIENT
Start: 2024-05-15 | End: 2024-05-15 | Stop reason: SDUPTHER

## 2024-05-15 RX ORDER — ONDANSETRON 2 MG/ML
INJECTION INTRAMUSCULAR; INTRAVENOUS PRN
Status: DISCONTINUED | OUTPATIENT
Start: 2024-05-15 | End: 2024-05-15 | Stop reason: SDUPTHER

## 2024-05-15 RX ORDER — PHENAZOPYRIDINE HYDROCHLORIDE 200 MG/1
200 TABLET, FILM COATED ORAL 3 TIMES DAILY PRN
Qty: 9 TABLET | Refills: 0 | Status: SHIPPED | OUTPATIENT
Start: 2024-05-15

## 2024-05-15 RX ORDER — SODIUM CHLORIDE 9 MG/ML
INJECTION, SOLUTION INTRAVENOUS CONTINUOUS
Status: DISCONTINUED | OUTPATIENT
Start: 2024-05-15 | End: 2024-05-15 | Stop reason: HOSPADM

## 2024-05-15 RX ADMIN — ONDANSETRON 4 MG: 2 INJECTION INTRAMUSCULAR; INTRAVENOUS at 10:44

## 2024-05-15 RX ADMIN — PROPOFOL 200 MG: 10 INJECTION, EMULSION INTRAVENOUS at 10:44

## 2024-05-15 RX ADMIN — DEXMEDETOMIDINE HYDROCHLORIDE 20 MCG: 100 INJECTION, SOLUTION INTRAVENOUS at 10:45

## 2024-05-15 RX ADMIN — PROPOFOL 150 MCG/KG/MIN: 10 INJECTION, EMULSION INTRAVENOUS at 10:45

## 2024-05-15 RX ADMIN — DEXAMETHASONE SODIUM PHOSPHATE 4 MG: 4 INJECTION INTRA-ARTICULAR; INTRALESIONAL; INTRAMUSCULAR; INTRAVENOUS; SOFT TISSUE at 10:44

## 2024-05-15 RX ADMIN — SODIUM CHLORIDE, POTASSIUM CHLORIDE, SODIUM LACTATE AND CALCIUM CHLORIDE: 600; 310; 30; 20 INJECTION, SOLUTION INTRAVENOUS at 10:44

## 2024-05-15 RX ADMIN — SODIUM CHLORIDE, POTASSIUM CHLORIDE, SODIUM LACTATE AND CALCIUM CHLORIDE: 600; 310; 30; 20 INJECTION, SOLUTION INTRAVENOUS at 10:16

## 2024-05-15 RX ADMIN — Medication 2000 MG: at 10:44

## 2024-05-15 RX ADMIN — KETOROLAC TROMETHAMINE 30 MG: 30 INJECTION, SOLUTION INTRAMUSCULAR; INTRAVENOUS at 10:53

## 2024-05-15 ASSESSMENT — PAIN - FUNCTIONAL ASSESSMENT: PAIN_FUNCTIONAL_ASSESSMENT: 0-10

## 2024-05-15 ASSESSMENT — PAIN SCALES - GENERAL
PAINLEVEL_OUTOF10: 0

## 2024-05-15 NOTE — ANESTHESIA POSTPROCEDURE EVALUATION
Department of Anesthesiology  Postprocedure Note    Patient: Binu Boggs  MRN: [id number]  YOB: 1955  Date of evaluation: 5/15/2024    Procedure Summary       Date: 05/15/24 Room / Location: [address]    Anesthesia Start: 1044 Anesthesia Stop: 1057    Procedure: CYSTOSCOPY Diagnosis:       Hematuria, unspecified type      (Hematuria, unspecified type [R31.9])    Surgeons: Germain Freeman MD Responsible Provider: Lucas Blount APRN - CRNA    Anesthesia Type: General, TIVA ASA Status: 3            Anesthesia Type: General, TIVA    Hernandez Phase I: Hernandez Score: 10    Hernandez Phase II: Hernandez Score: 8    Anesthesia Post Evaluation    Patient location during evaluation: bedside  Level of consciousness: awake  Airway patency: patent  Nausea & Vomiting: no nausea and no vomiting  Cardiovascular status: hemodynamically stable  Respiratory status: spontaneous ventilation  Hydration status: euvolemic  Pain management: satisfactory to patient    No notable events documented.

## 2024-05-15 NOTE — H&P
History and Physical    Patient:  Binu Boggs  MRN: [id number]  YOB: 1955    CHIEF COMPLAINT:  bladder cancer    HISTORY OF PRESENT ILLNESS:   The patient is a 69 y.o. male who presents with as above, here for surgery    Patient's old records, notes and chart reviewed and summarized above.     Past Medical History:    Past Medical History:   Diagnosis Date    Anxiety     Aortic regurgitation     Bicuspid aortic valve     Diabetes mellitus (HCC) since March 2018    on Rx.    GERD (gastroesophageal reflux disease)     Hypertension     Left renal mass 10/2018    Osteoarthritis of left knee     Wears glasses        Past Surgical History:    Past Surgical History:   Procedure Laterality Date    CARDIAC CATHETERIZATION  09/12/2022    CYST REMOVAL      tail bone    CYSTOSCOPY N/A 1/17/2024    CYSTOSCOPY performed by Germain Freeman MD at [facility] OR    CYSTOSCOPY N/A 1/31/2024    CYSTOSCOPY Transuretheral Resection Bladder (TURBT) performed by Germain Freeman MD at [facility] OR    MOUTH SURGERY  2015    PARTIAL NEPHRECTOMY Left 11/30/2018    ROBOTIC PARTIAL NEPHRECTOMY,     IL COLONOSCOPY FLX DX W/COLLJ SPEC WHEN PFRMD N/A 05/14/2018    normal colon, Dr. Noble    IL LAPAROSCOPY SURG PARTIAL NEPHRECTOMY Left 11/30/2018    XI ROBOTIC PARTIAL NEPHRECTOMY, INTRA-OP LAP ULTRASOUND performed by Germain Freeman MD at [facility] OR    TONSILLECTOMY AND ADENOIDECTOMY  1960    WISDOM TOOTH EXTRACTION       Medications Prior to Admission:    Prior to Admission medications    Medication Sig Start Date End Date Taking? Authorizing Provider   Semaglutide (RYBELSUS) 14 MG TABS Take 1 tablet by mouth daily 5/1/24   Caity Doyle MD   furosemide (LASIX) 20 MG tablet Take 1 tablet by mouth daily 4/15/24   Caity Doyle MD   carvedilol (COREG) 25 MG tablet Take 1.5 tablets by mouth 2 times daily 4/4/24   Alcira Erickson S, DO   celecoxib (CELEBREX) 100 MG capsule Take 1 capsule by mouth 2 times daily as needed for Pain 3/25/24    (LIORESAL) 10 MG tablet Take 1 tablet by mouth nightly as needed (muscle spasm) 3/25/21   Caity Doyle MD   vitamin D3 (CHOLECALCIFEROL) 400 UNITS TABS tablet Take 1 tablet by mouth every other day    ProviderShayla MD       Allergies:  Codeine and Sulfa antibiotics    Social History:    Social History     Socioeconomic History    Marital status: Life Partner     Spouse name: Not on file    Number of children: 0    Years of education: Not on file    Highest education level: Not on file   Occupational History    Not on file   Tobacco Use    Smoking status: Never    Smokeless tobacco: Never   Vaping Use    Vaping Use: Never used   Substance and Sexual Activity    Alcohol use: Yes     Comment: very rare    Drug use: No    Sexual activity: Yes     Partners: Male   Other Topics Concern    Not on file   Social History Narrative    Not on file     Social Determinants of Health     Financial Resource Strain: Patient Declined (5/8/2023)    Overall Financial Resource Strain (CARDIA)     Difficulty of Paying Living Expenses: Patient declined   Food Insecurity: Not on file (5/8/2023)   Transportation Needs: Unknown (5/8/2023)    PRAPARE - Transportation     Lack of Transportation (Medical): Not on file     Lack of Transportation (Non-Medical): Patient declined   Physical Activity: Insufficiently Active (11/8/2022)    Exercise Vital Sign     Days of Exercise per Week: 1 day     Minutes of Exercise per Session: 10 min   Stress: Not on file   Social Connections: Not on file   Intimate Partner Violence: Not on file   Housing Stability: Unknown (5/8/2023)    Housing Stability Vital Sign     Unable to Pay for Housing in the Last Year: Not on file     Number of Places Lived in the Last Year: Not on file     Unstable Housing in the Last Year: Patient refused       Family History:    Family History   Problem Relation Age of Onset    High Blood Pressure Mother     Diabetes Mother         impaired fasting glucose    Other

## 2024-05-15 NOTE — DISCHARGE INSTRUCTIONS
Call your doctor for the following:   Chills   Temperature greater than 101   Pain that is not tolerable despite taking pain medicine as ordered   Inability to urinate >12 hours/ or a non-draining vo         No alcoholic beverages, no driving or operating machinery, no making important decisions for 24 hours.     Take your prescribed medications (if any) as instructed.    You may have a normal diet but should eat lightly on the day of surgery.    Drink plenty of fluids.    You may notice some burning or blood with urination, this is normal and should improve over next few days.    You may also take motrin/ibuprofen for pain control, you can alternate this with your other pain medications.    Be sure to take over the counter stool softeners if you notice constipation or hard stools.    Follow up with Dr. Freeman, office will arrange.     Problems / Questions - Call [phone] ([facility]) or after hours call [phone] ([facility]) and they will page the doctor for you.

## 2024-05-15 NOTE — PROGRESS NOTES
CLINICAL PHARMACY NOTE: MEDS TO BEDS    Total # of Prescriptions Filled: 1   The following medications were delivered to the patient:  Phenazopyridine 95mg    Additional Documentation:

## 2024-05-15 NOTE — ANESTHESIA PRE PROCEDURE
Cath 9/2022   Angiographic Findings      Cardiac Arteries and Lesion Findings     LMCA: Normal 0% stenosis.     LAD: Normal 0% stenosis.     LCx: Normal 0% stenosis.     RCA: Normal 0% stenosis.      Coronary Tree      Dominance: Right    Lucas Blount, APRN - CRNA   5/15/2024

## 2024-05-23 NOTE — OP NOTE
Operative Note      Patient: Binu Boggs  YOB: 1955  MRN: [id number]    Date of Procedure: 5/15/2024    Pre-Op Diagnosis Codes:     * Hematuria, unspecified type [R31.9]    Post-Op Diagnosis: Same       Procedure(s):  CYSTOSCOPY    Surgeon(s):  Germain Lizama MD    Assistant:   * No surgical staff found *    Anesthesia: Monitor Anesthesia Care    Estimated Blood Loss (mL): Minimal    Complications: None    Specimens:   * No specimens in log *    Implants:  * No implants in log *      Drains: * No LDAs found *    Findings:  Infection Present At Time Of Surgery (PATOS) (choose all levels that have infection present):  No infection present  Other Findings: negative cystoscopy.    Detailed Description of Procedure:       Indications:  Binu Boggs is a 69 y.o. male with history of bladder cancer.  He is here for Cystoscopy.  Risks benefits alternatives goals and possible complications of the procedure were explained to the patient and informed consent was obtained he elected to proceed.    Details:   The patient was brought back to the operating room.  He was laid in the supine position.  His genitals were prepped and draped in usual sterile surgical fashion.  2 % lidocaine jelly was placed per the urethra for pain control.  Flexible cystoscope was assembled and passed per the urethra.  The anterior urethra was normal without any lesions; the posterior urethra and prostate were unremarkable.  The bladder was inspected thoroughly and systematically,  which did not show any lesions or tumors, stone, fistulous tracts, diverticula.  Both ureteral orifices were normal with clear efflux of urine. The patient tolerated the procedure well.  The scope was removed intact and without any issues.  This concluded the case.  He was taken to recovery period and discharged home in stable condition.      Plan  He will follow up for surveillance cystoscopy 9 months    Electronically signed by GERMAIN LIZAMA MD

## 2024-06-26 ENCOUNTER — HOSPITAL ENCOUNTER (OUTPATIENT)
Age: 69
Discharge: HOME | End: 2024-06-26
Payer: MEDICARE

## 2024-06-26 DIAGNOSIS — E03.8 HYPOTHYROIDISM DUE TO HASHIMOTO'S THYROIDITIS: ICD-10-CM

## 2024-06-26 DIAGNOSIS — E06.3 HYPOTHYROIDISM DUE TO HASHIMOTO'S THYROIDITIS: ICD-10-CM

## 2024-06-26 DIAGNOSIS — E11.59 TYPE 2 DIABETES MELLITUS WITH OTHER CIRCULATORY COMPLICATION, WITHOUT LONG-TERM CURRENT USE OF INSULIN (HCC): ICD-10-CM

## 2024-06-26 DIAGNOSIS — E78.2 MIXED HYPERLIPIDEMIA: ICD-10-CM

## 2024-06-26 LAB
ANION GAP SERPL CALCULATED.3IONS-SCNC: 9 MMOL/L (ref 9–17)
BUN SERPL-MCNC: 27 MG/DL (ref 8–23)
BUN/CREAT SERPL: 25 (ref 9–20)
CALCIUM SERPL-MCNC: 10 MG/DL (ref 8.6–10.4)
CHLORIDE SERPL-SCNC: 97 MMOL/L (ref 98–107)
CHOLEST SERPL-MCNC: 113 MG/DL (ref 0–199)
CHOLESTEROL/HDL RATIO: 3
CO2 SERPL-SCNC: 30 MMOL/L (ref 20–31)
CREAT SERPL-MCNC: 1.1 MG/DL (ref 0.7–1.2)
EST. AVERAGE GLUCOSE BLD GHB EST-MCNC: 128 MG/DL
GFR, ESTIMATED: 73 ML/MIN/1.73M2
GLUCOSE SERPL-MCNC: 132 MG/DL (ref 70–99)
HBA1C MFR BLD: 6.1 % (ref 4–6)
HDLC SERPL-MCNC: 38 MG/DL
LDLC SERPL CALC-MCNC: 57 MG/DL (ref 0–100)
POTASSIUM SERPL-SCNC: 4.6 MMOL/L (ref 3.7–5.3)
SODIUM SERPL-SCNC: 136 MMOL/L (ref 135–144)
TRIGL SERPL-MCNC: 89 MG/DL
TSH SERPL DL<=0.05 MIU/L-ACNC: 2.91 UIU/ML (ref 0.3–5)
VLDLC SERPL CALC-MCNC: 18 MG/DL

## 2024-06-26 PROCEDURE — 36415 COLL VENOUS BLD VENIPUNCTURE: CPT

## 2024-06-26 PROCEDURE — 84443 ASSAY THYROID STIM HORMONE: CPT

## 2024-06-26 PROCEDURE — 80061 LIPID PANEL: CPT

## 2024-06-26 PROCEDURE — 80048 BASIC METABOLIC PNL TOTAL CA: CPT

## 2024-06-26 PROCEDURE — 83036 HEMOGLOBIN GLYCOSYLATED A1C: CPT

## 2024-06-28 ENCOUNTER — OFFICE VISIT (OUTPATIENT)
Dept: FAMILY MEDICINE CLINIC | Age: 69
End: 2024-06-28
Payer: MEDICARE

## 2024-06-28 VITALS
DIASTOLIC BLOOD PRESSURE: 62 MMHG | BODY MASS INDEX: 29.96 KG/M2 | HEART RATE: 71 BPM | SYSTOLIC BLOOD PRESSURE: 118 MMHG | OXYGEN SATURATION: 98 % | HEIGHT: 71 IN | WEIGHT: 214 LBS

## 2024-06-28 DIAGNOSIS — I10 ESSENTIAL HYPERTENSION: ICD-10-CM

## 2024-06-28 DIAGNOSIS — F41.1 GAD (GENERALIZED ANXIETY DISORDER): ICD-10-CM

## 2024-06-28 DIAGNOSIS — H93.13 TINNITUS OF BOTH EARS: ICD-10-CM

## 2024-06-28 DIAGNOSIS — H61.23 BILATERAL IMPACTED CERUMEN: ICD-10-CM

## 2024-06-28 DIAGNOSIS — C64.9 RENAL CELL CARCINOMA, UNSPECIFIED LATERALITY (HCC): ICD-10-CM

## 2024-06-28 DIAGNOSIS — N52.9 ERECTILE DYSFUNCTION, UNSPECIFIED ERECTILE DYSFUNCTION TYPE: ICD-10-CM

## 2024-06-28 DIAGNOSIS — M17.0 PRIMARY OSTEOARTHRITIS OF BOTH KNEES: ICD-10-CM

## 2024-06-28 DIAGNOSIS — H91.93 BILATERAL HEARING LOSS, UNSPECIFIED HEARING LOSS TYPE: ICD-10-CM

## 2024-06-28 DIAGNOSIS — Z00.00 MEDICARE ANNUAL WELLNESS VISIT, SUBSEQUENT: Primary | ICD-10-CM

## 2024-06-28 DIAGNOSIS — E11.59 TYPE 2 DIABETES MELLITUS WITH OTHER CIRCULATORY COMPLICATION, WITHOUT LONG-TERM CURRENT USE OF INSULIN (HCC): ICD-10-CM

## 2024-06-28 DIAGNOSIS — F33.0 MAJOR DEPRESSIVE DISORDER, RECURRENT, MILD (HCC): ICD-10-CM

## 2024-06-28 PROBLEM — F33.1 MAJOR DEPRESSIVE DISORDER, RECURRENT, MODERATE (HCC): Status: RESOLVED | Noted: 2023-11-13 | Resolved: 2024-06-28

## 2024-06-28 PROBLEM — F33.9 MAJOR DEPRESSIVE DISORDER, RECURRENT, UNSPECIFIED (HCC): Status: RESOLVED | Noted: 2023-11-13 | Resolved: 2024-06-28

## 2024-06-28 PROCEDURE — G0439 PPPS, SUBSEQ VISIT: HCPCS | Performed by: FAMILY MEDICINE

## 2024-06-28 PROCEDURE — PBSHW PR REMOVAL IMPACTED CERUMEN IRRIGATION/LVG UNILAT: Performed by: FAMILY MEDICINE

## 2024-06-28 PROCEDURE — 2022F DILAT RTA XM EVC RTNOPTHY: CPT | Performed by: FAMILY MEDICINE

## 2024-06-28 PROCEDURE — 3017F COLORECTAL CA SCREEN DOC REV: CPT | Performed by: FAMILY MEDICINE

## 2024-06-28 PROCEDURE — 69209 REMOVE IMPACTED EAR WAX UNI: CPT | Performed by: FAMILY MEDICINE

## 2024-06-28 PROCEDURE — 3044F HG A1C LEVEL LT 7.0%: CPT | Performed by: FAMILY MEDICINE

## 2024-06-28 PROCEDURE — 99214 OFFICE O/P EST MOD 30 MIN: CPT | Performed by: FAMILY MEDICINE

## 2024-06-28 PROCEDURE — G8427 DOCREV CUR MEDS BY ELIG CLIN: HCPCS | Performed by: FAMILY MEDICINE

## 2024-06-28 PROCEDURE — 3074F SYST BP LT 130 MM HG: CPT | Performed by: FAMILY MEDICINE

## 2024-06-28 PROCEDURE — 1123F ACP DISCUSS/DSCN MKR DOCD: CPT | Performed by: FAMILY MEDICINE

## 2024-06-28 PROCEDURE — 1036F TOBACCO NON-USER: CPT | Performed by: FAMILY MEDICINE

## 2024-06-28 PROCEDURE — G8417 CALC BMI ABV UP PARAM F/U: HCPCS | Performed by: FAMILY MEDICINE

## 2024-06-28 PROCEDURE — 3078F DIAST BP <80 MM HG: CPT | Performed by: FAMILY MEDICINE

## 2024-06-28 RX ORDER — AMLODIPINE BESYLATE 10 MG/1
5 TABLET ORAL DAILY
Qty: 90 TABLET | Refills: 1
Start: 2024-06-28

## 2024-06-28 RX ORDER — SILDENAFIL CITRATE 20 MG/1
20 TABLET ORAL PRN
Qty: 30 TABLET | Refills: 5 | Status: SHIPPED | OUTPATIENT
Start: 2024-06-28

## 2024-06-28 SDOH — ECONOMIC STABILITY: FOOD INSECURITY: WITHIN THE PAST 12 MONTHS, THE FOOD YOU BOUGHT JUST DIDN'T LAST AND YOU DIDN'T HAVE MONEY TO GET MORE.: NEVER TRUE

## 2024-06-28 SDOH — ECONOMIC STABILITY: HOUSING INSECURITY
IN THE LAST 12 MONTHS, WAS THERE A TIME WHEN YOU DID NOT HAVE A STEADY PLACE TO SLEEP OR SLEPT IN A SHELTER (INCLUDING NOW)?: NO

## 2024-06-28 SDOH — ECONOMIC STABILITY: FOOD INSECURITY: WITHIN THE PAST 12 MONTHS, YOU WORRIED THAT YOUR FOOD WOULD RUN OUT BEFORE YOU GOT MONEY TO BUY MORE.: NEVER TRUE

## 2024-06-28 SDOH — ECONOMIC STABILITY: INCOME INSECURITY: HOW HARD IS IT FOR YOU TO PAY FOR THE VERY BASICS LIKE FOOD, HOUSING, MEDICAL CARE, AND HEATING?: NOT HARD AT ALL

## 2024-06-28 ASSESSMENT — PATIENT HEALTH QUESTIONNAIRE - PHQ9
3. TROUBLE FALLING OR STAYING ASLEEP: NOT AT ALL
9. THOUGHTS THAT YOU WOULD BE BETTER OFF DEAD, OR OF HURTING YOURSELF: NOT AT ALL
1. LITTLE INTEREST OR PLEASURE IN DOING THINGS: NOT AT ALL
4. FEELING TIRED OR HAVING LITTLE ENERGY: NOT AT ALL
10. IF YOU CHECKED OFF ANY PROBLEMS, HOW DIFFICULT HAVE THESE PROBLEMS MADE IT FOR YOU TO DO YOUR WORK, TAKE CARE OF THINGS AT HOME, OR GET ALONG WITH OTHER PEOPLE: NOT DIFFICULT AT ALL
5. POOR APPETITE OR OVEREATING: NOT AT ALL
2. FEELING DOWN, DEPRESSED OR HOPELESS: NOT AT ALL
SUM OF ALL RESPONSES TO PHQ9 QUESTIONS 1 & 2: 0
SUM OF ALL RESPONSES TO PHQ QUESTIONS 1-9: 0
7. TROUBLE CONCENTRATING ON THINGS, SUCH AS READING THE NEWSPAPER OR WATCHING TELEVISION: NOT AT ALL
8. MOVING OR SPEAKING SO SLOWLY THAT OTHER PEOPLE COULD HAVE NOTICED. OR THE OPPOSITE, BEING SO FIGETY OR RESTLESS THAT YOU HAVE BEEN MOVING AROUND A LOT MORE THAN USUAL: NOT AT ALL
6. FEELING BAD ABOUT YOURSELF - OR THAT YOU ARE A FAILURE OR HAVE LET YOURSELF OR YOUR FAMILY DOWN: NOT AT ALL
SUM OF ALL RESPONSES TO PHQ QUESTIONS 1-9: 0

## 2024-06-28 ASSESSMENT — ENCOUNTER SYMPTOMS
BLOOD IN STOOL: 0
ABDOMINAL PAIN: 0
SHORTNESS OF BREATH: 0
COUGH: 0
CHEST TIGHTNESS: 0
NAUSEA: 0
DIARRHEA: 0
WHEEZING: 0
CONSTIPATION: 0

## 2024-06-28 ASSESSMENT — LIFESTYLE VARIABLES
HOW OFTEN DO YOU HAVE A DRINK CONTAINING ALCOHOL: NEVER
HOW MANY STANDARD DRINKS CONTAINING ALCOHOL DO YOU HAVE ON A TYPICAL DAY: PATIENT DOES NOT DRINK

## 2024-06-28 NOTE — PROGRESS NOTES
[facility] OF [facility]  [address] [facility] [address]  Dept: [phone]  Dept Fax: [phone]  Loc: [phone]    Binu Boggs is a 69 y.o. male who presents today for his medical conditions/complaints as noted below.  Binu Boggs is c/o of   Chief Complaint   Patient presents with    Medicare AWV       HPI:     HPI Here today for a MWV and multiple other concerns.     He wants to do a ct of the abdomen with his ct of the chest to make sure no new cancer.     He has been having more sinus drainage recently. He is taking zyrtec but he has not been taking flonase yet. Typically when he takes flonase it helps.     He has bene having an occasional action tremor.     HTN: worsening; He is getting more lightheaded when he stands up. No issues with chest pain or shortness of breath. No issues with headaches. No issues with leg swelling.     The hard nodules in his left hand have gotten a little bigger and have been a little more painful.     The tinnitus in his ears is louder than it used to be. He has noticed some difficultly hearing people on TV and in large groups. He had a hearing test done many years ago that showed he had upper range hearing loss. He also is having some dry skin around his ears.     DM: stable; He has some neuropathy in his feet that he notices it it in both feet on occasion. He has been doing well on the rybelsus. He is stable at 211 lbs. He has lost another 5 pounds since his last visit. He does not checking his sugars. No hypoglycemic episodes.     His anxiety is much better since starting the cymbalta. He is calmer, he is able to not be irritable. He is sleeping well other than he is having very vivid dreams and he has had some violent dreams recently. He has not needed the buspar since starting the cymablta.     He is feeling much less achy since starting the celebrex. He is able to walk around [facility] 
Interventions:                Activity, Diet, and Weight:  On average, how many days per week do you engage in moderate to strenuous exercise (like a brisk walk)?: 0 days  On average, how many minutes do you engage in exercise at this level?: 0 min    Do you eat balanced/healthy meals regularly?: Yes    Body mass index is 29.85 kg/m².      Inactivity Interventions:  Patient advised to get more regular exercise        Dentist Screen:  Have you seen the dentist within the past year?: (!) No    Intervention:  Advised to schedule with their dentist    Hearing Screen:  Do you or your family notice any trouble with your hearing that hasn't been managed with hearing aids?: (!) Yes    Interventions:  Referred to Audiology                 Objective   Vitals:    06/28/24 1339   BP: 118/62   Site: Right Upper Arm   Position: Sitting   Cuff Size: Medium Adult   Pulse: 71   SpO2: 98%   Weight: 97.1 kg (214 lb)   Height: 1.803 m (5' 11\")      Body mass index is 29.85 kg/m².             Allergies   Allergen Reactions    Codeine      Severe Abdominal pain  Severe stomach pain  Severe Abdominal pain    Sulfa Antibiotics      Patient unsure of reaction     Prior to Visit Medications    Medication Sig Taking? Authorizing Provider   sildenafil (REVATIO) 20 MG tablet Take 1 tablet by mouth as needed (Erectile dysfunction) Yes Caity Doyle MD   amLODIPine (NORVASC) 10 MG tablet Take 0.5 tablets by mouth daily Yes Caity Doyle MD   phenazopyridine (PYRIDIUM) 200 MG tablet Take 1 tablet by mouth 3 times daily as needed for Pain Yes Germain Freeman MD   Semaglutide (RYBELSUS) 14 MG TABS Take 1 tablet by mouth daily Yes Ciaty Doyle MD   furosemide (LASIX) 20 MG tablet Take 1 tablet by mouth daily Yes Caity Doyle MD   carvedilol (COREG) 25 MG tablet Take 1.5 tablets by mouth 2 times daily Yes Alcira Erickson S, DO   celecoxib (CELEBREX) 100 MG capsule Take 1 capsule by mouth 2 times daily as needed for Pain Yes Caity Doyle

## 2024-07-15 ENCOUNTER — PATIENT MESSAGE (OUTPATIENT)
Dept: FAMILY MEDICINE CLINIC | Age: 69
End: 2024-07-15

## 2024-07-15 DIAGNOSIS — E11.9 TYPE 2 DIABETES MELLITUS WITHOUT COMPLICATION, WITHOUT LONG-TERM CURRENT USE OF INSULIN (HCC): ICD-10-CM

## 2024-07-15 DIAGNOSIS — Q23.1 AORTIC REGURGITATION DUE TO BICUSPID AORTIC VALVE: ICD-10-CM

## 2024-07-15 DIAGNOSIS — I10 ESSENTIAL HYPERTENSION: ICD-10-CM

## 2024-07-15 RX ORDER — AMLODIPINE BESYLATE 5 MG/1
5 TABLET ORAL DAILY
Qty: 30 TABLET | Refills: 0 | Status: SHIPPED | OUTPATIENT
Start: 2024-07-15

## 2024-07-15 RX ORDER — ROSUVASTATIN CALCIUM 5 MG/1
5 TABLET, COATED ORAL DAILY
Qty: 90 TABLET | Refills: 0 | Status: SHIPPED | OUTPATIENT
Start: 2024-07-15

## 2024-07-15 NOTE — TELEPHONE ENCOUNTER
From: Binu Boggs  To: Dr. Caity Doyle  Sent: 7/15/2024 11:19 AM EDT  Subject: refill and another questions    Hello  I am out of refills for my Rosuvastatin Calcium 5mg  Can you refill it please  Also, when I was at my visit a couple weeks ago you told me to cut my amlodipine in half. Every time I try to cut it, the little pill falls apart to mostly dust. Could I take it every other day for the same effect as cutting in half?  Thanks, Chriss

## 2024-07-29 ENCOUNTER — PATIENT MESSAGE (OUTPATIENT)
Dept: FAMILY MEDICINE CLINIC | Age: 69
End: 2024-07-29

## 2024-07-29 DIAGNOSIS — J30.2 OTHER SEASONAL ALLERGIC RHINITIS: ICD-10-CM

## 2024-07-29 RX ORDER — LEVOTHYROXINE SODIUM 0.07 MG/1
75 TABLET ORAL DAILY
Qty: 90 TABLET | Refills: 1 | Status: SHIPPED | OUTPATIENT
Start: 2024-07-29

## 2024-07-29 RX ORDER — MONTELUKAST SODIUM 10 MG/1
10 TABLET ORAL NIGHTLY
Qty: 90 TABLET | Refills: 1 | Status: SHIPPED | OUTPATIENT
Start: 2024-07-29

## 2024-07-29 NOTE — TELEPHONE ENCOUNTER
Binu called requesting a refill of the below medication which has been pended for you:     Requested Prescriptions     Pending Prescriptions Disp Refills    levothyroxine (SYNTHROID) 75 MCG tablet 90 tablet 1     Sig: Take 1 tablet by mouth daily    montelukast (SINGULAIR) 10 MG tablet 90 tablet 1     Sig: Take 1 tablet by mouth nightly       Last Appointment Date: 6/28/2024  Next Appointment Date: 1/7/2025    Allergies   Allergen Reactions    Codeine      Severe Abdominal pain  Severe stomach pain  Severe Abdominal pain    Sulfa Antibiotics      Patient unsure of reaction

## 2024-07-29 NOTE — TELEPHONE ENCOUNTER
From: Binu Boggs  To: Dr. Caity Doyle  Sent: 7/29/2024 8:16 AM EDT  Subject: refills    Good morning  I need 2 refills please  My Levothyroxine 75 mcg has no more refills  and  my Montelukast sod 10mg has no more refills  ThanksChriss

## 2024-08-13 ENCOUNTER — PATIENT MESSAGE (OUTPATIENT)
Dept: FAMILY MEDICINE CLINIC | Age: 69
End: 2024-08-13

## 2024-08-13 DIAGNOSIS — I10 ESSENTIAL HYPERTENSION: ICD-10-CM

## 2024-08-13 RX ORDER — AMLODIPINE BESYLATE 5 MG/1
5 TABLET ORAL DAILY
Qty: 90 TABLET | Refills: 1 | Status: SHIPPED | OUTPATIENT
Start: 2024-08-13

## 2024-08-30 ENCOUNTER — HOSPITAL ENCOUNTER (OUTPATIENT)
Age: 69
Discharge: HOME | End: 2024-08-30
Payer: MEDICARE

## 2024-08-30 DIAGNOSIS — I10 ESSENTIAL HYPERTENSION: ICD-10-CM

## 2024-08-30 DIAGNOSIS — I71.21 ANEURYSM OF ASCENDING AORTA WITHOUT RUPTURE (HCC): ICD-10-CM

## 2024-08-30 LAB
ANION GAP SERPL CALCULATED.3IONS-SCNC: 8 MMOL/L (ref 9–17)
BUN SERPL-MCNC: 25 MG/DL (ref 8–23)
BUN/CREAT SERPL: 25 (ref 9–20)
CALCIUM SERPL-MCNC: 9.5 MG/DL (ref 8.6–10.4)
CHLORIDE SERPL-SCNC: 101 MMOL/L (ref 98–107)
CO2 SERPL-SCNC: 31 MMOL/L (ref 20–31)
CREAT SERPL-MCNC: 1 MG/DL (ref 0.7–1.2)
GFR, ESTIMATED: 81 ML/MIN/1.73M2
GLUCOSE SERPL-MCNC: 128 MG/DL (ref 70–99)
POTASSIUM SERPL-SCNC: 4.2 MMOL/L (ref 3.7–5.3)
SODIUM SERPL-SCNC: 140 MMOL/L (ref 135–144)

## 2024-08-30 PROCEDURE — 36415 COLL VENOUS BLD VENIPUNCTURE: CPT

## 2024-08-30 PROCEDURE — 80048 BASIC METABOLIC PNL TOTAL CA: CPT

## 2024-09-02 ENCOUNTER — PATIENT MESSAGE (OUTPATIENT)
Dept: FAMILY MEDICINE CLINIC | Age: 69
End: 2024-09-02

## 2024-09-02 DIAGNOSIS — E11.9 TYPE 2 DIABETES MELLITUS WITHOUT COMPLICATION, WITHOUT LONG-TERM CURRENT USE OF INSULIN (HCC): ICD-10-CM

## 2024-09-03 ENCOUNTER — HOSPITAL ENCOUNTER (OUTPATIENT)
Dept: CT IMAGING | Age: 69
Discharge: HOME | End: 2024-09-05
Attending: FAMILY MEDICINE
Payer: MEDICARE

## 2024-09-03 ENCOUNTER — HOSPITAL ENCOUNTER (OUTPATIENT)
Age: 69
Discharge: HOME | End: 2024-09-05
Attending: INTERNAL MEDICINE
Payer: MEDICARE

## 2024-09-03 ENCOUNTER — HOSPITAL ENCOUNTER (OUTPATIENT)
Dept: CT IMAGING | Age: 69
Discharge: HOME | End: 2024-09-05
Attending: INTERNAL MEDICINE
Payer: MEDICARE

## 2024-09-03 VITALS — BODY MASS INDEX: 29.32 KG/M2 | HEIGHT: 71 IN | WEIGHT: 209.44 LBS

## 2024-09-03 DIAGNOSIS — I71.21 ANEURYSM OF ASCENDING AORTA WITHOUT RUPTURE (HCC): ICD-10-CM

## 2024-09-03 DIAGNOSIS — I25.10 CORONARY ARTERY CALCIFICATION: ICD-10-CM

## 2024-09-03 DIAGNOSIS — Q23.1 AORTIC REGURGITATION DUE TO BICUSPID AORTIC VALVE: ICD-10-CM

## 2024-09-03 DIAGNOSIS — E78.00 PURE HYPERCHOLESTEROLEMIA: ICD-10-CM

## 2024-09-03 DIAGNOSIS — Q23.1 BICUSPID AORTIC VALVE: ICD-10-CM

## 2024-09-03 DIAGNOSIS — I25.84 CORONARY ARTERY CALCIFICATION: ICD-10-CM

## 2024-09-03 DIAGNOSIS — C64.9 RENAL CELL CARCINOMA, UNSPECIFIED LATERALITY (HCC): ICD-10-CM

## 2024-09-03 DIAGNOSIS — Z98.890 S/P CARDIAC CATH: ICD-10-CM

## 2024-09-03 DIAGNOSIS — I10 ESSENTIAL HYPERTENSION: ICD-10-CM

## 2024-09-03 DIAGNOSIS — R94.39 ABNORMAL NUCLEAR STRESS TEST: ICD-10-CM

## 2024-09-03 LAB
ECHO AO ROOT DIAM: 4 CM
ECHO AO ROOT INDEX: 1.86 CM/M2
ECHO AV AREA PEAK VELOCITY: 3.9 CM2
ECHO AV AREA VTI: 4.2 CM2
ECHO AV AREA/BSA PEAK VELOCITY: 1.8 CM2/M2
ECHO AV AREA/BSA VTI: 2 CM2/M2
ECHO AV MEAN GRADIENT: 6 MMHG
ECHO AV MEAN VELOCITY: 1.1 M/S
ECHO AV PEAK GRADIENT: 10 MMHG
ECHO AV PEAK VELOCITY: 1.6 M/S
ECHO AV VELOCITY RATIO: 0.63
ECHO AV VTI: 39.3 CM
ECHO BSA: 2.18 M2
ECHO IVC PROX: 1.8 CM
ECHO LA AREA 2C: 21.4 CM2
ECHO LA AREA 4C: 17.7 CM2
ECHO LA DIAMETER INDEX: 1.72 CM/M2
ECHO LA DIAMETER: 3.7 CM
ECHO LA MAJOR AXIS: 5.1 CM
ECHO LA MINOR AXIS: 5.6 CM
ECHO LA TO AORTIC ROOT RATIO: 0.93
ECHO LA VOL BP: 59 ML (ref 18–58)
ECHO LA VOL MOD A2C: 65 ML (ref 18–58)
ECHO LA VOL MOD A4C: 49 ML (ref 18–58)
ECHO LA VOL/BSA BIPLANE: 27 ML/M2 (ref 16–34)
ECHO LA VOLUME INDEX MOD A2C: 30 ML/M2 (ref 16–34)
ECHO LA VOLUME INDEX MOD A4C: 23 ML/M2 (ref 16–34)
ECHO LV E' LATERAL VELOCITY: 7 CM/S
ECHO LV E' SEPTAL VELOCITY: 3 CM/S
ECHO LV EDV A2C: 124 ML
ECHO LV EDV A4C: 128 ML
ECHO LV EDV INDEX A4C: 60 ML/M2
ECHO LV EDV NDEX A2C: 58 ML/M2
ECHO LV EJECTION FRACTION A2C: 60 %
ECHO LV EJECTION FRACTION A4C: 53 %
ECHO LV EJECTION FRACTION BIPLANE: 56 % (ref 55–100)
ECHO LV ESV A2C: 49 ML
ECHO LV ESV A4C: 61 ML
ECHO LV ESV INDEX A2C: 23 ML/M2
ECHO LV ESV INDEX A4C: 28 ML/M2
ECHO LV FRACTIONAL SHORTENING: 26 % (ref 28–44)
ECHO LV INTERNAL DIMENSION DIASTOLE INDEX: 2.19 CM/M2
ECHO LV INTERNAL DIMENSION DIASTOLIC: 4.7 CM (ref 4.2–5.9)
ECHO LV INTERNAL DIMENSION SYSTOLIC INDEX: 1.63 CM/M2
ECHO LV INTERNAL DIMENSION SYSTOLIC: 3.5 CM
ECHO LV IVSD: 1.3 CM (ref 0.6–1)
ECHO LV MASS 2D: 265.2 G (ref 88–224)
ECHO LV MASS INDEX 2D: 123.4 G/M2 (ref 49–115)
ECHO LV POSTERIOR WALL DIASTOLIC: 1.5 CM (ref 0.6–1)
ECHO LV RELATIVE WALL THICKNESS RATIO: 0.64
ECHO LVOT AREA: 6.6 CM2
ECHO LVOT AV VTI INDEX: 0.64
ECHO LVOT DIAM: 2.9 CM
ECHO LVOT MEAN GRADIENT: 2 MMHG
ECHO LVOT PEAK GRADIENT: 4 MMHG
ECHO LVOT PEAK VELOCITY: 1 M/S
ECHO LVOT STROKE VOLUME INDEX: 76.8 ML/M2
ECHO LVOT SV: 165 ML
ECHO LVOT VTI: 25 CM
ECHO MV A VELOCITY: 0.61 M/S
ECHO MV E DECELERATION TIME (DT): 243 MS
ECHO MV E VELOCITY: 0.48 M/S
ECHO MV E/A RATIO: 0.79
ECHO MV E/E' LATERAL: 6.86
ECHO MV E/E' RATIO (AVERAGED): 11.43
ECHO MV E/E' SEPTAL: 16
ECHO RV TAPSE: 2.6 CM (ref 1.7–?)

## 2024-09-03 PROCEDURE — 6360000004 HC RX CONTRAST MEDICATION: Performed by: INTERNAL MEDICINE

## 2024-09-03 PROCEDURE — 93306 TTE W/DOPPLER COMPLETE: CPT

## 2024-09-03 PROCEDURE — 93306 TTE W/DOPPLER COMPLETE: CPT | Performed by: INTERNAL MEDICINE

## 2024-09-03 PROCEDURE — 2709999900 CTA CHEST W CONTRAST

## 2024-09-03 PROCEDURE — 74177 CT ABD & PELVIS W/CONTRAST: CPT

## 2024-09-03 RX ORDER — IOPAMIDOL 755 MG/ML
100 INJECTION, SOLUTION INTRAVASCULAR
Status: COMPLETED | OUTPATIENT
Start: 2024-09-03 | End: 2024-09-03

## 2024-09-03 RX ADMIN — IOPAMIDOL 100 ML: 755 INJECTION, SOLUTION INTRAVENOUS at 10:45

## 2024-09-03 NOTE — TELEPHONE ENCOUNTER
Binu called requesting a refill of the below medication which has been pended for you:     Requested Prescriptions     Pending Prescriptions Disp Refills    metFORMIN (GLUCOPHAGE) 500 MG tablet 180 tablet 1     Sig: Take 1 tablet by mouth 2 times daily (with meals)       Last Appointment Date: 6/28/2024  Next Appointment Date: 1/7/2025    Allergies   Allergen Reactions    Codeine      Severe Abdominal pain  Severe stomach pain  Severe Abdominal pain    Sulfa Antibiotics      Patient unsure of reaction

## 2024-09-04 ENCOUNTER — TELEPHONE (OUTPATIENT)
Dept: CARDIOLOGY | Age: 69
End: 2024-09-04

## 2024-09-04 NOTE — TELEPHONE ENCOUNTER
Interpretation Summary      Left Ventricle: Normal left ventricular systolic function. EF by 2D Simpsons Biplane is 56%. Left ventricle size is normal. Mildly increased wall thickness. Normal wall motion. Normal diastolic function.    Aortic Valve: Bicuspid valve. Mild sclerosis of the aortic valve cusps. Fusion of the left and right coronary cusps. Moderate regurgitation.    Aorta: Dilated aortic root. Ao root diameter is 4.0 cm. Dilated ascending aorta.    Image quality is technically difficult.     Echo Findings    Left Ventricle Normal left ventricular systolic function. EF by 2D Simpsons Biplane is 56%. Left ventricle size is normal. Mildly increased wall thickness. Normal wall motion. Normal diastolic function.   Left Atrium Left atrium size is normal.   Right Ventricle Right ventricle size is normal. Normal systolic function.   Right Atrium Right atrium size is normal.   Aortic Valve Bicuspid valve. Mild sclerosis of the aortic valve cusps. Fusion of the left and right coronary cusps. Moderate regurgitation. No stenosis.   Mitral Valve Valve structure is normal. No regurgitation. No stenosis noted.   Tricuspid Valve Valve structure is normal. No regurgitation. No stenosis noted.   Pulmonic Valve The pulmonic valve visualization is suboptimal but appears to be functioning normally. Physiologically normal regurgitation. No stenosis noted.   Aorta Dilated aortic root. Ao root diameter is 4.0 cm. Dilated ascending aorta.   IVC/Hepatic Veins IVC diameter is less than or equal to 21 mm and decreases greater than 50% during inspiration; therefore the estimated right atrial pressure is normal (~3 mmHg). IVC size is normal.   Pericardium No pericardial effusion.

## 2024-09-05 NOTE — TELEPHONE ENCOUNTER
Patient notified of Echo result. He is asymptomatic. He was instructed to call office for further questions. Or, go to ER for any symptoms. Follow up with Dr KENDRICK Erickson in Oct.

## 2024-10-01 ENCOUNTER — PATIENT MESSAGE (OUTPATIENT)
Dept: FAMILY MEDICINE CLINIC | Age: 69
End: 2024-10-01

## 2024-10-01 RX ORDER — CELECOXIB 100 MG/1
100 CAPSULE ORAL 2 TIMES DAILY PRN
Qty: 180 CAPSULE | Refills: 1 | Status: SHIPPED | OUTPATIENT
Start: 2024-10-01

## 2024-10-01 NOTE — TELEPHONE ENCOUNTER
Binu called requesting a refill of the below medication which has been pended for you:     Requested Prescriptions     Pending Prescriptions Disp Refills    celecoxib (CELEBREX) 100 MG capsule 180 capsule 1     Sig: Take 1 capsule by mouth 2 times daily as needed for Pain       Last Appointment Date: 6/28/2024  Next Appointment Date: 1/7/2025    Allergies   Allergen Reactions    Codeine      Severe Abdominal pain  Severe stomach pain  Severe Abdominal pain    Sulfa Antibiotics      Patient unsure of reaction

## 2024-10-14 ENCOUNTER — PATIENT MESSAGE (OUTPATIENT)
Dept: FAMILY MEDICINE CLINIC | Age: 69
End: 2024-10-14

## 2024-10-14 ENCOUNTER — OFFICE VISIT (OUTPATIENT)
Dept: CARDIOLOGY | Age: 69
End: 2024-10-14
Payer: MEDICARE

## 2024-10-14 VITALS
BODY MASS INDEX: 29.82 KG/M2 | HEIGHT: 71 IN | DIASTOLIC BLOOD PRESSURE: 66 MMHG | SYSTOLIC BLOOD PRESSURE: 118 MMHG | WEIGHT: 213 LBS | HEART RATE: 72 BPM

## 2024-10-14 DIAGNOSIS — M79.89 LEG SWELLING: ICD-10-CM

## 2024-10-14 DIAGNOSIS — E11.9 TYPE 2 DIABETES MELLITUS WITHOUT COMPLICATION, WITHOUT LONG-TERM CURRENT USE OF INSULIN (HCC): ICD-10-CM

## 2024-10-14 DIAGNOSIS — I10 ESSENTIAL HYPERTENSION: Primary | ICD-10-CM

## 2024-10-14 DIAGNOSIS — Q23.81 BICUSPID AORTIC VALVE: ICD-10-CM

## 2024-10-14 DIAGNOSIS — R94.39 ABNORMAL NUCLEAR STRESS TEST: ICD-10-CM

## 2024-10-14 DIAGNOSIS — I71.21 ANEURYSM OF ASCENDING AORTA WITHOUT RUPTURE (HCC): ICD-10-CM

## 2024-10-14 DIAGNOSIS — Q23.81 AORTIC REGURGITATION DUE TO BICUSPID AORTIC VALVE: ICD-10-CM

## 2024-10-14 DIAGNOSIS — Q23.1 AORTIC REGURGITATION DUE TO BICUSPID AORTIC VALVE: ICD-10-CM

## 2024-10-14 PROCEDURE — 3017F COLORECTAL CA SCREEN DOC REV: CPT | Performed by: INTERNAL MEDICINE

## 2024-10-14 PROCEDURE — 1036F TOBACCO NON-USER: CPT | Performed by: INTERNAL MEDICINE

## 2024-10-14 PROCEDURE — 99214 OFFICE O/P EST MOD 30 MIN: CPT | Performed by: INTERNAL MEDICINE

## 2024-10-14 PROCEDURE — 93010 ELECTROCARDIOGRAM REPORT: CPT | Performed by: INTERNAL MEDICINE

## 2024-10-14 PROCEDURE — 93005 ELECTROCARDIOGRAM TRACING: CPT | Performed by: INTERNAL MEDICINE

## 2024-10-14 PROCEDURE — 1123F ACP DISCUSS/DSCN MKR DOCD: CPT | Performed by: INTERNAL MEDICINE

## 2024-10-14 PROCEDURE — G8417 CALC BMI ABV UP PARAM F/U: HCPCS | Performed by: INTERNAL MEDICINE

## 2024-10-14 PROCEDURE — 3078F DIAST BP <80 MM HG: CPT | Performed by: INTERNAL MEDICINE

## 2024-10-14 PROCEDURE — 3074F SYST BP LT 130 MM HG: CPT | Performed by: INTERNAL MEDICINE

## 2024-10-14 PROCEDURE — G8484 FLU IMMUNIZE NO ADMIN: HCPCS | Performed by: INTERNAL MEDICINE

## 2024-10-14 PROCEDURE — G8427 DOCREV CUR MEDS BY ELIG CLIN: HCPCS | Performed by: INTERNAL MEDICINE

## 2024-10-14 RX ORDER — FUROSEMIDE 20 MG
20 TABLET ORAL DAILY
Qty: 90 TABLET | Refills: 1 | Status: SHIPPED | OUTPATIENT
Start: 2024-10-14

## 2024-10-14 RX ORDER — OMEPRAZOLE 40 MG/1
40 CAPSULE, DELAYED RELEASE ORAL
Qty: 90 CAPSULE | Refills: 1 | Status: SHIPPED | OUTPATIENT
Start: 2024-10-14

## 2024-10-14 RX ORDER — ROSUVASTATIN CALCIUM 5 MG/1
5 TABLET, COATED ORAL DAILY
Qty: 90 TABLET | Refills: 0 | Status: SHIPPED | OUTPATIENT
Start: 2024-10-14

## 2024-10-14 NOTE — TELEPHONE ENCOUNTER
Binu called requesting a refill of the below medication which has been pended for you:     Requested Prescriptions     Pending Prescriptions Disp Refills    rosuvastatin (CRESTOR) 5 MG tablet 90 tablet 0     Sig: Take 1 tablet by mouth daily    omeprazole (PRILOSEC) 40 MG delayed release capsule 90 capsule 1     Sig: Take 1 capsule by mouth every morning (before breakfast)    furosemide (LASIX) 20 MG tablet 90 tablet 1     Sig: Take 1 tablet by mouth daily       Last Appointment Date: 6/28/2024  Next Appointment Date: 1/7/2025    Allergies   Allergen Reactions    Codeine      Severe Abdominal pain  Severe stomach pain  Severe Abdominal pain    Sulfa Antibiotics      Patient unsure of reaction

## 2024-10-14 NOTE — PROGRESS NOTES
9/21:  Impression   Stable mild aneurysmal enlargement of the ascending thoracic aorta, measuring   4.3 cm.  No acute aortic abnormality.  No central pulmonary embolism.     Nuclear on 6/22:  1. No definitive reversible perfusion defects to suggest ischemia.  2. Small apical fixed defect - infarction vs apical thinning.  3. Moderate-sized fixed inferior defect, likely infarction.  4. Normal left ventricular ejection fraction.    Coronary CTA on 6/22:  Small atherosclerotic calcification at bifurcation of left main artery and   tiny calcification the proximal segment of the RCA. CAC score of 41.      Echo 8/22:  Normal left ventricular diameter.  Mild left ventricular hypertrophy.  Left ventricular systolic function is normal. Estimated Left ventricular ejection fraction 60 %.  Dilated right ventricle with normal function.  Aortic valve is not well visualized, possibly functionally bicuspid with mild to moderate eccentric regurgitation. Peak instantaneous gradient 7 mmHg and mean gradient 4 mmHg.  Aortic root is mildly dilated at 4.2 cm.    Cardiac Cath 9/22:   No CAD   Preserved LV function    CTA chest on 9/23:  1. Fairly stable ectatic ascending thoracic aorta 4.1 cm diameter previously  measured at 4.3 cm.  No evidence for aneurysm.  2. Atherosclerotic disease with mild calcifications in the aortic arch as  well as aortic valve calcifications.  3. Minor subsegmental atelectasis/scarring posterior right lower lobe in the  superior segment.     Echo 9/23:    Left Ventricle: Normal left ventricular systolic function with a visually estimated EF of 60 - 65%. EF by 2D Simpsons Biplane is 64%. Left ventricle size is normal. Normal wall thickness. Normal wall motion. Abnormal diastolic function.Grade I(mild).    Aortic Valve: Bicuspid valve. Fusion of the left and right coronary cusps. Mild regurgitation.    Left Atrium: Left atrium is mildly dilated.    Aorta: Mildly dilated aortic root. Ao root diameter is 4.4

## 2024-11-26 ENCOUNTER — IMMUNIZATION (OUTPATIENT)
Dept: LAB | Age: 69
End: 2024-11-26
Payer: MEDICARE

## 2024-11-26 ENCOUNTER — HOSPITAL ENCOUNTER (OUTPATIENT)
Age: 69
Discharge: HOME | End: 2024-11-26
Payer: MEDICARE

## 2024-11-26 DIAGNOSIS — C64.9 RENAL CELL CARCINOMA, UNSPECIFIED LATERALITY (HCC): ICD-10-CM

## 2024-11-26 LAB
ALBUMIN SERPL-MCNC: 4.3 G/DL (ref 3.5–5.2)
ALBUMIN/GLOB SERPL: 1.3 {RATIO} (ref 1–2.5)
ALP SERPL-CCNC: 41 U/L (ref 40–129)
ALT SERPL-CCNC: 9 U/L (ref 5–41)
ANION GAP SERPL CALCULATED.3IONS-SCNC: 8 MMOL/L (ref 9–17)
AST SERPL-CCNC: 15 U/L
BASOPHILS # BLD: 0.04 K/UL (ref 0–0.2)
BASOPHILS NFR BLD: 1 % (ref 0–2)
BILIRUB SERPL-MCNC: 0.5 MG/DL (ref 0.3–1.2)
BUN SERPL-MCNC: 33 MG/DL (ref 8–23)
BUN/CREAT SERPL: 30 (ref 9–20)
CALCIUM SERPL-MCNC: 10 MG/DL (ref 8.6–10.4)
CHLORIDE SERPL-SCNC: 100 MMOL/L (ref 98–107)
CO2 SERPL-SCNC: 30 MMOL/L (ref 20–31)
CREAT SERPL-MCNC: 1.1 MG/DL (ref 0.7–1.2)
EOSINOPHIL # BLD: 0.06 K/UL (ref 0–0.44)
EOSINOPHILS RELATIVE PERCENT: 1 % (ref 1–4)
ERYTHROCYTE [DISTWIDTH] IN BLOOD BY AUTOMATED COUNT: 13 % (ref 11.8–14.4)
GFR, ESTIMATED: 73 ML/MIN/1.73M2
GLUCOSE SERPL-MCNC: 116 MG/DL (ref 70–99)
HCT VFR BLD AUTO: 37.1 % (ref 40.7–50.3)
HGB BLD-MCNC: 13 G/DL (ref 13–17)
IMM GRANULOCYTES # BLD AUTO: <0.03 K/UL (ref 0–0.3)
IMM GRANULOCYTES NFR BLD: 0 %
LYMPHOCYTES NFR BLD: 2.04 K/UL (ref 1.1–3.7)
LYMPHOCYTES RELATIVE PERCENT: 36 % (ref 24–43)
MCH RBC QN AUTO: 32 PG (ref 25.2–33.5)
MCHC RBC AUTO-ENTMCNC: 35 G/DL (ref 25.2–33.5)
MCV RBC AUTO: 91.4 FL (ref 82.6–102.9)
MONOCYTES NFR BLD: 0.69 K/UL (ref 0.1–1.2)
MONOCYTES NFR BLD: 12 % (ref 3–12)
NEUTROPHILS NFR BLD: 51 % (ref 36–65)
NEUTS SEG NFR BLD: 2.91 K/UL (ref 1.5–8.1)
NRBC BLD-RTO: 0 PER 100 WBC
PLATELET # BLD AUTO: ABNORMAL K/UL (ref 138–453)
PLATELET, FLUORESCENCE: 145 K/UL (ref 138–453)
PLATELETS.RETICULATED NFR BLD AUTO: 6.3 % (ref 1.1–10.3)
POTASSIUM SERPL-SCNC: 4.4 MMOL/L (ref 3.7–5.3)
PROT SERPL-MCNC: 7.7 G/DL (ref 6.4–8.3)
RBC # BLD AUTO: 4.06 M/UL (ref 4.21–5.77)
SODIUM SERPL-SCNC: 138 MMOL/L (ref 135–144)
WBC OTHER # BLD: 5.8 K/UL (ref 3.5–11.3)

## 2024-11-26 PROCEDURE — 85025 COMPLETE CBC W/AUTO DIFF WBC: CPT

## 2024-11-26 PROCEDURE — 90653 IIV ADJUVANT VACCINE IM: CPT | Performed by: FAMILY MEDICINE

## 2024-11-26 PROCEDURE — 36415 COLL VENOUS BLD VENIPUNCTURE: CPT

## 2024-11-26 PROCEDURE — 80053 COMPREHEN METABOLIC PANEL: CPT

## 2024-12-02 ENCOUNTER — OFFICE VISIT (OUTPATIENT)
Dept: ONCOLOGY | Age: 69
End: 2024-12-02
Payer: MEDICARE

## 2024-12-02 VITALS
SYSTOLIC BLOOD PRESSURE: 134 MMHG | RESPIRATION RATE: 20 BRPM | HEIGHT: 71 IN | DIASTOLIC BLOOD PRESSURE: 64 MMHG | WEIGHT: 216.2 LBS | TEMPERATURE: 97.3 F | BODY MASS INDEX: 30.27 KG/M2 | OXYGEN SATURATION: 97 % | HEART RATE: 73 BPM

## 2024-12-02 DIAGNOSIS — D69.6 THROMBOCYTOPENIA (HCC): Primary | ICD-10-CM

## 2024-12-02 PROCEDURE — 3078F DIAST BP <80 MM HG: CPT | Performed by: INTERNAL MEDICINE

## 2024-12-02 PROCEDURE — G8417 CALC BMI ABV UP PARAM F/U: HCPCS | Performed by: INTERNAL MEDICINE

## 2024-12-02 PROCEDURE — 99214 OFFICE O/P EST MOD 30 MIN: CPT | Performed by: INTERNAL MEDICINE

## 2024-12-02 PROCEDURE — G8482 FLU IMMUNIZE ORDER/ADMIN: HCPCS | Performed by: INTERNAL MEDICINE

## 2024-12-02 PROCEDURE — 99213 OFFICE O/P EST LOW 20 MIN: CPT | Performed by: INTERNAL MEDICINE

## 2024-12-02 PROCEDURE — 1160F RVW MEDS BY RX/DR IN RCRD: CPT | Performed by: INTERNAL MEDICINE

## 2024-12-02 PROCEDURE — 1036F TOBACCO NON-USER: CPT | Performed by: INTERNAL MEDICINE

## 2024-12-02 PROCEDURE — 1159F MED LIST DOCD IN RCRD: CPT | Performed by: INTERNAL MEDICINE

## 2024-12-02 PROCEDURE — 3017F COLORECTAL CA SCREEN DOC REV: CPT | Performed by: INTERNAL MEDICINE

## 2024-12-02 PROCEDURE — 1123F ACP DISCUSS/DSCN MKR DOCD: CPT | Performed by: INTERNAL MEDICINE

## 2024-12-02 PROCEDURE — G8427 DOCREV CUR MEDS BY ELIG CLIN: HCPCS | Performed by: INTERNAL MEDICINE

## 2024-12-02 PROCEDURE — 3075F SYST BP GE 130 - 139MM HG: CPT | Performed by: INTERNAL MEDICINE

## 2024-12-02 NOTE — PROGRESS NOTES
Patient ID: Binu Boggs, 1955, [id number], 69 y.o.  Diagnosis:   Immune related thrombocytopenia  Left RCC, stage I, T1aNx s/p partial nephrectomy 11/30/18  Non invasive bladder cancer s/p TURBT 1/31/24    HISTORY OF PRESENT ILLNESS:    Hematologic History:  This is a 62-year-old male with a history of immune related thrombocytopenia was being followed by Dr. Dominguez.  He was noted to have thrombocytopenia since 2016.  He has a very mild thrombocytopenia ranging around 120-130 K.  His ITP screen was positive for platelet associated antibody IgM.  He denied any symptoms of bleeding.    INTERVAL HISTORY  Patient is returning for a follow-up visit and to discuss lab results, imaging studies and further recommendations.  He was diagnosed with noninvasive bladder cancer status post TURBT in January 2023.  He did receive intravesical chemotherapy treatment in the adjuvant setting.  He is following with urologist with surveillance cystoscopies.  He denied any blood in stool.  Denied any nosebleeds, abdominal pain nausea vomiting.      His recent CT scan showed no evidence of recurrence.    During this visit patient's allergy, social, medical, surgical history and medications were reviewed and updated.    Allergies   Allergen Reactions    Codeine      Severe Abdominal pain  Severe stomach pain  Severe Abdominal pain    Sulfa Antibiotics      Patient unsure of reaction     Current Outpatient Medications   Medication Sig Dispense Refill    rosuvastatin (CRESTOR) 5 MG tablet Take 1 tablet by mouth daily 90 tablet 0    omeprazole (PRILOSEC) 40 MG delayed release capsule Take 1 capsule by mouth every morning (before breakfast) (Patient taking differently: Take 1 capsule by mouth every morning (before breakfast) Pt takes every other day) 90 capsule 1    furosemide (LASIX) 20 MG tablet Take 1 tablet by mouth daily 90 tablet 1    celecoxib (CELEBREX) 100 MG capsule Take 1 capsule by mouth 2 times daily as needed for Pain

## 2024-12-10 ENCOUNTER — PREP FOR PROCEDURE (OUTPATIENT)
Dept: UROLOGY | Age: 69
End: 2024-12-10

## 2024-12-10 DIAGNOSIS — C67.2 MALIGNANT NEOPLASM OF LATERAL WALL OF URINARY BLADDER (HCC): ICD-10-CM

## 2024-12-23 ENCOUNTER — PATIENT MESSAGE (OUTPATIENT)
Dept: FAMILY MEDICINE CLINIC | Age: 69
End: 2024-12-23

## 2024-12-23 DIAGNOSIS — F33.0 MILD EPISODE OF RECURRENT MAJOR DEPRESSIVE DISORDER (HCC): ICD-10-CM

## 2024-12-23 DIAGNOSIS — J30.2 OTHER SEASONAL ALLERGIC RHINITIS: ICD-10-CM

## 2024-12-23 DIAGNOSIS — E11.9 TYPE 2 DIABETES MELLITUS WITHOUT COMPLICATION, WITHOUT LONG-TERM CURRENT USE OF INSULIN (HCC): ICD-10-CM

## 2024-12-23 DIAGNOSIS — I10 ESSENTIAL HYPERTENSION: ICD-10-CM

## 2024-12-23 RX ORDER — LEVOTHYROXINE SODIUM 75 UG/1
75 TABLET ORAL DAILY
Qty: 90 TABLET | Refills: 1 | Status: SHIPPED | OUTPATIENT
Start: 2024-12-23

## 2024-12-23 RX ORDER — ROSUVASTATIN CALCIUM 5 MG/1
5 TABLET, COATED ORAL DAILY
Qty: 90 TABLET | Refills: 0 | Status: SHIPPED | OUTPATIENT
Start: 2024-12-23

## 2024-12-23 RX ORDER — LOSARTAN POTASSIUM AND HYDROCHLOROTHIAZIDE 25; 100 MG/1; MG/1
1 TABLET ORAL DAILY
Qty: 90 TABLET | Refills: 3 | Status: SHIPPED | OUTPATIENT
Start: 2024-12-23

## 2024-12-23 RX ORDER — AMLODIPINE BESYLATE 5 MG/1
5 TABLET ORAL DAILY
Qty: 90 TABLET | Refills: 1 | Status: SHIPPED | OUTPATIENT
Start: 2024-12-23

## 2024-12-23 RX ORDER — MONTELUKAST SODIUM 10 MG/1
10 TABLET ORAL NIGHTLY
Qty: 90 TABLET | Refills: 1 | Status: SHIPPED | OUTPATIENT
Start: 2024-12-23

## 2024-12-23 RX ORDER — DULOXETIN HYDROCHLORIDE 20 MG/1
20 CAPSULE, DELAYED RELEASE ORAL DAILY
Qty: 90 CAPSULE | Refills: 3 | Status: SHIPPED | OUTPATIENT
Start: 2024-12-23

## 2024-12-23 NOTE — TELEPHONE ENCOUNTER
Binu called requesting a refill of the below medication which has been pended for you:     Requested Prescriptions     Pending Prescriptions Disp Refills    amLODIPine (NORVASC) 5 MG tablet 90 tablet 1     Sig: Take 1 tablet by mouth daily    DULoxetine (CYMBALTA) 20 MG extended release capsule 90 capsule 3     Sig: Take 1 capsule by mouth daily    levothyroxine (SYNTHROID) 75 MCG tablet 90 tablet 1     Sig: Take 1 tablet by mouth daily    losartan-hydroCHLOROthiazide (HYZAAR) 100-25 MG per tablet 90 tablet 3     Sig: Take 1 tablet by mouth daily    montelukast (SINGULAIR) 10 MG tablet 90 tablet 1     Sig: Take 1 tablet by mouth nightly    rosuvastatin (CRESTOR) 5 MG tablet 90 tablet 0     Sig: Take 1 tablet by mouth daily       Last Appointment Date: 6/28/2024  Next Appointment Date: 1/7/2025    Allergies   Allergen Reactions    Codeine      Severe Abdominal pain  Severe stomach pain  Severe Abdominal pain    Sulfa Antibiotics      Patient unsure of reaction

## 2024-12-26 ENCOUNTER — HOSPITAL ENCOUNTER (OUTPATIENT)
Age: 69
Discharge: HOME | End: 2024-12-26
Payer: MEDICARE

## 2024-12-26 DIAGNOSIS — N13.8 BPH WITH OBSTRUCTION/LOWER URINARY TRACT SYMPTOMS: ICD-10-CM

## 2024-12-26 DIAGNOSIS — E11.59 TYPE 2 DIABETES MELLITUS WITH OTHER CIRCULATORY COMPLICATION, WITHOUT LONG-TERM CURRENT USE OF INSULIN (HCC): ICD-10-CM

## 2024-12-26 DIAGNOSIS — N40.1 BPH WITH OBSTRUCTION/LOWER URINARY TRACT SYMPTOMS: ICD-10-CM

## 2024-12-26 LAB
CREAT UR-MCNC: 185 MG/DL (ref 39–259)
MICROALBUMIN UR-MCNC: 28 MG/L (ref 0–20)
MICROALBUMIN/CREAT UR-RTO: 15 MCG/MG CREAT (ref 0–17)
PSA SERPL-MCNC: 0.99 NG/ML (ref 0–4)

## 2024-12-26 PROCEDURE — 82043 UR ALBUMIN QUANTITATIVE: CPT

## 2024-12-26 PROCEDURE — 36415 COLL VENOUS BLD VENIPUNCTURE: CPT

## 2024-12-26 PROCEDURE — 84153 ASSAY OF PSA TOTAL: CPT

## 2024-12-26 PROCEDURE — 82570 ASSAY OF URINE CREATININE: CPT

## 2025-01-03 ENCOUNTER — TELEPHONE (OUTPATIENT)
Dept: UROLOGY | Age: 70
End: 2025-01-03

## 2025-01-03 NOTE — TELEPHONE ENCOUNTER
[facility]         Patient:Binu Boggs           :1955           Surgical/Procedure Planned: Cystoscopy    Date & Location: 25 [facility]       Outpatient   Planned Length of OR: 1hour    Sedation: MAC    This is notification of the scheduled procedure only: Cardiac clearance    Estimated Cardiac Risk for Non-Cardiac Surgery/Procedure     Low______ Moderate___x___ High______    Medication Instructions - Clarification needed by this date:   -Insulin:  -Other medications:    ASA 81 mg/325 mg Hold _5__ Days    Resume medications:     Lovenox indicated: _____Yes _x____NO    Provider: Dr. Freeman      Signature of Provider Giving Orders for Medication holds:    _____________________________________________    Usman Erickson DO, FACC, FACOI, RPVI, [facility]  [facility]  [phone]     Warm

## 2025-01-03 NOTE — TELEPHONE ENCOUNTER
[facility]     Pre-Operative Evaluation/Consultation    Name:  Binu Boggs                                         Age:  69 y.o.  MRN:  [id number]       :  1955   Date:  1/3/2025         Sex: male      Surgeon:  Dr. qureshi  Procedure (Planned):  cystoscopy  Date Scheduled surgery: 25    Attending : No att. providers found    Primary Physician:   Cardiologist:     Type of Anesthesia Requested: Monitored Anesthesia Care    Patient Medical history:  Allergies   Allergen Reactions    Codeine      Severe Abdominal pain  Severe stomach pain  Severe Abdominal pain    Sulfa Antibiotics      Patient unsure of reaction     Social History     Tobacco Use    Smoking status: Never    Smokeless tobacco: Never   Vaping Use    Vaping status: Never Used   Substance Use Topics    Alcohol use: Yes     Comment: very rare    Drug use: No         Additional ordered pre-operative testing:  []CBC    []ABG      [] BMP   []URINALYSIS   []CMP    []HCG   []COAGS PT/INR  []T&C  []LFTs   []TYPE AND SCREEN    [] EKG  [] Chest X-Ray  [] Other Radiology    [] Sent to Hospitalist None  [] Sent to Anesthesia for your review: None   [] Additional Orders: None     Comments:None   Requests: No Special requests    Signed: Albina Caldwell LPN 1/3/2025 12:47 PM

## 2025-01-03 NOTE — TELEPHONE ENCOUNTER
[facility]         Patient:Binu Boggs           :1955           Surgical/Procedure Planned: Cystoscopy    Date & Location: 25 [facility]       Outpatient   Planned Length of OR: 1hour    Sedation: MAC    This is notification of the scheduled procedure only: Medication hold    Estimated Cardiac Risk for Non-Cardiac Surgery/Procedure     Low______ Moderate______ High______    Medication Instructions - Clarification needed by this date:     -Other medications: Rybelsus      Provider:       Signature of Provider Giving Orders for Medication holds:    _____________________________________________

## 2025-01-06 NOTE — TELEPHONE ENCOUNTER
Patient is low cardiac risk for procedure. Hold rybelsus for 1 week prior to surgery. Resume rybelsus the day after surgery.

## 2025-01-07 ENCOUNTER — OFFICE VISIT (OUTPATIENT)
Dept: FAMILY MEDICINE CLINIC | Age: 70
End: 2025-01-07
Payer: MEDICARE

## 2025-01-07 VITALS
SYSTOLIC BLOOD PRESSURE: 136 MMHG | HEIGHT: 71 IN | WEIGHT: 219.9 LBS | DIASTOLIC BLOOD PRESSURE: 70 MMHG | HEART RATE: 68 BPM | BODY MASS INDEX: 30.78 KG/M2 | OXYGEN SATURATION: 98 %

## 2025-01-07 DIAGNOSIS — E78.2 MIXED HYPERLIPIDEMIA: ICD-10-CM

## 2025-01-07 DIAGNOSIS — G25.0 ESSENTIAL TREMOR: ICD-10-CM

## 2025-01-07 DIAGNOSIS — H61.21 IMPACTED CERUMEN OF RIGHT EAR: ICD-10-CM

## 2025-01-07 DIAGNOSIS — I10 PRIMARY HYPERTENSION: ICD-10-CM

## 2025-01-07 DIAGNOSIS — E11.59 TYPE 2 DIABETES MELLITUS WITH OTHER CIRCULATORY COMPLICATION, WITHOUT LONG-TERM CURRENT USE OF INSULIN (HCC): Primary | ICD-10-CM

## 2025-01-07 DIAGNOSIS — F33.0 MAJOR DEPRESSIVE DISORDER, RECURRENT, MILD (HCC): ICD-10-CM

## 2025-01-07 DIAGNOSIS — C64.9 RENAL CELL CARCINOMA, UNSPECIFIED LATERALITY (HCC): ICD-10-CM

## 2025-01-07 LAB — HBA1C MFR BLD: 6.8 %

## 2025-01-07 PROCEDURE — G8417 CALC BMI ABV UP PARAM F/U: HCPCS | Performed by: FAMILY MEDICINE

## 2025-01-07 PROCEDURE — 1160F RVW MEDS BY RX/DR IN RCRD: CPT | Performed by: FAMILY MEDICINE

## 2025-01-07 PROCEDURE — 3044F HG A1C LEVEL LT 7.0%: CPT | Performed by: FAMILY MEDICINE

## 2025-01-07 PROCEDURE — 2022F DILAT RTA XM EVC RTNOPTHY: CPT | Performed by: FAMILY MEDICINE

## 2025-01-07 PROCEDURE — 1159F MED LIST DOCD IN RCRD: CPT | Performed by: FAMILY MEDICINE

## 2025-01-07 PROCEDURE — PBSHW POCT GLYCOSYLATED HEMOGLOBIN (HGB A1C): Performed by: FAMILY MEDICINE

## 2025-01-07 PROCEDURE — 83036 HEMOGLOBIN GLYCOSYLATED A1C: CPT | Performed by: FAMILY MEDICINE

## 2025-01-07 PROCEDURE — 3075F SYST BP GE 130 - 139MM HG: CPT | Performed by: FAMILY MEDICINE

## 2025-01-07 PROCEDURE — 69209 REMOVE IMPACTED EAR WAX UNI: CPT | Performed by: FAMILY MEDICINE

## 2025-01-07 PROCEDURE — 3017F COLORECTAL CA SCREEN DOC REV: CPT | Performed by: FAMILY MEDICINE

## 2025-01-07 PROCEDURE — M1299 PR FLU IMMUNIZE ORDER/ADMIN: HCPCS | Performed by: FAMILY MEDICINE

## 2025-01-07 PROCEDURE — 3078F DIAST BP <80 MM HG: CPT | Performed by: FAMILY MEDICINE

## 2025-01-07 PROCEDURE — 99214 OFFICE O/P EST MOD 30 MIN: CPT | Performed by: FAMILY MEDICINE

## 2025-01-07 PROCEDURE — G8427 DOCREV CUR MEDS BY ELIG CLIN: HCPCS | Performed by: FAMILY MEDICINE

## 2025-01-07 PROCEDURE — PBSHW REMOVAL IMPACTED CERUMEN IRRIGATION/LVG UNILAT: Performed by: FAMILY MEDICINE

## 2025-01-07 PROCEDURE — 1036F TOBACCO NON-USER: CPT | Performed by: FAMILY MEDICINE

## 2025-01-07 PROCEDURE — 1123F ACP DISCUSS/DSCN MKR DOCD: CPT | Performed by: FAMILY MEDICINE

## 2025-01-07 ASSESSMENT — PATIENT HEALTH QUESTIONNAIRE - PHQ9
8. MOVING OR SPEAKING SO SLOWLY THAT OTHER PEOPLE COULD HAVE NOTICED. OR THE OPPOSITE, BEING SO FIGETY OR RESTLESS THAT YOU HAVE BEEN MOVING AROUND A LOT MORE THAN USUAL: NOT AT ALL
3. TROUBLE FALLING OR STAYING ASLEEP: NOT AT ALL
7. TROUBLE CONCENTRATING ON THINGS, SUCH AS READING THE NEWSPAPER OR WATCHING TELEVISION: NOT AT ALL
9. THOUGHTS THAT YOU WOULD BE BETTER OFF DEAD, OR OF HURTING YOURSELF: NOT AT ALL
5. POOR APPETITE OR OVEREATING: NOT AT ALL
SUM OF ALL RESPONSES TO PHQ QUESTIONS 1-9: 0
SUM OF ALL RESPONSES TO PHQ9 QUESTIONS 1 & 2: 0
1. LITTLE INTEREST OR PLEASURE IN DOING THINGS: NOT AT ALL
10. IF YOU CHECKED OFF ANY PROBLEMS, HOW DIFFICULT HAVE THESE PROBLEMS MADE IT FOR YOU TO DO YOUR WORK, TAKE CARE OF THINGS AT HOME, OR GET ALONG WITH OTHER PEOPLE: NOT DIFFICULT AT ALL
SUM OF ALL RESPONSES TO PHQ QUESTIONS 1-9: 0
4. FEELING TIRED OR HAVING LITTLE ENERGY: NOT AT ALL
SUM OF ALL RESPONSES TO PHQ QUESTIONS 1-9: 0
2. FEELING DOWN, DEPRESSED OR HOPELESS: NOT AT ALL
6. FEELING BAD ABOUT YOURSELF - OR THAT YOU ARE A FAILURE OR HAVE LET YOURSELF OR YOUR FAMILY DOWN: NOT AT ALL
SUM OF ALL RESPONSES TO PHQ QUESTIONS 1-9: 0

## 2025-01-07 ASSESSMENT — ENCOUNTER SYMPTOMS
BACK PAIN: 1
CHEST TIGHTNESS: 0
SHORTNESS OF BREATH: 0
COUGH: 0
WHEEZING: 0

## 2025-01-07 NOTE — PROGRESS NOTES
scheduled foot examination. He reports no orthostatic symptoms such as lightheadedness or dizziness upon standing. He has noticed an improvement in his joint pain and overall energy levels, attributing this to his current medication regimen of tumeric, celebrex and glucosamine. He is able to perform high kicks and other physical activities that were previously challenging. He experiences morning stiffness in his foot, which gradually improves throughout the day. He has a history of bladder cancer, which was successfully treated with chemotherapy. He declines the offer of a COVID-19 vaccine today. He received his influenza vaccine in November 2024. He reports occasional sinus issues, which typically resolve within a few days.    FAMILY HISTORY  His father had bladder cancer. His grandparents had hereditary tremors.    MEDICATIONS  Current: turmeric    IMMUNIZATIONS  He received his influenza vaccine in November 2024.      Review of Systems   Constitutional:  Negative for activity change, appetite change, chills, fatigue (his energy is excellent) and fever.   Eyes:  Negative for visual disturbance.   Respiratory:  Negative for cough, chest tightness, shortness of breath and wheezing.    Cardiovascular:  Negative for chest pain, palpitations and leg swelling.   Genitourinary:  Negative for difficulty urinating.   Musculoskeletal:  Positive for back pain (occasionally in the morning). Negative for arthralgias (doing very well recently).   Skin:  Negative for rash.   Neurological:  Negative for dizziness, syncope, weakness and light-headedness.   Psychiatric/Behavioral:  Positive for depression.           Objective   Physical Exam  HENT:      Head: Normocephalic and atraumatic.      Right Ear: Tympanic membrane, ear canal and external ear normal. There is impacted cerumen.      Left Ear: Tympanic membrane, ear canal and external ear normal. There is no impacted cerumen.      Mouth/Throat:      Mouth: Mucous membranes are

## 2025-01-07 NOTE — ASSESSMENT & PLAN NOTE
Orders:    POCT Hb A1C (glycosylated hemoglobin)    Comprehensive Metabolic Panel; Future    Hemoglobin A1C; Future

## 2025-01-08 ENCOUNTER — HOSPITAL ENCOUNTER (OUTPATIENT)
Dept: INTERVENTIONAL RADIOLOGY/VASCULAR | Age: 70
Discharge: HOME | End: 2025-01-10
Attending: UROLOGY
Payer: MEDICARE

## 2025-01-08 DIAGNOSIS — C64.2 RENAL CELL CARCINOMA OF LEFT KIDNEY (HCC): ICD-10-CM

## 2025-01-08 PROCEDURE — 76775 US EXAM ABDO BACK WALL LIM: CPT

## 2025-01-20 ENCOUNTER — TELEMEDICINE (OUTPATIENT)
Dept: FAMILY MEDICINE CLINIC | Age: 70
End: 2025-01-20

## 2025-01-20 DIAGNOSIS — Z00.00 MEDICARE ANNUAL WELLNESS VISIT, SUBSEQUENT: Primary | ICD-10-CM

## 2025-01-20 SDOH — ECONOMIC STABILITY: FOOD INSECURITY: WITHIN THE PAST 12 MONTHS, THE FOOD YOU BOUGHT JUST DIDN'T LAST AND YOU DIDN'T HAVE MONEY TO GET MORE.: NEVER TRUE

## 2025-01-20 SDOH — ECONOMIC STABILITY: FOOD INSECURITY: WITHIN THE PAST 12 MONTHS, YOU WORRIED THAT YOUR FOOD WOULD RUN OUT BEFORE YOU GOT MONEY TO BUY MORE.: NEVER TRUE

## 2025-01-20 ASSESSMENT — PATIENT HEALTH QUESTIONNAIRE - PHQ9
SUM OF ALL RESPONSES TO PHQ QUESTIONS 1-9: 0
7. TROUBLE CONCENTRATING ON THINGS, SUCH AS READING THE NEWSPAPER OR WATCHING TELEVISION: NOT AT ALL
6. FEELING BAD ABOUT YOURSELF - OR THAT YOU ARE A FAILURE OR HAVE LET YOURSELF OR YOUR FAMILY DOWN: NOT AT ALL
5. POOR APPETITE OR OVEREATING: NOT AT ALL
9. THOUGHTS THAT YOU WOULD BE BETTER OFF DEAD, OR OF HURTING YOURSELF: NOT AT ALL
1. LITTLE INTEREST OR PLEASURE IN DOING THINGS: NOT AT ALL
SUM OF ALL RESPONSES TO PHQ QUESTIONS 1-9: 0
SUM OF ALL RESPONSES TO PHQ QUESTIONS 1-9: 0
4. FEELING TIRED OR HAVING LITTLE ENERGY: NOT AT ALL
3. TROUBLE FALLING OR STAYING ASLEEP: NOT AT ALL
10. IF YOU CHECKED OFF ANY PROBLEMS, HOW DIFFICULT HAVE THESE PROBLEMS MADE IT FOR YOU TO DO YOUR WORK, TAKE CARE OF THINGS AT HOME, OR GET ALONG WITH OTHER PEOPLE: NOT DIFFICULT AT ALL
8. MOVING OR SPEAKING SO SLOWLY THAT OTHER PEOPLE COULD HAVE NOTICED. OR THE OPPOSITE, BEING SO FIGETY OR RESTLESS THAT YOU HAVE BEEN MOVING AROUND A LOT MORE THAN USUAL: NOT AT ALL
2. FEELING DOWN, DEPRESSED OR HOPELESS: NOT AT ALL
SUM OF ALL RESPONSES TO PHQ9 QUESTIONS 1 & 2: 0
SUM OF ALL RESPONSES TO PHQ QUESTIONS 1-9: 0

## 2025-01-20 ASSESSMENT — LIFESTYLE VARIABLES
HOW MANY STANDARD DRINKS CONTAINING ALCOHOL DO YOU HAVE ON A TYPICAL DAY: 1 OR 2
HOW OFTEN DO YOU HAVE A DRINK CONTAINING ALCOHOL: MONTHLY OR LESS

## 2025-01-20 NOTE — PROGRESS NOTES
Medicare Annual Wellness Visit    Binu Boggs is here for Medicare AWV    Assessment & Plan        No follow-ups on file.     Subjective       Patient's complete Health Risk Assessment and screening values have been reviewed and are found in Flowsheets. The following problems were reviewed today and where indicated follow up appointments were made and/or referrals ordered.    Positive Risk Factor Screenings with Interventions:                Abnormal BMI (obese):  There is no height or weight on file to calculate BMI. (!) Abnormal    Interventions:  See AVS for additional education material        Dentist Screen:  Have you seen the dentist within the past year?: (!) No    Intervention:  See AVS for additional education material    Hearing Screen:  Do you or your family notice any trouble with your hearing that hasn't been managed with hearing aids?: (!) Yes (pt has tinnitus/some difficulty hearing in crowds, in process of getting hearing aides)    Interventions:  See AVS for additional education material                     Objective    Patient-Reported Vitals  Patient-Reported Weight: 219 lbs  Patient-Reported Height: 5'11\"                 Allergies   Allergen Reactions    Codeine      Severe Abdominal pain  Severe stomach pain  Severe Abdominal pain    Sulfa Antibiotics      Patient unsure of reaction     Prior to Visit Medications    Medication Sig Taking? Authorizing Provider   amLODIPine (NORVASC) 5 MG tablet Take 1 tablet by mouth daily  Caity Doyle MD   DULoxetine (CYMBALTA) 20 MG extended release capsule Take 1 capsule by mouth daily  Caity Doyle MD   levothyroxine (SYNTHROID) 75 MCG tablet Take 1 tablet by mouth daily  Caity Doyle MD   losartan-hydroCHLOROthiazide (HYZAAR) 100-25 MG per tablet Take 1 tablet by mouth daily  Caity Doyle MD   montelukast (SINGULAIR) 10 MG tablet Take 1 tablet by mouth nightly  Caity Doyle MD   rosuvastatin (CRESTOR) 5 MG tablet Take 1 tablet by

## 2025-01-20 NOTE — PATIENT INSTRUCTIONS

## 2025-02-05 ENCOUNTER — ANESTHESIA (OUTPATIENT)
Dept: OPERATING ROOM | Age: 70
End: 2025-02-05
Payer: MEDICARE

## 2025-02-05 ENCOUNTER — ANESTHESIA EVENT (OUTPATIENT)
Dept: OPERATING ROOM | Age: 70
End: 2025-02-05
Payer: MEDICARE

## 2025-02-05 ENCOUNTER — HOSPITAL ENCOUNTER (OUTPATIENT)
Age: 70
Setting detail: OUTPATIENT SURGERY
Discharge: HOME | End: 2025-02-05
Attending: UROLOGY | Admitting: UROLOGY
Payer: MEDICARE

## 2025-02-05 VITALS
DIASTOLIC BLOOD PRESSURE: 69 MMHG | WEIGHT: 220 LBS | RESPIRATION RATE: 16 BRPM | BODY MASS INDEX: 30.8 KG/M2 | SYSTOLIC BLOOD PRESSURE: 131 MMHG | OXYGEN SATURATION: 97 % | HEART RATE: 66 BPM | HEIGHT: 71 IN | TEMPERATURE: 98 F

## 2025-02-05 PROCEDURE — 3700000000 HC ANESTHESIA ATTENDED CARE: Performed by: UROLOGY

## 2025-02-05 PROCEDURE — 3600000002 HC SURGERY LEVEL 2 BASE: Performed by: UROLOGY

## 2025-02-05 PROCEDURE — 6360000002 HC RX W HCPCS: Performed by: UROLOGY

## 2025-02-05 PROCEDURE — 7100000010 HC PHASE II RECOVERY - FIRST 15 MIN: Performed by: UROLOGY

## 2025-02-05 PROCEDURE — 2709999900 HC NON-CHARGEABLE SUPPLY: Performed by: UROLOGY

## 2025-02-05 PROCEDURE — 7100000011 HC PHASE II RECOVERY - ADDTL 15 MIN: Performed by: UROLOGY

## 2025-02-05 PROCEDURE — 6360000002 HC RX W HCPCS: Performed by: NURSE ANESTHETIST, CERTIFIED REGISTERED

## 2025-02-05 PROCEDURE — 2720000010 HC SURG SUPPLY STERILE: Performed by: UROLOGY

## 2025-02-05 PROCEDURE — 2500000003 HC RX 250 WO HCPCS: Performed by: UROLOGY

## 2025-02-05 RX ORDER — LIDOCAINE HYDROCHLORIDE 10 MG/ML
INJECTION, SOLUTION EPIDURAL; INFILTRATION; INTRACAUDAL; PERINEURAL
Status: DISCONTINUED | OUTPATIENT
Start: 2025-02-05 | End: 2025-02-05 | Stop reason: SDUPTHER

## 2025-02-05 RX ORDER — SODIUM CHLORIDE 9 MG/ML
INJECTION, SOLUTION INTRAVENOUS PRN
Status: DISCONTINUED | OUTPATIENT
Start: 2025-02-05 | End: 2025-02-05 | Stop reason: HOSPADM

## 2025-02-05 RX ORDER — SODIUM CHLORIDE 0.9 % (FLUSH) 0.9 %
5-40 SYRINGE (ML) INJECTION PRN
Status: DISCONTINUED | OUTPATIENT
Start: 2025-02-05 | End: 2025-02-05 | Stop reason: HOSPADM

## 2025-02-05 RX ORDER — SODIUM CHLORIDE, SODIUM LACTATE, POTASSIUM CHLORIDE, CALCIUM CHLORIDE 600; 310; 30; 20 MG/100ML; MG/100ML; MG/100ML; MG/100ML
INJECTION, SOLUTION INTRAVENOUS CONTINUOUS
Status: DISCONTINUED | OUTPATIENT
Start: 2025-02-05 | End: 2025-02-05 | Stop reason: HOSPADM

## 2025-02-05 RX ORDER — SODIUM CHLORIDE 0.9 % (FLUSH) 0.9 %
5-40 SYRINGE (ML) INJECTION EVERY 12 HOURS SCHEDULED
Status: DISCONTINUED | OUTPATIENT
Start: 2025-02-05 | End: 2025-02-05 | Stop reason: HOSPADM

## 2025-02-05 RX ORDER — PROPOFOL 10 MG/ML
INJECTION, EMULSION INTRAVENOUS
Status: DISCONTINUED | OUTPATIENT
Start: 2025-02-05 | End: 2025-02-05 | Stop reason: SDUPTHER

## 2025-02-05 RX ADMIN — PROPOFOL 100 MG: 10 INJECTION, EMULSION INTRAVENOUS at 12:38

## 2025-02-05 RX ADMIN — PROPOFOL 150 MCG/KG/MIN: 10 INJECTION, EMULSION INTRAVENOUS at 12:39

## 2025-02-05 RX ADMIN — LIDOCAINE HYDROCHLORIDE 50 MG: 10 INJECTION, SOLUTION EPIDURAL; INFILTRATION; INTRACAUDAL; PERINEURAL at 12:38

## 2025-02-05 RX ADMIN — SODIUM CHLORIDE, PRESERVATIVE FREE 10 ML: 5 INJECTION INTRAVENOUS at 12:29

## 2025-02-05 RX ADMIN — Medication 2000 MG: at 12:38

## 2025-02-05 ASSESSMENT — PAIN - FUNCTIONAL ASSESSMENT
PAIN_FUNCTIONAL_ASSESSMENT: 0-10

## 2025-02-05 NOTE — ANESTHESIA PRE PROCEDURE
Department of Anesthesiology  Preprocedure Note       Name:  Binu Boggs   Age:  69 y.o.  :  1955                                          MRN:  [id number]         Date:  2025      Surgeon: Surgeon(s):  Germain Freeman MD    Procedure: Procedure(s):  Cystoscopy    Medications prior to admission:   Prior to Admission medications    Medication Sig Start Date End Date Taking? Authorizing Provider   amLODIPine (NORVASC) 5 MG tablet Take 1 tablet by mouth daily 24   Caity Doyle MD   DULoxetine (CYMBALTA) 20 MG extended release capsule Take 1 capsule by mouth daily 24   Caity Doyle MD   levothyroxine (SYNTHROID) 75 MCG tablet Take 1 tablet by mouth daily 24   Caity Doyle MD   losartan-hydroCHLOROthiazide (HYZAAR) 100-25 MG per tablet Take 1 tablet by mouth daily 24   Caity Doyle MD   montelukast (SINGULAIR) 10 MG tablet Take 1 tablet by mouth nightly 24   Caity oDyle MD   rosuvastatin (CRESTOR) 5 MG tablet Take 1 tablet by mouth daily 24   Caity Doyle MD   omeprazole (PRILOSEC) 40 MG delayed release capsule Take 1 capsule by mouth every morning (before breakfast)  Patient taking differently: Take 1 capsule by mouth every morning (before breakfast) Pt takes every other day 10/14/24   Caity Doyle MD   furosemide (LASIX) 20 MG tablet Take 1 tablet by mouth daily 10/14/24   Caity Doyle MD   celecoxib (CELEBREX) 100 MG capsule Take 1 capsule by mouth 2 times daily as needed for Pain 10/1/24   Caity Doyle MD   metFORMIN (GLUCOPHAGE) 500 MG tablet Take 1 tablet by mouth 2 times daily (with meals) 9/3/24   Caity Doyle MD   sildenafil (REVATIO) 20 MG tablet Take 1 tablet by mouth as needed (Erectile dysfunction) 24   Caity Doyle MD   Semaglutide (RYBELSUS) 14 MG TABS Take 1 tablet by mouth daily 24   Caity Doyle MD   carvedilol (COREG) 25 MG tablet Take 1.5 tablets by mouth 2 times daily 24   Alcira Erickson, DO

## 2025-02-05 NOTE — H&P
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 male who presents with as above, here for surgery    Patient's old records, notes and chart reviewed and summarized above.     Past Medical History:    Past Medical History:   Diagnosis Date    Anxiety     Aortic regurgitation     Bicuspid aortic valve     Diabetes mellitus (HCC) since March 2018    on Rx.    GERD (gastroesophageal reflux disease)     Hypertension     Left renal mass 10/2018    Osteoarthritis of left knee     Wears glasses        Past Surgical History:    Past Surgical History:   Procedure Laterality Date    CARDIAC CATHETERIZATION  09/12/2022    CYST REMOVAL      tail bone    CYSTOSCOPY N/A 1/17/2024    CYSTOSCOPY performed by Germain Freeman MD at [facility] OR    CYSTOSCOPY N/A 1/31/2024    CYSTOSCOPY Transuretheral Resection Bladder (TURBT) performed by Germain Freeman MD at [facility] OR    CYSTOSCOPY N/A 5/15/2024    CYSTOSCOPY performed by Germain Freeman MD at [facility] OR    MOUTH SURGERY  2015    PARTIAL NEPHRECTOMY Left 11/30/2018    ROBOTIC PARTIAL NEPHRECTOMY,     NC COLONOSCOPY FLX DX W/COLLJ SPEC WHEN PFRMD N/A 05/14/2018    normal colon, Dr. Noble    NC LAPAROSCOPY SURG PARTIAL NEPHRECTOMY Left 11/30/2018    XI ROBOTIC PARTIAL NEPHRECTOMY, INTRA-OP LAP ULTRASOUND performed by Germain Freeman MD at [facility] OR    TONSILLECTOMY AND ADENOIDECTOMY  1960    WISDOM TOOTH EXTRACTION       Medications Prior to Admission:    Prior to Admission medications    Medication Sig Start Date End Date Taking? Authorizing Provider   amLODIPine (NORVASC) 5 MG tablet Take 1 tablet by mouth daily 12/23/24   Caity Doyle MD   DULoxetine (CYMBALTA) 20 MG extended release capsule Take 1 capsule by mouth daily 12/23/24   Caity Doyle MD   levothyroxine (SYNTHROID) 75 MCG tablet Take

## 2025-02-05 NOTE — ANESTHESIA POSTPROCEDURE EVALUATION
Department of Anesthesiology  Postprocedure Note    Patient: Binu Boggs  MRN: [id number]  YOB: 1955  Date of evaluation: 2/5/2025    Procedure Summary       Date: 02/05/25 Room / Location: [address]    Anesthesia Start: 1236 Anesthesia Stop: 1257    Procedure: Cystoscopy Diagnosis:       Malignant neoplasm of lateral wall of urinary bladder (HCC)      (Malignant neoplasm of lateral wall of urinary bladder (HCC) [C67.2])    Surgeons: Germain Freeman MD Responsible Provider: Pepe Aaron II, APRN - CRNA    Anesthesia Type: General, TIVA ASA Status: 3            Anesthesia Type: General, TIVA    Hernandez Phase I: Hernandez Score: 10    Hernandez Phase II: Hernandez Score: 10    Anesthesia Post Evaluation    Patient location during evaluation: bedside  Patient participation: complete - patient participated  Level of consciousness: awake  Pain score: 0  Airway patency: patent  Nausea & Vomiting: no nausea and no vomiting  Cardiovascular status: hemodynamically stable  Respiratory status: spontaneous ventilation  Hydration status: stable  Pain management: satisfactory to patient    No notable events documented.

## 2025-02-05 NOTE — ACP (ADVANCE CARE PLANNING)
Advance Care Planning     Advance Care Planning Inpatient Note  [facility] Department    Today's Date: 2/5/2025  Unit: [facility]  OR    Received request from patient.  Upon review of chart and communication with care team, patient's decision making abilities are not in question.. Patient was/were present in the room during visit.    Goals of ACP Conversation:  Discuss advance care planning documents    Health Care Decision Makers:       Primary Decision Maker: Tom Rivero - Domestic Partner - [phone]  Summary:  Verified Healthcare Decision Maker    Advance Care Planning Documents (Patient Wishes):  None     Assessment:   reviewed information sheets and documents. Provided contact information in information sheets. Explained that the person who he has named as emergency contact has no legal decision making status without the Advance directive document completed and witnessed.    Interventions:  Provided education on documents for clarity and greater understanding  Discussed and provided education on state decision maker hierarchy  Reviewed but did not complete ACP document    Care Preferences Communicated:   No    Outcomes/Plan:  ACP Discussion: Postponed    Electronically signed by Chaplain Delaney on 2/5/2025 at 12:12 PM

## 2025-02-05 NOTE — PROGRESS NOTES
02/05/25 1204   Encounter Summary   Encounter Overview/Reason Pre-Procedural;Advance Care Planning   Service Provided For Patient   Referral/Consult From Rounding   Support System Friends/neighbors;Significant other   Last Encounter  02/05/25   Complexity of Encounter Low   Begin Time 1145   End Time  1206   Total Time Calculated 21 min   Advance Care Planning   Type ACP conversation   Assessment/Intervention/Outcome   Assessment Calm   Intervention Active listening;Sustaining Presence/Ministry of presence   Outcome Engaged in conversation;Expressed Gratitude      rounding on Surgery    Assessment: Patient is being prepared for procedure. He had been expecting information to be sent to him from primary care but had not received it.  furnished information. SEE ACP note    Intervention: Engaged in conversation. Patient expressed appreciation for visit and offer of continued prayer.    Plan: Chaplains are available on site or on call 24/7 for spiritual and emotional support.

## 2025-02-05 NOTE — DISCHARGE INSTRUCTIONS
Call your doctor for the following:   Chills   Temperature greater than 101   Pain that is not tolerable despite taking pain medicine as ordered   Inability to urinate >12 hours/ or a non-draining vo         No alcoholic beverages, no driving or operating machinery, no making important decisions for 24 hours.     Take your prescribed medications (if any) as instructed.    You may have a normal diet but should eat lightly on the day of surgery.    Drink plenty of fluids.    You may notice some burning or blood with urination, this is normal and should improve over next few days.    You may also take motrin/ibuprofen for pain control, you can alternate this with your other pain medications.    Be sure to take over the counter stool softeners if you notice constipation or hard stools.    Follow up with Dr. Freeman, office will arrange.     Resume blood thinners in 3 days

## 2025-02-06 NOTE — OP NOTE
Operative Note      Patient: Binu Boggs  YOB: 1955  MRN: [id number]    Date of Procedure: 2/5/2025    Pre-Op Diagnosis Codes:      * Malignant neoplasm of lateral wall of urinary bladder (HCC) [C67.2]    Post-Op Diagnosis: Same       Procedure(s):  Cystoscopy    Surgeon(s):  Germain Freeman MD    Assistant:   * No surgical staff found *    Anesthesia: Monitor Anesthesia Care    Estimated Blood Loss (mL): Minimal    Complications: None    Specimens:   * No specimens in log *    Implants:  * No implants in log *      Drains:   [REMOVED] Urinary Catheter 01/31/24 3 Way (Removed)       Findings:  Infection Present At Time Of Surgery (PATOS) (choose all levels that have infection present):  No infection present  Other Findings: Negative for bladder cancer or tumors    Detailed Description of Procedure:    He is here for Cystoscopy.  Risks benefits alternatives goals and possible complications of the procedure were explained to the patient and informed consent was obtained he elected to proceed.    Details:   The patient was brought back to the operating room.  He was laid in the supine position.  MAC anesthesia was inducted.  Timeouts performed his genitals were prepped and draped in usual sterile surgical fashion.  2 % lidocaine jelly was placed per the urethra for pain control.  Flexible cystoscope was assembled and passed per the urethra.  The anterior urethra was normal without any lesions; the posterior urethra and prostate were unremarkable.  The bladder was inspected thoroughly and systematically,  which did not show any lesions or tumors, stone, fistulous tracts, diverticula.  Both ureteral orifices were normal with clear efflux of urine.   The patient tolerated the procedure well.  The scope was removed intact and without any issues.  This concluded the case.  He was taken to recovery period and discharged home in stable condition.      Plan  He will follow up as scheduled.     Electronically

## 2025-02-07 DIAGNOSIS — Z12.5 SCREENING PSA (PROSTATE SPECIFIC ANTIGEN): Primary | ICD-10-CM

## 2025-02-12 ENCOUNTER — PATIENT MESSAGE (OUTPATIENT)
Dept: FAMILY MEDICINE CLINIC | Age: 70
End: 2025-02-12

## 2025-02-12 RX ORDER — FLUTICASONE PROPIONATE 50 MCG
1 SPRAY, SUSPENSION (ML) NASAL 2 TIMES DAILY
Qty: 1 EACH | Refills: 5 | Status: SHIPPED | OUTPATIENT
Start: 2025-02-12

## 2025-02-12 NOTE — TELEPHONE ENCOUNTER
Binu called requesting a refill of the below medication which has been pended for you:     Requested Prescriptions     Pending Prescriptions Disp Refills    fluticasone (FLONASE) 50 MCG/ACT nasal spray 1 each 5     Si spray by Nasal route 2 times daily       Last Appointment Date: 2025  Next Appointment Date: 2025    Allergies   Allergen Reactions    Codeine      Severe Abdominal pain      Sulfa Antibiotics      Patient unsure of reaction

## 2025-03-02 ENCOUNTER — PATIENT MESSAGE (OUTPATIENT)
Dept: FAMILY MEDICINE CLINIC | Age: 70
End: 2025-03-02

## 2025-03-02 DIAGNOSIS — E11.9 TYPE 2 DIABETES MELLITUS WITHOUT COMPLICATION, WITHOUT LONG-TERM CURRENT USE OF INSULIN (HCC): ICD-10-CM

## 2025-03-03 NOTE — TELEPHONE ENCOUNTER
Binu called requesting a refill of the below medication which has been pended for you:     Requested Prescriptions     Pending Prescriptions Disp Refills    metFORMIN (GLUCOPHAGE) 500 MG tablet 180 tablet 1     Sig: Take 1 tablet by mouth 2 times daily (with meals)       Last Appointment Date: 1/20/2025  Next Appointment Date: 7/8/2025    Allergies   Allergen Reactions    Codeine      Severe Abdominal pain      Sulfa Antibiotics      Patient unsure of reaction

## 2025-03-24 ENCOUNTER — OFFICE VISIT (OUTPATIENT)
Dept: DERMATOLOGY | Age: 70
End: 2025-03-24
Payer: MEDICARE

## 2025-03-24 VITALS
HEART RATE: 76 BPM | BODY MASS INDEX: 30.74 KG/M2 | WEIGHT: 220.4 LBS | OXYGEN SATURATION: 100 % | DIASTOLIC BLOOD PRESSURE: 68 MMHG | SYSTOLIC BLOOD PRESSURE: 120 MMHG

## 2025-03-24 DIAGNOSIS — L73.9 FOLLICULITIS: ICD-10-CM

## 2025-03-24 DIAGNOSIS — L82.0 INFLAMED SEBORRHEIC KERATOSIS: ICD-10-CM

## 2025-03-24 DIAGNOSIS — L57.0 ACTINIC KERATOSIS: ICD-10-CM

## 2025-03-24 DIAGNOSIS — D48.9 NEOPLASM OF UNCERTAIN BEHAVIOR: Primary | ICD-10-CM

## 2025-03-24 DIAGNOSIS — L30.8 ASTEATOTIC ECZEMA: ICD-10-CM

## 2025-03-24 DIAGNOSIS — L82.1 SEBORRHEIC KERATOSES: ICD-10-CM

## 2025-03-24 PROCEDURE — 11102 TANGNTL BX SKIN SINGLE LES: CPT | Performed by: PHYSICIAN ASSISTANT

## 2025-03-24 PROCEDURE — 17110 DESTRUCTION B9 LES UP TO 14: CPT | Performed by: PHYSICIAN ASSISTANT

## 2025-03-24 PROCEDURE — 1159F MED LIST DOCD IN RCRD: CPT | Performed by: PHYSICIAN ASSISTANT

## 2025-03-24 PROCEDURE — 1123F ACP DISCUSS/DSCN MKR DOCD: CPT | Performed by: PHYSICIAN ASSISTANT

## 2025-03-24 PROCEDURE — 3078F DIAST BP <80 MM HG: CPT | Performed by: PHYSICIAN ASSISTANT

## 2025-03-24 PROCEDURE — 3074F SYST BP LT 130 MM HG: CPT | Performed by: PHYSICIAN ASSISTANT

## 2025-03-24 PROCEDURE — 99213 OFFICE O/P EST LOW 20 MIN: CPT | Performed by: PHYSICIAN ASSISTANT

## 2025-03-24 PROCEDURE — 3017F COLORECTAL CA SCREEN DOC REV: CPT | Performed by: PHYSICIAN ASSISTANT

## 2025-03-24 PROCEDURE — 1036F TOBACCO NON-USER: CPT | Performed by: PHYSICIAN ASSISTANT

## 2025-03-24 PROCEDURE — G8427 DOCREV CUR MEDS BY ELIG CLIN: HCPCS | Performed by: PHYSICIAN ASSISTANT

## 2025-03-24 PROCEDURE — G8417 CALC BMI ABV UP PARAM F/U: HCPCS | Performed by: PHYSICIAN ASSISTANT

## 2025-03-24 RX ORDER — CLINDAMYCIN PHOSPHATE 11.9 MG/ML
SOLUTION TOPICAL 2 TIMES DAILY
COMMUNITY
End: 2025-03-24 | Stop reason: SDUPTHER

## 2025-03-24 RX ORDER — FLUOROURACIL 50 MG/G
CREAM TOPICAL
Qty: 40 G | Refills: 0 | Status: SHIPPED | OUTPATIENT
Start: 2025-03-24 | End: 2025-03-28

## 2025-03-24 RX ORDER — CLINDAMYCIN PHOSPHATE 11.9 MG/ML
SOLUTION TOPICAL
Qty: 60 ML | Refills: 5 | Status: SHIPPED | OUTPATIENT
Start: 2025-03-24

## 2025-03-24 RX ORDER — FLUOROURACIL 50 MG/G
CREAM TOPICAL 2 TIMES DAILY
COMMUNITY
End: 2025-03-24

## 2025-03-24 NOTE — PROGRESS NOTES
Dermatology Patient Note  [facility], [facility]  [facility] SK[facility] A DEPARTMENT OF [facility]  [address] [facility] [address]  Dept: [phone]  Dept Fax: [phone]      VISITDATE: 3/24/2025   REFERRING PROVIDER: No ref. provider found      Binu Boggs is a 69 y.o. male  who presents today in the office for:    1 Year Follow Up (2 lesions bottom lip, top right ear, 2 spots left ear, nasal tip, itchy wart middle of back ), Eczema (Dry skin back and legs ), Lesion(s) (Right lower leg- itchy, enlarging ), and Discuss Medications (Refill fluorouracil/ )      HISTORY OF PRESENT ILLNESS:  As above.  Pt notes that the pruritus on his lower legs is doing well with prn triamcinolone ointment.    MEDICAL PROBLEMS:  Patient Active Problem List    Diagnosis Date Noted    Primary osteoarthritis of both knees 11/08/2022     Priority: Medium    Malignant neoplasm of lateral wall of urinary bladder (HCC) 12/10/2024    Major depressive disorder, recurrent, mild 11/13/2023    Mixed hyperlipidemia 09/25/2023    Hypothyroidism due to Hashimoto's thyroiditis 09/25/2023    Chronic midline low back pain without sciatica 01/20/2022    Erectile dysfunction 01/20/2022    Renal cell carcinoma (HCC) 02/12/2020    Dilated aortic root 02/12/2020    Type 2 diabetes mellitus with other circulatory complication, without long-term current use of insulin (HCC) 10/14/2019     on Rx.      Renal mass 11/30/2018    Bicuspid aortic valve 09/20/2016    Left knee pain 03/09/2016    Hallux valgus, acquired 01/26/2016     Replacing Inactive Diagnoses      Left foot pain 01/26/2016    HTN (hypertension) 03/17/2015    Aortic regurgitation due to bicuspid aortic valve 02/23/2015       CURRENT MEDICATIONS:   Current Outpatient Medications   Medication Sig Dispense Refill    fluorouracil (EFUDEX) 5 % cream Apply twice daily to actinic keratosis for 3 weeks. Expected redness and

## 2025-03-25 ENCOUNTER — LAB (OUTPATIENT)
Dept: LAB | Age: 70
End: 2025-03-25

## 2025-03-28 ENCOUNTER — RESULTS FOLLOW-UP (OUTPATIENT)
Age: 70
End: 2025-03-28

## 2025-03-28 DIAGNOSIS — L57.0 AK (ACTINIC KERATOSIS): Primary | ICD-10-CM

## 2025-03-28 RX ORDER — FLUOROURACIL 50 MG/G
CREAM TOPICAL
Qty: 40 G | Refills: 0 | Status: SHIPPED | OUTPATIENT
Start: 2025-03-28

## 2025-04-07 ENCOUNTER — PATIENT MESSAGE (OUTPATIENT)
Dept: FAMILY MEDICINE CLINIC | Age: 70
End: 2025-04-07

## 2025-04-07 DIAGNOSIS — E11.9 TYPE 2 DIABETES MELLITUS WITHOUT COMPLICATION, WITHOUT LONG-TERM CURRENT USE OF INSULIN: ICD-10-CM

## 2025-04-07 RX ORDER — CELECOXIB 100 MG/1
100 CAPSULE ORAL 2 TIMES DAILY PRN
Qty: 180 CAPSULE | Refills: 1 | Status: SHIPPED | OUTPATIENT
Start: 2025-04-07

## 2025-04-07 RX ORDER — ROSUVASTATIN CALCIUM 5 MG/1
5 TABLET, COATED ORAL DAILY
Qty: 90 TABLET | Refills: 1 | Status: SHIPPED | OUTPATIENT
Start: 2025-04-07

## 2025-04-07 NOTE — TELEPHONE ENCOUNTER
Binu called requesting a refill of the below medication which has been pended for you:     Requested Prescriptions     Pending Prescriptions Disp Refills    celecoxib (CELEBREX) 100 MG capsule 180 capsule 1     Sig: Take 1 capsule by mouth 2 times daily as needed for Pain    rosuvastatin (CRESTOR) 5 MG tablet 90 tablet 1     Sig: Take 1 tablet by mouth daily       Last Appointment Date: 1/20/2025  Next Appointment Date: 7/8/2025    Allergies   Allergen Reactions    Codeine      Severe Abdominal pain      Sulfa Antibiotics      Patient unsure of reaction

## 2025-04-14 DIAGNOSIS — I10 ESSENTIAL HYPERTENSION: ICD-10-CM

## 2025-04-14 RX ORDER — CARVEDILOL 25 MG/1
37.5 TABLET ORAL 2 TIMES DAILY
Qty: 270 TABLET | Refills: 3 | Status: SHIPPED | OUTPATIENT
Start: 2025-04-14

## 2025-04-28 ENCOUNTER — OFFICE VISIT (OUTPATIENT)
Dept: CARDIOLOGY | Age: 70
End: 2025-04-28
Payer: MEDICARE

## 2025-04-28 VITALS
HEART RATE: 76 BPM | WEIGHT: 215 LBS | DIASTOLIC BLOOD PRESSURE: 60 MMHG | BODY MASS INDEX: 29.99 KG/M2 | SYSTOLIC BLOOD PRESSURE: 128 MMHG | OXYGEN SATURATION: 97 %

## 2025-04-28 DIAGNOSIS — I35.0 NONRHEUMATIC AORTIC VALVE STENOSIS: ICD-10-CM

## 2025-04-28 DIAGNOSIS — I25.10 CORONARY ARTERY CALCIFICATION: ICD-10-CM

## 2025-04-28 DIAGNOSIS — I10 ESSENTIAL HYPERTENSION: Primary | ICD-10-CM

## 2025-04-28 DIAGNOSIS — I77.810 DILATED AORTIC ROOT: ICD-10-CM

## 2025-04-28 DIAGNOSIS — F33.0 MILD EPISODE OF RECURRENT MAJOR DEPRESSIVE DISORDER: ICD-10-CM

## 2025-04-28 DIAGNOSIS — Z98.890 S/P CARDIAC CATH: ICD-10-CM

## 2025-04-28 DIAGNOSIS — I38 MURMUR, DIASTOLIC: ICD-10-CM

## 2025-04-28 DIAGNOSIS — R94.31 ABNORMAL ECG: ICD-10-CM

## 2025-04-28 DIAGNOSIS — E78.00 PURE HYPERCHOLESTEROLEMIA: ICD-10-CM

## 2025-04-28 DIAGNOSIS — Q23.1 AORTIC REGURGITATION DUE TO BICUSPID AORTIC VALVE: ICD-10-CM

## 2025-04-28 DIAGNOSIS — Q23.81 BICUSPID AORTIC VALVE: ICD-10-CM

## 2025-04-28 DIAGNOSIS — R94.39 ABNORMAL CARDIOVASCULAR STRESS TEST: ICD-10-CM

## 2025-04-28 DIAGNOSIS — R94.39 ABNORMAL NUCLEAR STRESS TEST: ICD-10-CM

## 2025-04-28 DIAGNOSIS — F33.1 MAJOR DEPRESSIVE DISORDER, RECURRENT, MODERATE (HCC): ICD-10-CM

## 2025-04-28 DIAGNOSIS — Q23.81 AORTIC REGURGITATION DUE TO BICUSPID AORTIC VALVE: ICD-10-CM

## 2025-04-28 PROCEDURE — 3074F SYST BP LT 130 MM HG: CPT | Performed by: INTERNAL MEDICINE

## 2025-04-28 PROCEDURE — 1123F ACP DISCUSS/DSCN MKR DOCD: CPT | Performed by: INTERNAL MEDICINE

## 2025-04-28 PROCEDURE — 3078F DIAST BP <80 MM HG: CPT | Performed by: INTERNAL MEDICINE

## 2025-04-28 PROCEDURE — 99213 OFFICE O/P EST LOW 20 MIN: CPT | Performed by: INTERNAL MEDICINE

## 2025-04-28 PROCEDURE — G8427 DOCREV CUR MEDS BY ELIG CLIN: HCPCS | Performed by: INTERNAL MEDICINE

## 2025-04-28 PROCEDURE — 93005 ELECTROCARDIOGRAM TRACING: CPT | Performed by: INTERNAL MEDICINE

## 2025-04-28 PROCEDURE — 1159F MED LIST DOCD IN RCRD: CPT | Performed by: INTERNAL MEDICINE

## 2025-04-28 PROCEDURE — 99214 OFFICE O/P EST MOD 30 MIN: CPT | Performed by: INTERNAL MEDICINE

## 2025-04-28 PROCEDURE — G8417 CALC BMI ABV UP PARAM F/U: HCPCS | Performed by: INTERNAL MEDICINE

## 2025-04-28 PROCEDURE — 3017F COLORECTAL CA SCREEN DOC REV: CPT | Performed by: INTERNAL MEDICINE

## 2025-04-28 PROCEDURE — 93010 ELECTROCARDIOGRAM REPORT: CPT | Performed by: INTERNAL MEDICINE

## 2025-04-28 PROCEDURE — 1036F TOBACCO NON-USER: CPT | Performed by: INTERNAL MEDICINE

## 2025-04-28 NOTE — PROGRESS NOTES
[facility]    Binu Boggs  1955  [id number]    CC: Follow up for thoracic aortic aneurysm, Bicuspid AV with AI    HPI:  Patient is here for follow up.  Feels well.  Denies complaints of chest pain, shortness of breath orthopnea, PND, lower extremity edema.  He is active.  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  He states that one-time episode of left antecubital pain.  Also has some shoulder pain.  This was not with exertion.  This was not associated with chest pain.    Past Medical History:   Diagnosis Date    Anxiety     Aortic regurgitation     Bicuspid aortic valve     Diabetes mellitus (HCC) since March 2018    on Rx.    GERD (gastroesophageal reflux disease)     Hypertension     Left renal mass 10/2018    Osteoarthritis of left knee     Wears glasses        Past Surgical History:   Procedure Laterality Date    CARDIAC CATHETERIZATION  09/12/2022    CYST REMOVAL      tail bone    CYSTOSCOPY N/A 1/17/2024    CYSTOSCOPY performed by Germain Freeman MD at [facility]    CYSTOSCOPY N/A 1/31/2024    CYSTOSCOPY Transuretheral Resection Bladder (TURBT) performed by Germain Freeman MD at [facility] OR    CYSTOSCOPY N/A 5/15/2024    CYSTOSCOPY performed by Germain Freeman MD at [facility] OR    CYSTOSCOPY N/A 2/5/2025    Cystoscopy performed by Germain Freeman MD at [facility] OR    MOUTH SURGERY  2015    PARTIAL NEPHRECTOMY Left 11/30/2018    ROBOTIC PARTIAL NEPHRECTOMY,     MD COLONOSCOPY FLX DX W/COLLJ SPEC WHEN PFRMD N/A 05/14/2018    normal colon, Dr. Noble    MD LAPAROSCOPY SURG PARTIAL NEPHRECTOMY Left 11/30/2018    XI ROBOTIC PARTIAL NEPHRECTOMY, INTRA-OP LAP ULTRASOUND performed by Germain Freeman MD at [facility] OR    TONSILLECTOMY AND ADENOIDECTOMY  1960    WISDOM TOOTH EXTRACTION         Family History   Problem Relation Age of Onset    High Blood Pressure Mother     Diabetes Mother         impaired fasting glucose    Other Mother         short term memory loss after MVA    Glaucoma Mother

## 2025-05-12 ENCOUNTER — PATIENT MESSAGE (OUTPATIENT)
Dept: FAMILY MEDICINE CLINIC | Age: 70
End: 2025-05-12

## 2025-05-12 DIAGNOSIS — N52.9 ERECTILE DYSFUNCTION, UNSPECIFIED ERECTILE DYSFUNCTION TYPE: ICD-10-CM

## 2025-05-12 DIAGNOSIS — M79.89 LEG SWELLING: ICD-10-CM

## 2025-05-13 RX ORDER — SILDENAFIL CITRATE 20 MG/1
20 TABLET ORAL PRN
Qty: 30 TABLET | Refills: 5 | Status: SHIPPED | OUTPATIENT
Start: 2025-05-13

## 2025-05-13 RX ORDER — FUROSEMIDE 20 MG/1
20 TABLET ORAL DAILY
Qty: 90 TABLET | Refills: 1 | Status: SHIPPED | OUTPATIENT
Start: 2025-05-13

## 2025-05-13 NOTE — TELEPHONE ENCOUNTER
Binu called requesting a refill of the below medication which has been pended for you:     Requested Prescriptions     Pending Prescriptions Disp Refills    furosemide (LASIX) 20 MG tablet 90 tablet 1     Sig: Take 1 tablet by mouth daily    sildenafil (REVATIO) 20 MG tablet 30 tablet 5     Sig: Take 1 tablet by mouth as needed (Erectile dysfunction)       Last Appointment Date: 1/20/2025  Next Appointment Date: 7/8/2025    Allergies   Allergen Reactions    Codeine      Severe Abdominal pain      Sulfa Antibiotics      Patient unsure of reaction

## 2025-05-14 ENCOUNTER — HOSPITAL ENCOUNTER (OUTPATIENT)
Age: 70
Discharge: HOME | End: 2025-05-14
Payer: MEDICARE

## 2025-05-14 DIAGNOSIS — Z12.5 SCREENING PSA (PROSTATE SPECIFIC ANTIGEN): ICD-10-CM

## 2025-05-14 LAB — PSA SERPL-MCNC: 1.03 NG/ML (ref 0–4)

## 2025-05-14 PROCEDURE — G0103 PSA SCREENING: HCPCS

## 2025-05-14 PROCEDURE — 36415 COLL VENOUS BLD VENIPUNCTURE: CPT

## 2025-05-21 ENCOUNTER — OFFICE VISIT (OUTPATIENT)
Dept: UROLOGY | Age: 70
End: 2025-05-21
Payer: MEDICARE

## 2025-05-21 VITALS
SYSTOLIC BLOOD PRESSURE: 132 MMHG | BODY MASS INDEX: 29.54 KG/M2 | OXYGEN SATURATION: 97 % | HEART RATE: 80 BPM | HEIGHT: 71 IN | WEIGHT: 211 LBS | DIASTOLIC BLOOD PRESSURE: 62 MMHG | RESPIRATION RATE: 16 BRPM

## 2025-05-21 DIAGNOSIS — N40.1 BPH WITH OBSTRUCTION/LOWER URINARY TRACT SYMPTOMS: ICD-10-CM

## 2025-05-21 DIAGNOSIS — C64.2 RENAL CELL CARCINOMA OF LEFT KIDNEY (HCC): ICD-10-CM

## 2025-05-21 DIAGNOSIS — C67.2 MALIGNANT NEOPLASM OF LATERAL WALL OF URINARY BLADDER (HCC): Primary | ICD-10-CM

## 2025-05-21 DIAGNOSIS — N52.9 ERECTILE DYSFUNCTION, UNSPECIFIED ERECTILE DYSFUNCTION TYPE: ICD-10-CM

## 2025-05-21 DIAGNOSIS — R31.0 GROSS HEMATURIA: ICD-10-CM

## 2025-05-21 DIAGNOSIS — N13.8 BPH WITH OBSTRUCTION/LOWER URINARY TRACT SYMPTOMS: ICD-10-CM

## 2025-05-21 PROCEDURE — 3075F SYST BP GE 130 - 139MM HG: CPT | Performed by: UROLOGY

## 2025-05-21 PROCEDURE — 3017F COLORECTAL CA SCREEN DOC REV: CPT | Performed by: UROLOGY

## 2025-05-21 PROCEDURE — 3078F DIAST BP <80 MM HG: CPT | Performed by: UROLOGY

## 2025-05-21 PROCEDURE — 99214 OFFICE O/P EST MOD 30 MIN: CPT | Performed by: UROLOGY

## 2025-05-21 PROCEDURE — 1159F MED LIST DOCD IN RCRD: CPT | Performed by: UROLOGY

## 2025-05-21 PROCEDURE — G8417 CALC BMI ABV UP PARAM F/U: HCPCS | Performed by: UROLOGY

## 2025-05-21 PROCEDURE — G8427 DOCREV CUR MEDS BY ELIG CLIN: HCPCS | Performed by: UROLOGY

## 2025-05-21 PROCEDURE — 99215 OFFICE O/P EST HI 40 MIN: CPT | Performed by: UROLOGY

## 2025-05-21 PROCEDURE — 1123F ACP DISCUSS/DSCN MKR DOCD: CPT | Performed by: UROLOGY

## 2025-05-21 PROCEDURE — 1036F TOBACCO NON-USER: CPT | Performed by: UROLOGY

## 2025-05-21 NOTE — PATIENT INSTRUCTIONS
days before surgery (marijuana, heroin, cocaine, LSD, etc.)  ?  You will NEED a  the DAY of SURGERY if you are having SEDATION.  ?  The day of Surgery/Procedure ONLY one (1) adult may come into the facility with you.  ?  Please make arrangements to have someone assist you at home for the first 24 hours   following surgery.  ?  If you develop a cold, fever, chills, stomach ache, diarrhea, or other illness, please   contact your doctor right away. For your safety, your surgery may be re-scheduled.  If you have any questions regarding your surgery, please call your physician [phone]  [facility] will call THE AFTERNOON BEFORE AFTER 1PM with arrival time [phone].  **Please Bring** 1)insurance card(s) 2)’s license 3)also a form of payment as you may   need to pay on your account

## 2025-05-21 NOTE — PROGRESS NOTES
Activating Events/Stressors (REVATIO) 20 MG tablet Take 1 tablet by mouth as needed (Erectile dysfunction) 30 tablet 5    carvedilol (COREG) 25 MG tablet Take 1.5 tablets by mouth 2 times daily 270 tablet 3    celecoxib (CELEBREX) 100 MG capsule Take 1 capsule by mouth 2 times daily as needed for Pain 180 capsule 1    rosuvastatin (CRESTOR) 5 MG tablet Take 1 tablet by mouth daily 90 tablet 1    fluorouracil (EFUDEX) 5 % cream Apply twice daily to actinic keratosis for 3 weeks. Expected redness and irritation 40 g 0    clindamycin (CLEOCIN T) 1 % external solution Apply to affected region of scalp, daily. 60 mL 5    metFORMIN (GLUCOPHAGE) 500 MG tablet Take 1 tablet by mouth 2 times daily (with meals) 180 tablet 1    fluticasone (FLONASE) 50 MCG/ACT nasal spray 1 spray by Nasal route 2 times daily 1 each 5    amLODIPine (NORVASC) 5 MG tablet Take 1 tablet by mouth daily 90 tablet 1    DULoxetine (CYMBALTA) 20 MG extended release capsule Take 1 capsule by mouth daily 90 capsule 3    levothyroxine (SYNTHROID) 75 MCG tablet Take 1 tablet by mouth daily 90 tablet 1    losartan-hydroCHLOROthiazide (HYZAAR) 100-25 MG per tablet Take 1 tablet by mouth daily 90 tablet 3    montelukast (SINGULAIR) 10 MG tablet Take 1 tablet by mouth nightly 90 tablet 1    omeprazole (PRILOSEC) 40 MG delayed release capsule Take 1 capsule by mouth every morning (before breakfast) 90 capsule 1    Semaglutide (RYBELSUS) 14 MG TABS Take 1 tablet by mouth daily 90 tablet 3    benzoyl peroxide 5 % external liquid Use to wash scalp and axilla, daily. 236 mL 5    triamcinolone (KENALOG) 0.1 % ointment Apply to lower legs twice daily, as needed, for itching.  Do NOT use on face, groin, or armpits. 454 g 2    cetirizine (ZYRTEC) 10 MG tablet Take 1 tablet by mouth daily 90 tablet 3    turmeric 500 MG CAPS Take 1 capsule by mouth in the morning and 1 capsule in the evening.      busPIRone (BUSPAR) 7.5 MG tablet Take 1 tablet by mouth 3 times daily as needed (anxiety) 90 tablet 3

## 2025-05-24 ENCOUNTER — PATIENT MESSAGE (OUTPATIENT)
Dept: FAMILY MEDICINE CLINIC | Age: 70
End: 2025-05-24

## 2025-05-27 RX ORDER — ORAL SEMAGLUTIDE 14 MG/1
1 TABLET ORAL DAILY
Qty: 90 TABLET | Refills: 3 | Status: SHIPPED | OUTPATIENT
Start: 2025-05-27

## 2025-05-27 NOTE — TELEPHONE ENCOUNTER
Binu called requesting a refill of the below medication which has been pended for you:     Requested Prescriptions     Pending Prescriptions Disp Refills    Semaglutide (RYBELSUS) 14 MG TABS 90 tablet 3     Sig: Take 1 tablet by mouth daily       Last Appointment Date: 1/20/2025  Next Appointment Date: 7/8/2025    Allergies   Allergen Reactions    Codeine      Severe Abdominal pain      Sulfa Antibiotics      Patient unsure of reaction

## 2025-07-28 ENCOUNTER — PATIENT MESSAGE (OUTPATIENT)
Dept: FAMILY MEDICINE CLINIC | Age: 70
End: 2025-07-28

## 2025-07-28 RX ORDER — LEVOTHYROXINE SODIUM 75 UG/1
75 TABLET ORAL DAILY
Qty: 90 TABLET | Refills: 1 | Status: SHIPPED | OUTPATIENT
Start: 2025-07-28

## 2025-07-28 NOTE — TELEPHONE ENCOUNTER
Binu called requesting a refill of the below medication which has been pended for you:     Requested Prescriptions     Pending Prescriptions Disp Refills    levothyroxine (SYNTHROID) 75 MCG tablet 90 tablet 1     Sig: Take 1 tablet by mouth daily       Last Appointment Date: 1/20/2025  Next Appointment Date: 8/18/2025    Allergies   Allergen Reactions    Codeine      Severe Abdominal pain      Sulfa Antibiotics      Patient unsure of reaction

## 2025-07-29 DIAGNOSIS — I10 ESSENTIAL HYPERTENSION: ICD-10-CM

## 2025-07-29 DIAGNOSIS — J30.2 OTHER SEASONAL ALLERGIC RHINITIS: ICD-10-CM

## 2025-07-29 RX ORDER — MONTELUKAST SODIUM 10 MG/1
10 TABLET ORAL NIGHTLY
Qty: 90 TABLET | Refills: 1 | Status: SHIPPED | OUTPATIENT
Start: 2025-07-29

## 2025-07-29 RX ORDER — AMLODIPINE BESYLATE 5 MG/1
5 TABLET ORAL DAILY
Qty: 90 TABLET | Refills: 1 | Status: SHIPPED | OUTPATIENT
Start: 2025-07-29

## 2025-07-29 RX ORDER — OMEPRAZOLE 40 MG/1
40 CAPSULE, DELAYED RELEASE ORAL
Qty: 90 CAPSULE | Refills: 1 | Status: SHIPPED | OUTPATIENT
Start: 2025-07-29

## 2025-07-29 NOTE — TELEPHONE ENCOUNTER
Binu called requesting a refill of the below medication which has been pended for you:     Requested Prescriptions     Pending Prescriptions Disp Refills    omeprazole (PRILOSEC) 40 MG delayed release capsule [Pharmacy Med Name: OMEPRAZOLE DR 40 MG CAPSULE] 90 capsule 1     Sig: TAKE ONE CAPSULE BY MOUTH EVERY MORNING BEFORE BREAKFAST    montelukast (SINGULAIR) 10 MG tablet [Pharmacy Med Name: MONTELUKAST SOD 10 MG TABLET] 90 tablet 1     Sig: TAKE ONE TABLET BY MOUTH ONCE NIGHTLY    amLODIPine (NORVASC) 5 MG tablet [Pharmacy Med Name: amLODIPine BESYLATE 5 MG TAB] 90 tablet 1     Sig: TAKE 1 TABLET BY MOUTH DAILY       Last Appointment Date: 1/20/2025  Next Appointment Date: 8/18/2025    Allergies   Allergen Reactions    Codeine      Severe Abdominal pain      Sulfa Antibiotics      Patient unsure of reaction

## 2025-08-18 ENCOUNTER — OFFICE VISIT (OUTPATIENT)
Dept: FAMILY MEDICINE CLINIC | Age: 70
End: 2025-08-18
Payer: MEDICARE

## 2025-08-18 ENCOUNTER — HOSPITAL ENCOUNTER (OUTPATIENT)
Dept: LAB | Age: 70
Discharge: HOME | End: 2025-08-18
Payer: MEDICARE

## 2025-08-18 VITALS
DIASTOLIC BLOOD PRESSURE: 64 MMHG | OXYGEN SATURATION: 99 % | SYSTOLIC BLOOD PRESSURE: 126 MMHG | HEART RATE: 100 BPM | HEIGHT: 71 IN | BODY MASS INDEX: 29.57 KG/M2 | WEIGHT: 211.2 LBS

## 2025-08-18 DIAGNOSIS — J30.1 SEASONAL ALLERGIC RHINITIS DUE TO POLLEN: ICD-10-CM

## 2025-08-18 DIAGNOSIS — F33.0 MAJOR DEPRESSIVE DISORDER, RECURRENT, MILD: ICD-10-CM

## 2025-08-18 DIAGNOSIS — I10 PRIMARY HYPERTENSION: ICD-10-CM

## 2025-08-18 DIAGNOSIS — M17.0 PRIMARY OSTEOARTHRITIS OF BOTH KNEES: ICD-10-CM

## 2025-08-18 DIAGNOSIS — E11.59 TYPE 2 DIABETES MELLITUS WITH OTHER CIRCULATORY COMPLICATION, WITHOUT LONG-TERM CURRENT USE OF INSULIN (HCC): Primary | ICD-10-CM

## 2025-08-18 DIAGNOSIS — E78.2 MIXED HYPERLIPIDEMIA: ICD-10-CM

## 2025-08-18 DIAGNOSIS — E11.59 TYPE 2 DIABETES MELLITUS WITH OTHER CIRCULATORY COMPLICATION, WITHOUT LONG-TERM CURRENT USE OF INSULIN (HCC): ICD-10-CM

## 2025-08-18 LAB
ALBUMIN SERPL-MCNC: 4.1 G/DL (ref 3.5–5.2)
ALBUMIN/GLOB SERPL: 1.2 {RATIO} (ref 1–2.5)
ALP SERPL-CCNC: 39 U/L (ref 40–129)
ALT SERPL-CCNC: 12 U/L (ref 10–50)
ANION GAP SERPL CALCULATED.3IONS-SCNC: 9 MMOL/L (ref 9–16)
AST SERPL-CCNC: 18 U/L (ref 10–50)
BILIRUB SERPL-MCNC: 0.5 MG/DL (ref 0–1.2)
BUN SERPL-MCNC: 23 MG/DL (ref 8–23)
BUN/CREAT SERPL: 23 (ref 9–20)
CALCIUM SERPL-MCNC: 9.4 MG/DL (ref 8.6–10.4)
CHLORIDE SERPL-SCNC: 101 MMOL/L (ref 98–107)
CHOLEST SERPL-MCNC: 111 MG/DL (ref 0–199)
CHOLESTEROL/HDL RATIO: 3.2
CO2 SERPL-SCNC: 29 MMOL/L (ref 20–31)
CREAT SERPL-MCNC: 1 MG/DL (ref 0.7–1.2)
EST. AVERAGE GLUCOSE BLD GHB EST-MCNC: 128 MG/DL
GFR, ESTIMATED: 81 ML/MIN/1.73M2
GLUCOSE SERPL-MCNC: 124 MG/DL (ref 74–99)
HBA1C MFR BLD: 6.1 % (ref 4–6)
HDLC SERPL-MCNC: 35 MG/DL
LDLC SERPL CALC-MCNC: 57 MG/DL (ref 0–100)
POTASSIUM SERPL-SCNC: 4.4 MMOL/L (ref 3.7–5.3)
PROT SERPL-MCNC: 7.5 G/DL (ref 6.6–8.7)
SODIUM SERPL-SCNC: 139 MMOL/L (ref 136–145)
TRIGL SERPL-MCNC: 97 MG/DL
VLDLC SERPL CALC-MCNC: 19 MG/DL (ref 1–30)

## 2025-08-18 PROCEDURE — 3017F COLORECTAL CA SCREEN DOC REV: CPT | Performed by: FAMILY MEDICINE

## 2025-08-18 PROCEDURE — 1036F TOBACCO NON-USER: CPT | Performed by: FAMILY MEDICINE

## 2025-08-18 PROCEDURE — G8417 CALC BMI ABV UP PARAM F/U: HCPCS | Performed by: FAMILY MEDICINE

## 2025-08-18 PROCEDURE — 1160F RVW MEDS BY RX/DR IN RCRD: CPT | Performed by: FAMILY MEDICINE

## 2025-08-18 PROCEDURE — 3078F DIAST BP <80 MM HG: CPT | Performed by: FAMILY MEDICINE

## 2025-08-18 PROCEDURE — 80061 LIPID PANEL: CPT

## 2025-08-18 PROCEDURE — G2211 COMPLEX E/M VISIT ADD ON: HCPCS | Performed by: FAMILY MEDICINE

## 2025-08-18 PROCEDURE — 3044F HG A1C LEVEL LT 7.0%: CPT | Performed by: FAMILY MEDICINE

## 2025-08-18 PROCEDURE — 1123F ACP DISCUSS/DSCN MKR DOCD: CPT | Performed by: FAMILY MEDICINE

## 2025-08-18 PROCEDURE — 2022F DILAT RTA XM EVC RTNOPTHY: CPT | Performed by: FAMILY MEDICINE

## 2025-08-18 PROCEDURE — 3074F SYST BP LT 130 MM HG: CPT | Performed by: FAMILY MEDICINE

## 2025-08-18 PROCEDURE — G8427 DOCREV CUR MEDS BY ELIG CLIN: HCPCS | Performed by: FAMILY MEDICINE

## 2025-08-18 PROCEDURE — 99214 OFFICE O/P EST MOD 30 MIN: CPT | Performed by: FAMILY MEDICINE

## 2025-08-18 PROCEDURE — 36415 COLL VENOUS BLD VENIPUNCTURE: CPT

## 2025-08-18 PROCEDURE — 1159F MED LIST DOCD IN RCRD: CPT | Performed by: FAMILY MEDICINE

## 2025-08-18 PROCEDURE — 83036 HEMOGLOBIN GLYCOSYLATED A1C: CPT

## 2025-08-18 PROCEDURE — 80053 COMPREHEN METABOLIC PANEL: CPT

## 2025-08-18 ASSESSMENT — ENCOUNTER SYMPTOMS
CHEST TIGHTNESS: 0
SHORTNESS OF BREATH: 0
WHEEZING: 0
COUGH: 0

## 2025-08-27 DIAGNOSIS — E11.9 TYPE 2 DIABETES MELLITUS WITHOUT COMPLICATION, WITHOUT LONG-TERM CURRENT USE OF INSULIN (HCC): ICD-10-CM

## (undated) DEVICE — CYSTO/BLADDER IRRIGATION SET, REGULATING CLAMP

## (undated) DEVICE — TIP COVER ACCESSORY

## (undated) DEVICE — COUNTER NDL 40 COUNT HLD 70 FOAM BLK ADH W/ MAG

## (undated) DEVICE — GLOVE ORANGE PI 7 1/2   MSG9075

## (undated) DEVICE — DISCONTINUED USE 419147 KIT ENDOSCOPY CUSTOM PACK

## (undated) DEVICE — PAD,O.R.,TABLE,CONV FOAM,2X20X72: Brand: MEDLINE

## (undated) DEVICE — CYSTOURETHROSCOPE FLX OD 16.5 FR WORKING CHANNEL 7.2 FR

## (undated) DEVICE — AGENT HEMSTAT W2XL14IN OXIDIZED REGENERATED CELOS ABSRB FOR

## (undated) DEVICE — PACK PROCEDURE SURG ROBOTIC KID SVMMC

## (undated) DEVICE — GOWN,AURORA,NONREINFORCED,LARGE: Brand: MEDLINE

## (undated) DEVICE — SOLUTION SCRB 4OZ 4% CHG CLN BASE FOR PT SKIN ANTISEPSIS

## (undated) DEVICE — POUCH INSTR W40XL26IN BUTT FLD COLL FLTR DRNGE PRT

## (undated) DEVICE — 1200CC GUARDIAN II: Brand: GUARDIAN

## (undated) DEVICE — COLONOSCOPE ENDOSCP ABSORBENT SM PEDIATRIC 104 MM VISION

## (undated) DEVICE — DRAIN,WOUND,15FR,3/16,FULL-FLUTED: Brand: MEDLINE

## (undated) DEVICE — BAG SPEC REM 224ML W4XL6IN DIA10MM 1 HND GYN DISP ENDOPCH

## (undated) DEVICE — GOWN,AURORA,NONRNF,XL,30/CS: Brand: MEDLINE

## (undated) DEVICE — CHLORAPREP 26ML ORANGE

## (undated) DEVICE — CANNULA SEAL

## (undated) DEVICE — CUTTING LOOP, BIPOLAR, 24/26 FR.: Brand: N.A.

## (undated) DEVICE — GLOVE SURG SZ 6 THK91MIL LTX FREE SYN POLYISOPRENE ANTI

## (undated) DEVICE — ARM DRAPE

## (undated) DEVICE — 1840 FOAM BLOCK NEEDLE COUNTER: Brand: DEVON

## (undated) DEVICE — LINE SAMP O2 6.5FT W/FEMALE CONN F/ADULT CAPNOLINE PLUS

## (undated) DEVICE — SOLUTION IV IRRIG WATER 1000ML POUR BRL 2F7114

## (undated) DEVICE — SKIN AFFIX SURG ADHESIVE 72/CS 0.55ML: Brand: MEDLINE

## (undated) DEVICE — SUTURE ETHLN SZ 3-0 L18IN NONABSORBABLE BLK L24MM PS-1 3/8 1663G

## (undated) DEVICE — MDHZ CYSTOSCOPY: Brand: MEDLINE INDUSTRIES, INC.

## (undated) DEVICE — AIRSEAL 12 MM ACCESS PORT AND PALM GRIP OBTURATOR WITH BLADELESS OPTICAL TIP, 120 MM LENGTH: Brand: AIRSEAL

## (undated) DEVICE — KENDALL SCD EXPRESS SLEEVES, KNEE LENGTH, MEDIUM: Brand: KENDALL SCD

## (undated) DEVICE — TOTAL TRAY, 16FR 10ML SIL FOLEY, URN: Brand: MEDLINE

## (undated) DEVICE — PACK,CYSTOSCOPY,PK VI: Brand: MEDLINE

## (undated) DEVICE — TRI-LUMEN FILTERED TUBE SET WITH ACTIVATED CHARCOAL FILTER: Brand: AIRSEAL

## (undated) DEVICE — SCISSOR SURG METZ CRV TIP

## (undated) DEVICE — RESERVOIR,SUCTION,100CC,SILICONE: Brand: MEDLINE

## (undated) DEVICE — SUTURE VCRL SZ 1 L18IN ABSRB VLT CT-1 L36MM 1/2 CIR J741D

## (undated) DEVICE — Z DUP USE 2641840 CLIP INT L POLYMER LOK LIG HEM O LOK

## (undated) DEVICE — SUTURE V-LOC 180 SZ 3-0 L6IN ABSRB GRN V-20 L26MM 1/2 CIR VLOCL0604

## (undated) DEVICE — SOLUTION IRRIG 3000ML 0.9% SOD CHL USP UROMATIC PLAS CONT

## (undated) DEVICE — GLOVE ORANGE PI 7   MSG9070

## (undated) DEVICE — AGENT HEMSTAT 5GM ARISTA AH

## (undated) DEVICE — CONNECTOR TBNG AUX H2O JET DISP FOR OLY 160/180 SER

## (undated) DEVICE — DVD-R MAXWELL ROBOTIC SURGERY

## (undated) DEVICE — TROCAR ENDOSCP L100MM DIA12MM BLDELSS OBT RADLUC STBL SL

## (undated) DEVICE — 4-PORT MANIFOLD: Brand: NEPTUNE 2

## (undated) DEVICE — CATHETER,FOLEY,3WAY,SILI-ELAST,22FR,30ML: Brand: MEDLINE

## (undated) DEVICE — JELLY LUBRICATING 4OZ FLIP TOP TB E Z

## (undated) DEVICE — SUTURE VCRL + SZ 1 L36IN ABSRB UD L36MM CT-1 1/2 CIR VCP947H

## (undated) DEVICE — TOWEL,OR,DSP,ST,NATURAL,DLX,4/PK,20PK/CS: Brand: MEDLINE

## (undated) DEVICE — SUTURE MCRYL SZ 4-0 L18IN ABSRB UD L16MM PC-3 3/8 CIR PRIM Y845G

## (undated) DEVICE — 60 ML SYRINGE REGULAR TIP: Brand: MONOJECT

## (undated) DEVICE — APPLIER SUT CLP FOR 2-0 3-0 4-0 VCRL ABSRB SUT

## (undated) DEVICE — BAG SPEC LAP 9X7.5 IN 12 MM 1500 CC MEM WIRE CANN NYL

## (undated) DEVICE — PROTECTOR ULN NRV PUR FOAM HK LOOP STRP ANATOMICALLY

## (undated) DEVICE — GAUZE,SPONGE,FLUFF,6"X6.75",STRL,5/TRAY: Brand: MEDLINE

## (undated) DEVICE — DRESSING TRNSPAR W5XL4.5IN FLM SHT SEMIPERMEABLE WIND

## (undated) DEVICE — COVER,LIGHT HANDLE,FLX,2/PK: Brand: MEDLINE INDUSTRIES, INC.

## (undated) DEVICE — GARMENT,MEDLINE,DVT,INT,CALF,MED, GEN2: Brand: MEDLINE

## (undated) DEVICE — BLADELESS OBTURATOR: Brand: WECK VISTA

## (undated) DEVICE — SODIUM CHL 09% SOL EXCEL 1000ML

## (undated) DEVICE — GAUZE,SPONGE,4"X4",16PLY,XRAY,STRL,LF: Brand: MEDLINE

## (undated) DEVICE — ELECTRO LUBE IS A SINGLE PATIENT USE DEVICE THAT IS INTENDED TO BE USED ON ELECTROSURGICAL ELECTRODES TO REDUCE STICKING.: Brand: KEY SURGICAL ELECTRO LUBE

## (undated) DEVICE — GLOVE SURG SZ 65 THK91MIL LTX FREE SYN POLYISOPRENE

## (undated) DEVICE — POUCH DRNGE FLX BND INTEGR RAIL CLMP DISP EZ CTCH

## (undated) DEVICE — APPLICATOR SURG XL L38CM FOR ARISTA ABSRB HEMSTAT FLEXITIP

## (undated) DEVICE — SOLUTION ANTIFOG VIS SYS CLEARIFY LAPSCP